# Patient Record
Sex: FEMALE | Race: WHITE | HISPANIC OR LATINO | Employment: UNEMPLOYED | URBAN - METROPOLITAN AREA
[De-identification: names, ages, dates, MRNs, and addresses within clinical notes are randomized per-mention and may not be internally consistent; named-entity substitution may affect disease eponyms.]

---

## 2017-01-31 ENCOUNTER — PATIENT MESSAGE (OUTPATIENT)
Dept: UROLOGY | Facility: CLINIC | Age: 42
End: 2017-01-31

## 2017-02-02 ENCOUNTER — PROCEDURE VISIT (OUTPATIENT)
Dept: UROLOGY | Facility: CLINIC | Age: 42
End: 2017-02-02
Payer: COMMERCIAL

## 2017-02-02 VITALS
HEART RATE: 84 BPM | TEMPERATURE: 99 F | WEIGHT: 143 LBS | DIASTOLIC BLOOD PRESSURE: 74 MMHG | BODY MASS INDEX: 26.31 KG/M2 | SYSTOLIC BLOOD PRESSURE: 120 MMHG | HEIGHT: 62 IN

## 2017-02-02 DIAGNOSIS — N39.8 VOIDING DYSFUNCTION: ICD-10-CM

## 2017-02-02 DIAGNOSIS — R35.0 FREQUENCY OF URINATION: ICD-10-CM

## 2017-02-02 DIAGNOSIS — N31.8 FREQUENCY-URGENCY SYNDROME: Primary | ICD-10-CM

## 2017-02-02 PROCEDURE — 51728 CYSTOMETROGRAM W/VP: CPT | Mod: 26,,, | Performed by: UROLOGY

## 2017-02-02 PROCEDURE — 51797 INTRAABDOMINAL PRESSURE TEST: CPT | Mod: 26,,, | Performed by: UROLOGY

## 2017-02-02 PROCEDURE — 52000 CYSTOURETHROSCOPY: CPT | Mod: 59,,, | Performed by: UROLOGY

## 2017-02-02 PROCEDURE — 51741 ELECTRO-UROFLOWMETRY FIRST: CPT | Mod: 26,51,, | Performed by: UROLOGY

## 2017-02-02 PROCEDURE — 51784 ANAL/URINARY MUSCLE STUDY: CPT | Mod: 26,51,, | Performed by: UROLOGY

## 2017-02-02 RX ORDER — LIDOCAINE HYDROCHLORIDE AND EPINEPHRINE 10; 10 MG/ML; UG/ML
20 INJECTION, SOLUTION INFILTRATION; PERINEURAL ONCE
Status: DISCONTINUED | OUTPATIENT
Start: 2017-02-02 | End: 2017-09-21

## 2017-02-02 RX ORDER — LORAZEPAM 1 MG/1
1 TABLET ORAL ONCE
Qty: 1 TABLET | Refills: 0 | Status: SHIPPED | OUTPATIENT
Start: 2017-02-02 | End: 2017-02-02

## 2017-02-02 RX ORDER — CIPROFLOXACIN 500 MG/1
500 TABLET ORAL 2 TIMES DAILY
Qty: 6 TABLET | Refills: 0 | Status: SHIPPED | OUTPATIENT
Start: 2017-02-02 | End: 2017-02-05

## 2017-02-02 RX ORDER — OXYCODONE AND ACETAMINOPHEN 5; 325 MG/1; MG/1
1 TABLET ORAL EVERY 6 HOURS PRN
Qty: 3 TABLET | Refills: 0 | Status: SHIPPED | OUTPATIENT
Start: 2017-02-02 | End: 2017-08-17

## 2017-02-02 NOTE — MR AVS SNAPSHOT
[facility] U[facility] 4th Floor  [address]  [place] [address]  Phone: [phone]                  Rema Horton   2017 9:30 AM   Procedure visit    Description:  Female : 1975   Provider:  SUDS, UROLOGY   Department:  [facility] Urolog 4th Floor           Diagnoses this Visit        Comments    Frequency-urgency syndrome    -  Primary     Frequency of urination         Voiding dysfunction                To Do List           Future Appointments        Provider Department Dept Phone    3/2/2017 12:45 PM ANUPAMA UROLOGISATU [facility] 4th Floor [phone]      Goals (5 Years of Data)     None      Follow-Up and Disposition     Return in about 4 weeks (around 3/2/2017) for PNE sacral nerve test stimulation.       These Medications        Disp Refills Start End    ciprofloxacin HCl (CIPRO) 500 MG tablet 6 tablet 0 2017    Take 1 tablet (500 mg total) by mouth 2 (two) times daily. - Oral    Pharmacy: [facility]/pharmacy #[address] [place] [address] [place] [address] [address]. Ph #: [phone]       lorazepam (ATIVAN) 1 MG tablet 1 tablet 0 2017    Take 1 tablet (1 mg total) by mouth once. - Oral    Pharmacy: [facility]/pharmacy #[address] [place] [address] [place] [address] [address]. Ph #: [phone]       oxycodone-acetaminophen (PERCOCET) 5-325 mg per tablet 3 tablet 0 2017     Take 1 tablet by mouth every 6 (six) hours as needed for Pain. - Oral    Pharmacy: [facility]/pharmacy #[address] [place] [address] [place] [address] [address]. Ph #: [phone]         [facility] On Call     [facility] On Call Nurse Care Line -  Assistance  Registered nurses in the [facility] Center provide clinical advisement, health education, appointment booking, and other advisory services.  Call for this free service at [phone].             Medications           Message regarding Medications     Verify the changes and/or additions to your medication regime listed below are the same as discussed with your  clinician today.  If any of these changes or additions are incorrect, please notify your healthcare provider.        START taking these NEW medications        Refills    ciprofloxacin HCl (CIPRO) 500 MG tablet 0    Sig: Take 1 tablet (500 mg total) by mouth 2 (two) times daily.    Class: Print    Route: Oral    lorazepam (ATIVAN) 1 MG tablet 0    Sig: Take 1 tablet (1 mg total) by mouth once.    Class: Print    Route: Oral    oxycodone-acetaminophen (PERCOCET) 5-325 mg per tablet 0    Sig: Take 1 tablet by mouth every 6 (six) hours as needed for Pain.    Class: Print    Route: Oral      These medications were administered today        Dose Freq    lidocaine-EPINEPHrine 1%-1:100,000 injection 20 mL 20 mL Once    Si mLs by Other route once.    Class: Normal    Route: Other    sodium bicarbonate 4% injection 2.5 mEq 5 mL Once    Sig: Inject 5 mLs (2.5 mEq total) into the skin once.    Class: Normal    Route: Subcutaneous      STOP taking these medications     tamsulosin (FLOMAX) 0.4 mg Cp24 Take 1 capsule (0.4 mg total) by mouth every evening.           Verify that the below list of medications is an accurate representation of the medications you are currently taking.  If none reported, the list may be blank. If incorrect, please contact your healthcare provider. Carry this list with you in case of emergency.           Current Medications     amitriptyline (ELAVIL) 10 MG tablet Take 1 tablet (10 mg total) by mouth nightly as needed for Insomnia. Take 1 to 2 tablets at night    ASCORBATE CALCIUM (VITAMIN C ORAL) Take by mouth.    baclofen (LIORESAL) 10 MG tablet Take 1 tablet (10 mg total) by mouth 2 (two) times daily.    betamethasone valerate (LUXIQ) 0.12 % foam APPLY TOPICALLY ONCE DAILY TO AFFECTED AREAS ON SCALP AS NEEDED FOR ITCHING    boric acid Gran QHS X 7 DAYS THEN 3 TIMES A WEEK VAGINALLY    buPROPion (WELLBUTRIN XL) 150 MG TB24 tablet Take 1 tablet (150 mg total) by mouth once daily.    ciclopirox 1 %  "shampoo AAA every other day. Let sit x 5 min prior to rinsing.    ciprofloxacin HCl (CIPRO) 500 MG tablet Take 1 tablet (500 mg total) by mouth 2 (two) times daily.    estradiol (ESTRACE) 0.01 % (0.1 mg/gram) vaginal cream Place 1 g vaginally twice a week.    fluocinonide (LIDEX) 0.05 % external solution AAA scalp qd - bid prn pruritus    gabapentin (NEURONTIN) 300 MG capsule TAKE ONE CAPSULE BY MOUTH 3 TIMES A DAY    hydrOXYzine HCl (ATARAX) 25 MG tablet Take 1 tablet (25 mg total) by mouth every evening.    IRON, FERROUS SULFATE, 325 mg (65 mg iron) Tab tablet TAKE 1 TABLET (325 MG TOTAL) BY MOUTH 3 (THREE) TIMES DAILY.    lorazepam (ATIVAN) 1 MG tablet Take 1 tablet (1 mg total) by mouth once.    oxycodone-acetaminophen (PERCOCET) 5-325 mg per tablet Take 1 tablet by mouth every 4 (four) hours as needed.    oxycodone-acetaminophen (PERCOCET) 5-325 mg per tablet Take 1 tablet by mouth every 6 (six) hours as needed for Pain.    oxyquinoline-Na lauryl sulfate (TRIMO-MIRANDA JELLY) 0.025-0.01 % Gel Place 3 g vaginally once daily.    triamcinolone acetonide 0.1% (KENALOG) 0.1 % cream AAA BID x 1-2 wks then prn flares only    valacyclovir (VALTREX) 500 MG tablet TAKE 1 TABLET (500 MG TOTAL) BY MOUTH ONCE DAILY.    VITAMIN E ACETATE (VITAMIN E ORAL) Take by mouth continuous prn.            Clinical Reference Information           Your Vitals Were     BP Pulse Temp Height Weight BMI    120/74 84 99.3 °F (37.4 °C) (Oral) 5' 2" (1.575 m) 64.9 kg (143 lb) 26.16 kg/m2      Blood Pressure          Most Recent Value    BP  120/74      Allergies as of 2/2/2017     Flexeril  [Cyclobenzaprine]    Lodine  [Etodolac]      Immunizations Administered on Date of Encounter - 2/2/2017     None      Orders Placed During Today's Visit      Normal Orders This Visit    Simple urodynamics w/ cysto     Future Labs/Procedures Expected by Expires    PERCUTANEOUS NERVE TRIAL  3/2/2017 5/2/2017      Instructions    SIMPLE URODYNAMIC STUDY (SUDS) & " CYSTOSCOPY  UROLOGY CLINIC DISCHARGE INSTRUCTIONS    You have had a procedure that will require time to properly heal. Follow the instructions you have been given on how to care for yourself once you are home. Below is additional information to help in your recovery.    ACTIVITY  · There are no restrictions in activity. Start doing again the things you did before the procedure.  · You may experience a slight burning sensation. You may notice a small amount of blood in your urine. This will clear up within a day. Call the clinic if this continues beyond 48 hours.    DIET  · Continue your normal diet. You may eat the same foods you ate before your procedure.  · Drink plenty of fluids during the first 24-48 hours following your procedure.    MEDICATIONS  · Resume all other previous medications from your prescribing physician.  · Continue any pre=procedure antibiotics until they are all gone.    SIGNS AND SYMPTOMS TO REPORT TO THE DOCTOR  · Chills or fever greater than 101° F within 24 hours of procedure.  · Changes in urination, such as increased bleeding, foul smell, cloudy urine, or painful urination.  · Call your doctor with any questions or concerns.    For any emergency situation, call 151 immediately or go to your nearest emergency room.    [facility]  [phone]      Instructed by_________________________________          Patient Signature___________________________________                                                                                                                                                                                             If any problems after hours or weekends, you may call [phone] and ask for the urology resident on call.       Language Assistance Services     ATTENTION: Language assistance services are available, free of charge. Please call [phone].      ATENCIÓN: Si habla español, tiene a valles disposición servicios gratuitos de asistencia  lingüística. Dario al [phone].     LAZARA Ý: N?u b?n nói Ti?ng Vi?t, có các d?ch v? h? tr? ngôn ng? mi?n phí dành cho b?n. G?i s? [phone].         [facility] 4th Floor complies with applicable Federal civil rights laws and does not discriminate on the basis of race, color, national origin, age, disability, or sex.

## 2017-02-02 NOTE — PATIENT INSTRUCTIONS
SIMPLE URODYNAMIC STUDY (SUDS) & CYSTOSCOPY  UROLOGY CLINIC DISCHARGE INSTRUCTIONS    You have had a procedure that will require time to properly heal. Follow the instructions you have been given on how to care for yourself once you are home. Below is additional information to help in your recovery.    ACTIVITY  · There are no restrictions in activity. Start doing again the things you did before the procedure.  · You may experience a slight burning sensation. You may notice a small amount of blood in your urine. This will clear up within a day. Call the clinic if this continues beyond 48 hours.    DIET  · Continue your normal diet. You may eat the same foods you ate before your procedure.  · Drink plenty of fluids during the first 24-48 hours following your procedure.    MEDICATIONS  · Resume all other previous medications from your prescribing physician.  · Continue any pre=procedure antibiotics until they are all gone.    SIGNS AND SYMPTOMS TO REPORT TO THE DOCTOR  · Chills or fever greater than 101° F within 24 hours of procedure.  · Changes in urination, such as increased bleeding, foul smell, cloudy urine, or painful urination.  · Call your doctor with any questions or concerns.    For any emergency situation, call 221 immediately or go to your nearest emergency room.    [facility]  [phone]      Instructed by_________________________________          Patient Signature___________________________________                                                                                                                                                                                             If any problems after hours or weekends, you may call [phone] and ask for the urology resident on call.

## 2017-02-03 NOTE — PROCEDURES
Simple urodynamics w/ cysto  Date/Time: 2/2/2017 6:06 PM  Performed by: DANIEL DAVIES.  Authorized by: DANIEL DAVIES.   Comments: Procedure Date:  02/02/2017    Procedure:   Diagnostic Cystourethroscopy   Complex Cystometrogram   Voiding / Pressure Study with Intrarectal Balloon   Complex Uroflow   Electromyogram of Anal Sphincter.     Pre-OP Diagnosis:   Frequency, urgency, incomplete emptying, bladder pain   Post-OP Diagnosis:   Same, abdominal voiding  Anesthesia:   Anesthesia Administered:   Intraurethral instillation of 10 mL 2% lidocaine (Xylocaine) jelly.   Findings:   --- Bladder ---   CYSTOMETROGRAM ( Filling Phase ):   Cystometric Numeric Data:   - First Desire (Sensation): 143 mL at 9cm of water.   - Normal Desire: 171mL at 7 cm of water.   - Strong Desire: 291 mL at 10 cm of water.   - Urgency (Imminent Void) : 405 mL at 15cm of water.   - Maximum Cystometric Capacity: 512 mL.   Compliance:   - normal.   Leak Point Pressure:   - Valsalva ( Abdominal ) Leak Point Pressure: none.   UROFLOW:   - Voided Volume: 494 mL.   - Residual Urine: 20 mL.   - Maximum Flow Rate: 20 mL/sec.   - Flow Pattern: intermittent, abdominal voiding  VOIDING PRESSURE STUDY ( Voiding Phase ):   Detrusor Pressure:   - Maximum Detrusor Pressure: 33cm of water.   - Detrusor Pressure at Maximum Flow: 19 cm of water.   - Detrusor Contraction Characteristics: low amplitude, Sustained contraction(s).   ELECTROMYOGRAM:   - increased tonicity and activity.     ---Diagnostic Cystourethroscopy ---   Normal urethra.   Width of Bladder Neck Opening: Approximately 18 Fr.   Normal bladder with some hyeremic area (?IC).   Normal ureteral orifices bilaterally.       Description of Procedure:   Informed Consent:   - Risks, benefits and alternatives of procedure discussed with   patient and informed consent obtained.   Patient Position:   - Supine.   --- Bladder ---   Prep and Drape:   - Patient prepped and draped in usual sterile fashion using  povidone   iodine (Betadine).   --- Diagnostic Cystourethroscopy ---   Instruments:   - 16 Fr flexible cystoscope with 0 degree lens.   Procedure Details:   - Cystoscope passed under vision into bladder.   - Bladder and urethra examined in their entirety with findings as   above.   --- Urodynamic Studies ---   Procedure Details:   Cystometrogram:   - Catheter(s) passed into the bladder.   - Rectal balloon inserted.   - Catheter(s) connected to infusion medium and to pressure recording   device.   - Infusion Rate: 30 mL / min.   Electromyogram:   - Perineal electromyogram pad placed and connected to electromyogram   recording device.   Equipment:   - Catheters: Double lumen catheter.   - Medium: Liquid.   - Pressure Recording Device: Calibrated electronic equipment.   Complications:   No immediate complications.    CONCLUSIONS:  1. Abdominal voiding  2. IC    Previous meds given did help her with symptoms.  However, Flomax made her so weak.  Recommend to stop flomax.  Continue all IC meds and IC diet.    Recommend InterStim test stimulation to improve her frequency, urgency, abdominal voiding with weak detrusor contraction    Post-OP Plan:   Patient was discharged home in a stable condition.  Medications: cipro  Follow up:  InterStim Therapy    A brochure given.  The consent was obtained.

## 2017-02-28 ENCOUNTER — PATIENT MESSAGE (OUTPATIENT)
Dept: UROLOGY | Facility: CLINIC | Age: 42
End: 2017-02-28

## 2017-03-06 ENCOUNTER — TELEPHONE (OUTPATIENT)
Dept: UROLOGY | Facility: CLINIC | Age: 42
End: 2017-03-06

## 2017-03-09 ENCOUNTER — OFFICE VISIT (OUTPATIENT)
Dept: SURGERY | Facility: CLINIC | Age: 42
End: 2017-03-09
Payer: COMMERCIAL

## 2017-03-09 VITALS
SYSTOLIC BLOOD PRESSURE: 137 MMHG | BODY MASS INDEX: 26.74 KG/M2 | HEART RATE: 101 BPM | DIASTOLIC BLOOD PRESSURE: 91 MMHG | HEIGHT: 62 IN | WEIGHT: 145.31 LBS

## 2017-03-09 DIAGNOSIS — K60.2 FISSURE IN ANO: Primary | ICD-10-CM

## 2017-03-09 PROCEDURE — 99999 PR PBB SHADOW E&M-EST. PATIENT-LVL III: CPT | Mod: PBBFAC,,, | Performed by: COLON & RECTAL SURGERY

## 2017-03-09 PROCEDURE — 1160F RVW MEDS BY RX/DR IN RCRD: CPT | Mod: S$GLB,,, | Performed by: COLON & RECTAL SURGERY

## 2017-03-09 PROCEDURE — 99203 OFFICE O/P NEW LOW 30 MIN: CPT | Mod: S$GLB,,, | Performed by: COLON & RECTAL SURGERY

## 2017-03-09 NOTE — PROGRESS NOTES
Subjective:       Patient ID: Rema Horton is a 42 y.o. female.    Chief Complaint: Hemorrhoids    HPI   42 F who presents to clinic with complaints of rectal bleeding and rectal pain for several years. She notices bright red bleeding with bowel movements 2-3x/week and has a burning pain with bowel movements that last a couple of hours then resolve. She has had issues with constipation for several years, she occasionally has to strain.  She is taking citrucel for her constipation, but only takes it every 4-5 days. She has never had a colonoscopy. No family history of colon or rectal cancer.       Review of Systems   Constitutional: Negative for fatigue, fever and unexpected weight change.   Respiratory: Negative for cough and shortness of breath.    Cardiovascular: Negative for chest pain and leg swelling.   Gastrointestinal: Positive for blood in stool, constipation and rectal pain. Negative for abdominal distention, abdominal pain, diarrhea, nausea and vomiting.       Objective:      Physical Exam   Constitutional: She is oriented to person, place, and time. She appears well-developed and well-nourished. No distress.   Eyes: Conjunctivae and EOM are normal.   Pulmonary/Chest: Effort normal. No respiratory distress.   Abdominal: Soft. She exhibits no distension. There is no tenderness.   Genitourinary:   Genitourinary Comments: Posterior fissure present   Musculoskeletal: Normal range of motion.   Neurological: She is alert and oriented to person, place, and time.   Skin: Skin is warm and dry.   Psychiatric: She has a normal mood and affect. Her behavior is normal.       Assessment:       No diagnosis found.    Plan:         Instructions anal fissure  We Discussed:  Take fiber supplements such as Metamucil, Citrucel, Konsyl,  The goal is 1-2 nonstraining nonloose bowel movements per day.  Sitz Baths or tub soaks three times a day in warm water  We recommend 25-30 grams of fiber daily or reaching the above bowel  movement goal.  Analpram ointment two times  This will cause resolution of fissure in the majority of cases.           RTC 3 weeks  Have seen and examined the patient with the fellow and agree with their plan.  ALENA GREENE

## 2017-03-10 ENCOUNTER — OFFICE VISIT (OUTPATIENT)
Dept: OBSTETRICS AND GYNECOLOGY | Facility: CLINIC | Age: 42
End: 2017-03-10
Payer: COMMERCIAL

## 2017-03-10 VITALS
DIASTOLIC BLOOD PRESSURE: 90 MMHG | HEIGHT: 59 IN | SYSTOLIC BLOOD PRESSURE: 130 MMHG | WEIGHT: 146.19 LBS | BODY MASS INDEX: 29.47 KG/M2

## 2017-03-10 DIAGNOSIS — Z01.419 ENCOUNTER FOR GYNECOLOGICAL EXAMINATION WITHOUT ABNORMAL FINDING: Primary | ICD-10-CM

## 2017-03-10 DIAGNOSIS — N76.3 CHRONIC VULVITIS: ICD-10-CM

## 2017-03-10 DIAGNOSIS — Z12.39 BREAST CANCER SCREENING: ICD-10-CM

## 2017-03-10 LAB
CANDIDA RRNA VAG QL PROBE: NEGATIVE
G VAGINALIS RRNA GENITAL QL PROBE: NEGATIVE
T VAGINALIS RRNA GENITAL QL PROBE: NEGATIVE

## 2017-03-10 PROCEDURE — 99396 PREV VISIT EST AGE 40-64: CPT | Mod: S$GLB,,, | Performed by: OBSTETRICS & GYNECOLOGY

## 2017-03-10 PROCEDURE — 87591 N.GONORRHOEAE DNA AMP PROB: CPT

## 2017-03-10 PROCEDURE — 99999 PR PBB SHADOW E&M-EST. PATIENT-LVL III: CPT | Mod: PBBFAC,,, | Performed by: OBSTETRICS & GYNECOLOGY

## 2017-03-10 PROCEDURE — 87480 CANDIDA DNA DIR PROBE: CPT

## 2017-03-10 RX ORDER — LORAZEPAM 1 MG/1
TABLET ORAL
Refills: 0 | COMMUNITY
Start: 2017-02-08 | End: 2017-06-14

## 2017-03-10 RX ORDER — FLUCONAZOLE 150 MG/1
TABLET ORAL
Qty: 2 TABLET | Refills: 1 | Status: SHIPPED | OUTPATIENT
Start: 2017-03-10 | End: 2017-06-13 | Stop reason: SDUPTHER

## 2017-03-10 NOTE — MR AVS SNAPSHOT
[facility] OB/GYN 5th Floor  [address] [facility] [address]  Phone: [phone]                  Rema Horton   3/10/2017 11:15 AM   Office Visit    Description:  Female : 1975   Provider:  Marichuy Ramirez MD   Department:  [facility] OB/[facility] 5th Floor           Reason for Visit     Vaginal Discharge     Vaginal Pain     Vaginal Itching                To Do List           Future Appointments        Provider Department Dept Phone    3/10/2017 11:15 AM Marichuy Ramirez MD [facility] 5th Floor [phone]    3/30/2017 10:15 AM Isra Bobo MD [facility] [phone]    2017 12:45 PM SUDS, UROLOGY [facility] Urology 4th Floor [phone]    2017 2:40 PM Britta Clark MD [facility] Internal Medicine [phone]      Goals (5 Years of Data)     None      [facility] On [facility]     [facility] On Call Nurse [facility] -  Assistance  Registered nurses in the [facility] On Call Center provide clinical advisement, health education, appointment booking, and other advisory services.  Call for this free service at [phone].             Medications           Message regarding Medications     Verify the changes and/or additions to your medication regime listed below are the same as discussed with your clinician today.  If any of these changes or additions are incorrect, please notify your healthcare provider.             Verify that the below list of medications is an accurate representation of the medications you are currently taking.  If none reported, the list may be blank. If incorrect, please contact your healthcare provider. Carry this list with you in case of emergency.           Current Medications     amitriptyline (ELAVIL) 10 MG tablet Take 1 tablet (10 mg total) by mouth nightly as needed for Insomnia. Take 1 to 2 tablets at night    ASCORBATE CALCIUM (VITAMIN C ORAL) Take by mouth.    betamethasone valerate (LUXIQ) 0.12 % foam APPLY  "TOPICALLY ONCE DAILY TO AFFECTED AREAS ON SCALP AS NEEDED FOR ITCHING    boric acid Gran QHS X 7 DAYS THEN 3 TIMES A WEEK VAGINALLY    ciclopirox 1 % shampoo AAA every other day. Let sit x 5 min prior to rinsing.    estradiol (ESTRACE) 0.01 % (0.1 mg/gram) vaginal cream Place 1 g vaginally twice a week.    gabapentin (NEURONTIN) 300 MG capsule TAKE ONE CAPSULE BY MOUTH 3 TIMES A DAY    hydrOXYzine HCl (ATARAX) 25 MG tablet Take 1 tablet (25 mg total) by mouth every evening.    IRON, FERROUS SULFATE, 325 mg (65 mg iron) Tab tablet TAKE 1 TABLET (325 MG TOTAL) BY MOUTH 3 (THREE) TIMES DAILY.    lorazepam (ATIVAN) 1 MG tablet TAKE 1 TABLET BY MOUTH ONCE    oxycodone-acetaminophen (PERCOCET) 5-325 mg per tablet Take 1 tablet by mouth every 4 (four) hours as needed.    oxycodone-acetaminophen (PERCOCET) 5-325 mg per tablet Take 1 tablet by mouth every 6 (six) hours as needed for Pain.    oxyquinoline-Na lauryl sulfate (TRIMO-MIRANDA JELLY) 0.025-0.01 % Gel Place 3 g vaginally once daily.    triamcinolone acetonide 0.1% (KENALOG) 0.1 % cream AAA BID x 1-2 wks then prn flares only    valacyclovir (VALTREX) 500 MG tablet TAKE 1 TABLET (500 MG TOTAL) BY MOUTH ONCE DAILY.    VITAMIN E ACETATE (VITAMIN E ORAL) Take by mouth continuous prn.     baclofen (LIORESAL) 10 MG tablet Take 1 tablet (10 mg total) by mouth 2 (two) times daily.    buPROPion (WELLBUTRIN XL) 150 MG TB24 tablet Take 1 tablet (150 mg total) by mouth once daily.    fluocinonide (LIDEX) 0.05 % external solution AAA scalp qd - bid prn pruritus           Clinical Reference Information           Your Vitals Were     BP Height Weight Last Period BMI    130/90 4' 11" (1.499 m) 66.3 kg (146 lb 2.6 oz) 02/17/2017 (Approximate) 29.52 kg/m2      Blood Pressure          Most Recent Value    BP  (!)  130/90      Allergies as of 3/10/2017     Flexeril  [Cyclobenzaprine]    Lodine  [Etodolac]      Immunizations Administered on Date of Encounter - 3/10/2017     None    "   Language Assistance Services     ATTENTION: Language assistance services are available, free of charge. Please call [phone].      ATENCIÓN: Si habla madeline, tiene a valles disposición servicios gratuitos de asistencia lingüística. Llame al [phone].     CHÚ Ý: N?u b?n nói Ti?ng Vi?t, có các d?ch v? h? tr? ngôn ng? mi?n phí dành cho b?n. G?i s? [phone].         [facility] 5th [facility] complies with applicable Federal civil rights laws and does not discriminate on the basis of race, color, national origin, age, disability, or sex.

## 2017-03-10 NOTE — PROGRESS NOTES
"CC: Well woman exam    Rema Horton is a 42 y.o. female  presents for a well woman exam.    She has seen Dr Mei who did vaginal testing and GC/ CT which have been negative. Feels something is wrong with her.   Dyspareunia. Discomfort. Having breast pain- followed by urology for urology sx, having fibromyalgia, sees Candace Freeman for breast pain     Continues to have vulvar and vaginal burning.  Very concerned about her vaginal introitus and the skin around the vulva.  Feels it causes significant dysfunction in her life and cannot have relations.                All vaginal and cervical cultures were negative    Past Medical History:   Diagnosis Date    Anemia     Fever blister     Fibromyalgia        Past Surgical History:   Procedure Laterality Date    BREAST BIOPSY Left 2014    fibroadenoma     SECTION  2012    X 2---JST FOR KJB ([facility])    DILATION AND CURETTAGE OF UTERUS      KJB       OB History    Para Term  AB SAB TAB Ectopic Multiple Living   2 2              # Outcome Date GA Lbr Jem/2nd Weight Sex Delivery Anes PTL Lv   2 Para            1 Para                   Family History   Problem Relation Age of Onset    Stroke Maternal Grandfather     Cancer Mother      bladder    Birth defects Neg Hx     Melanoma Neg Hx     Ovarian cancer Neg Hx     Colon cancer Neg Hx     Breast cancer Neg Hx        Social History   Substance Use Topics    Smoking status: Never Smoker    Smokeless tobacco: Never Used    Alcohol use No       BP (!) 130/90  Ht 4' 11" (1.499 m)  Wt 66.3 kg (146 lb 2.6 oz)  LMP 2017 (Approximate)  BMI 29.52 kg/m2    ROS:  GENERAL: Denies weight gain or weight loss. Feeling well overall.   SKIN: Denies rash or lesions.   HEAD: Denies head injury or headache.   NODES: Denies enlarged lymph nodes.   CHEST: Denies chest pain or shortness of breath.   CARDIOVASCULAR: Denies palpitations or left sided chest pain.   ABDOMEN: No abdominal pain, " constipation, diarrhea, nausea, vomiting or rectal bleeding.   URINARY: No frequency, dysuria, hematuria, or burning on urination.  REPRODUCTIVE: See HPI.   BREASTS: The patient performs breast self-examination and denies pain, lumps, or nipple discharge.   HEMATOLOGIC: No easy bruisability or excessive bleeding.  MUSCULOSKELETAL: Denies joint pain or swelling.   NEUROLOGIC: Denies syncope or weakness.   PSYCHIATRIC: Denies depression, anxiety or mood swings.    Physical Exam:    APPEARANCE: Well nourished, well developed, in no acute distress.  AFFECT: WNL, alert and oriented x 3  SKIN: No acne or hirsutism  NECK: Neck symmetric without masses or thyromegaly  NODES: No inguinal, cervical, axillary, or femoral lymph node enlargement  CHEST: Good respiratory effect  ABDOMEN: Soft.  No tenderness or masses.  No hepatosplenomegaly.  No hernias.  BREASTS: Symmetrical, no skin changes or visible lesions.  No palpable masses, nipple discharge bilaterally.  PELVIC: Normal external genitalia without lesions.  Normal hair distribution.  Adequate perineal body, normal urethral meatus.  Vagina moist and well rugated without lesions or discharge.  Cervix pink, without lesions, discharge or tenderness.  No significant cystocele or rectocele.  Bimanual exam shows uterus to be normal size, regular, mobile and nontender.  Adnexa without masses or tenderness.    EXTREMITIES: No edema.    ASSESSMENT AND PLAN  1. Encounter for gynecological examination without abnormal finding     2. Chronic vulvitis  fluconazole (DIFLUCAN) 150 MG Tab    boric acid (BORIC ACID) vaginal suppository    C. trachomatis/N. gonorrhoeae by AMP DNA Cervix    Vaginosis Screen by DNA Probe   3. Breast cancer screening  Mammo Digital Screening Bilat With CAD     Vaginitis prevention including :   a. avoiding feminine products such as deoderant soaps, body wash, bubble bath, douches, scented toilet paper, deoderant tampons or pads, baby or feminine wipes, chronic  pad use, etc. and   b. avoiding other vulvovaginal irritants such as long hot baths, humidity, tight, synthetic clothing, chlorine and sitting around in wet bathing suits and   c. wearing cotton underwear, avoiding thong underwear and no underwear to bed and   d. taking showers instead of baths and use a hair dryer on cool setting afterwards to dry and   e.wearing cotton to exercise and shower immediately after exercise and change clothes and   f. using polyurethane condoms without spermicide if sexually active and symptoms are triggered by intercourse;   Diflucan and Mycolog cream use and potential side effects;   -pelvic rest until symptoms resolve.           Patient was counseled today on A.C.S. Pap guidelines and recommendations for yearly pelvic exams, mammograms and monthly self breast exams; to see her PCP for other health maintenance.     No Follow-up on file.

## 2017-03-13 LAB
C TRACH DNA SPEC QL NAA+PROBE: NEGATIVE
N GONORRHOEA DNA SPEC QL NAA+PROBE: NEGATIVE

## 2017-05-15 ENCOUNTER — HOSPITAL ENCOUNTER (OUTPATIENT)
Dept: RADIOLOGY | Facility: HOSPITAL | Age: 42
Discharge: HOME | End: 2017-05-15
Attending: OBSTETRICS & GYNECOLOGY
Payer: COMMERCIAL

## 2017-05-15 DIAGNOSIS — Z12.39 BREAST CANCER SCREENING: ICD-10-CM

## 2017-05-15 DIAGNOSIS — N63.0 LUMP OR MASS IN BREAST: ICD-10-CM

## 2017-05-15 PROCEDURE — 77066 DX MAMMO INCL CAD BI: CPT | Mod: 26,,, | Performed by: RADIOLOGY

## 2017-05-15 PROCEDURE — 77066 DX MAMMO INCL CAD BI: CPT | Mod: TC

## 2017-05-15 PROCEDURE — 77062 BREAST TOMOSYNTHESIS BI: CPT | Mod: 26,,, | Performed by: RADIOLOGY

## 2017-06-13 DIAGNOSIS — N76.3 CHRONIC VULVITIS: ICD-10-CM

## 2017-06-14 ENCOUNTER — OFFICE VISIT (OUTPATIENT)
Dept: INTERNAL MEDICINE | Facility: CLINIC | Age: 42
End: 2017-06-14
Payer: COMMERCIAL

## 2017-06-14 VITALS
DIASTOLIC BLOOD PRESSURE: 80 MMHG | HEIGHT: 62 IN | WEIGHT: 149.06 LBS | OXYGEN SATURATION: 99 % | SYSTOLIC BLOOD PRESSURE: 110 MMHG | HEART RATE: 98 BPM | BODY MASS INDEX: 27.43 KG/M2

## 2017-06-14 DIAGNOSIS — M79.602 LEFT ARM PAIN: Primary | ICD-10-CM

## 2017-06-14 PROCEDURE — 93005 ELECTROCARDIOGRAM TRACING: CPT | Mod: S$GLB,,, | Performed by: INTERNAL MEDICINE

## 2017-06-14 PROCEDURE — 99999 PR PBB SHADOW E&M-EST. PATIENT-LVL III: CPT | Mod: PBBFAC,,, | Performed by: INTERNAL MEDICINE

## 2017-06-14 PROCEDURE — 93010 ELECTROCARDIOGRAM REPORT: CPT | Mod: S$GLB,,, | Performed by: INTERNAL MEDICINE

## 2017-06-14 PROCEDURE — 99213 OFFICE O/P EST LOW 20 MIN: CPT | Mod: S$GLB,,, | Performed by: INTERNAL MEDICINE

## 2017-06-14 RX ORDER — FLUCONAZOLE 150 MG/1
TABLET ORAL
Qty: 2 TABLET | Refills: 3 | Status: SHIPPED | OUTPATIENT
Start: 2017-06-14 | End: 2017-08-17

## 2017-06-14 NOTE — PROGRESS NOTES
Subjective:       Patient ID: Rema Horton is a 42 y.o. female.    Chief Complaint: Arm Pain (L arm pain x sev mos)    Patient gives detailed story of left arm and chest pain not associated with activity or position.  No assoc SOB, diaphoresis, nausea or vomiting.  Seems very anxious      Arm Pain    There was no injury mechanism. The pain is present in the left forearm, upper left arm and left shoulder (left chest). The quality of the pain is described as shooting, cramping and aching. The pain does not radiate. The pain is at a severity of 5/10. The pain is moderate. The pain has been intermittent since the incident. Associated symptoms include chest pain. Pertinent negatives include no muscle weakness, numbness or tingling. Associated symptoms comments: Cramping in left hand. Nothing aggravates the symptoms. She has tried nothing for the symptoms. The treatment provided no relief.     Review of Systems   Constitutional: Positive for activity change and unexpected weight change. Negative for chills, fatigue and fever.   HENT: Negative for congestion, ear pain, hearing loss, nosebleeds, postnasal drip, rhinorrhea, sinus pressure, sore throat and trouble swallowing.    Eyes: Positive for visual disturbance. Negative for discharge.   Respiratory: Positive for chest tightness. Negative for cough, shortness of breath and wheezing.    Cardiovascular: Positive for chest pain and palpitations.   Gastrointestinal: Positive for constipation. Negative for abdominal pain, blood in stool, diarrhea, nausea and vomiting.   Endocrine: Positive for polydipsia and polyuria.   Genitourinary: Positive for difficulty urinating and menstrual problem. Negative for dysuria, frequency, hematuria and urgency.   Musculoskeletal: Positive for arthralgias, joint swelling and neck pain. Negative for neck stiffness.   Skin: Negative for rash.   Neurological: Positive for weakness and headaches. Negative for dizziness, tingling and numbness.    Psychiatric/Behavioral: Positive for confusion and dysphoric mood. Negative for sleep disturbance. The patient is not nervous/anxious.        Objective:      Physical Exam   Constitutional: She is oriented to person, place, and time. She appears well-developed and well-nourished.  Non-toxic appearance. No distress.   HENT:   Head: Normocephalic and atraumatic.   Right Ear: Tympanic membrane, external ear and ear canal normal.   Left Ear: Tympanic membrane, external ear and ear canal normal.   Eyes: EOM are normal. Pupils are equal, round, and reactive to light. No scleral icterus.   Neck: Normal range of motion. Neck supple. No thyromegaly present.   Cardiovascular: Normal rate, regular rhythm and normal heart sounds.    Pulmonary/Chest: Effort normal and breath sounds normal.   Abdominal: Soft. Bowel sounds are normal. She exhibits no mass. There is no tenderness. There is no rebound.   Musculoskeletal: Normal range of motion.   Lymphadenopathy:     She has no cervical adenopathy.   Neurological: She is alert and oriented to person, place, and time. She has normal reflexes. She displays normal reflexes. No cranial nerve deficit. She exhibits normal muscle tone. Coordination normal.   Skin: Skin is warm and dry.   Psychiatric: She has a normal mood and affect. Her behavior is normal.       Assessment:       1. Left arm pain        Plan:   Rema was seen today for arm pain.    Diagnoses and all orders for this visit:    Left arm pain  -     IN OFFICE EKG 12-LEAD (to Muse)  -     EMG w/ Ultrasound; Future

## 2017-08-15 ENCOUNTER — PATIENT MESSAGE (OUTPATIENT)
Dept: OBSTETRICS AND GYNECOLOGY | Facility: CLINIC | Age: 42
End: 2017-08-15

## 2017-08-17 ENCOUNTER — OFFICE VISIT (OUTPATIENT)
Dept: OBSTETRICS AND GYNECOLOGY | Facility: CLINIC | Age: 42
End: 2017-08-17
Payer: COMMERCIAL

## 2017-08-17 ENCOUNTER — TELEPHONE (OUTPATIENT)
Dept: OBSTETRICS AND GYNECOLOGY | Facility: CLINIC | Age: 42
End: 2017-08-17

## 2017-08-17 VITALS
SYSTOLIC BLOOD PRESSURE: 118 MMHG | DIASTOLIC BLOOD PRESSURE: 22 MMHG | BODY MASS INDEX: 27.14 KG/M2 | WEIGHT: 148.38 LBS

## 2017-08-17 DIAGNOSIS — N76.3 CHRONIC VULVITIS: Primary | ICD-10-CM

## 2017-08-17 LAB
BILIRUB SERPL-MCNC: NEGATIVE MG/DL
BLOOD URINE, POC: NEGATIVE
CANDIDA RRNA VAG QL PROBE: NEGATIVE
COLOR, POC UA: YELLOW
G VAGINALIS RRNA GENITAL QL PROBE: NEGATIVE
GLUCOSE UR QL STRIP: NORMAL
KETONES UR QL STRIP: NEGATIVE
LEUKOCYTE ESTERASE URINE, POC: NEGATIVE
NITRITE, POC UA: NEGATIVE
PH, POC UA: 6
PROTEIN, POC: NORMAL
SPECIFIC GRAVITY, POC UA: 1
T VAGINALIS RRNA GENITAL QL PROBE: NEGATIVE
UROBILINOGEN, POC UA: NORMAL

## 2017-08-17 PROCEDURE — 3008F BODY MASS INDEX DOCD: CPT | Mod: S$GLB,,, | Performed by: NURSE PRACTITIONER

## 2017-08-17 PROCEDURE — 87480 CANDIDA DNA DIR PROBE: CPT

## 2017-08-17 PROCEDURE — 87660 TRICHOMONAS VAGIN DIR PROBE: CPT

## 2017-08-17 PROCEDURE — 99213 OFFICE O/P EST LOW 20 MIN: CPT | Mod: 25,S$GLB,, | Performed by: NURSE PRACTITIONER

## 2017-08-17 PROCEDURE — 99999 PR PBB SHADOW E&M-EST. PATIENT-LVL III: CPT | Mod: PBBFAC,,,

## 2017-08-17 PROCEDURE — 81002 URINALYSIS NONAUTO W/O SCOPE: CPT | Mod: S$GLB,,, | Performed by: NURSE PRACTITIONER

## 2017-08-17 RX ORDER — NYSTATIN AND TRIAMCINOLONE ACETONIDE 100000; 1 [USP'U]/G; MG/G
CREAM TOPICAL
Qty: 30 G | Refills: 1 | Status: SHIPPED | OUTPATIENT
Start: 2017-08-17 | End: 2018-09-12

## 2017-08-17 NOTE — PROGRESS NOTES
"Rema Horton is a 42 y.o. female  presents to urgent GYN care with complaint of vulvovaginal pain and irritation x 1 week. Denies odor and abnormal discharge. Pt states the pain has been occuring intermittently since she had her last child 2-3 years ago. Pt sees Dr. Ramirez for her GYN care. Reports seeing a "white stringy" substance in the toilet. Unsure if it is coming from her vagina. States that she continues to have pain and burning around her introitus and vulvar area. Last affirm in march was negative when she had the same complaints.     Past Medical History:   Diagnosis Date    Anemia     Fever blister     Fibromyalgia      Past Surgical History:   Procedure Laterality Date    BREAST BIOPSY Left 2014    fibroadenoma     SECTION  2012    X 2---JST FOR KJB ([facility])    DILATION AND CURETTAGE OF UTERUS      KJB     Social History   Substance Use Topics    Smoking status: Never Smoker    Smokeless tobacco: Never Used    Alcohol use No     Family History   Problem Relation Age of Onset    Stroke Maternal Grandfather     Cancer Mother      bladder    Birth defects Neg Hx     Melanoma Neg Hx     Ovarian cancer Neg Hx     Colon cancer Neg Hx     Breast cancer Neg Hx      OB History    Para Term  AB Living   2 2       2   SAB TAB Ectopic Multiple Live Births                  # Outcome Date GA Lbr Jem/2nd Weight Sex Delivery Anes PTL Lv   2 Para            1 Para                   BP (!) 118/22 (BP Location: Right arm, Patient Position: Sitting, BP Method: Medium (Manual))   Wt 67.3 kg (148 lb 5.9 oz)   LMP 2017 (Approximate)   BMI 27.14 kg/m²     ROS:  GENERAL: No fever, chills, fatigability or weight loss.  VULVAR: + pain, no lesions and no itching.  VAGINAL: No relaxation, no itching, no discharge, no abnormal bleeding and no lesions. + irritation at introitus  ABDOMEN: No abdominal pain. Denies nausea. Denies vomiting. No diarrhea. No constipation  BREAST: " Denies pain. No lumps. No discharge.  URINARY: No incontinence, no nocturia, no frequency and no dysuria.  CARDIOVASCULAR: No chest pain. No shortness of breath. No leg cramps.  NEUROLOGICAL: No headaches. No vision changes.    PHYSICAL EXAM:  VULVA: normal appearing vulva with no masses, tenderness or lesions   VAGINA: normal appearing vagina with normal color. NO discharge, no lesions   CERVIX: normal appearing cervix without discharge or lesions   UTERUS: uterus is normal size, shape, consistency and nontender   ADNEXA: normal adnexa in size, nontender and no masses    ASSESSMENT and PLAN:    ICD-10-CM ICD-9-CM    1. Vulvovaginitis N76.0 616.10 Vaginosis Screen by DNA Probe      nystatin-triamcinolone (MYCOLOG II) cream      POCT URINE DIPSTICK WITHOUT MICROSCOPE     -urine dip neg  -exam unremarkable  -affirm pending  -rx mycolog  -f/u with PCP for back pain and fatigue    Patient was counseled today on vaginitis prevention including :  a. avoiding feminine products such as deoderant soaps, body wash, bubble bath, douches, scented toilet paper, deoderant tampons or pads, feminine wipes, chronic pad use, etc.  b. avoiding other vulvovaginal irritants such as long hot baths, humidity, tight, synthetic clothing, chlorine and sitting around in wet bathing suits  c. wearing cotton underwear, avoiding thong underwear and no underwear to bed  d. taking showers instead of baths and use a hair dryer on cool setting afterwards to dry  e. wearing cotton to exercise and shower immediately after exercise and change clothes  f. using polyurethane condoms without spermicide if sexually active and symptoms are triggered by intercourse    FOLLOW UP: PRN lack of improvement.

## 2017-09-21 ENCOUNTER — IMMUNIZATION (OUTPATIENT)
Dept: INTERNAL MEDICINE | Facility: CLINIC | Age: 42
End: 2017-09-21
Payer: COMMERCIAL

## 2017-09-21 ENCOUNTER — OFFICE VISIT (OUTPATIENT)
Dept: INTERNAL MEDICINE | Facility: CLINIC | Age: 42
End: 2017-09-21
Payer: COMMERCIAL

## 2017-09-21 VITALS
OXYGEN SATURATION: 98 % | WEIGHT: 151.44 LBS | HEART RATE: 92 BPM | BODY MASS INDEX: 27.87 KG/M2 | HEIGHT: 62 IN | DIASTOLIC BLOOD PRESSURE: 92 MMHG | SYSTOLIC BLOOD PRESSURE: 120 MMHG

## 2017-09-21 DIAGNOSIS — F41.9 ANXIETY AND DEPRESSION: ICD-10-CM

## 2017-09-21 DIAGNOSIS — N64.4 BREAST PAIN: ICD-10-CM

## 2017-09-21 DIAGNOSIS — R45.89 ANXIETY ABOUT HEALTH: ICD-10-CM

## 2017-09-21 DIAGNOSIS — N63.20 LEFT BREAST MASS: ICD-10-CM

## 2017-09-21 DIAGNOSIS — Z00.00 HEALTH CARE MAINTENANCE: Primary | ICD-10-CM

## 2017-09-21 DIAGNOSIS — M79.7 FIBROMYALGIA: ICD-10-CM

## 2017-09-21 DIAGNOSIS — Z23 NEED FOR TDAP VACCINATION: ICD-10-CM

## 2017-09-21 DIAGNOSIS — F32.A ANXIETY AND DEPRESSION: ICD-10-CM

## 2017-09-21 DIAGNOSIS — R41.3 MEMORY DIFFICULTIES: ICD-10-CM

## 2017-09-21 PROCEDURE — 99396 PREV VISIT EST AGE 40-64: CPT | Mod: 25,S$GLB,, | Performed by: INTERNAL MEDICINE

## 2017-09-21 PROCEDURE — 99999 PR PBB SHADOW E&M-EST. PATIENT-LVL III: CPT | Mod: PBBFAC,,, | Performed by: INTERNAL MEDICINE

## 2017-09-21 PROCEDURE — 90471 IMMUNIZATION ADMIN: CPT | Mod: S$GLB,,, | Performed by: INTERNAL MEDICINE

## 2017-09-21 PROCEDURE — 90662 IIV NO PRSV INCREASED AG IM: CPT | Mod: S$GLB,,, | Performed by: INTERNAL MEDICINE

## 2017-09-21 PROCEDURE — 90715 TDAP VACCINE 7 YRS/> IM: CPT | Mod: S$GLB,,, | Performed by: INTERNAL MEDICINE

## 2017-09-21 RX ORDER — DULOXETIN HYDROCHLORIDE 30 MG/1
30 CAPSULE, DELAYED RELEASE ORAL DAILY
Qty: 30 CAPSULE | Refills: 11 | Status: SHIPPED | OUTPATIENT
Start: 2017-09-21 | End: 2018-09-12

## 2017-09-21 NOTE — PROGRESS NOTES
Subjective:       Patient ID: Rema Horton is a 42 y.o. female.    Chief Complaint: Establish Care and Recurrent Skin Infections (pt states she may have a possible lump near the L side of sternum, right under L axilla.)    HPI   Rema Horton is a 42 y.o. female here to establish care and have yearly preventative healthcare visit.   She was previously seen by Dr. Warren.   Previously diagnosed with fibromyalgia. Saw rheum last in 2015. At that time rx gabapentin, baclofen prn. Previously recommended PT for left hip pain but couldn't afford.   Sees Dr. Ramirez in gynecology.     Recently a lot of headaches.     Feels her left outer breast is larger and has a mass. MMG has shown fibroglandular densities. She has had breast biopsy in area near nipple that showed fibroadenoma.     Trouble with her memory. Forgets things frequently.   She saw Dr. Rodriguez and MRI was normal.   Referred to psychiatry for depression and anxiety.     has seen Dr. Robert for frequency-urgency syndrome.   She saw Dr. Bobo for anal fissure.     Recurrent left arm pain with needle like pain. EMG was normal in 2015.     Has tried meditation without help.   She has stopped all of her previous medication because didn't seem helpful. Gabapentin made her too drowsy.     She has a son and daughter. Daughter has special needs.     She previously took lorazepam from Dr. Warren briefly previously for panic attacks.     She is originally from [place].   Review of Systems   Constitutional: Positive for activity change and unexpected weight change.   HENT: Negative for hearing loss, rhinorrhea and trouble swallowing.    Eyes: Positive for visual disturbance. Negative for discharge.   Respiratory: Positive for chest tightness. Negative for wheezing.    Cardiovascular: Positive for palpitations. Negative for chest pain.   Gastrointestinal: Positive for blood in stool and constipation. Negative for diarrhea and vomiting.   Endocrine: Positive for polydipsia and  "polyuria.   Genitourinary: Positive for difficulty urinating and menstrual problem. Negative for dysuria and hematuria.   Musculoskeletal: Positive for arthralgias, joint swelling and neck pain.   Neurological: Positive for weakness and headaches.   Psychiatric/Behavioral: Positive for confusion and dysphoric mood.       Objective:   BP (!) 120/92 (BP Location: Right arm, Patient Position: Sitting, BP Method: Medium (Manual))   Pulse 92   Ht 5' 2" (1.575 m)   Wt 68.7 kg (151 lb 7.3 oz)   LMP  (LMP Unknown)   SpO2 98%   BMI 27.70 kg/m²      Physical Exam   Constitutional: She is oriented to person, place, and time. She appears well-developed and well-nourished. No distress.   HENT:   Head: Normocephalic and atraumatic.   Cardiovascular: Normal rate and regular rhythm.    Pulmonary/Chest: Effort normal. No respiratory distress. She has no wheezes. She has no rales.   Neurological: She is alert and oriented to person, place, and time.   Skin: Skin is warm and dry. She is not diaphoretic.   Left breast with density appreciated in upper lateral quadrant - cystic?   Psychiatric: She has a normal mood and affect. Her behavior is normal.   Anxious, pressured speech       Assessment:       1. Health care maintenance    2. Fibromyalgia    3. Breast pain    4. Memory difficulties    5. Anxiety and depression    6. Anxiety about health    7. Left breast mass    8. Need for Tdap vaccination        Plan:       Rema was seen today for establish care and recurrent skin infections.    Diagnoses and all orders for this visit:    Health care maintenance  -     CBC auto differential; Future  -     Comprehensive metabolic panel; Future  -     Lipid panel; Future  -     TSH; Future  -     Vitamin D; Future  -     Hemoglobin A1c; Future    Fibromyalgia  Anxiety and depression  Anxiety about health  -     duloxetine (CYMBALTA) 30 MG capsule; Take 1 capsule (30 mg total) by mouth once daily.  Start duloxetine    Memory " difficulties  Previously evaluated by neurology    Left breast mass with breast pain  -     US Breast Left Complete; Future    Need for Tdap vaccination  -     (In Office Administered) Tdap Vaccine        flu vaccine today

## 2017-09-22 ENCOUNTER — HOSPITAL ENCOUNTER (OUTPATIENT)
Dept: RADIOLOGY | Facility: HOSPITAL | Age: 42
Discharge: HOME | End: 2017-09-22
Attending: INTERNAL MEDICINE
Payer: COMMERCIAL

## 2017-09-22 DIAGNOSIS — N63.20 LEFT BREAST MASS: ICD-10-CM

## 2017-09-22 PROCEDURE — 76642 ULTRASOUND BREAST LIMITED: CPT | Mod: 26,LT,, | Performed by: RADIOLOGY

## 2017-09-22 PROCEDURE — 76642 ULTRASOUND BREAST LIMITED: CPT | Mod: TC,LT

## 2017-10-01 ENCOUNTER — PATIENT MESSAGE (OUTPATIENT)
Dept: INTERNAL MEDICINE | Facility: CLINIC | Age: 42
End: 2017-10-01

## 2017-10-01 DIAGNOSIS — E55.9 VITAMIN D INSUFFICIENCY: Primary | ICD-10-CM

## 2017-10-01 RX ORDER — VIT C/E/ZN/COPPR/LUTEIN/ZEAXAN 250MG-90MG
1000 CAPSULE ORAL DAILY
Qty: 30 CAPSULE | Refills: 11 | COMMUNITY
Start: 2017-10-01 | End: 2017-10-23 | Stop reason: SDUPTHER

## 2017-10-23 ENCOUNTER — PATIENT MESSAGE (OUTPATIENT)
Dept: INTERNAL MEDICINE | Facility: CLINIC | Age: 42
End: 2017-10-23

## 2017-10-23 DIAGNOSIS — E55.9 VITAMIN D INSUFFICIENCY: ICD-10-CM

## 2017-10-23 RX ORDER — VIT C/E/ZN/COPPR/LUTEIN/ZEAXAN 250MG-90MG
1000 CAPSULE ORAL DAILY
Qty: 30 CAPSULE | Refills: 11 | Status: SHIPPED | OUTPATIENT
Start: 2017-10-23 | End: 2019-03-29

## 2018-09-03 ENCOUNTER — PATIENT MESSAGE (OUTPATIENT)
Dept: INTERNAL MEDICINE | Facility: CLINIC | Age: 43
End: 2018-09-03

## 2018-09-12 ENCOUNTER — LAB VISIT (OUTPATIENT)
Dept: LAB | Facility: HOSPITAL | Age: 43
End: 2018-09-12
Attending: INTERNAL MEDICINE
Payer: COMMERCIAL

## 2018-09-12 ENCOUNTER — OFFICE VISIT (OUTPATIENT)
Dept: INTERNAL MEDICINE | Facility: CLINIC | Age: 43
End: 2018-09-12
Payer: COMMERCIAL

## 2018-09-12 VITALS
OXYGEN SATURATION: 99 % | HEART RATE: 83 BPM | DIASTOLIC BLOOD PRESSURE: 66 MMHG | SYSTOLIC BLOOD PRESSURE: 104 MMHG | WEIGHT: 155 LBS | HEIGHT: 62 IN | BODY MASS INDEX: 28.52 KG/M2

## 2018-09-12 DIAGNOSIS — Z00.00 HEALTH CARE MAINTENANCE: ICD-10-CM

## 2018-09-12 DIAGNOSIS — N30.10 INTERSTITIAL CYSTITIS: ICD-10-CM

## 2018-09-12 DIAGNOSIS — D64.9 NORMOCYTIC ANEMIA: ICD-10-CM

## 2018-09-12 DIAGNOSIS — R35.0 URINARY FREQUENCY: ICD-10-CM

## 2018-09-12 DIAGNOSIS — F41.9 ANXIETY: ICD-10-CM

## 2018-09-12 DIAGNOSIS — M79.662 PAIN IN BOTH LOWER LEGS: ICD-10-CM

## 2018-09-12 DIAGNOSIS — Z12.31 ENCOUNTER FOR SCREENING MAMMOGRAM FOR BREAST CANCER: ICD-10-CM

## 2018-09-12 DIAGNOSIS — N63.32 UNSPECIFIED LUMP IN AXILLARY TAIL OF THE LEFT BREAST: Primary | ICD-10-CM

## 2018-09-12 DIAGNOSIS — M79.7 FIBROMYALGIA: ICD-10-CM

## 2018-09-12 DIAGNOSIS — M79.661 PAIN IN BOTH LOWER LEGS: ICD-10-CM

## 2018-09-12 LAB
25(OH)D3+25(OH)D2 SERPL-MCNC: 26 NG/ML
ALBUMIN SERPL BCP-MCNC: 3.9 G/DL
ALP SERPL-CCNC: 55 U/L
ALT SERPL W/O P-5'-P-CCNC: 9 U/L
ANION GAP SERPL CALC-SCNC: 4 MMOL/L
ANISOCYTOSIS BLD QL SMEAR: SLIGHT
AST SERPL-CCNC: 15 U/L
BASOPHILS # BLD AUTO: 0.01 K/UL
BASOPHILS NFR BLD: 0.2 %
BILIRUB SERPL-MCNC: 0.3 MG/DL
BILIRUB SERPL-MCNC: ABNORMAL MG/DL
BILIRUB UR QL STRIP: NEGATIVE
BLOOD, POC UA: NEGATIVE
BUN SERPL-MCNC: 9 MG/DL
CALCIUM SERPL-MCNC: 8.9 MG/DL
CHLORIDE SERPL-SCNC: 106 MMOL/L
CLARITY UR REFRACT.AUTO: CLEAR
CO2 SERPL-SCNC: 28 MMOL/L
COLOR UR AUTO: NORMAL
CREAT SERPL-MCNC: 0.7 MG/DL
DIFFERENTIAL METHOD: ABNORMAL
EOSINOPHIL # BLD AUTO: 0 K/UL
EOSINOPHIL NFR BLD: 0.7 %
ERYTHROCYTE [DISTWIDTH] IN BLOOD BY AUTOMATED COUNT: 16.7 %
EST. GFR  (AFRICAN AMERICAN): >60 ML/MIN/1.73 M^2
EST. GFR  (NON AFRICAN AMERICAN): >60 ML/MIN/1.73 M^2
FERRITIN SERPL-MCNC: 2 NG/ML
FOLATE SERPL-MCNC: 16.4 NG/ML
GLUCOSE SERPL-MCNC: 85 MG/DL
GLUCOSE UR QL STRIP: NEGATIVE
GLUCOSE UR QL STRIP: NORMAL
HCT VFR BLD AUTO: 29.5 %
HGB BLD-MCNC: 8.5 G/DL
HGB UR QL STRIP: NEGATIVE
HYPOCHROMIA BLD QL SMEAR: ABNORMAL
IRON SERPL-MCNC: 12 UG/DL
KETONES UR QL STRIP: NEGATIVE
KETONES UR QL STRIP: NEGATIVE
LEUKOCYTE ESTERASE UR QL STRIP: NEGATIVE
LEUKOCYTE ESTERASE URINE, POC: ABNORMAL
LYMPHOCYTES # BLD AUTO: 1.6 K/UL
LYMPHOCYTES NFR BLD: 36.4 %
MCH RBC QN AUTO: 22 PG
MCHC RBC AUTO-ENTMCNC: 28.8 G/DL
MCV RBC AUTO: 76 FL
MONOCYTES # BLD AUTO: 0.5 K/UL
MONOCYTES NFR BLD: 10 %
NEUTROPHILS # BLD AUTO: 2.4 K/UL
NEUTROPHILS NFR BLD: 52.7 %
NITRITE UR QL STRIP: NEGATIVE
NITRITE, POC UA: NEGATIVE
PH UR STRIP: 6 [PH] (ref 5–8)
PH, POC UA: 5
PLATELET # BLD AUTO: 273 K/UL
PMV BLD AUTO: 10 FL
POIKILOCYTOSIS BLD QL SMEAR: SLIGHT
POTASSIUM SERPL-SCNC: 3.9 MMOL/L
PROT SERPL-MCNC: 7.1 G/DL
PROT UR QL STRIP: NEGATIVE
PROTEIN, POC: ABNORMAL
RBC # BLD AUTO: 3.87 M/UL
SATURATED IRON: 3 %
SODIUM SERPL-SCNC: 138 MMOL/L
SP GR UR STRIP: 1 (ref 1–1.03)
SPECIFIC GRAVITY, POC UA: 1.01
TOTAL IRON BINDING CAPACITY: 389 UG/DL
TRANSFERRIN SERPL-MCNC: 263 MG/DL
TSH SERPL DL<=0.005 MIU/L-ACNC: 2.11 UIU/ML
URN SPEC COLLECT METH UR: NORMAL
UROBILINOGEN UR STRIP-ACNC: NEGATIVE EU/DL
UROBILINOGEN, POC UA: NORMAL
VIT B12 SERPL-MCNC: 739 PG/ML
WBC # BLD AUTO: 4.48 K/UL

## 2018-09-12 PROCEDURE — 80053 COMPREHEN METABOLIC PANEL: CPT

## 2018-09-12 PROCEDURE — 82728 ASSAY OF FERRITIN: CPT

## 2018-09-12 PROCEDURE — 82607 VITAMIN B-12: CPT

## 2018-09-12 PROCEDURE — 82746 ASSAY OF FOLIC ACID SERUM: CPT

## 2018-09-12 PROCEDURE — 36415 COLL VENOUS BLD VENIPUNCTURE: CPT

## 2018-09-12 PROCEDURE — 84443 ASSAY THYROID STIM HORMONE: CPT

## 2018-09-12 PROCEDURE — 3008F BODY MASS INDEX DOCD: CPT | Mod: CPTII,S$GLB,, | Performed by: INTERNAL MEDICINE

## 2018-09-12 PROCEDURE — 81003 URINALYSIS AUTO W/O SCOPE: CPT

## 2018-09-12 PROCEDURE — 87077 CULTURE AEROBIC IDENTIFY: CPT

## 2018-09-12 PROCEDURE — 87086 URINE CULTURE/COLONY COUNT: CPT

## 2018-09-12 PROCEDURE — 85025 COMPLETE CBC W/AUTO DIFF WBC: CPT

## 2018-09-12 PROCEDURE — 87186 SC STD MICRODIL/AGAR DIL: CPT

## 2018-09-12 PROCEDURE — 81000 URINALYSIS NONAUTO W/SCOPE: CPT | Mod: S$GLB,,, | Performed by: INTERNAL MEDICINE

## 2018-09-12 PROCEDURE — 82306 VITAMIN D 25 HYDROXY: CPT

## 2018-09-12 PROCEDURE — 99214 OFFICE O/P EST MOD 30 MIN: CPT | Mod: 25,S$GLB,, | Performed by: INTERNAL MEDICINE

## 2018-09-12 PROCEDURE — 87088 URINE BACTERIA CULTURE: CPT

## 2018-09-12 PROCEDURE — 83540 ASSAY OF IRON: CPT

## 2018-09-12 PROCEDURE — 99999 PR PBB SHADOW E&M-EST. PATIENT-LVL IV: CPT | Mod: PBBFAC,,, | Performed by: INTERNAL MEDICINE

## 2018-09-12 RX ORDER — CITALOPRAM 10 MG/1
10 TABLET ORAL DAILY
Qty: 30 TABLET | Refills: 11 | Status: SHIPPED | OUTPATIENT
Start: 2018-09-12 | End: 2018-12-04 | Stop reason: ALTCHOICE

## 2018-09-12 NOTE — PROGRESS NOTES
Subjective:       Patient ID: Rema Horton is a 43 y.o. female.    Chief Complaint: Urinary Frequency (pt feels she has to urinate but cannot.); Mass (lump/mass in outer R foot. ); and Leg Pain (burning sensations in [place] legs. )    Urinary Frequency    Associated symptoms include frequency. Pertinent negatives include no hematuria or weight loss.   Mass   Associated symptoms include abdominal pain, chest pain, headaches, numbness and weakness. Pertinent negatives include no fever.   Leg Pain    Associated symptoms include numbness and tingling.   Back Pain   This is a chronic problem. The current episode started more than 1 year ago. The problem has been gradually worsening since onset. The pain is present in the thoracic spine and costovertebral angle. The quality of the pain is described as burning, cramping, shooting and stabbing. The pain radiates to the left foot and right foot. The pain is at a severity of 10/10. The pain is severe. The pain is the same all the time. The symptoms are aggravated by position, stress and twisting. Stiffness is present in the morning and at night. Associated symptoms include abdominal pain, bladder incontinence, chest pain, headaches, leg pain, numbness, paresis, paresthesias, pelvic pain, tingling and weakness. Pertinent negatives include no bowel incontinence, dysuria, fever, perianal numbness or weight loss. Risk factors include history of steroid use, lack of exercise and obesity. She has tried analgesics, NSAIDs, bed rest and home exercises for the symptoms. The treatment provided moderate relief.      42 yo F here for urgent eval of bilateral burning leg pains, mass of right foot, urinary frequency.    It has been a year since she last saw me.    Hx of frequency-urgency syndrome and incomplete bladder emptying.  Saw urology in 2016. dx Interstitial cystitis. Recommended InterStim test stimulation. This was in 3/2017.    Previously with nerve like pain of left arm. EMG 2015  "nl.     She was having pelvic pain, felt like she was pregnant. Is not pregnant. sympotms have resolved. She is schedule with gynecology.   Severe pain again last month. Describes as coming from low back wrapping around to bilateral sides. Felt like twisting in her insides.     Trouble with vision. Wearing reading glasses.     Feels like feet are retaining water. Legs feel tired and painful.  Two weeks of swelling on ankles.     Is eating lots of ice.  Last labs w normocytic anemia.     Not taking duloxetine. Trouble falling asleep.      Review of Systems   Constitutional: Negative for fever and weight loss.   Cardiovascular: Positive for chest pain.   Gastrointestinal: Positive for abdominal pain. Negative for bowel incontinence.   Genitourinary: Positive for bladder incontinence, frequency and pelvic pain. Negative for dysuria and hematuria.   Musculoskeletal: Positive for back pain.   Neurological: Positive for tingling, weakness, numbness, headaches and paresthesias.       Objective:   /66 (BP Location: Left arm, Patient Position: Sitting, BP Method: Large (Manual))   Pulse 83   Ht 5' 2" (1.575 m)   Wt 70.3 kg (155 lb)   SpO2 99%   BMI 28.35 kg/m²      Physical Exam   Constitutional: She is oriented to person, place, and time. She appears well-developed and well-nourished.   HENT:   Head: Normocephalic and atraumatic.   Eyes: Conjunctivae and EOM are normal. Pupils are equal, round, and reactive to light.   Neck: Neck supple. No thyromegaly present.   Cardiovascular: Normal rate, regular rhythm and normal heart sounds.   No murmur heard.  Pulmonary/Chest: Effort normal and breath sounds normal. No respiratory distress. She has no wheezes.       Abdominal: Soft. Bowel sounds are normal. She exhibits no distension. There is no tenderness.   Musculoskeletal: Normal range of motion.   Neurological: She is alert and oriented to person, place, and time.   Skin: Skin is warm and dry. No rash noted. "   Psychiatric: She has a normal mood and affect. Judgment and thought content normal.   Pressured speech, pleasant, no si/hi, no a/v hallucinations   Vitals reviewed.      Assessment:       1. Unspecified lump in axillary tail of the left breast    2. Urinary frequency    3. Interstitial cystitis    4. Pain in both lower legs    5. Encounter for screening mammogram for breast cancer    6. Normocytic anemia    7. Health care maintenance    8. Anxiety    9. Fibromyalgia        Plan:       Rema was seen today for urinary frequency, mass and leg pain.    Diagnoses and all orders for this visit:    Unspecified lump in axillary tail of the left breast  -     US Breast Left Limited; Future  Encounter for screening mammogram for breast cancer  -     Mammo Digital Screening Bilat with Tomosynthesis_CAD; Future    Urinary frequency  -     POCT Urinalysis  -     Urinalysis  -     Urine culture    Interstitial cystitis  -     Ambulatory consult to Urology    Normocytic anemia  -     CBC auto differential; Future  -     Vitamin D; Future  -     Ferritin; Future  -     Iron and TIBC; Future  -     Vitamin B12; Future  -     Folate; Future    Health care maintenance  -     CBC auto differential; Future  -     Comprehensive metabolic panel; Future  -     TSH; Future    Anxiety  Fibromyalgia  -     citalopram (CELEXA) 10 MG tablet; Take 1 tablet (10 mg total) by mouth once daily.          Answers for HPI/ROS submitted by the patient on 9/10/2018   Back pain  genital pain: Yes

## 2018-09-12 NOTE — PATIENT INSTRUCTIONS
Schedule urology - Dr. Robert  Urine sent to lab  Mammogram  Breast ultrasound  Schedule labs  Back to see me in 3 months.

## 2018-09-12 NOTE — PROGRESS NOTES
She was having pelvic pain, felt like she was pregnant. Is not pregnant. sympotms have resolved. She is schedule with gynecology.     Trouble with vision. Wearing reading glasses.       Answers for HPI/ROS submitted by the patient on 9/10/2018   Back pain  Chronicity: chronic  Onset: more than 1 year ago  Progression since onset: gradually worsening  Pain location: thoracic spine, costovertebral angle  Pain quality: burning, cramping, shooting, stabbing  Radiates to: left foot, right foot  Pain - numeric: 10/10  Pain is: the same all the time  Aggravated by: position, stress, twisting  Stiffness is present: in the morning, at night  abdominal pain: Yes  bladder incontinence: Yes  bowel incontinence: No  chest pain: Yes  dysuria: No  fever: No  headaches: Yes  leg pain: Yes  numbness: Yes  paresis: Yes  paresthesias: Yes  pelvic pain: Yes  perianal numbness: No  tingling: Yes  weakness: Yes  weight loss: No  genital pain: Yes  hematuria: No  Risk factors: history of steroid use, lack of exercise, obesity  Pain severity: severe  Treatments tried: analgesics, NSAIDs, bed rest, home exercises  Improvement on treatment: moderate

## 2018-09-13 ENCOUNTER — PATIENT MESSAGE (OUTPATIENT)
Dept: INTERNAL MEDICINE | Facility: CLINIC | Age: 43
End: 2018-09-13

## 2018-09-13 DIAGNOSIS — D50.9 IRON DEFICIENCY ANEMIA, UNSPECIFIED IRON DEFICIENCY ANEMIA TYPE: Primary | ICD-10-CM

## 2018-09-14 RX ORDER — FERROUS SULFATE 325(65) MG
325 TABLET ORAL
Refills: 0 | COMMUNITY
Start: 2018-09-14 | End: 2019-03-29

## 2018-09-15 ENCOUNTER — PATIENT MESSAGE (OUTPATIENT)
Dept: INTERNAL MEDICINE | Facility: CLINIC | Age: 43
End: 2018-09-15

## 2018-09-17 ENCOUNTER — PATIENT MESSAGE (OUTPATIENT)
Dept: INTERNAL MEDICINE | Facility: CLINIC | Age: 43
End: 2018-09-17

## 2018-09-17 LAB — BACTERIA UR CULT: NORMAL

## 2018-09-20 ENCOUNTER — HOSPITAL ENCOUNTER (OUTPATIENT)
Dept: RADIOLOGY | Facility: HOSPITAL | Age: 43
Discharge: HOME | End: 2018-09-20
Attending: INTERNAL MEDICINE
Payer: COMMERCIAL

## 2018-09-20 VITALS — WEIGHT: 155 LBS | BODY MASS INDEX: 28.52 KG/M2 | HEIGHT: 62 IN

## 2018-09-20 DIAGNOSIS — N63.0 BREAST MASS IN FEMALE: Primary | ICD-10-CM

## 2018-09-20 DIAGNOSIS — N63.32 UNSPECIFIED LUMP IN AXILLARY TAIL OF THE LEFT BREAST: ICD-10-CM

## 2018-09-20 DIAGNOSIS — Z12.31 ENCOUNTER FOR SCREENING MAMMOGRAM FOR BREAST CANCER: ICD-10-CM

## 2018-09-20 DIAGNOSIS — N63.0 BREAST LUMP: ICD-10-CM

## 2018-09-20 PROCEDURE — 76642 ULTRASOUND BREAST LIMITED: CPT | Mod: 26,LT,, | Performed by: RADIOLOGY

## 2018-09-20 PROCEDURE — 76642 ULTRASOUND BREAST LIMITED: CPT | Mod: TC,PO,LT

## 2018-09-20 PROCEDURE — 77066 DX MAMMO INCL CAD BI: CPT | Mod: 26,,, | Performed by: RADIOLOGY

## 2018-09-20 PROCEDURE — 77062 BREAST TOMOSYNTHESIS BI: CPT | Mod: 26,,, | Performed by: RADIOLOGY

## 2018-09-20 PROCEDURE — 77062 BREAST TOMOSYNTHESIS BI: CPT | Mod: TC,PO

## 2018-10-16 ENCOUNTER — PATIENT MESSAGE (OUTPATIENT)
Dept: INTERNAL MEDICINE | Facility: CLINIC | Age: 43
End: 2018-10-16

## 2018-10-16 DIAGNOSIS — R70.0 ESR RAISED: Primary | ICD-10-CM

## 2018-10-18 ENCOUNTER — LAB VISIT (OUTPATIENT)
Dept: LAB | Facility: HOSPITAL | Age: 43
End: 2018-10-18
Attending: INTERNAL MEDICINE
Payer: COMMERCIAL

## 2018-10-18 DIAGNOSIS — R70.0 ESR RAISED: ICD-10-CM

## 2018-10-18 LAB — ERYTHROCYTE [SEDIMENTATION RATE] IN BLOOD BY WESTERGREN METHOD: 15 MM/HR

## 2018-10-18 PROCEDURE — 85652 RBC SED RATE AUTOMATED: CPT

## 2018-10-18 PROCEDURE — 36415 COLL VENOUS BLD VENIPUNCTURE: CPT

## 2018-10-30 ENCOUNTER — TELEPHONE (OUTPATIENT)
Dept: OBSTETRICS AND GYNECOLOGY | Facility: CLINIC | Age: 43
End: 2018-10-30

## 2018-10-30 NOTE — TELEPHONE ENCOUNTER
----- Message from Kj Douglass MD sent at 10/29/2018 12:20 PM CDT -----  Hey, you're seeing her and she's the mom of one of my delayed patients - she mentioned that she's anemic and bleeds during periods but OCP option wasn't given to her - do you plan on it?

## 2018-10-30 NOTE — TELEPHONE ENCOUNTER
Please schedule appt for patient to review her bleeding and anemia concerns. We can discuss treatment options for the patient   [place]

## 2018-11-02 ENCOUNTER — TELEPHONE (OUTPATIENT)
Dept: OBSTETRICS AND GYNECOLOGY | Facility: CLINIC | Age: 43
End: 2018-11-02

## 2018-11-06 ENCOUNTER — PATIENT MESSAGE (OUTPATIENT)
Dept: OBSTETRICS AND GYNECOLOGY | Facility: CLINIC | Age: 43
End: 2018-11-06

## 2018-11-09 ENCOUNTER — OFFICE VISIT (OUTPATIENT)
Dept: OBSTETRICS AND GYNECOLOGY | Facility: CLINIC | Age: 43
End: 2018-11-09
Payer: COMMERCIAL

## 2018-11-09 VITALS
DIASTOLIC BLOOD PRESSURE: 70 MMHG | WEIGHT: 152.25 LBS | SYSTOLIC BLOOD PRESSURE: 110 MMHG | HEIGHT: 62 IN | BODY MASS INDEX: 28.02 KG/M2

## 2018-11-09 DIAGNOSIS — N89.8 VAGINAL DISCHARGE: ICD-10-CM

## 2018-11-09 DIAGNOSIS — N92.4 EXCESSIVE BLEEDING IN PREMENOPAUSAL PERIOD: Primary | ICD-10-CM

## 2018-11-09 DIAGNOSIS — N90.89 VULVAR IRRITATION: ICD-10-CM

## 2018-11-09 PROCEDURE — 99214 OFFICE O/P EST MOD 30 MIN: CPT | Mod: S$GLB,,, | Performed by: OBSTETRICS & GYNECOLOGY

## 2018-11-09 PROCEDURE — 87660 TRICHOMONAS VAGIN DIR PROBE: CPT

## 2018-11-09 PROCEDURE — 99999 PR PBB SHADOW E&M-EST. PATIENT-LVL IV: CPT | Mod: PBBFAC,,, | Performed by: OBSTETRICS & GYNECOLOGY

## 2018-11-09 PROCEDURE — 3008F BODY MASS INDEX DOCD: CPT | Mod: CPTII,S$GLB,, | Performed by: OBSTETRICS & GYNECOLOGY

## 2018-11-09 PROCEDURE — 87491 CHLMYD TRACH DNA AMP PROBE: CPT

## 2018-11-09 NOTE — PROGRESS NOTES
"CC: menorrhagia  anemia    Rema Horton is a 43 y.o. female  presents for menorrhagia and symptomatic anemia.  She is still deciding if she wants to consider future childbearing H/H-  .   Having back pain and vaginal pain    Past Medical History:   Diagnosis Date    Anemia     Fever blister     Fibromyalgia        Past Surgical History:   Procedure Laterality Date    BREAST BIOPSY Left 2014    fibroadenoma     SECTION  2012    X 2---JST FOR KJB ([facility])    DILATION AND CURETTAGE OF UTERUS      KJB    LAPAROSCOPY-DIAGNOSTIC N/A 2016    Performed by Richy Mei Jr., MD at [facility] OR       OB History    Para Term  AB Living   3 3 3     2   SAB TAB Ectopic Multiple Live Births                  # Outcome Date GA Lbr Jem/2nd Weight Sex Delivery Anes PTL Lv   3 Term            2 Term            1 Term                   Family History   Problem Relation Age of Onset    Stroke Maternal Grandfather     Cancer Mother         bladder (not sure if was actually gynecologic)    No Known Problems Father     No Known Problems Son     Chromosomal disorder Daughter     Melanoma Neg Hx     Ovarian cancer Neg Hx     Colon cancer Neg Hx     Breast cancer Neg Hx        Social History     Tobacco Use    Smoking status: Never Smoker    Smokeless tobacco: Never Used   Substance Use Topics    Alcohol use: No     Alcohol/week: 0.0 oz    Drug use: No       /70   Ht 5' 2" (1.575 m)   Wt 69 kg (152 lb 3.6 oz)   LMP 10/25/2018   BMI 27.84 kg/m²     ROS:  GENERAL: Denies weight gain or weight loss. Feeling well overall.   SKIN: Denies rash or lesions.   HEAD: Denies head injury or headache.   NODES: Denies enlarged lymph nodes.   CHEST: Denies chest pain or shortness of breath.   CARDIOVASCULAR: Denies palpitations or left sided chest pain.   ABDOMEN: No abdominal pain, constipation, diarrhea, nausea, vomiting or rectal bleeding.   URINARY: No frequency, dysuria, hematuria, " or burning on urination.  REPRODUCTIVE: See HPI.   BREASTS: The patient performs breast self-examination and denies pain, lumps, or nipple discharge.   HEMATOLOGIC: No easy bruisability or excessive bleeding.  MUSCULOSKELETAL: Denies joint pain or swelling.   NEUROLOGIC: Denies syncope or weakness.   PSYCHIATRIC: Denies depression, anxiety or mood swings.    Physical Exam:    APPEARANCE: Well nourished, well developed, in no acute distress.  AFFECT: WNL, alert and oriented x 3  SKIN: No acne or hirsutism  NECK: Neck symmetric without masses or thyromegaly  NODES: No inguinal, cervical, axillary, or femoral lymph node enlargement  CHEST: Good respiratory effect  ABDOMEN: Soft.  No tenderness or masses.  No hepatosplenomegaly.  No hernias.  PELVIC: Normal external genitalia without lesions.  Normal hair distribution.  Adequate perineal body, normal urethral meatus.  Vagina moist and well rugated without lesions or discharge.  Cervix pink, without lesions, discharge or tenderness.  No significant cystocele or rectocele.  Bimanual exam shows uterus to be normal size, regular, mobile and nontender.  Adnexa without masses or tenderness.    EXTREMITIES: No edema.    ASSESSMENT AND PLAN  1. Excessive bleeding in premenopausal period  US Pelvis Comp with Transvag NON-OB (xpd    C. trachomatis/N. gonorrhoeae by AMP DNA   2. Vaginal discharge  US Pelvis Comp with Transvag NON-OB (xpd    Vaginosis Screen by DNA Probe    C. trachomatis/N. gonorrhoeae by AMP DNA   3. Vulvar irritation  US Pelvis Comp with Transvag NON-OB (xpd    C. trachomatis/N. gonorrhoeae by AMP DNA     Discussed possible treatment options  May consider endometrial abalation but needs to decide if she wants to have any more children    Patient was counseled today on A.C.S. Pap guidelines and recommendations for yearly pelvic exams, mammograms and monthly self breast exams; to see her PCP for other health maintenance.     Follow-up in about 4 weeks (around  12/7/2018), or if symptoms worsen or fail to improve.  F/U after US to consider possible medical vs surgical management and EMBx

## 2018-11-10 LAB
C TRACH DNA SPEC QL NAA+PROBE: NOT DETECTED
N GONORRHOEA DNA SPEC QL NAA+PROBE: NOT DETECTED

## 2018-11-21 ENCOUNTER — HOSPITAL ENCOUNTER (OUTPATIENT)
Dept: RADIOLOGY | Facility: HOSPITAL | Age: 43
Discharge: HOME | End: 2018-11-21
Attending: OBSTETRICS & GYNECOLOGY
Payer: COMMERCIAL

## 2018-11-21 DIAGNOSIS — N92.4 EXCESSIVE BLEEDING IN PREMENOPAUSAL PERIOD: ICD-10-CM

## 2018-11-21 DIAGNOSIS — N90.89 VULVAR IRRITATION: ICD-10-CM

## 2018-11-21 DIAGNOSIS — N89.8 VAGINAL DISCHARGE: ICD-10-CM

## 2018-11-21 PROCEDURE — 76830 TRANSVAGINAL US NON-OB: CPT | Mod: 26,,, | Performed by: RADIOLOGY

## 2018-11-21 PROCEDURE — 76830 TRANSVAGINAL US NON-OB: CPT | Mod: TC

## 2018-11-21 PROCEDURE — 76856 US EXAM PELVIC COMPLETE: CPT | Mod: 26,,, | Performed by: RADIOLOGY

## 2018-11-21 PROCEDURE — 76856 US EXAM PELVIC COMPLETE: CPT | Mod: TC

## 2018-11-30 ENCOUNTER — PATIENT MESSAGE (OUTPATIENT)
Dept: INTERNAL MEDICINE | Facility: CLINIC | Age: 43
End: 2018-11-30

## 2018-11-30 DIAGNOSIS — M79.7 FIBROMYALGIA: Primary | ICD-10-CM

## 2018-12-04 ENCOUNTER — PATIENT MESSAGE (OUTPATIENT)
Dept: OBSTETRICS AND GYNECOLOGY | Facility: CLINIC | Age: 43
End: 2018-12-04

## 2018-12-04 RX ORDER — DULOXETIN HYDROCHLORIDE 30 MG/1
30 CAPSULE, DELAYED RELEASE ORAL DAILY
Qty: 60 CAPSULE | Refills: 5 | Status: SHIPPED | OUTPATIENT
Start: 2018-12-04 | End: 2019-03-21

## 2018-12-06 ENCOUNTER — PROCEDURE VISIT (OUTPATIENT)
Dept: OBSTETRICS AND GYNECOLOGY | Facility: CLINIC | Age: 43
End: 2018-12-06
Payer: COMMERCIAL

## 2018-12-06 VITALS
WEIGHT: 154 LBS | SYSTOLIC BLOOD PRESSURE: 114 MMHG | HEIGHT: 62 IN | BODY MASS INDEX: 28.34 KG/M2 | DIASTOLIC BLOOD PRESSURE: 72 MMHG

## 2018-12-06 DIAGNOSIS — N93.8 DUB (DYSFUNCTIONAL UTERINE BLEEDING): Primary | ICD-10-CM

## 2018-12-06 DIAGNOSIS — N92.4 EXCESSIVE BLEEDING IN PREMENOPAUSAL PERIOD: ICD-10-CM

## 2018-12-06 DIAGNOSIS — D21.9 FIBROIDS: ICD-10-CM

## 2018-12-06 DIAGNOSIS — N94.6 DYSMENORRHEA: ICD-10-CM

## 2018-12-06 PROCEDURE — 58100 BIOPSY OF UTERUS LINING: CPT | Mod: S$GLB,,, | Performed by: OBSTETRICS & GYNECOLOGY

## 2018-12-06 PROCEDURE — 88305 TISSUE EXAM BY PATHOLOGIST: CPT | Performed by: PATHOLOGY

## 2018-12-06 PROCEDURE — 88305 TISSUE EXAM BY PATHOLOGIST: CPT | Mod: 26,,, | Performed by: PATHOLOGY

## 2018-12-06 NOTE — PROCEDURES
Biopsy (Gynecological)  Date/Time: 12/6/2018 10:24 AM  Performed by: Marichuy Ramirez MD  Authorized by: Marichuy Ramirez MD       Here for endometrial biopsy  Risks, benefits and alternatives to procedure discussed.        Patient presents for endometrial biopsy.      The risks, benefits and alternatives to procedure was discussed, and will also determine the plan of care, treatments available, the minimal risk of bleeding and infection with endometrial biopsy,and she agrees to proceed.      UPT is negative    ENDOMETRIAL BIOPSY     The cervix was swabbed with betadine times 3.  The anterior lip of the cervix was grasped with a single toothed tenaculum.  A uterine pipelle was inserted into the uterus to a level of 8 cm.  The patient tolerated with above procedure WELL.     All collected specimens sent to pathology for histologic analysis.    Post-biopsy counseling:  The patient was instructed to manage post endometrial biopsy cramping with NSAIDs or Tylenol, or with a prescription per the medication card.  Avoid intercourse, douching, or tampons in the vagina for at least 2-3 days.  .  Report heavy bleeding, worsening pain or pain that does not respond to above medications, or foul-smelling vaginal discharge.   Importance of follow-up stressed.      Follow up based on endometrial biopsy results.    Reviewed her US    FINDINGS:  Uterus:    Size: 9.8 x 5.4 x 5.7 cm    Masses: Hypoechoic intramural fibroid re-identified measuring 1.0 x 0.7 x 1.3 cm.    Endometrium: Normal in this pre menopausal patient, measuring 12 mm.    Right ovary:    Size: 3.4 x 2.9 x 3.2 cm    Appearance: Normal    Vascular flow: Normal.    Left ovary:    Not definitively seen.    Free Fluid:    None.      Impression       Stable intramural uterine fibroid.    Left ovary non-visualized.       Will consider Novasure endometrial ablation-  To notify the office

## 2019-03-21 ENCOUNTER — OFFICE VISIT (OUTPATIENT)
Dept: INTERNAL MEDICINE | Facility: CLINIC | Age: 44
End: 2019-03-21
Payer: COMMERCIAL

## 2019-03-21 ENCOUNTER — IMMUNIZATION (OUTPATIENT)
Dept: INTERNAL MEDICINE | Facility: CLINIC | Age: 44
End: 2019-03-21
Payer: COMMERCIAL

## 2019-03-21 VITALS
HEIGHT: 62 IN | SYSTOLIC BLOOD PRESSURE: 122 MMHG | DIASTOLIC BLOOD PRESSURE: 80 MMHG | HEART RATE: 72 BPM | OXYGEN SATURATION: 96 % | BODY MASS INDEX: 28.71 KG/M2 | WEIGHT: 156 LBS

## 2019-03-21 DIAGNOSIS — D22.9 NEVUS: ICD-10-CM

## 2019-03-21 DIAGNOSIS — N92.0 MENORRHAGIA WITH REGULAR CYCLE: ICD-10-CM

## 2019-03-21 DIAGNOSIS — Z00.00 HEALTH CARE MAINTENANCE: Primary | ICD-10-CM

## 2019-03-21 DIAGNOSIS — D50.0 IRON DEFICIENCY ANEMIA DUE TO CHRONIC BLOOD LOSS: ICD-10-CM

## 2019-03-21 LAB
B-HCG UR QL: NEGATIVE
CTP QC/QA: YES

## 2019-03-21 PROCEDURE — 99999 PR PBB SHADOW E&M-EST. PATIENT-LVL III: CPT | Mod: PBBFAC,,, | Performed by: INTERNAL MEDICINE

## 2019-03-21 PROCEDURE — 99999 PR PBB SHADOW E&M-EST. PATIENT-LVL III: ICD-10-PCS | Mod: PBBFAC,,, | Performed by: INTERNAL MEDICINE

## 2019-03-21 PROCEDURE — 81025 POCT URINE PREGNANCY: ICD-10-PCS | Mod: S$GLB,,, | Performed by: INTERNAL MEDICINE

## 2019-03-21 PROCEDURE — 81025 URINE PREGNANCY TEST: CPT | Mod: S$GLB,,, | Performed by: INTERNAL MEDICINE

## 2019-03-21 PROCEDURE — 90686 IIV4 VACC NO PRSV 0.5 ML IM: CPT | Mod: S$GLB,,, | Performed by: INTERNAL MEDICINE

## 2019-03-21 PROCEDURE — 90471 IMMUNIZATION ADMIN: CPT | Mod: S$GLB,,, | Performed by: INTERNAL MEDICINE

## 2019-03-21 PROCEDURE — 99396 PR PREVENTIVE VISIT,EST,40-64: ICD-10-PCS | Mod: 25,S$GLB,, | Performed by: INTERNAL MEDICINE

## 2019-03-21 PROCEDURE — 99396 PREV VISIT EST AGE 40-64: CPT | Mod: 25,S$GLB,, | Performed by: INTERNAL MEDICINE

## 2019-03-21 PROCEDURE — 90686 FLU VACCINE (QUAD) GREATER THAN OR EQUAL TO 3YO PRESERVATIVE FREE IM: ICD-10-PCS | Mod: S$GLB,,, | Performed by: INTERNAL MEDICINE

## 2019-03-21 PROCEDURE — 90471 FLU VACCINE (QUAD) GREATER THAN OR EQUAL TO 3YO PRESERVATIVE FREE IM: ICD-10-PCS | Mod: S$GLB,,, | Performed by: INTERNAL MEDICINE

## 2019-03-21 NOTE — PROGRESS NOTES
Subjective:       Patient ID: Rema Horton is a 44 y.o. female.    Chief Complaint: Annual Exam (yearly checkup.); Medication Problem (patient stated that her last check for iron levels were low. ); and Possible Pregnancy (test for pregnancy, last menstrual was 2/08 along with some sharp pains in [place] breasts/abdominal cramping. )    HPI   Rema Hortno is a 44 y.o. female here for a yearly preventative healthcare visit.     She has menorrhagia and symptomatic anemia. Sees Dr. Ramirez. Pelvic US with stable intramural uterine fibroid. Has been generally regular. LMP 2/8. Feeling cramp, pulling in pelvic area, breasts feel tender.   Last hgb 8.5.   Taking iron one per day (325mg?)  UPT at home negative. Not on an OCP etc. Last relations with  were on 3/6.   She underwent endometrial biopsy on 12/6/18 which was benign.     Fibromyalgia has been ongoing issue. Stable.   Off of cymblata and citalopram.    Has flesh colored papule near right ear that bothers her - would like to have it removed.   Review of Systems   Constitutional: Positive for unexpected weight change. Negative for activity change.   HENT: Negative for hearing loss, rhinorrhea and trouble swallowing.    Eyes: Positive for visual disturbance. Negative for discharge.   Respiratory: Negative for chest tightness and wheezing.    Cardiovascular: Positive for palpitations. Negative for chest pain.   Gastrointestinal: Positive for constipation. Negative for blood in stool, diarrhea and vomiting.   Endocrine: Negative for polydipsia and polyuria.   Genitourinary: Positive for menstrual problem. Negative for difficulty urinating, dysuria and hematuria.   Musculoskeletal: Positive for arthralgias, joint swelling and neck pain.   Neurological: Positive for weakness and headaches.   Psychiatric/Behavioral: Positive for confusion. Negative for dysphoric mood.       Objective:   /80 (BP Location: Right arm, Patient Position: Sitting, BP Method: Medium  "(Manual))   Pulse 72   Ht 5' 2" (1.575 m)   Wt 70.8 kg (156 lb)   LMP 02/08/2019 (Exact Date)   SpO2 96%   BMI 28.53 kg/m²      Physical Exam   Constitutional: She is oriented to person, place, and time. She appears well-developed and well-nourished. No distress.   HENT:   Head: Normocephalic and atraumatic.   Cardiovascular: Normal rate and regular rhythm.   Pulmonary/Chest: Effort normal. No respiratory distress. She has no wheezes. She has no rales.   Neurological: She is alert and oriented to person, place, and time.   Skin: Skin is warm and dry. She is not diaphoretic.   Psychiatric: She has a normal mood and affect. Her behavior is normal.     right cheek near ear 1mm raised flesh colored nevus    Assessment:       1. Health care maintenance    2. Iron deficiency anemia due to chronic blood loss    3. Menorrhagia with regular cycle    4. Nevus        Plan:       Rema was seen today for annual exam, medication problem and possible pregnancy.    Diagnoses and all orders for this visit:    Health care maintenance  -     POCT Urine Pregnancy  -     Comprehensive metabolic panel; Future  -     TSH; Future  -     Vitamin D; Future    Iron deficiency anemia due to chronic blood loss  -     CBC auto differential; Future  -     Ferritin; Future  -     Iron and TIBC; Future   Taking iron daily    Menorrhagia with regular cycle   Now with late menses (LMP 2/8) - will check UPT    Nevus  -     Ambulatory Referral to Dermatology          "

## 2019-03-25 ENCOUNTER — TELEPHONE (OUTPATIENT)
Dept: INTERNAL MEDICINE | Facility: CLINIC | Age: 44
End: 2019-03-25

## 2019-03-25 NOTE — TELEPHONE ENCOUNTER
----- Message from Deborah Russell sent at 3/25/2019  2:48 PM CDT -----  Contact: [facility] [phone]  Requesting 90 day supply.    DULoxetine (CYMBALTA) 30 MG capsule     [facility]/pharmacy #[id number] - [facility] [address]    Thank You

## 2019-03-25 NOTE — TELEPHONE ENCOUNTER
----- Message from Deborah Russell sent at 3/25/2019  2:48 PM CDT -----  Contact: [facility] [phone]  Requesting 90 day supply.    DULoxetine (CYMBALTA) 30 MG capsule     [facility]/pharmacy #[id number] - [facility] [address]    Thank You

## 2019-03-26 NOTE — TELEPHONE ENCOUNTER
Please verify if patient requesting. At her recent visit she was not taking and did not want to restart.

## 2019-03-28 ENCOUNTER — LAB VISIT (OUTPATIENT)
Dept: LAB | Facility: HOSPITAL | Age: 44
End: 2019-03-28
Attending: INTERNAL MEDICINE
Payer: COMMERCIAL

## 2019-03-28 DIAGNOSIS — Z00.00 HEALTH CARE MAINTENANCE: ICD-10-CM

## 2019-03-28 DIAGNOSIS — D50.0 IRON DEFICIENCY ANEMIA DUE TO CHRONIC BLOOD LOSS: ICD-10-CM

## 2019-03-28 LAB
25(OH)D3+25(OH)D2 SERPL-MCNC: 28 NG/ML (ref 30–96)
ALBUMIN SERPL BCP-MCNC: 3.9 G/DL (ref 3.5–5.2)
ALP SERPL-CCNC: 56 U/L (ref 55–135)
ALT SERPL W/O P-5'-P-CCNC: 13 U/L (ref 10–44)
ANION GAP SERPL CALC-SCNC: 8 MMOL/L (ref 8–16)
AST SERPL-CCNC: 19 U/L (ref 10–40)
BASOPHILS # BLD AUTO: 0.01 K/UL (ref 0–0.2)
BASOPHILS NFR BLD: 0.3 % (ref 0–1.9)
BILIRUB SERPL-MCNC: 0.4 MG/DL (ref 0.1–1)
BUN SERPL-MCNC: 11 MG/DL (ref 6–20)
CALCIUM SERPL-MCNC: 9.3 MG/DL (ref 8.7–10.5)
CHLORIDE SERPL-SCNC: 105 MMOL/L (ref 95–110)
CO2 SERPL-SCNC: 27 MMOL/L (ref 23–29)
CREAT SERPL-MCNC: 0.8 MG/DL (ref 0.5–1.4)
DIFFERENTIAL METHOD: ABNORMAL
EOSINOPHIL # BLD AUTO: 0 K/UL (ref 0–0.5)
EOSINOPHIL NFR BLD: 0.5 % (ref 0–8)
ERYTHROCYTE [DISTWIDTH] IN BLOOD BY AUTOMATED COUNT: 13.2 % (ref 11.5–14.5)
EST. GFR  (AFRICAN AMERICAN): >60 ML/MIN/1.73 M^2
EST. GFR  (NON AFRICAN AMERICAN): >60 ML/MIN/1.73 M^2
FERRITIN SERPL-MCNC: 7 NG/ML (ref 20–300)
GLUCOSE SERPL-MCNC: 88 MG/DL (ref 70–110)
HCT VFR BLD AUTO: 36.8 % (ref 37–48.5)
HGB BLD-MCNC: 11.9 G/DL (ref 12–16)
IRON SERPL-MCNC: 42 UG/DL (ref 30–160)
LYMPHOCYTES # BLD AUTO: 1.5 K/UL (ref 1–4.8)
LYMPHOCYTES NFR BLD: 41.7 % (ref 18–48)
MCH RBC QN AUTO: 30.4 PG (ref 27–31)
MCHC RBC AUTO-ENTMCNC: 32.3 G/DL (ref 32–36)
MCV RBC AUTO: 94 FL (ref 82–98)
MONOCYTES # BLD AUTO: 0.3 K/UL (ref 0.3–1)
MONOCYTES NFR BLD: 7.9 % (ref 4–15)
NEUTROPHILS # BLD AUTO: 1.8 K/UL (ref 1.8–7.7)
NEUTROPHILS NFR BLD: 49.3 % (ref 38–73)
PLATELET # BLD AUTO: 224 K/UL (ref 150–350)
PMV BLD AUTO: 10.1 FL (ref 9.2–12.9)
POTASSIUM SERPL-SCNC: 4.1 MMOL/L (ref 3.5–5.1)
PROT SERPL-MCNC: 7.2 G/DL (ref 6–8.4)
RBC # BLD AUTO: 3.91 M/UL (ref 4–5.4)
SATURATED IRON: 11 % (ref 20–50)
SODIUM SERPL-SCNC: 140 MMOL/L (ref 136–145)
TOTAL IRON BINDING CAPACITY: 369 UG/DL (ref 250–450)
TRANSFERRIN SERPL-MCNC: 249 MG/DL (ref 200–375)
TSH SERPL DL<=0.005 MIU/L-ACNC: 2.59 UIU/ML (ref 0.4–4)
WBC # BLD AUTO: 3.69 K/UL (ref 3.9–12.7)

## 2019-03-28 PROCEDURE — 80053 COMPREHEN METABOLIC PANEL: CPT

## 2019-03-28 PROCEDURE — 36415 COLL VENOUS BLD VENIPUNCTURE: CPT

## 2019-03-28 PROCEDURE — 83540 ASSAY OF IRON: CPT

## 2019-03-28 PROCEDURE — 82306 VITAMIN D 25 HYDROXY: CPT

## 2019-03-28 PROCEDURE — 85025 COMPLETE CBC W/AUTO DIFF WBC: CPT

## 2019-03-28 PROCEDURE — 84443 ASSAY THYROID STIM HORMONE: CPT

## 2019-03-28 PROCEDURE — 82728 ASSAY OF FERRITIN: CPT

## 2019-03-29 DIAGNOSIS — D50.9 IRON DEFICIENCY ANEMIA, UNSPECIFIED IRON DEFICIENCY ANEMIA TYPE: ICD-10-CM

## 2019-03-29 DIAGNOSIS — E55.9 VITAMIN D INSUFFICIENCY: ICD-10-CM

## 2019-03-29 RX ORDER — FERROUS SULFATE 325(65) MG
325 TABLET ORAL 2 TIMES DAILY
Refills: 0 | COMMUNITY
Start: 2019-03-29 | End: 2020-01-08 | Stop reason: SDUPTHER

## 2019-03-29 RX ORDER — VIT C/E/ZN/COPPR/LUTEIN/ZEAXAN 250MG-90MG
2000 CAPSULE ORAL DAILY
Qty: 30 CAPSULE | Refills: 11 | Status: SHIPPED | OUTPATIENT
Start: 2019-03-29 | End: 2020-01-08 | Stop reason: SDUPTHER

## 2019-04-03 ENCOUNTER — OFFICE VISIT (OUTPATIENT)
Dept: OPTOMETRY | Facility: CLINIC | Age: 44
End: 2019-04-03
Payer: COMMERCIAL

## 2019-04-03 DIAGNOSIS — Z01.00 EYE EXAM, ROUTINE: Primary | ICD-10-CM

## 2019-04-03 DIAGNOSIS — H52.4 PRESBYOPIA OF BOTH EYES: ICD-10-CM

## 2019-04-03 PROCEDURE — 92015 PR REFRACTION: ICD-10-PCS | Mod: S$GLB,,, | Performed by: OPTOMETRIST

## 2019-04-03 PROCEDURE — 92004 COMPRE OPH EXAM NEW PT 1/>: CPT | Mod: S$GLB,,, | Performed by: OPTOMETRIST

## 2019-04-03 PROCEDURE — 99999 PR PBB SHADOW E&M-EST. PATIENT-LVL III: CPT | Mod: PBBFAC,,, | Performed by: OPTOMETRIST

## 2019-04-03 PROCEDURE — 92015 DETERMINE REFRACTIVE STATE: CPT | Mod: S$GLB,,, | Performed by: OPTOMETRIST

## 2019-04-03 PROCEDURE — 92004 PR EYE EXAM, NEW PATIENT,COMPREHESV: ICD-10-PCS | Mod: S$GLB,,, | Performed by: OPTOMETRIST

## 2019-04-03 PROCEDURE — 99999 PR PBB SHADOW E&M-EST. PATIENT-LVL III: ICD-10-PCS | Mod: PBBFAC,,, | Performed by: OPTOMETRIST

## 2019-04-03 NOTE — PROGRESS NOTES
HPI     Patient Is here for annual eye exam.  Pt complaints of reading up close  Using OTC readers  (-)Flashes (-)Floaters  (-)Itch, (-)tear, (--)burn, (-)Dryness. (-) OTC Drops   (-)Photophobia  (--)Glare (-)diplopia (--) headaches          Last edited by Greg Christina, OD on 4/3/2019 10:39 AM. (History)            Assessment /Plan     For exam results, see Encounter Report.    Eye exam, routine  -Good ocular health    Presbyopia of both eyes  Eyeglass Final Rx     Eyeglass Final Rx       Sphere Cylinder Axis    Right +1.00 +0.50 090    Left +1.50 Sphere     Type:  SVL-Near    Expiration Date:  4/3/2020                  RTC 1 yr

## 2019-07-16 ENCOUNTER — PATIENT MESSAGE (OUTPATIENT)
Dept: INTERNAL MEDICINE | Facility: CLINIC | Age: 44
End: 2019-07-16

## 2019-07-17 ENCOUNTER — PATIENT MESSAGE (OUTPATIENT)
Dept: INTERNAL MEDICINE | Facility: CLINIC | Age: 44
End: 2019-07-17

## 2019-08-01 ENCOUNTER — PATIENT MESSAGE (OUTPATIENT)
Dept: RHEUMATOLOGY | Facility: CLINIC | Age: 44
End: 2019-08-01

## 2019-10-08 ENCOUNTER — PATIENT OUTREACH (OUTPATIENT)
Dept: ADMINISTRATIVE | Facility: OTHER | Age: 44
End: 2019-10-08

## 2019-10-10 ENCOUNTER — OFFICE VISIT (OUTPATIENT)
Dept: INTERNAL MEDICINE | Facility: CLINIC | Age: 44
End: 2019-10-10
Payer: COMMERCIAL

## 2019-10-10 ENCOUNTER — IMMUNIZATION (OUTPATIENT)
Dept: PHARMACY | Facility: CLINIC | Age: 44
End: 2019-10-10
Payer: COMMERCIAL

## 2019-10-10 VITALS
WEIGHT: 158 LBS | DIASTOLIC BLOOD PRESSURE: 84 MMHG | HEIGHT: 62 IN | SYSTOLIC BLOOD PRESSURE: 120 MMHG | BODY MASS INDEX: 29.08 KG/M2 | OXYGEN SATURATION: 96 % | HEART RATE: 74 BPM

## 2019-10-10 DIAGNOSIS — M79.7 FIBROMYALGIA: ICD-10-CM

## 2019-10-10 DIAGNOSIS — F32.A ANXIETY AND DEPRESSION: ICD-10-CM

## 2019-10-10 DIAGNOSIS — F41.9 ANXIETY AND DEPRESSION: ICD-10-CM

## 2019-10-10 DIAGNOSIS — G89.29 CHRONIC NONINTRACTABLE HEADACHE, UNSPECIFIED HEADACHE TYPE: ICD-10-CM

## 2019-10-10 DIAGNOSIS — R41.3 MEMORY DIFFICULTIES: ICD-10-CM

## 2019-10-10 DIAGNOSIS — R51.9 CHRONIC NONINTRACTABLE HEADACHE, UNSPECIFIED HEADACHE TYPE: ICD-10-CM

## 2019-10-10 DIAGNOSIS — R06.83 SNORING: ICD-10-CM

## 2019-10-10 DIAGNOSIS — D50.0 IRON DEFICIENCY ANEMIA DUE TO CHRONIC BLOOD LOSS: Primary | ICD-10-CM

## 2019-10-10 DIAGNOSIS — R68.89 SOMATIC COMPLAINTS, MULTIPLE: ICD-10-CM

## 2019-10-10 PROCEDURE — 99999 PR PBB SHADOW E&M-EST. PATIENT-LVL IV: ICD-10-PCS | Mod: PBBFAC,,, | Performed by: INTERNAL MEDICINE

## 2019-10-10 PROCEDURE — 99999 PR PBB SHADOW E&M-EST. PATIENT-LVL IV: CPT | Mod: PBBFAC,,, | Performed by: INTERNAL MEDICINE

## 2019-10-10 PROCEDURE — 99215 OFFICE O/P EST HI 40 MIN: CPT | Mod: S$GLB,,, | Performed by: INTERNAL MEDICINE

## 2019-10-10 PROCEDURE — 3008F PR BODY MASS INDEX (BMI) DOCUMENTED: ICD-10-PCS | Mod: CPTII,S$GLB,, | Performed by: INTERNAL MEDICINE

## 2019-10-10 PROCEDURE — 3008F BODY MASS INDEX DOCD: CPT | Mod: CPTII,S$GLB,, | Performed by: INTERNAL MEDICINE

## 2019-10-10 PROCEDURE — 99215 PR OFFICE/OUTPT VISIT, EST, LEVL V, 40-54 MIN: ICD-10-PCS | Mod: S$GLB,,, | Performed by: INTERNAL MEDICINE

## 2019-10-10 NOTE — PROGRESS NOTES
Subjective:       Patient ID: Rema Horton is a 44 y.o. female.    Chief Complaint: Memory Loss (patient says she tends to be more and more forgetful. patient feels that she has to write everything down. ) and Headache (reports of more frequent, severe headaches, lasting up to 2-3 days. sensitivity to loud noises, teary eyed and slight lightheaded. )    Headache    Associated symptoms include a fever and neck pain. Pertinent negatives include no abdominal pain, ear pain, rhinorrhea, sore throat, swollen glands or vomiting.   Shortness of Breath   This is a chronic problem. The current episode started more than 1 year ago. The problem occurs daily. The problem has been waxing and waning. The average episode lasts 2 hours. Associated symptoms include chest pain, claudication, a fever, headaches, leg pain, leg swelling, neck pain, orthopnea, PND, a rash and syncope. Pertinent negatives include no abdominal pain, coryza, ear pain, hemoptysis, rhinorrhea, sore throat, sputum production, swollen glands, vomiting or wheezing. The symptoms are aggravated by emotional upset, any activity and lying flat. The patient has no known risk factors for DVT/PE. The treatment provided moderate relief. There is no history of allergies, aspirin allergies, asthma, bronchiolitis, CAD, chronic lung disease, COPD, DVT, a heart failure, PE, pneumonia or a recent surgery.      43 yo F here for evaluation of memory loss and headaches.     Fibromyalgia has been ongoing issue.   Off of cymblata and citalopram.    She has menorrhagia and symptomatic anemia. Sees Dr. Ramirez.   hgb in March 11.9 improved from 8.5 previously.     She will be going back to rheumatology - to see Dr. Alex in December.     Chest heaviness all the time  Worse when wearing bra. Wears pajamas all day.  This is chronic and unchanged for years.     Headache - chronic  Seems to be getting worse. Throbbing.  Taking ibuprofen twice a day.   Worse with orgasm - feels like an  "ice pick in the brain.  MRI brain 2015 was normal.  Worse with noise.   Saw neurology 2015 with pain of LUE and MCI secondary to depression, anxiety.    fatigue  Chronic    Hair loss  Chronic    Memory loss, confusion  Forgets what she is supposed to get at store, having wrong date.  Trouble counting change so just uses debit/credit card.    Snoring a lot. Was supposed to do sleep study but never done.     Wrist pain  Neck pain    She shows me pictures of erythema of her face - states happens if she is outside gardening.     Labs 2014:  FELIPA HEP-2 Titer Positive 1:160 Speckled     Remainder FELIPA profile negative  Recent ESR in 2018 was normal.    Recently tried cymbalta again but felt worse on it.     Review of Systems   Constitutional: Positive for fever.   HENT: Negative for ear pain, rhinorrhea and sore throat.    Respiratory: Positive for shortness of breath. Negative for hemoptysis, sputum production and wheezing.    Cardiovascular: Positive for chest pain, orthopnea, claudication, leg swelling, syncope and PND.   Gastrointestinal: Negative for abdominal pain and vomiting.   Musculoskeletal: Positive for neck pain.   Skin: Positive for rash.   Neurological: Positive for headaches.       Objective:   /84 (BP Location: Right arm, Patient Position: Sitting, BP Method: Medium (Manual))   Pulse 74   Ht 5' 2" (1.575 m)   Wt 71.7 kg (158 lb)   SpO2 96%   BMI 28.90 kg/m²      Physical Exam   Constitutional: She is oriented to person, place, and time. She appears well-developed and well-nourished. No distress.   HENT:   Head: Normocephalic and atraumatic.   Cardiovascular: Normal rate.   Pulmonary/Chest: Effort normal. No respiratory distress.   Neurological: She is alert and oriented to person, place, and time.   Skin: Skin is warm and dry. She is not diaphoretic.   Psychiatric: She has a normal mood and affect. Her behavior is normal.   Anxious appearing, pressured speech       Assessment:       1. Iron " deficiency anemia due to chronic blood loss    2. Fibromyalgia    3. Anxiety and depression    4. Chronic nonintractable headache, unspecified headache type    5. Memory difficulties    6. Snoring    7. Somatic complaints, multiple        Plan:       Rema was seen today for memory loss and headache.    Diagnoses and all orders for this visit:    Iron deficiency anemia due to chronic blood loss  -     CBC auto differential; Future  -     Comprehensive metabolic panel; Future  -     Ferritin; Future    Fibromyalgia  Anxiety and depression  -     TSH; Future  -     Ambulatory referral to [facility]    Chronic nonintractable headache, unspecified headache type  -     Ambulatory referral to Neurology    Memory difficulties  -     Ambulatory consult to Neuropsychology  -     Ambulatory referral to Neurology    Snoring  -     Ambulatory referral to Sleep Disorders    Somatic complaints, multiple  -     Ambulatory referral to [facility]       discussed restarting cymbalta but she declines.     We discussed that I believe most of her somatic complaints are related to anxiety, depression and fibromyalgia.  I think the functional restoration program would be very beneficial for her and she agrees to take part in this.  Will also get her set up with Sleep Medicine to evaluate for sleep apnea and Neurology and neuropsychological testing to evaluate memory difficulties and headaches.  I suspect the memory difficulties are related to the anxiety. I suspect headaches are functional as well and she has had previous normal brain imaging.     45  minutes were spent with patient with over half of this time spent in coordination of care and/or counseling.

## 2019-10-11 ENCOUNTER — TELEPHONE (OUTPATIENT)
Dept: PAIN MEDICINE | Facility: OTHER | Age: 44
End: 2019-10-11

## 2019-10-14 ENCOUNTER — LAB VISIT (OUTPATIENT)
Dept: LAB | Facility: HOSPITAL | Age: 44
End: 2019-10-14
Attending: INTERNAL MEDICINE
Payer: COMMERCIAL

## 2019-10-14 DIAGNOSIS — F41.9 ANXIETY AND DEPRESSION: ICD-10-CM

## 2019-10-14 DIAGNOSIS — F32.A ANXIETY AND DEPRESSION: ICD-10-CM

## 2019-10-14 DIAGNOSIS — D50.0 IRON DEFICIENCY ANEMIA DUE TO CHRONIC BLOOD LOSS: ICD-10-CM

## 2019-10-14 LAB
ALBUMIN SERPL BCP-MCNC: 3.9 G/DL (ref 3.5–5.2)
ALP SERPL-CCNC: 52 U/L (ref 55–135)
ALT SERPL W/O P-5'-P-CCNC: 14 U/L (ref 10–44)
ANION GAP SERPL CALC-SCNC: 6 MMOL/L (ref 8–16)
AST SERPL-CCNC: 13 U/L (ref 10–40)
BASOPHILS # BLD AUTO: 0.01 K/UL (ref 0–0.2)
BASOPHILS NFR BLD: 0.2 % (ref 0–1.9)
BILIRUB SERPL-MCNC: 0.3 MG/DL (ref 0.1–1)
BUN SERPL-MCNC: 12 MG/DL (ref 6–20)
CALCIUM SERPL-MCNC: 9.3 MG/DL (ref 8.7–10.5)
CHLORIDE SERPL-SCNC: 107 MMOL/L (ref 95–110)
CO2 SERPL-SCNC: 25 MMOL/L (ref 23–29)
CREAT SERPL-MCNC: 0.7 MG/DL (ref 0.5–1.4)
DIFFERENTIAL METHOD: ABNORMAL
EOSINOPHIL # BLD AUTO: 0 K/UL (ref 0–0.5)
EOSINOPHIL NFR BLD: 0.5 % (ref 0–8)
ERYTHROCYTE [DISTWIDTH] IN BLOOD BY AUTOMATED COUNT: 12.6 % (ref 11.5–14.5)
EST. GFR  (AFRICAN AMERICAN): >60 ML/MIN/1.73 M^2
EST. GFR  (NON AFRICAN AMERICAN): >60 ML/MIN/1.73 M^2
FERRITIN SERPL-MCNC: 5 NG/ML (ref 20–300)
GLUCOSE SERPL-MCNC: 93 MG/DL (ref 70–110)
HCT VFR BLD AUTO: 34.4 % (ref 37–48.5)
HGB BLD-MCNC: 11.1 G/DL (ref 12–16)
LYMPHOCYTES # BLD AUTO: 1.6 K/UL (ref 1–4.8)
LYMPHOCYTES NFR BLD: 39.1 % (ref 18–48)
MCH RBC QN AUTO: 28.5 PG (ref 27–31)
MCHC RBC AUTO-ENTMCNC: 32.3 G/DL (ref 32–36)
MCV RBC AUTO: 88 FL (ref 82–98)
MONOCYTES # BLD AUTO: 0.4 K/UL (ref 0.3–1)
MONOCYTES NFR BLD: 9 % (ref 4–15)
NEUTROPHILS # BLD AUTO: 2.1 K/UL (ref 1.8–7.7)
NEUTROPHILS NFR BLD: 51.2 % (ref 38–73)
PLATELET # BLD AUTO: 238 K/UL (ref 150–350)
PMV BLD AUTO: 9.5 FL (ref 9.2–12.9)
POTASSIUM SERPL-SCNC: 4.4 MMOL/L (ref 3.5–5.1)
PROT SERPL-MCNC: 7.1 G/DL (ref 6–8.4)
RBC # BLD AUTO: 3.89 M/UL (ref 4–5.4)
SODIUM SERPL-SCNC: 138 MMOL/L (ref 136–145)
TSH SERPL DL<=0.005 MIU/L-ACNC: 2.3 UIU/ML (ref 0.4–4)
WBC # BLD AUTO: 4.09 K/UL (ref 3.9–12.7)

## 2019-10-14 PROCEDURE — 80053 COMPREHEN METABOLIC PANEL: CPT

## 2019-10-14 PROCEDURE — 82728 ASSAY OF FERRITIN: CPT

## 2019-10-14 PROCEDURE — 85025 COMPLETE CBC W/AUTO DIFF WBC: CPT

## 2019-10-14 PROCEDURE — 36415 COLL VENOUS BLD VENIPUNCTURE: CPT

## 2019-10-14 PROCEDURE — 84443 ASSAY THYROID STIM HORMONE: CPT

## 2019-11-12 ENCOUNTER — TELEPHONE (OUTPATIENT)
Dept: PAIN MEDICINE | Facility: OTHER | Age: 44
End: 2019-11-12

## 2019-11-14 ENCOUNTER — OFFICE VISIT (OUTPATIENT)
Dept: OBSTETRICS AND GYNECOLOGY | Facility: CLINIC | Age: 44
End: 2019-11-14
Payer: COMMERCIAL

## 2019-11-14 VITALS
DIASTOLIC BLOOD PRESSURE: 86 MMHG | WEIGHT: 156.06 LBS | SYSTOLIC BLOOD PRESSURE: 116 MMHG | HEIGHT: 62 IN | BODY MASS INDEX: 28.72 KG/M2

## 2019-11-14 DIAGNOSIS — N30.10 IC (INTERSTITIAL CYSTITIS): ICD-10-CM

## 2019-11-14 DIAGNOSIS — Z12.39 BREAST CANCER SCREENING: ICD-10-CM

## 2019-11-14 DIAGNOSIS — R92.30 BREAST DENSITY: ICD-10-CM

## 2019-11-14 DIAGNOSIS — Z01.419 ENCOUNTER FOR GYNECOLOGICAL EXAMINATION WITHOUT ABNORMAL FINDING: Primary | ICD-10-CM

## 2019-11-14 DIAGNOSIS — N63.0 BREAST LUMP: ICD-10-CM

## 2019-11-14 PROCEDURE — 99396 PR PREVENTIVE VISIT,EST,40-64: ICD-10-PCS | Mod: S$GLB,,, | Performed by: OBSTETRICS & GYNECOLOGY

## 2019-11-14 PROCEDURE — 99396 PREV VISIT EST AGE 40-64: CPT | Mod: S$GLB,,, | Performed by: OBSTETRICS & GYNECOLOGY

## 2019-11-14 PROCEDURE — 99999 PR PBB SHADOW E&M-EST. PATIENT-LVL III: CPT | Mod: PBBFAC,,, | Performed by: OBSTETRICS & GYNECOLOGY

## 2019-11-14 PROCEDURE — 88175 CYTOPATH C/V AUTO FLUID REDO: CPT

## 2019-11-14 PROCEDURE — 99999 PR PBB SHADOW E&M-EST. PATIENT-LVL III: ICD-10-PCS | Mod: PBBFAC,,, | Performed by: OBSTETRICS & GYNECOLOGY

## 2019-11-14 RX ORDER — DULOXETIN HYDROCHLORIDE 30 MG/1
CAPSULE, DELAYED RELEASE ORAL
Refills: 5 | COMMUNITY
Start: 2019-09-18 | End: 2019-12-03 | Stop reason: ALTCHOICE

## 2019-11-14 NOTE — PROGRESS NOTES
"CC: Well woman exam    Rema Horton is a 44 y.o. female  presents for a well woman exam.  She has been following with a breast density by [facility] and   Will follow up with the breast center. She has chronic pain, fibromyalgia    Past Medical History:   Diagnosis Date    Abnormal Pap smear of cervix     Anemia     Breast disorder     Fever blister     Fibromyalgia     IC (interstitial cystitis)        Past Surgical History:   Procedure Laterality Date    BREAST BIOPSY Left 2014    fibroadenoma     SECTION  2012    X 2---JST FOR KJB (BREECH)    DILATION AND CURETTAGE OF UTERUS      KJB       OB History    Para Term  AB Living   4 3 3   1 3   SAB TAB Ectopic Multiple Live Births   1       3      # Outcome Date GA Lbr Jem/2nd Weight Sex Delivery Anes PTL Lv   4 Term 12    F CS-Unspec   BARRY   3 Term 03/24/10    M Vag-Spont   BARRY   2 SAB      SAB   FD   1 Term 10/23/92    M Vag-Spont   BARRY       Family History   Problem Relation Age of Onset    Stroke Maternal Grandfather     Cancer Mother         bladder (not sure if was actually gynecologic)    No Known Problems Father     No Known Problems Son     Chromosomal disorder Daughter     Melanoma Neg Hx     Ovarian cancer Neg Hx     Colon cancer Neg Hx     Breast cancer Neg Hx        Social History     Tobacco Use    Smoking status: Never Smoker    Smokeless tobacco: Never Used   Substance Use Topics    Alcohol use: Yes     Alcohol/week: 0.0 standard drinks     Frequency: Never     Drinks per session: Patient refused     Binge frequency: Never     Comment: Occasionally.    Drug use: No       /86   Ht 5' 2" (1.575 m)   Wt 70.8 kg (156 lb 1.4 oz)   LMP 11/10/2019 (Approximate)   BMI 28.55 kg/m²     ROS:  GENERAL: Denies weight gain or weight loss. Feeling well overall.   SKIN: Denies rash or lesions.   HEAD: Denies head injury or headache.   NODES: Denies enlarged lymph nodes.   CHEST: Denies chest pain or " shortness of breath.   CARDIOVASCULAR: Denies palpitations or left sided chest pain.   ABDOMEN: No abdominal pain, constipation, diarrhea, nausea, vomiting or rectal bleeding.   URINARY: No frequency, dysuria, hematuria, or burning on urination.  REPRODUCTIVE: See HPI.   BREASTS: The patient performs breast self-examination and denies pain, lumps, or nipple discharge.   HEMATOLOGIC: No easy bruisability or excessive bleeding.  MUSCULOSKELETAL: Denies joint pain or swelling.   NEUROLOGIC: Denies syncope or weakness.   PSYCHIATRIC: Denies depression, anxiety or mood swings.    Physical Exam:    APPEARANCE: Well nourished, well developed, in no acute distress.  AFFECT: WNL, alert and oriented x 3  SKIN: No acne or hirsutism  NECK: Neck symmetric without masses or thyromegaly  NODES: No inguinal, cervical, axillary, or femoral lymph node enlargement  CHEST: Good respiratory effect  ABDOMEN: Soft.  No tenderness or masses.  No hepatosplenomegaly.  No hernias.  BREASTS: Symmetrical, no skin changes or visible lesions.  No palpable masses, nipple discharge bilaterally.  PELVIC: Normal external genitalia without lesions.  Normal hair distribution.  Adequate perineal body, normal urethral meatus.  Vagina moist and well rugated without lesions or discharge.  Cervix pink, without lesions, discharge or tenderness.  No significant cystocele or rectocele.  Bimanual exam shows uterus to be normal size, regular, mobile and nontender.  Adnexa without masses or tenderness.    EXTREMITIES: No edema.    ASSESSMENT AND PLAN  1. Encounter for gynecological examination without abnormal finding  Liquid-Based Pap Smear, Screening   2. Breast cancer screening  Mammo Digital Screening Bilat w/ Hsosein   3. Breast lump     4. Breast density  Ambulatory Referral to Breast Surgery   5. IC (interstitial cystitis)         Patient was counseled today on A.C.S. Pap guidelines and recommendations for yearly pelvic exams, mammograms and monthly self  breast exams; to see her PCP for other health maintenance.     Follow up in about 1 year (around 11/14/2020).

## 2019-11-16 ENCOUNTER — TELEPHONE (OUTPATIENT)
Dept: OBSTETRICS AND GYNECOLOGY | Facility: CLINIC | Age: 44
End: 2019-11-16

## 2019-11-16 DIAGNOSIS — R92.30 BREAST DENSITY: Primary | ICD-10-CM

## 2019-11-16 NOTE — TELEPHONE ENCOUNTER
Please schedule diagnostic mammogram with left breast US for increasing size in her left breast density   Please schedule her at the [facility]  AP

## 2019-11-17 ENCOUNTER — PATIENT MESSAGE (OUTPATIENT)
Dept: OBSTETRICS AND GYNECOLOGY | Facility: CLINIC | Age: 44
End: 2019-11-17

## 2019-11-23 LAB
FINAL PATHOLOGIC DIAGNOSIS: NORMAL
Lab: NORMAL

## 2019-11-29 ENCOUNTER — OFFICE VISIT (OUTPATIENT)
Dept: DERMATOLOGY | Facility: CLINIC | Age: 44
End: 2019-11-29
Payer: COMMERCIAL

## 2019-11-29 DIAGNOSIS — D22.9 NEVUS: ICD-10-CM

## 2019-11-29 DIAGNOSIS — L21.9 ACUTE SEBORRHEIC DERMATITIS: Primary | ICD-10-CM

## 2019-11-29 PROCEDURE — 99201 PR OFFICE/OUTPT VISIT,NEW,LEVL I: CPT | Mod: S$GLB,,, | Performed by: DERMATOLOGY

## 2019-11-29 PROCEDURE — 99201 PR OFFICE/OUTPT VISIT,NEW,LEVL I: ICD-10-PCS | Mod: S$GLB,,, | Performed by: DERMATOLOGY

## 2019-11-29 PROCEDURE — 99999 PR PBB SHADOW E&M-EST. PATIENT-LVL II: ICD-10-PCS | Mod: PBBFAC,,, | Performed by: DERMATOLOGY

## 2019-11-29 PROCEDURE — 99999 PR PBB SHADOW E&M-EST. PATIENT-LVL II: CPT | Mod: PBBFAC,,, | Performed by: DERMATOLOGY

## 2019-11-29 RX ORDER — FLUOCINOLONE ACETONIDE 0.1 MG/ML
SOLUTION TOPICAL
Qty: 60 ML | Refills: 3 | Status: SHIPPED | OUTPATIENT
Start: 2019-11-29 | End: 2020-01-08 | Stop reason: SDUPTHER

## 2019-11-29 RX ORDER — KETOCONAZOLE 20 MG/ML
SHAMPOO, SUSPENSION TOPICAL
Qty: 120 ML | Refills: 5 | Status: SHIPPED | OUTPATIENT
Start: 2019-11-29 | End: 2020-01-08 | Stop reason: SDUPTHER

## 2019-11-29 NOTE — PROGRESS NOTES
Subjective:       Patient ID:  Rema Horton is a 44 y.o. female who presents for   Chief Complaint   Patient presents with    Hair Loss     scalp     HPI   Pt presents today for spot on right side of face, raised, asymptomatic Tx none  Pt presents today for scalp, hair loss, itching, oily  x  20 years, Tx otc shampoo    Review of Systems   Constitutional: Negative for fever, chills, weight loss, weight gain, fatigue and malaise.   Skin: Positive for activity-related sunscreen use. Negative for daily sunscreen use and recent sunburn.   Hematologic/Lymphatic: Does not bruise/bleed easily.        Objective:    Physical Exam   Constitutional: She appears well-developed and well-nourished.   Neurological: She is alert and oriented to person, place, and time.   Psychiatric: She has a normal mood and affect.   Skin:   Areas Examined (abnormalities noted in diagram):   Scalp / Hair Palpated and Inspected  Head / Face Inspection Performed              Diagram Legend     Erythematous scaling macule/papule c/w actinic keratosis       Vascular papule c/w angioma      Pigmented verrucoid papule/plaque c/w seborrheic keratosis      Yellow umbilicated papule c/w sebaceous hyperplasia      Irregularly shaped tan macule c/w lentigo     1-2 mm smooth white papules consistent with Milia      Movable subcutaneous cyst with punctum c/w epidermal inclusion cyst      Subcutaneous movable cyst c/w pilar cyst      Firm pink to brown papule c/w dermatofibroma      Pedunculated fleshy papule(s) c/w skin tag(s)      Evenly pigmented macule c/w junctional nevus     Mildly variegated pigmented, slightly irregular-bordered macule c/w mildly atypical nevus      Flesh colored to evenly pigmented papule c/w intradermal nevus       Pink pearly papule/plaque c/w basal cell carcinoma      Erythematous hyperkeratotic cursted plaque c/w SCC      Surgical scar with no sign of skin cancer recurrence      Open and closed comedones      Inflammatory  papules and pustules      Verrucoid papule consistent consistent with wart     Erythematous eczematous patches and plaques     Dystrophic onycholytic nail with subungual debris c/w onychomycosis     Umbilicated papule    Erythematous-base heme-crusted tan verrucoid plaque consistent with inflamed seborrheic keratosis     Erythematous Silvery Scaling Plaque c/w Psoriasis     See annotation      Assessment / Plan:        Acute seborrheic dermatitis  -     ketoconazole (NIZORAL) 2 % shampoo; Wash hair with medicated shampoo at least 2x/week - let sit on scalp at least 5 minutes prior to rinsing  Dispense: 120 mL; Refill: 5  -     fluocinolone (SYNALAR) 0.01 % external solution; AAA scalp qday - bid prn itching or scaling  Dispense: 60 mL; Refill: 3  Biotin 5000mcg daily    Nevus  Benign-appearing on exam today. Counseled pt to monitor mole(s) and return to clinic if any changes noted or symptoms (bleeding, itching, pain, etc) noted. Brochure provided.  Explained methods of removal to patient, including excision by a plastic surgeon or removal with punch biopsy (will likely have a linear scar with less chance of recurrence)  vs. shave removal (will leave a flat, round, slightly depressed scar with a higher chance of recurrence)  Pt will think about removal and call back if interested.    rtc prn

## 2019-12-03 ENCOUNTER — OFFICE VISIT (OUTPATIENT)
Dept: RHEUMATOLOGY | Facility: CLINIC | Age: 44
End: 2019-12-03
Payer: COMMERCIAL

## 2019-12-03 VITALS
HEIGHT: 62 IN | BODY MASS INDEX: 30.07 KG/M2 | HEART RATE: 72 BPM | DIASTOLIC BLOOD PRESSURE: 87 MMHG | WEIGHT: 163.38 LBS | SYSTOLIC BLOOD PRESSURE: 134 MMHG

## 2019-12-03 DIAGNOSIS — M79.7 FIBROMYALGIA: Primary | ICD-10-CM

## 2019-12-03 PROCEDURE — 99204 OFFICE O/P NEW MOD 45 MIN: CPT | Mod: S$GLB,,, | Performed by: INTERNAL MEDICINE

## 2019-12-03 PROCEDURE — 99204 PR OFFICE/OUTPT VISIT, NEW, LEVL IV, 45-59 MIN: ICD-10-PCS | Mod: S$GLB,,, | Performed by: INTERNAL MEDICINE

## 2019-12-03 PROCEDURE — 3008F PR BODY MASS INDEX (BMI) DOCUMENTED: ICD-10-PCS | Mod: CPTII,S$GLB,, | Performed by: INTERNAL MEDICINE

## 2019-12-03 PROCEDURE — 99999 PR PBB SHADOW E&M-EST. PATIENT-LVL III: CPT | Mod: PBBFAC,,, | Performed by: INTERNAL MEDICINE

## 2019-12-03 PROCEDURE — 3008F BODY MASS INDEX DOCD: CPT | Mod: CPTII,S$GLB,, | Performed by: INTERNAL MEDICINE

## 2019-12-03 PROCEDURE — 99999 PR PBB SHADOW E&M-EST. PATIENT-LVL III: ICD-10-PCS | Mod: PBBFAC,,, | Performed by: INTERNAL MEDICINE

## 2019-12-03 ASSESSMENT — ROUTINE ASSESSMENT OF PATIENT INDEX DATA (RAPID3)
WHEN YOU AWAKENED IN THE MORNING OVER THE LAST WEEK, PLEASE INDICATE THE AMOUNT OF TIME IT TAKES UNTIL YOU ARE AS LIMBER AS YOU WILL BE FOR THE DAY: 2HRS
PAIN SCORE: 8
FATIGUE SCORE: 10
MDHAQ FUNCTION SCORE: 1.2
TOTAL RAPID3 SCORE: 7
PATIENT GLOBAL ASSESSMENT SCORE: 9
AM STIFFNESS SCORE: 1, YES
PSYCHOLOGICAL DISTRESS SCORE: 7.7

## 2019-12-03 NOTE — PROGRESS NOTES
Rapid3 Question Responses and Scores 11/28/2019   MDHAQ Score 1.2   Psychologic Score 7.7   Pain Score 8   When you awakened in the morning OVER THE LAST WEEK, did you feel stiff? Yes   If Yes, please indicate the number of hours until you are as limber as you will be for the day 2   Fatigue Score 10   Global Health Score 9   RAPID3 Score 7

## 2019-12-03 NOTE — ASSESSMENT & PLAN NOTE
She has FMS with chronic fatigue  She has a lot of anxiety and stress   She has not had professional psych support    Advise [employer], Marcel Chi, trial of Savella since cannot tolerate Cymbalta; also advise consult  re: stress management    Can f/u with me in a few months

## 2019-12-03 NOTE — PROGRESS NOTES
"[facility]  CLINIC NOTE    Patient Name: Rema Horton  YOB: 1975    PRESENTING HISTORY       History of Present Illness:  Ms. Rema Horton is a 44 y.o. female w/ history of anxiety, depression, iron deficiency anemia, fibroadenoma of the left breast,  chronic headaches, coming to follow-up for Fibromyalgia. She was previously seen by Dr. Nunez in 2014, last seen in 2015. At the time she had FELIPA+ 1:160 but the rest of immunological work-up was negative and mildly elevated ESR. She was diagnosed with Fibromyalgia, treated initially with Gabapentin but she states she did not feel well with it so she stopped it. She was then started on Cymbalta by her PCP which has been taking ever since, however she is not constant with it. She takes it 1-2 times per week since it makes her feel more fatigued and feels "out of this world".  She has not followed-up with Rheumatology since 2015 due to problems with her insurance. She comes for the first time with Dr. Alex.    Today she has multiple complaints including menorrhagia,left arm pain, chest pain that improves when she removes her bra, chronic fatigue, photosensitivity, chronic mouth sores, memory loss, dizziness, hair loss, joint pain mostly her PIP joints bilaterally and knee pain, vaginal soreness and diffuse muscular pain. She states she is very limited in her daily activities due to her fatigue and cannot be outside for too long. She has a 10 year old daughter with a chromosome abnormality requiring special needs, and that creates a lot of stress and anxiety in her life. Her mother and grandmother had psychiatric disorders (she states "lost their mind"), she remember her mother also had Raynaud's phenomenon, but is unaware if they had any rheumatological diseases.       Review of Systems   Constitutional: Positive for fever and malaise/fatigue.   Eyes: Positive for blurred vision and redness.   Respiratory: Positive for cough " and shortness of breath.    Cardiovascular: Positive for chest pain.   Gastrointestinal: Positive for constipation. Negative for diarrhea.   Genitourinary: Positive for dysuria.   Musculoskeletal: Positive for myalgias and neck pain.   Skin: Positive for rash.   Neurological: Positive for headaches.   Endo/Heme/Allergies: Bruises/bleeds easily.   Psychiatric/Behavioral: Positive for depression and memory loss. The patient is nervous/anxious and has insomnia.          PAST HISTORY:     Past Medical History:   Diagnosis Date    Abnormal Pap smear of cervix     Anemia     Breast disorder     Fever blister     Fibromyalgia     IC (interstitial cystitis)        Past Surgical History:   Procedure Laterality Date    BREAST BIOPSY Left 2014    fibroadenoma     SECTION  2012    X 2---JST FOR KJB ([facility])    DILATION AND CURETTAGE OF UTERUS      KJB       Family History   Problem Relation Age of Onset    Stroke Maternal Grandfather     Cancer Mother         bladder (not sure if was actually gynecologic)    No Known Problems Father     No Known Problems Son     Chromosomal disorder Daughter     Melanoma Neg Hx     Ovarian cancer Neg Hx     Colon cancer Neg Hx     Breast cancer Neg Hx        Social History     Socioeconomic History    Marital status:      Spouse name: Not on file    Number of children: Not on file    Years of education: Not on file    Highest education level: Not on file   Occupational History    Not on file   Social Needs    Financial resource strain: Somewhat hard    Food insecurity:     Worry: Sometimes true     Inability: Sometimes true    Transportation needs:     Medical: No     Non-medical: No   Tobacco Use    Smoking status: Never Smoker    Smokeless tobacco: Never Used   Substance and Sexual Activity    Alcohol use: Yes     Alcohol/week: 0.0 standard drinks     Frequency: Never     Drinks per session: Patient refused     Binge frequency: Never      "Comment: Occasionally.    Drug use: No    Sexual activity: Yes     Partners: Male     Birth control/protection: None   Lifestyle    Physical activity:     Days per week: Patient refused     Minutes per session: 10 min    Stress: Not on file   Relationships    Social connections:     Talks on phone: Once a week     Gets together: Once a week     Attends Yarsani service: Not on file     Active member of club or organization: No     Attends meetings of clubs or organizations: Never     Relationship status:    Other Topics Concern    Are you pregnant or think you may be? Not Asked    Breast-feeding Not Asked   Social History Narrative    Not on file       MEDICATIONS & ALLERGIES:     Current Outpatient Medications on File Prior to Visit   Medication Sig    cholecalciferol, vitamin D3, (VITAMIN D3) 1,000 unit capsule Take 2 capsules (2,000 Units total) by mouth once daily.    DULoxetine (CYMBALTA) 30 MG capsule TAKE 1 CAPSULE (30 MG TOTAL) BY MOUTH ONCE DAILY. CAN INCREASE TO 2 TABS (60MG) IF NEEDED    ferrous sulfate (FEOSOL) 325 mg (65 mg iron) Tab tablet Take 1 tablet (325 mg total) by mouth 2 (two) times daily.    fluocinolone (SYNALAR) 0.01 % external solution AAA scalp qday - bid prn itching or scaling    ketoconazole (NIZORAL) 2 % shampoo Wash hair with medicated shampoo at least 2x/week - let sit on scalp at least 5 minutes prior to rinsing     No current facility-administered medications on file prior to visit.        Review of patient's allergies indicates:   Allergen Reactions    Flexeril  [cyclobenzaprine]      Other reaction(s): Rash    Lodine  [etodolac] Rash       OBJECTIVE:   Vital Signs:  Vitals:    12/03/19 0850   BP: 134/87   Pulse: 72   Weight: 74.1 kg (163 lb 5.8 oz)   Height: 5' 2" (1.575 m)       No results found for this or any previous visit (from the past 24 hour(s)).      Physical Exam   Constitutional: She is oriented to person, place, and time. She appears " well-developed and well-nourished. No distress.   HENT:   Head: Normocephalic and atraumatic.   Mouth/Throat: Oropharynx is clear and moist.   Eyes: Pupils are equal, round, and reactive to light.   Neck: Normal range of motion. Neck supple. No JVD present.   Cardiovascular: Normal rate, regular rhythm and normal heart sounds.   No murmur heard.  Pulmonary/Chest: Effort normal and breath sounds normal. No respiratory distress. She has no wheezes. She has no rales.   Abdominal: Soft. Bowel sounds are normal. She exhibits no distension. There is no tenderness.   Musculoskeletal: Normal range of motion. She exhibits no edema, tenderness or deformity.   Tenderness on trigger points in back, neck, parasternal, back of the head, outer elbows and knees   Neurological: She is alert and oriented to person, place, and time.   Skin: Skin is warm. Rash (mild malar rash that crosses the nasolabial folds) noted.   Psychiatric:   Patient appears extremely anxious and stressed about her health     Vitals reviewed.        ASSESSMENT & PLAN:      44 y.o. female w/ history of anxiety, depression, iron deficiency anemia, fibroadenoma of the left breast,  chronic headaches, coming to follow-up for Fibromyalgia, diagnosed in 2014 by Dr. Nunez, Lost to f/u since 2015 due to insurance issues, and now coming again to continue her care. She comes with multiple complaints including photosensitivity with a rash that crosses nasolabial folds. No objective arthritis, n o oral ulcers. FELIPA+ 1:160 but the rest of immunological work-up was negative. No signs of inflammation in laboratory studies or on physical exam. She does not meet SLICC criteria for SLE. She does have an important anxiety/depression background due to her personal and family issues. Most likely she has Fibromyalgia.     Fibromyalgia        -     Patient has tried gabapentin in the past but did not tolerate. Currently taking Cymbalta but states it causes her more fatigue and  dizziness.        -     Advise [employer], Marcel Chi, trial of Savella since cannot tolerate Cymbalta; also advise consult  re: stress management  -     Will start milnacipran (SAVELLA) 12.5 mg Tab tablet; Take 1 tablet (12.5 mg total) by mouth 2 (two) times daily.      Discussed with Dr. Alex    RTC  In 3-4 months    Yadiel Rincon MD  Internal Medicine PGY-1  [phone]    Answers for HPI/ROS submitted by the patient on 11/28/2019   trouble swallowing: No  unexpected weight change: Yes  genital sore: Yes

## 2019-12-05 ENCOUNTER — HOSPITAL ENCOUNTER (OUTPATIENT)
Dept: RADIOLOGY | Facility: HOSPITAL | Age: 44
Discharge: HOME | End: 2019-12-05
Attending: OBSTETRICS & GYNECOLOGY
Payer: COMMERCIAL

## 2019-12-05 ENCOUNTER — OFFICE VISIT (OUTPATIENT)
Dept: SURGERY | Facility: CLINIC | Age: 44
End: 2019-12-05
Payer: COMMERCIAL

## 2019-12-05 VITALS
HEIGHT: 62 IN | SYSTOLIC BLOOD PRESSURE: 143 MMHG | TEMPERATURE: 98 F | DIASTOLIC BLOOD PRESSURE: 87 MMHG | HEART RATE: 65 BPM | BODY MASS INDEX: 29.13 KG/M2 | WEIGHT: 158.31 LBS

## 2019-12-05 DIAGNOSIS — N63.20 BREAST MASS, LEFT: Primary | ICD-10-CM

## 2019-12-05 DIAGNOSIS — R92.30 BREAST DENSITY: ICD-10-CM

## 2019-12-05 PROCEDURE — 99211 PR OFFICE/OUTPT VISIT, EST, LEVL I: ICD-10-PCS | Mod: S$GLB,,, | Performed by: SURGERY

## 2019-12-05 PROCEDURE — 77062 BREAST TOMOSYNTHESIS BI: CPT | Mod: 26,,, | Performed by: RADIOLOGY

## 2019-12-05 PROCEDURE — 99211 OFF/OP EST MAY X REQ PHY/QHP: CPT | Mod: S$GLB,,, | Performed by: SURGERY

## 2019-12-05 PROCEDURE — 77066 MAMMO DIGITAL DIAGNOSTIC BILAT WITH TOMOSYNTHESIS_CAD: ICD-10-PCS | Mod: 26,,, | Performed by: RADIOLOGY

## 2019-12-05 PROCEDURE — 77062 BREAST TOMOSYNTHESIS BI: CPT | Mod: TC,PO

## 2019-12-05 PROCEDURE — 99999 PR PBB SHADOW E&M-EST. PATIENT-LVL III: CPT | Mod: PBBFAC,,, | Performed by: SURGERY

## 2019-12-05 PROCEDURE — 77066 DX MAMMO INCL CAD BI: CPT | Mod: 26,,, | Performed by: RADIOLOGY

## 2019-12-05 PROCEDURE — 76642 ULTRASOUND BREAST LIMITED: CPT | Mod: 26,LT,, | Performed by: RADIOLOGY

## 2019-12-05 PROCEDURE — 76642 US BREAST LEFT LIMITED: ICD-10-PCS | Mod: 26,LT,, | Performed by: RADIOLOGY

## 2019-12-05 PROCEDURE — 77062 MAMMO DIGITAL DIAGNOSTIC BILAT WITH TOMOSYNTHESIS_CAD: ICD-10-PCS | Mod: 26,,, | Performed by: RADIOLOGY

## 2019-12-05 PROCEDURE — 76642 ULTRASOUND BREAST LIMITED: CPT | Mod: TC,PO,LT

## 2019-12-05 PROCEDURE — 99999 PR PBB SHADOW E&M-EST. PATIENT-LVL III: ICD-10-PCS | Mod: PBBFAC,,, | Performed by: SURGERY

## 2019-12-05 NOTE — PROGRESS NOTES
Patient was scheduled to see me last year but cancelled to have a left breast mass evaluated  The patient repots a mass in her left breast about midway between the nipple edge and her axilla  She reports that the mass is tender to touch  She has breast imaging last year and again today that does not reveal any abnormality  Her T-C score is about 8%  On exam I do not palpate anything abnormal in either breast or axilla  Her nipples an areolae are normal and symmetrical  The area that is most concerned about is her rib  I have tried to reassure her

## 2019-12-20 ENCOUNTER — PATIENT OUTREACH (OUTPATIENT)
Dept: ADMINISTRATIVE | Facility: HOSPITAL | Age: 44
End: 2019-12-20

## 2019-12-27 ENCOUNTER — PATIENT MESSAGE (OUTPATIENT)
Dept: SLEEP MEDICINE | Facility: CLINIC | Age: 44
End: 2019-12-27

## 2020-01-08 DIAGNOSIS — D50.9 IRON DEFICIENCY ANEMIA, UNSPECIFIED IRON DEFICIENCY ANEMIA TYPE: ICD-10-CM

## 2020-01-08 DIAGNOSIS — M79.7 FIBROMYALGIA: ICD-10-CM

## 2020-01-08 DIAGNOSIS — E55.9 VITAMIN D INSUFFICIENCY: ICD-10-CM

## 2020-01-08 DIAGNOSIS — L21.9 ACUTE SEBORRHEIC DERMATITIS: ICD-10-CM

## 2020-01-08 RX ORDER — KETOCONAZOLE 20 MG/ML
SHAMPOO, SUSPENSION TOPICAL
Qty: 120 ML | Refills: 5 | Status: SHIPPED | OUTPATIENT
Start: 2020-01-08 | End: 2024-02-09 | Stop reason: SDUPTHER

## 2020-01-08 RX ORDER — FLUOCINOLONE ACETONIDE 0.1 MG/ML
SOLUTION TOPICAL
Qty: 60 ML | Refills: 3 | Status: SHIPPED | OUTPATIENT
Start: 2020-01-08

## 2020-01-09 ENCOUNTER — OFFICE VISIT (OUTPATIENT)
Dept: INTERNAL MEDICINE | Facility: CLINIC | Age: 45
End: 2020-01-09
Payer: COMMERCIAL

## 2020-01-09 VITALS
HEIGHT: 62 IN | BODY MASS INDEX: 29.08 KG/M2 | OXYGEN SATURATION: 96 % | DIASTOLIC BLOOD PRESSURE: 74 MMHG | WEIGHT: 158 LBS | SYSTOLIC BLOOD PRESSURE: 108 MMHG | HEART RATE: 76 BPM

## 2020-01-09 DIAGNOSIS — M79.7 FIBROMYALGIA: Primary | ICD-10-CM

## 2020-01-09 DIAGNOSIS — E55.9 VITAMIN D INSUFFICIENCY: ICD-10-CM

## 2020-01-09 DIAGNOSIS — D50.0 IRON DEFICIENCY ANEMIA DUE TO CHRONIC BLOOD LOSS: ICD-10-CM

## 2020-01-09 PROCEDURE — 99214 OFFICE O/P EST MOD 30 MIN: CPT | Mod: S$GLB,,, | Performed by: INTERNAL MEDICINE

## 2020-01-09 PROCEDURE — 99214 PR OFFICE/OUTPT VISIT, EST, LEVL IV, 30-39 MIN: ICD-10-PCS | Mod: S$GLB,,, | Performed by: INTERNAL MEDICINE

## 2020-01-09 PROCEDURE — 99999 PR PBB SHADOW E&M-EST. PATIENT-LVL III: CPT | Mod: PBBFAC,,, | Performed by: INTERNAL MEDICINE

## 2020-01-09 PROCEDURE — 99999 PR PBB SHADOW E&M-EST. PATIENT-LVL III: ICD-10-PCS | Mod: PBBFAC,,, | Performed by: INTERNAL MEDICINE

## 2020-01-09 PROCEDURE — 3008F BODY MASS INDEX DOCD: CPT | Mod: CPTII,S$GLB,, | Performed by: INTERNAL MEDICINE

## 2020-01-09 PROCEDURE — 3008F PR BODY MASS INDEX (BMI) DOCUMENTED: ICD-10-PCS | Mod: CPTII,S$GLB,, | Performed by: INTERNAL MEDICINE

## 2020-01-09 RX ORDER — VIT C/E/ZN/COPPR/LUTEIN/ZEAXAN 250MG-90MG
2000 CAPSULE ORAL DAILY
Qty: 180 CAPSULE | Refills: 3 | Status: SHIPPED | OUTPATIENT
Start: 2020-01-09 | End: 2021-01-11

## 2020-01-09 RX ORDER — FERROUS SULFATE 325(65) MG
325 TABLET ORAL 2 TIMES DAILY
Qty: 180 TABLET | Refills: 3 | Status: SHIPPED | OUTPATIENT
Start: 2020-01-09 | End: 2021-01-11

## 2020-01-09 NOTE — PROGRESS NOTES
Refill Authorization Note     is requesting a refill authorization.    Brief assessment and rationale for refill: ROUTE: op (ferrous sulfate) // APPROVE: prr                                         Comments:     Requested Prescriptions   Pending Prescriptions Disp Refills    ferrous sulfate (FEOSOL) 325 mg (65 mg iron) Tab tablet 180 tablet 3     Sig: Take 1 tablet (325 mg total) by mouth 2 (two) times daily.       There is no refill protocol information for this order      Signed Prescriptions Disp Refills    cholecalciferol, vitamin D3, (VITAMIN D3) 25 mcg (1,000 unit) capsule 180 capsule 3     Sig: Take 2 capsules (2,000 Units total) by mouth once daily.       Endocrinology:  Vitamins - Vitamin D Supplementation Passed - 1/9/2020  2:21 PM        Passed - Patient is at least 18 years old        Passed - Negative Pregnancy Status Check        Passed - Hypercalcemia is not present on problem list        Passed - Office visit in past 12 months or future 90 days     Recent Outpatient Visits            Today Fibromyalgia    [facility] Britta Clark MD    1 month ago Breast mass, left    [facility] General [facility] Greg Nagy MD    1 month ago Fibromyalgia    [facility] - Rheumatology Julio Alex MD    1 month ago Acute seborrheic dermatitis    [facility] - Dermatology Odessa Li MD    1 month ago Encounter for gynecological examination without abnormal finding    [facility] - Obstetrics and Gynecology Marichuy Ramirez MD          Future Appointments              In 1 week Vicky Pan MD [facility] [place] [address], [facility]    In 2 weeks MD [facility] - Neurology, [facility]    In 1 month LAB, APPOINTMENT [facility]    In 5 months [facility]                Passed - Ca in normal range and within 360 days     Calcium   Date Value Ref Range  Status   10/14/2019 9.3 8.7 - 10.5 mg/dL Final   03/28/2019 9.3 8.7 - 10.5 mg/dL Final   09/12/2018 8.9 8.7 - 10.5 mg/dL Final              Passed - Vit D is 20 or above and within 360 days     Vit D, 25-Hydroxy   Date Value Ref Range Status   03/28/2019 28 (L) 30 - 96 ng/mL Final     Comment:     Vitamin D deficiency.........<10 ng/mL                              Vitamin D insufficiency......10-29 ng/mL       Vitamin D sufficiency........> or equal to 30 ng/mL  Vitamin D toxicity............>100 ng/mL     09/12/2018 26 (L) 30 - 96 ng/mL Final     Comment:     Vitamin D deficiency.........<10 ng/mL                              Vitamin D insufficiency......10-29 ng/mL       Vitamin D sufficiency........> or equal to 30 ng/mL  Vitamin D toxicity............>100 ng/mL     09/22/2017 24 (L) 30 - 96 ng/mL Final     Comment:     Vitamin D deficiency.........<10 ng/mL                              Vitamin D insufficiency......10-29 ng/mL       Vitamin D sufficiency........> or equal to 30 ng/mL  Vitamin D toxicity............>100 ng/mL

## 2020-01-09 NOTE — PROGRESS NOTES
Subjective:       Patient ID: Rema Horton is a 44 y.o. female.    Chief Complaint: Follow-up (3 month follow up. patient says she feels weak, fatigued. )    HPI   43 yo F with fibromyalgia and vitamin d deficiency presents for follow up. She was referred to Functional restoration program in November but did not go. States she spoke to someone on phone about it. Has had trouble scheduling due to her daughter's needs. Also rescheduled with sleep clinic.     Labs October 2019 - ferritin 5 and hgb 11.1.    Vitamin d 2000 IU daily  Iron 325mg bid - not able to take everyday due to constipation.   savella 12.5mg bid - recently started by Dr. Alex.     She is having heavy periods.     Reports she is fatigued, stiff, headaches. Multiple complaints.   She reports that since starting Savella she is having trouble sleeping. She was having that trouble before starting it as well. Unclear if medicine related.     She saw Dr. Nagy for left breast mass subjective,. Her imaging did not reveal abnormality and he did not palpate any abnormality.     Review of Systems   Constitutional: Positive for activity change and unexpected weight change.   HENT: Positive for rhinorrhea. Negative for hearing loss and trouble swallowing.    Eyes: Positive for visual disturbance. Negative for discharge.   Respiratory: Positive for chest tightness. Negative for wheezing.    Cardiovascular: Negative for chest pain and palpitations.   Gastrointestinal: Positive for blood in stool and constipation. Negative for diarrhea and vomiting.   Endocrine: Negative for polydipsia and polyuria.   Genitourinary: Positive for difficulty urinating. Negative for dysuria, hematuria and menstrual problem.   Musculoskeletal: Positive for arthralgias, joint swelling and neck pain.   Neurological: Positive for weakness and headaches.   Psychiatric/Behavioral: Positive for confusion and dysphoric mood.       Objective:   /74 (BP Location: Right arm, Patient  "Position: Sitting, BP Method: Medium (Manual))   Pulse 76   Ht 5' 2" (1.575 m)   Wt 71.7 kg (158 lb)   SpO2 96%   BMI 28.90 kg/m²      Physical Exam   Constitutional: She is oriented to person, place, and time. She appears well-developed and well-nourished. No distress.   HENT:   Head: Normocephalic and atraumatic.   Cardiovascular: Normal rate and regular rhythm.   Pulmonary/Chest: Effort normal. No respiratory distress. She has no wheezes. She has no rales.   Neurological: She is alert and oriented to person, place, and time.   Skin: Skin is warm and dry. She is not diaphoretic.   Psychiatric:   Pressured speech, appears anxious       Assessment:       1. Fibromyalgia    2. Iron deficiency anemia due to chronic blood loss    3. Vitamin D insufficiency        Plan:       Rema was seen today for follow-up.    Diagnoses and all orders for this visit:    Fibromyalgia  Recommend functional restoration program  Continue savella    Iron deficiency anemia due to chronic blood loss  -     Ferritin; Future  -     CBC auto differential; Future    Vitamin D insufficiency  -     Vitamin D; Future    rtc 5 months       "

## 2020-01-09 NOTE — TELEPHONE ENCOUNTER
Please see the following assessment. This refill request still requires some action on your part. Iron supplementation is outside of [facility] protocol. Routing to you. Thank you.

## 2020-01-17 ENCOUNTER — OFFICE VISIT (OUTPATIENT)
Dept: SLEEP MEDICINE | Facility: CLINIC | Age: 45
End: 2020-01-17
Payer: COMMERCIAL

## 2020-01-17 VITALS
SYSTOLIC BLOOD PRESSURE: 111 MMHG | HEIGHT: 62 IN | HEART RATE: 74 BPM | WEIGHT: 158.5 LBS | DIASTOLIC BLOOD PRESSURE: 74 MMHG | BODY MASS INDEX: 29.17 KG/M2

## 2020-01-17 DIAGNOSIS — G47.30 SLEEP APNEA, UNSPECIFIED TYPE: ICD-10-CM

## 2020-01-17 DIAGNOSIS — R53.82 CHRONIC FATIGUE: ICD-10-CM

## 2020-01-17 DIAGNOSIS — R06.83 SNORING: Primary | ICD-10-CM

## 2020-01-17 DIAGNOSIS — G47.10 HYPERSOMNIA: ICD-10-CM

## 2020-01-17 DIAGNOSIS — G47.9 RESTLESS SLEEPER: ICD-10-CM

## 2020-01-17 DIAGNOSIS — G47.62 NOCTURNAL LEG CRAMPS: ICD-10-CM

## 2020-01-17 PROCEDURE — 99204 PR OFFICE/OUTPT VISIT, NEW, LEVL IV, 45-59 MIN: ICD-10-PCS | Mod: S$GLB,,, | Performed by: INTERNAL MEDICINE

## 2020-01-17 PROCEDURE — 99999 PR PBB SHADOW E&M-EST. PATIENT-LVL III: CPT | Mod: PBBFAC,,, | Performed by: INTERNAL MEDICINE

## 2020-01-17 PROCEDURE — 3008F BODY MASS INDEX DOCD: CPT | Mod: CPTII,S$GLB,, | Performed by: INTERNAL MEDICINE

## 2020-01-17 PROCEDURE — 99204 OFFICE O/P NEW MOD 45 MIN: CPT | Mod: S$GLB,,, | Performed by: INTERNAL MEDICINE

## 2020-01-17 PROCEDURE — 3008F PR BODY MASS INDEX (BMI) DOCUMENTED: ICD-10-PCS | Mod: CPTII,S$GLB,, | Performed by: INTERNAL MEDICINE

## 2020-01-17 PROCEDURE — 99999 PR PBB SHADOW E&M-EST. PATIENT-LVL III: ICD-10-PCS | Mod: PBBFAC,,, | Performed by: INTERNAL MEDICINE

## 2020-01-17 NOTE — PROGRESS NOTES
Subjective:       Patient ID: Rema Horton is a 44 y.o. female.    Chief Complaint: No chief complaint on file.    HPI   I had the pleasure of seeing Rema Horton today, who is a 44 y.o. female that presents with snoring.    Rema Horton does not havea CDL.    Rema Horton is nota shift worker.    Rema Horton presents with apnea that has been going on for 5 years    Bedtime when working ranges from NA to NA.   When not working, bedtime ranges from 2000 to 2030.   Sleep latency ranges from 30 to 180 minutes.   Average number of awakenings is 2-3 and return to sleep is variable.   Wake up time when working is NA to NA.   When not working, wake up time is 0600 to 0900.   Patient does not rested upon awakening.    Rema Horton consumes approximately 1-2 beverages with caffeine are consumed daily.   An average of 0 beverages with alcohol are consumed   Medications taken for sleep currently: none  Previous medications taken: none    Rema Horton does experience daytime sleepiness.   Naps are taken about 0 times weekly, usually lasting NA to NA minutes.  Rema currently does operate an automobile.  Rema Horton does not experience drowsiness when driving.   Patient does doze off when sedentary.   Rema Horton does not have auxiliary symptoms of narcolepsy including sleep onset paralysis, hypnagogic hallucinations, sleep attacks and cataplexy    EPWORTH SLEEPINESS SCALE 1/10/2020   Sitting and reading 3   Watching TV 2   Sitting, inactive in a public place (e.g. a theatre or a meeting) 3   As a passenger in a car for an hour without a break 3   Lying down to rest in the afternoon when circumstances permit 3   Sitting and talking to someone 3   Sitting quietly after a lunch without alcohol 3   In a car, while stopped for a few minutes in traffic 1   Total score 21       Rema Horton has a history of snoring.   Snoring is described as moderate and constant.   Apneic episodes have been noticed during  sleep.   A witness to sleep is present.   The patient awakens with mouth dryness and headaches.      Rema Horton may have symptoms of Restless Legs Syndrome.   Dysthesia description: numbness  Are symptoms limited to lower extremities? no  Do symptoms ever involve the arms?   Yes  Is there an associated urge to move?  no  Does movement relieve symptoms?  no  Symptoms are worse in the in the morning, in the evening and at nighttime  Ferritin level: No  CBC: No  Symptoms started around 5 years.    Nocturnal leg movements have not been noticed.   The patient does not experience sleep related leg cramps.   There is not a history of parasomnia .      Current Outpatient Medications:     cholecalciferol, vitamin D3, (VITAMIN D3) 25 mcg (1,000 unit) capsule, Take 2 capsules (2,000 Units total) by mouth once daily., Disp: 180 capsule, Rfl: 3    ferrous sulfate (FEOSOL) 325 mg (65 mg iron) Tab tablet, Take 1 tablet (325 mg total) by mouth 2 (two) times daily., Disp: 180 tablet, Rfl: 3    fluocinolone (SYNALAR) 0.01 % external solution, AAA scalp qday - bid prn itching or scaling, Disp: 60 mL, Rfl: 3    ketoconazole (NIZORAL) 2 % shampoo, Wash hair with medicated shampoo at least 2x/week - let sit on scalp at least 5 minutes prior to rinsing, Disp: 120 mL, Rfl: 5    milnacipran (SAVELLA) 12.5 mg Tab tablet, Take 1 tablet (12.5 mg total) by mouth 2 (two) times daily., Disp: 60 tablet, Rfl: 2     Review of patient's allergies indicates:   Allergen Reactions    Flexeril  [cyclobenzaprine]      Other reaction(s): Rash    Lodine  [etodolac] Rash         Past Medical History:   Diagnosis Date    Abnormal Pap smear of cervix     Anemia     Breast disorder     Fever blister     Fibromyalgia     IC (interstitial cystitis)        Past Surgical History:   Procedure Laterality Date    BREAST BIOPSY Left     fibroadenoma     SECTION  2012    X 2---JST FOR KJB ([facility])    DILATION AND CURETTAGE OF UTERUS       KJB       Family History   Problem Relation Age of Onset    Stroke Maternal Grandfather     Cancer Mother         bladder (not sure if was actually gynecologic)    No Known Problems Father     No Known Problems Son     Chromosomal disorder Daughter     Melanoma Neg Hx     Ovarian cancer Neg Hx     Colon cancer Neg Hx     Breast cancer Neg Hx        Social History     Socioeconomic History    Marital status:      Spouse name: Not on file    Number of children: Not on file    Years of education: Not on file    Highest education level: Not on file   Occupational History    Not on file   Social Needs    Financial resource strain: Somewhat hard    Food insecurity:     Worry: Sometimes true     Inability: Sometimes true    Transportation needs:     Medical: No     Non-medical: No   Tobacco Use    Smoking status: Never Smoker    Smokeless tobacco: Never Used   Substance and Sexual Activity    Alcohol use: Yes     Alcohol/week: 0.0 standard drinks     Frequency: Never     Drinks per session: Patient refused     Binge frequency: Never     Comment: Occasionally.    Drug use: No    Sexual activity: Yes     Partners: Male     Birth control/protection: None   Lifestyle    Physical activity:     Days per week: Patient refused     Minutes per session: 10 min    Stress: Not on file   Relationships    Social connections:     Talks on phone: Once a week     Gets together: Once a week     Attends [employer] service: Not on file     Active member of club or organization: No     Attends meetings of clubs or organizations: Never     Relationship status:    Other Topics Concern    Are you pregnant or think you may be? Not Asked    Breast-feeding Not Asked   Social History Narrative    Not on file           Old medical records.    Vitals:    01/17/20 1040   BP: 111/74   Pulse: 74         Diagnoses and all orders for this visit:    Snoring    Chronic fatigue    Sleep apnea, unspecified  type           The patient was given open opportunity to ask questions and/or express concerns about treatment plan.   All questions/concerns were discussed.   Driving precautions were provided.     Two patient identifiers used prior to evaluation.    Thank you for referring Rema Horton for evaluation.             Review of Systems   Constitutional: Positive for fatigue. Negative for activity change, appetite change, chills, diaphoresis, fever and unexpected weight change.   HENT: Negative for congestion, dental problem, drooling, facial swelling, hearing loss, mouth sores, nosebleeds, postnasal drip, rhinorrhea, sneezing, sore throat, tinnitus, trouble swallowing and voice change.    Eyes: Negative for photophobia and visual disturbance.   Respiratory: Positive for apnea, chest tightness and shortness of breath. Negative for cough, choking, wheezing and stridor.    Cardiovascular: Positive for chest pain. Negative for palpitations and leg swelling.   Gastrointestinal: Positive for abdominal pain. Negative for abdominal distention, blood in stool, constipation, diarrhea, nausea and vomiting.   Endocrine: Negative for cold intolerance, heat intolerance, polydipsia, polyphagia and polyuria.   Genitourinary: Positive for dysuria and urgency. Negative for enuresis, flank pain and frequency.   Musculoskeletal: Positive for myalgias. Negative for arthralgias, back pain, gait problem, joint swelling, neck pain and neck stiffness.   Skin: Negative for rash and wound.   Allergic/Immunologic: Negative for environmental allergies, food allergies and immunocompromised state.   Neurological: Positive for numbness and headaches. Negative for dizziness, tremors, seizures, syncope, facial asymmetry, speech difficulty, weakness and light-headedness.   Hematological: Negative for adenopathy. Does not bruise/bleed easily.   Psychiatric/Behavioral: Positive for sleep disturbance. Negative for agitation, behavioral problems,  confusion, decreased concentration, dysphoric mood, hallucinations, self-injury and suicidal ideas. The patient is not nervous/anxious and is not hyperactive.    All other systems reviewed and are negative.      Objective:      Physical Exam   Constitutional: She is oriented to person, place, and time. She appears well-developed and well-nourished. No distress.   HENT:   Head: Normocephalic and atraumatic.   Nose: Nose normal.   Mouth/Throat: Uvula is midline, oropharynx is clear and moist and mucous membranes are normal. She does not have dentures. No uvula swelling. No oropharyngeal exudate or posterior oropharyngeal edema. Tonsils are 0 on the right. Tonsils are 0 on the left. No tonsillar exudate.   Eyes: EOM are normal.   Neck: Normal range of motion. Neck supple. No JVD present. No tracheal deviation present. No thyromegaly present.   Cardiovascular: Normal rate, regular rhythm, normal heart sounds and intact distal pulses. Exam reveals no gallop and no friction rub.   No murmur heard.  Pulmonary/Chest: Effort normal and breath sounds normal. No stridor. No respiratory distress. She has no wheezes. She has no rales. She exhibits no tenderness.   Musculoskeletal: Normal range of motion.   Lymphadenopathy:     She has no cervical adenopathy.   Neurological: She is alert and oriented to person, place, and time. She displays normal reflexes. No cranial nerve deficit. She exhibits normal muscle tone. Coordination normal.   Skin: Skin is warm and dry. She is not diaphoretic.   Psychiatric: She has a normal mood and affect. Her behavior is normal. Judgment and thought content normal.   Nursing note and vitals reviewed.      Assessment:       1. Snoring    2. Chronic fatigue    3. Sleep apnea, unspecified type        Plan:       Due to listed symptoms, a home sleep apnea test is recommended (due to insurance requirements)  and ordered.   Description of procedure given to patient.   If significant Obstructive Sleep  Apnea (PERFECTO) is found during the initial portion of the study, therapy will be initiated with nasal Continuous Positive Airway Pressure (CPAP).   Goals of therapy were discussed, alternative treatments listed and patient agrees to this form of therapy if indicated.   The pathophysiology of PERFECTO was discussed.   The effects of PERFETCO on patient's co-morbid conditions and the increased morbidity and/or mortality associated with this condition were reviewed.   The patient was given open opportunity to ask questions and/or express concerns about treatment plan.   All questions/concerns were discussed.   Driving precautions were provided.       Thank you for referring Rema Horton for evaluation.

## 2020-01-17 NOTE — PATIENT INSTRUCTIONS
Obstructive Sleep Apnea  Obstructive sleep apnea is a condition that causes your air passages to become narrowed or blocked during sleep. As a result, breathing stops for short periods. Your body wakes up enough for breathing to begin again, though you don't remember it. The cycle of stopped breathing and brief awakenings can repeat dozens of times a night. This prevents the body from getting to the deeper stages of sleep that are needed for good rest and may cause your body's oxygen level to fall.  Signs of sleep apnea include loud snoring, noisy breathing, and gasping sounds during sleep. Daytime symptoms include waking up tired after a full night's sleep, waking up with headaches, feeling very sleepy or falling asleep during the day, and having problems with memory or concentration.  Risk factors for sleep apnea include:  · Being overweight  · Being a man, or a woman in menopause  · Smoking  · Using alcohol or sedating medicines  · Having enlarged structures in the nose or throat  Home care  Lifestyle changes that can help treat snoring and sleep apnea include the following:  · If you are overweight, lose weight. Talk to your healthcare provider about a weight-loss plan for you.  · Avoid alcohol for 3 to 4 hours before bedtime. Avoid sedating medications. Ask your healthcare provider about the medicines you take.  · If you smoke, talk to your healthcare provider about ways to quit.  · Sleep on your side. This can help prevent gravity from pulling relaxed throat tissues into your breathing passages.  · If you have allergies or sinus problems that block your nose, ask your healthcare provider for help.  Follow-up care  Follow up with your healthcare provider, or as advised. A diagnosis of sleep apnea is made with a sleep study. Your healthcare provider can tell you more about this test.  When to seek medical advice  Sleep apnea can make you more likely to have certain health problems. These include high blood  pressure, heart attack, stroke, and sexual dysfunction. If you have sleep apnea, talk to your healthcare provider about the best treatments for you.  Date Last Reviewed: 4/1/2017  © 3324-6822 [employer]. [address] [place] [address], [place] [address]. All rights reserved. This information is not intended as a substitute for professional medical care. Always follow your healthcare professional's instructions.

## 2020-01-17 NOTE — LETTER
January 17, 2020      Britta Clark MD  [address]  [facility] [address]           [facility] [place] [address] [facility] [address]  [address]  [facility] [address]  Phone: [phone]          Patient: Rema Horton   MR Number: [id number]   YOB: 1975   Date of Visit: 1/17/2020       Dear Dr. Britta Clark:    Thank you for referring Rema Horton to me for evaluation. Attached you will find relevant portions of my assessment and plan of care.    If you have questions, please do not hesitate to call me. I look forward to following Rema Horton along with you.    Sincerely,    Vicky Pan MD    Enclosure  CC:  No Recipients    If you would like to receive this communication electronically, please contact [email or web address] or [phone] to request more information on [employer] access.    For providers and/or their staff who would like to refer a patient to [facility], please contact us through our one-stop-shop provider referral line, [facility], at [phone].    If you feel you have received this communication in error or would no longer like to receive these types of communications, please e-mail [email or web address]

## 2020-01-24 ENCOUNTER — OFFICE VISIT (OUTPATIENT)
Dept: NEUROLOGY | Facility: CLINIC | Age: 45
End: 2020-01-24
Payer: COMMERCIAL

## 2020-01-24 VITALS
DIASTOLIC BLOOD PRESSURE: 89 MMHG | HEIGHT: 62 IN | HEART RATE: 74 BPM | SYSTOLIC BLOOD PRESSURE: 130 MMHG | WEIGHT: 158 LBS | BODY MASS INDEX: 29.08 KG/M2

## 2020-01-24 DIAGNOSIS — G47.09 OTHER INSOMNIA: ICD-10-CM

## 2020-01-24 DIAGNOSIS — G43.C0 PERIODIC HEADACHE SYNDROME, NOT INTRACTABLE: Primary | ICD-10-CM

## 2020-01-24 DIAGNOSIS — R41.3 MEMORY DIFFICULTY: ICD-10-CM

## 2020-01-24 PROCEDURE — 99205 PR OFFICE/OUTPT VISIT, NEW, LEVL V, 60-74 MIN: ICD-10-PCS | Mod: S$GLB,,, | Performed by: PSYCHIATRY & NEUROLOGY

## 2020-01-24 PROCEDURE — 99999 PR PBB SHADOW E&M-EST. PATIENT-LVL III: CPT | Mod: PBBFAC,,, | Performed by: PSYCHIATRY & NEUROLOGY

## 2020-01-24 PROCEDURE — 99999 PR PBB SHADOW E&M-EST. PATIENT-LVL III: ICD-10-PCS | Mod: PBBFAC,,, | Performed by: PSYCHIATRY & NEUROLOGY

## 2020-01-24 PROCEDURE — 3008F PR BODY MASS INDEX (BMI) DOCUMENTED: ICD-10-PCS | Mod: CPTII,S$GLB,, | Performed by: PSYCHIATRY & NEUROLOGY

## 2020-01-24 PROCEDURE — 99205 OFFICE O/P NEW HI 60 MIN: CPT | Mod: S$GLB,,, | Performed by: PSYCHIATRY & NEUROLOGY

## 2020-01-24 PROCEDURE — 3008F BODY MASS INDEX DOCD: CPT | Mod: CPTII,S$GLB,, | Performed by: PSYCHIATRY & NEUROLOGY

## 2020-01-24 RX ORDER — AMITRIPTYLINE HYDROCHLORIDE 25 MG/1
25 TABLET, FILM COATED ORAL NIGHTLY
Qty: 90 TABLET | Refills: 3 | Status: SHIPPED | OUTPATIENT
Start: 2020-01-24 | End: 2021-03-18

## 2020-01-24 RX ORDER — LANOLIN ALCOHOL/MO/W.PET/CERES
400 CREAM (GRAM) TOPICAL DAILY
Qty: 90 TABLET | Refills: 3 | Status: SHIPPED | OUTPATIENT
Start: 2020-01-24 | End: 2022-08-25

## 2020-01-24 NOTE — PATIENT INSTRUCTIONS
TAKE ELAVIL 1 TAB AT BED TIME EVERY NIGHT TO HELP WITH HEADACHE AND INSOMNIA    START SUPPLEMENTATION WITH MAGNESIUM 400MG DAILY

## 2020-01-24 NOTE — PROGRESS NOTES
[facility] - NEUROLOGY  [facility], [place]    Date: 2020   Patient Name: Rema Horton   MRN: [id number]   PCP: Britta Clark  Referring Provider: Britta Clark MD    Assessment:      This is Rema Horton, 44 y.o. female with fibromyalgia, Fe deficiency, migraines, insomnia, possible PEFRECTO, and short term memory difficulties.     Plan:      -  Elavil, Mg supplement  -  Vitamin levels  -  Encouraged follow up for upcoming home sleep study    Follow up 3 months        I discussed side effects of the medications. I asked the patient to stop the medication if she notices serious adverse effects as we discussed and to seek immediate medical attention at an ER.     Carmelo Alex MD  [facility]   Department of Neurology    Subjective:      HPI:   Ms. Rema Horton is a 44 y.o. female who presents with multiple complaints, she is a disorganized historian    She has had difficulty with pain and fatigue followed by rheumatology with diagnosis of fibromyalgia and has been unable to tolerate several medications, most recently savella worsened insomnia.  She has prolonged pounding headaches 2-3 days per week as well as abdominal pain that does not have clear association with headaches, difficulty with constipation as well.  She reports some short term memory difficulty such as keeping track of tasks during the day but remains independent and able to fulfill responsibilities as caregiver to her children.  She acknowledges taking Fe supplement inconsistently.    PAST MEDICAL HISTORY:  Past Medical History:   Diagnosis Date    Abnormal Pap smear of cervix     Anemia     Breast disorder     Fever blister     Fibromyalgia     IC (interstitial cystitis)        PAST SURGICAL HISTORY:  Past Surgical History:   Procedure Laterality Date    BREAST BIOPSY Left     fibroadenoma     SECTION  2012    X 2---JST FOR KJB ([facility])    DILATION AND  CURETTAGE OF UTERUS  2000    KJB       CURRENT MEDS:  Current Outpatient Medications   Medication Sig Dispense Refill    cholecalciferol, vitamin D3, (VITAMIN D3) 25 mcg (1,000 unit) capsule Take 2 capsules (2,000 Units total) by mouth once daily. 180 capsule 3    ferrous sulfate (FEOSOL) 325 mg (65 mg iron) Tab tablet Take 1 tablet (325 mg total) by mouth 2 (two) times daily. 180 tablet 3    fluocinolone (SYNALAR) 0.01 % external solution AAA scalp qday - bid prn itching or scaling 60 mL 3    ketoconazole (NIZORAL) 2 % shampoo Wash hair with medicated shampoo at least 2x/week - let sit on scalp at least 5 minutes prior to rinsing 120 mL 5    milnacipran (SAVELLA) 12.5 mg Tab tablet Take 1 tablet (12.5 mg total) by mouth 2 (two) times daily. 60 tablet 2    amitriptyline (ELAVIL) 25 MG tablet Take 1 tablet (25 mg total) by mouth every evening. 90 tablet 3    magnesium oxide (MAG-OX) 400 mg (241.3 mg magnesium) tablet Take 1 tablet (400 mg total) by mouth once daily. 90 tablet 3     No current facility-administered medications for this visit.        ALLERGIES:  Review of patient's allergies indicates:   Allergen Reactions    Flexeril  [cyclobenzaprine]      Other reaction(s): Rash    Lodine  [etodolac] Rash       FAMILY HISTORY:  Family History   Problem Relation Age of Onset    Stroke Maternal Grandfather     Cancer Mother         bladder (not sure if was actually gynecologic)    No Known Problems Father     No Known Problems Son     Chromosomal disorder Daughter     Melanoma Neg Hx     Ovarian cancer Neg Hx     Colon cancer Neg Hx     Breast cancer Neg Hx        SOCIAL HISTORY:  Social History     Tobacco Use    Smoking status: Never Smoker    Smokeless tobacco: Never Used   Substance Use Topics    Alcohol use: Yes     Alcohol/week: 0.0 standard drinks     Frequency: Never     Drinks per session: Patient refused     Binge frequency: Never     Comment: Occasionally.    Drug use: No       Review  "of Systems:  12 review of systems is negative except for the symptoms mentioned in HPI.        Objective:     Vitals:    01/24/20 1053   BP: 130/89   Pulse: 74   Weight: 71.7 kg (158 lb)   Height: 5' 2" (1.575 m)       General: NAD, well nourished   Eyes: no tearing, discharge, no erythema   ENT: moist mucous membranes of the oral cavity, nares patent    Neck: Supple, full range of motion  Cardiovascular: Warm and well perfused, pulses equal and symmetrical  Lungs: Normal work of breathing, normal chest wall excursions  Skin: No rash, lesions, or breakdown on exposed skin  Psychiatry: Mood and affect are appropriate   Abdomen: soft, non tender, non distended  Extremeties: No cyanosis, clubbing or edema.    Neurological   MENTAL STATUS: Alert and oriented to person, place, and time. Speech without dysarthria, able to name and repeat without difficulty.   CRANIAL NERVES: Visual fields intact. PERRL. EOMI. Facial sensation intact. Face symmetrical. Hearing grossly intact. Full shoulder shrug bilaterally. Tongue protrudes midline   SENSORY: Sensation is intact to light touch throughout. Negative Romberg.   MOTOR: Normal bulk and tone. No pronator drift.  5/5 deltoid, biceps, triceps, interosseous, hand  bilaterally. 5/5 iliopsoas, knee extension/flexion, foot dorsi/plantarflexion bilaterally.    REFLEXES: ankles mute, remainder symmetric and 2+ throughout.  CEREBELLAR/COORDINATION/GAIT: Gait steady with normal arm swing and stride length.    "

## 2020-01-24 NOTE — LETTER
January 24, 2020      Britta Clark MD  [address]  [facility] [address]           [facility]  [address]  [facility] [address]  Phone: [phone]  Fax: [phone]          Patient: Rema Horton   MR Number: [id number]   YOB: 1975   Date of Visit: 1/24/2020       Dear Dr. Britta Clark:    Thank you for referring Rema Horton to me for evaluation. Attached you will find relevant portions of my assessment and plan of care.    If you have questions, please do not hesitate to call me. I look forward to following Rema Horton along with you.    Sincerely,    Dontrell Olivas MD    Enclosure  CC:  No Recipients    If you would like to receive this communication electronically, please contact [email or web address] or [phone] to request more information on [employer] access.    For providers and/or their staff who would like to refer a patient to [facility], please contact us through our one-stop-shop provider referral line, [facility], at [phone].    If you feel you have received this communication in error or would no longer like to receive these types of communications, please e-mail [email or web address]

## 2020-02-05 ENCOUNTER — TELEPHONE (OUTPATIENT)
Dept: SLEEP MEDICINE | Facility: OTHER | Age: 45
End: 2020-02-05

## 2020-02-14 ENCOUNTER — TELEPHONE (OUTPATIENT)
Dept: SLEEP MEDICINE | Facility: OTHER | Age: 45
End: 2020-02-14

## 2020-02-17 ENCOUNTER — TELEPHONE (OUTPATIENT)
Dept: SLEEP MEDICINE | Facility: OTHER | Age: 45
End: 2020-02-17

## 2020-02-27 ENCOUNTER — TELEPHONE (OUTPATIENT)
Dept: SLEEP MEDICINE | Facility: OTHER | Age: 45
End: 2020-02-27

## 2020-02-28 ENCOUNTER — HOSPITAL ENCOUNTER (OUTPATIENT)
Dept: SLEEP MEDICINE | Facility: OTHER | Age: 45
Discharge: HOME | End: 2020-02-28
Attending: INTERNAL MEDICINE
Payer: COMMERCIAL

## 2020-02-28 DIAGNOSIS — R53.82 CHRONIC FATIGUE: ICD-10-CM

## 2020-02-28 DIAGNOSIS — R06.83 SNORING: ICD-10-CM

## 2020-02-28 DIAGNOSIS — G47.10 HYPERSOMNIA: ICD-10-CM

## 2020-02-28 DIAGNOSIS — G47.30 SLEEP APNEA, UNSPECIFIED TYPE: ICD-10-CM

## 2020-02-28 DIAGNOSIS — G47.33 OSA (OBSTRUCTIVE SLEEP APNEA): Primary | ICD-10-CM

## 2020-02-28 PROCEDURE — 95800 SLP STDY UNATTENDED: CPT

## 2020-03-01 DIAGNOSIS — M79.7 FIBROMYALGIA: ICD-10-CM

## 2020-03-02 RX ORDER — MILNACIPRAN HYDROCHLORIDE 12.5 MG/1
TABLET, FILM COATED ORAL
Qty: 60 TABLET | Refills: 2 | Status: SHIPPED | OUTPATIENT
Start: 2020-03-02 | End: 2020-03-02 | Stop reason: SDUPTHER

## 2020-03-03 PROCEDURE — 95800 PR SLEEP STUDY, UNATTENDED, RECORD HEART RATE/O2 SAT/RESP ANAL/SLEEP TIME: ICD-10-PCS | Mod: 26,,, | Performed by: INTERNAL MEDICINE

## 2020-03-03 PROCEDURE — 95800 SLP STDY UNATTENDED: CPT | Mod: 26,,, | Performed by: INTERNAL MEDICINE

## 2020-03-04 ENCOUNTER — LAB VISIT (OUTPATIENT)
Dept: LAB | Facility: HOSPITAL | Age: 45
End: 2020-03-04
Attending: INTERNAL MEDICINE
Payer: COMMERCIAL

## 2020-03-04 DIAGNOSIS — D50.0 IRON DEFICIENCY ANEMIA DUE TO CHRONIC BLOOD LOSS: ICD-10-CM

## 2020-03-04 DIAGNOSIS — E55.9 VITAMIN D INSUFFICIENCY: ICD-10-CM

## 2020-03-04 LAB
25(OH)D3+25(OH)D2 SERPL-MCNC: 30 NG/ML (ref 30–96)
BASOPHILS # BLD AUTO: 0.02 K/UL (ref 0–0.2)
BASOPHILS NFR BLD: 0.5 % (ref 0–1.9)
DIFFERENTIAL METHOD: ABNORMAL
EOSINOPHIL # BLD AUTO: 0 K/UL (ref 0–0.5)
EOSINOPHIL NFR BLD: 0.5 % (ref 0–8)
ERYTHROCYTE [DISTWIDTH] IN BLOOD BY AUTOMATED COUNT: 16.2 % (ref 11.5–14.5)
FERRITIN SERPL-MCNC: 12 NG/ML (ref 20–300)
HCT VFR BLD AUTO: 38.1 % (ref 37–48.5)
HGB BLD-MCNC: 11.7 G/DL (ref 12–16)
IMM GRANULOCYTES # BLD AUTO: 0.01 K/UL (ref 0–0.04)
IMM GRANULOCYTES NFR BLD AUTO: 0.2 % (ref 0–0.5)
LYMPHOCYTES # BLD AUTO: 1.1 K/UL (ref 1–4.8)
LYMPHOCYTES NFR BLD: 26.8 % (ref 18–48)
MCH RBC QN AUTO: 29 PG (ref 27–31)
MCHC RBC AUTO-ENTMCNC: 30.7 G/DL (ref 32–36)
MCV RBC AUTO: 94 FL (ref 82–98)
MONOCYTES # BLD AUTO: 0.5 K/UL (ref 0.3–1)
MONOCYTES NFR BLD: 11.4 % (ref 4–15)
NEUTROPHILS # BLD AUTO: 2.5 K/UL (ref 1.8–7.7)
NEUTROPHILS NFR BLD: 60.6 % (ref 38–73)
NRBC BLD-RTO: 0 /100 WBC
PLATELET # BLD AUTO: 237 K/UL (ref 150–350)
PMV BLD AUTO: 10.1 FL (ref 9.2–12.9)
RBC # BLD AUTO: 4.04 M/UL (ref 4–5.4)
WBC # BLD AUTO: 4.11 K/UL (ref 3.9–12.7)

## 2020-03-04 PROCEDURE — 85025 COMPLETE CBC W/AUTO DIFF WBC: CPT

## 2020-03-04 PROCEDURE — 82728 ASSAY OF FERRITIN: CPT

## 2020-03-04 PROCEDURE — 82306 VITAMIN D 25 HYDROXY: CPT

## 2020-03-04 PROCEDURE — 36415 COLL VENOUS BLD VENIPUNCTURE: CPT

## 2020-03-05 ENCOUNTER — PATIENT MESSAGE (OUTPATIENT)
Dept: INTERNAL MEDICINE | Facility: CLINIC | Age: 45
End: 2020-03-05

## 2020-03-05 DIAGNOSIS — G47.33 OBSTRUCTIVE SLEEP APNEA SYNDROME: Primary | ICD-10-CM

## 2020-03-05 NOTE — PROCEDURES
"The sleep study that you ordered is complete.  You have ordered sleep LAB services to perform the sleep study for Rema Horton     Please find Sleep Study result in  the "Media tab" of Chart Review menu.     Patient is already established with a Sleep Medicine provider        For any questions, please contact our clinic staff at [phone] to talk to clinical staff.     "

## 2020-03-10 ENCOUNTER — TELEPHONE (OUTPATIENT)
Dept: SLEEP MEDICINE | Facility: CLINIC | Age: 45
End: 2020-03-10

## 2020-03-10 NOTE — TELEPHONE ENCOUNTER
Called patient to review study results and treatment options. Mild jonny (ahi 5)    Candidate for treatment including CPAP or mandibular repositioning device.

## 2020-04-07 ENCOUNTER — PATIENT MESSAGE (OUTPATIENT)
Dept: OBSTETRICS AND GYNECOLOGY | Facility: CLINIC | Age: 45
End: 2020-04-07

## 2020-04-07 RX ORDER — VALACYCLOVIR HYDROCHLORIDE 500 MG/1
500 TABLET, FILM COATED ORAL DAILY
Qty: 30 TABLET | Refills: 0 | Status: SHIPPED | OUTPATIENT
Start: 2020-04-07 | End: 2020-05-04 | Stop reason: SDUPTHER

## 2020-04-07 RX ORDER — FLUCONAZOLE 150 MG/1
TABLET ORAL
Qty: 2 TABLET | Refills: 0 | Status: SHIPPED | OUTPATIENT
Start: 2020-04-07 | End: 2020-08-27

## 2020-05-05 RX ORDER — VALACYCLOVIR HYDROCHLORIDE 500 MG/1
500 TABLET, FILM COATED ORAL DAILY
Qty: 30 TABLET | Refills: 0 | Status: SHIPPED | OUTPATIENT
Start: 2020-05-05 | End: 2020-06-01

## 2020-05-22 ENCOUNTER — PATIENT MESSAGE (OUTPATIENT)
Dept: RHEUMATOLOGY | Facility: CLINIC | Age: 45
End: 2020-05-22

## 2020-08-26 ENCOUNTER — TELEPHONE (OUTPATIENT)
Dept: INTERNAL MEDICINE | Facility: CLINIC | Age: 45
End: 2020-08-26

## 2020-08-26 ENCOUNTER — OFFICE VISIT (OUTPATIENT)
Dept: INTERNAL MEDICINE | Facility: CLINIC | Age: 45
End: 2020-08-26
Payer: COMMERCIAL

## 2020-08-26 VITALS
WEIGHT: 153.44 LBS | DIASTOLIC BLOOD PRESSURE: 80 MMHG | SYSTOLIC BLOOD PRESSURE: 118 MMHG | HEART RATE: 77 BPM | BODY MASS INDEX: 28.24 KG/M2 | HEIGHT: 62 IN | OXYGEN SATURATION: 98 % | TEMPERATURE: 98 F

## 2020-08-26 DIAGNOSIS — R07.89 LEFT-SIDED CHEST WALL PAIN: ICD-10-CM

## 2020-08-26 DIAGNOSIS — R07.81 RIB PAIN ON LEFT SIDE: ICD-10-CM

## 2020-08-26 DIAGNOSIS — R10.12 LEFT UPPER QUADRANT PAIN: Primary | ICD-10-CM

## 2020-08-26 LAB
BILIRUB UR QL STRIP: NEGATIVE
CLARITY UR REFRACT.AUTO: CLEAR
COLOR UR AUTO: NORMAL
GLUCOSE UR QL STRIP: NEGATIVE
HGB UR QL STRIP: NEGATIVE
KETONES UR QL STRIP: NEGATIVE
LEUKOCYTE ESTERASE UR QL STRIP: NEGATIVE
NITRITE UR QL STRIP: NEGATIVE
PH UR STRIP: 6 [PH] (ref 5–8)
PROT UR QL STRIP: NEGATIVE
SP GR UR STRIP: 1 (ref 1–1.03)
URN SPEC COLLECT METH UR: NORMAL

## 2020-08-26 PROCEDURE — 99999 PR PBB SHADOW E&M-EST. PATIENT-LVL V: ICD-10-PCS | Mod: PBBFAC,,, | Performed by: NURSE PRACTITIONER

## 2020-08-26 PROCEDURE — 3008F BODY MASS INDEX DOCD: CPT | Mod: CPTII,S$GLB,, | Performed by: NURSE PRACTITIONER

## 2020-08-26 PROCEDURE — 99214 PR OFFICE/OUTPT VISIT, EST, LEVL IV, 30-39 MIN: ICD-10-PCS | Mod: S$GLB,,, | Performed by: NURSE PRACTITIONER

## 2020-08-26 PROCEDURE — 81025 URINE PREGNANCY TEST: CPT

## 2020-08-26 PROCEDURE — 81003 URINALYSIS AUTO W/O SCOPE: CPT

## 2020-08-26 PROCEDURE — 99214 OFFICE O/P EST MOD 30 MIN: CPT | Mod: S$GLB,,, | Performed by: NURSE PRACTITIONER

## 2020-08-26 PROCEDURE — 3008F PR BODY MASS INDEX (BMI) DOCUMENTED: ICD-10-PCS | Mod: CPTII,S$GLB,, | Performed by: NURSE PRACTITIONER

## 2020-08-26 PROCEDURE — 99999 PR PBB SHADOW E&M-EST. PATIENT-LVL V: CPT | Mod: PBBFAC,,, | Performed by: NURSE PRACTITIONER

## 2020-08-26 RX ORDER — METHOCARBAMOL 500 MG/1
500 TABLET, FILM COATED ORAL 3 TIMES DAILY
Qty: 30 TABLET | Refills: 0 | Status: SHIPPED | OUTPATIENT
Start: 2020-08-26 | End: 2020-09-05

## 2020-08-26 RX ORDER — IBUPROFEN 600 MG/1
600 TABLET ORAL 3 TIMES DAILY
Qty: 30 TABLET | Refills: 0 | Status: SHIPPED | OUTPATIENT
Start: 2020-08-26 | End: 2022-08-25

## 2020-08-26 NOTE — PATIENT INSTRUCTIONS
Labs today.    Urine sent to lab    CT scan and xrays tomorrow    Muscle relaxer (robaxin) and ibuprofen sent to pharmacy.  Take as needed.  Muscle relaxer may make you drowsy.  Do not drive while taking    Can take tylenol as well for pain.    Apply heating pack to the area as well    Go to the ER for new shortness of breath, chest pain, lightheadedness, fever, severe/new headaches or any other emergent complaints or changes in condition.

## 2020-08-26 NOTE — PROGRESS NOTES
Subjective:      Patient ID: Rema Horton is a 45 y.o. female.    Chief Complaint: Follow-up and Flank Pain (right side pain, pain rate 8 )    Ms. Horton is a 45 y.o. female patient of Britta Clark MD who comes in today for left sided pain.  Pain started Monday morning.  It comes and goes.  When it comes it lasts 2-4 hours.  Sometimes pain so severe that she cannot move.  Has to lay in bed on right side.   It is relieved by tylenol and ibuprofen, but only temporarily.  Today pain is a 8/10.  can be a 10/10.  Monday morning could not move at all d/t this pain.  Pain is located on her left side under her rib cage.  Certain movements such as reaching back make it worse.  It is tender to touch in that area.  She denies any fall or injury to the area.  Says this pain happened one year ago as well.  It is a sharp pain with sudden movements and also described as a tearing pain.  No fever or chills.  Urinating fine today.  No blood noted in urine.  Bowel movements at baseline for her.  No blood noted in stool.  She uses metamucil twice weekly for constipation.  Last bm yesterday which was normal.  No sob or chest pain.  Says she always has some fatigue and chest pressure, this is not worse or new with the pain.  Pain is not worse with deep breaths.  NO cough.  Does not smoke, not on birth control.  She believes her last menstrual cycle was around 08/14.  Eating and drinking fine.  Occasional nausea, but no vomiting.      Has a disable daughter who she takes care of.  Daughter is immunocompromised so she has to worry about bringing home infection to her.  Says prior the pain she was doing well.  Has been doing yoga and meditating which has helped with her fatigue        Review of Systems   Constitutional: Negative for chills and fever.   HENT: Negative for congestion, ear pain, sinus pain and sore throat.    Respiratory: Negative for cough, chest tightness and shortness of breath.    Cardiovascular: Negative for  chest pain and leg swelling.   Gastrointestinal: Positive for abdominal pain and nausea. Negative for abdominal distention, constipation, diarrhea and vomiting.        More left sided pain than abdominal pain, radiates to left back   Genitourinary: Negative for difficulty urinating, flank pain and hematuria.   Musculoskeletal: Positive for back pain. Negative for gait problem.   Skin: Negative for rash.   Neurological: Negative for dizziness, light-headedness and headaches.   Hematological: Bruises/bleeds easily.       Review of patient's allergies indicates:   Allergen Reactions    Flexeril  [cyclobenzaprine]      Other reaction(s): Rash    Lodine  [etodolac] Rash         Current Outpatient Medications:     amitriptyline (ELAVIL) 25 MG tablet, Take 1 tablet (25 mg total) by mouth every evening., Disp: 90 tablet, Rfl: 3    cholecalciferol, vitamin D3, (VITAMIN D3) 25 mcg (1,000 unit) capsule, Take 2 capsules (2,000 Units total) by mouth once daily., Disp: 180 capsule, Rfl: 3    ferrous sulfate (FEOSOL) 325 mg (65 mg iron) Tab tablet, Take 1 tablet (325 mg total) by mouth 2 (two) times daily., Disp: 180 tablet, Rfl: 3    fluconazole (DIFLUCAN) 150 MG Tab, Take 1 tablet (150mg) now, then in 3 days., Disp: 2 tablet, Rfl: 0    fluocinolone (SYNALAR) 0.01 % external solution, AAA scalp qday - bid prn itching or scaling, Disp: 60 mL, Rfl: 3    ketoconazole (NIZORAL) 2 % shampoo, Wash hair with medicated shampoo at least 2x/week - let sit on scalp at least 5 minutes prior to rinsing, Disp: 120 mL, Rfl: 5    magnesium oxide (MAG-OX) 400 mg (241.3 mg magnesium) tablet, Take 1 tablet (400 mg total) by mouth once daily., Disp: 90 tablet, Rfl: 3    milnacipran (SAVELLA) 12.5 mg Tab tablet, Take 1 tablet (12.5 mg total) by mouth 2 (two) times daily., Disp: 180 tablet, Rfl: 1    valACYclovir (VALTREX) 500 MG tablet, TAKE 1 TABLET BY MOUTH EVERY DAY, Disp: 30 tablet, Rfl: 0    Patient Active Problem List    Diagnosis  "Date Noted    PERFECTO (obstructive sleep apnea)     Snoring     Periodic headache syndrome, not intractable 2020    Other insomnia 2020    Interstitial cystitis 2018    Fibromyalgia 2017    Voiding dysfunction 2017    Dysuria 10/14/2016    Incomplete bladder emptying 10/14/2016    Normocytic anemia 10/14/2016    Chronic pelvic pain in female 2015    Frequency-urgency syndrome 2015    Slow transit constipation 2015       Past Medical History:   Diagnosis Date    Abnormal Pap smear of cervix     Anemia     Breast disorder     Fever blister     Fibromyalgia     IC (interstitial cystitis)        Past Surgical History:   Procedure Laterality Date    BREAST BIOPSY Left 2014    fibroadenoma     SECTION  2012    X 2---JST FOR KJB (BREECH)    DILATION AND CURETTAGE OF UTERUS      [facility]       Objective:     Vitals:    20 1626   BP: 118/80   Pulse: 77   Temp: 98.3 °F (36.8 °C)   SpO2: 98%   Weight: 69.6 kg (153 lb 7 oz)   Height: 5' 2" (1.575 m)   PainSc:   8       Body mass index is 28.06 kg/m².    Physical Exam  Vitals signs reviewed.   Constitutional:       General: She is not in acute distress.     Appearance: She is well-developed. She is not ill-appearing or diaphoretic.   HENT:      Head: Normocephalic and atraumatic.   Eyes:      General: No scleral icterus.     Conjunctiva/sclera: Conjunctivae normal.   Neck:      Musculoskeletal: Normal range of motion and neck supple.   Cardiovascular:      Rate and Rhythm: Normal rate and regular rhythm.      Pulses: Normal pulses.      Heart sounds: Normal heart sounds.   Pulmonary:      Effort: Pulmonary effort is normal. No respiratory distress.      Breath sounds: Normal breath sounds. No wheezing.   Abdominal:      General: Abdomen is flat. Bowel sounds are normal. There is no distension.      Palpations: Abdomen is soft. There is no mass.      Tenderness: There is no abdominal tenderness. There " is no right CVA tenderness, left CVA tenderness, guarding or rebound.      Hernia: No hernia is present.      Comments: No abdominal distention or tenderness noted   Musculoskeletal: Normal range of motion.         General: Tenderness present. No swelling or deformity.      Comments: Patient with tenderness to left side right below rib cage, normal range of motione, but pain worse with certain positions and movement   Skin:     General: Skin is warm and dry.      Findings: No rash.   Neurological:      Mental Status: She is alert and oriented to person, place, and time.   Psychiatric:         Behavior: Behavior normal.       Assessment:     1. Left upper quadrant pain    2. Rib pain on left side    3. Left-sided chest wall pain      Plan:     Rema was seen today for follow-up and flank pain.    Diagnoses and all orders for this visit:    Left upper quadrant pain  Patient states pain is severe.  Discussed with patient that quickest way to get fully evaluated and pain control would be to go to the ER.  Patient declines.  Does not want to be exposed to anything in the ER.  Immunocompromised child at home.  VSS.  Pt does not appear in acute distress.  Will get CT, lab work and xrays.  Pain is possibly muskuloskeletal, will give ibuprofen and robaxin.  This plan was discussed with Dr. Albright.  Strict ER precautions discussed    -     CT Abdomen Pelvis With Contrast; Future  -     CBC auto differential; Future  -     Comprehensive metabolic panel; Future  -     Amylase; Future  -     Lipase; Future  -     Urinalysis, Reflex to Urine Culture Urine, Clean Catch   MICROSCOPE  -     PREGNANCY TEST, URINE RAPID    Rib pain on left side  -     X-Ray Chest PA And Lateral; Future  -     X-Ray Ribs 2 View Left; Future  -     Urinalysis, Reflex to Urine Culture Urine, Clean Catch  -     methocarbamoL (ROBAXIN) 500 MG Tab; Take 1 tablet (500 mg total) by mouth 3 (three) times daily. for 10 days    Left-sided chest wall  pain  Patient with allergy to etodolac, but has been taking ibuprofen without difficulty or adverse effects  -     ibuprofen (ADVIL,MOTRIN) 600 MG tablet; Take 1 tablet (600 mg total) by mouth 3 (three) times daily.      Patient Instructions   Labs today.    Urine sent to lab    CT scan and xrays tomorrow    Muscle relaxer (robaxin) and ibuprofen sent to pharmacy.  Take as needed.  Muscle relaxer may make you drowsy.  Do not drive while taking    Can take tylenol as well for pain.    Apply heating pack to the area as well    Go to the ER for new shortness of breath, chest pain, lightheadedness, fever, severe/new headaches or any other emergent complaints or changes in condition.

## 2020-08-26 NOTE — TELEPHONE ENCOUNTER
Pt says a year ago she had a sharp pain in her back causing her to not be able to move because the pain was so bad. She saw PCP and was advised to wait and see if this pain ever comes back. Pt says Sunday was the first time this pain has come back. She says the pain is on her left side by her ribs and radiates to the back. She says Sunday she stayed in the bed for a few hours before she had some relief. she has been taking tylenol which helps a little. Pt says when she moves certain ways she feels the pain.

## 2020-08-27 ENCOUNTER — HOSPITAL ENCOUNTER (OUTPATIENT)
Dept: RADIOLOGY | Facility: HOSPITAL | Age: 45
Discharge: HOME | End: 2020-08-27
Attending: NURSE PRACTITIONER
Payer: COMMERCIAL

## 2020-08-27 DIAGNOSIS — R10.12 LEFT UPPER QUADRANT PAIN: ICD-10-CM

## 2020-08-27 DIAGNOSIS — R07.81 RIB PAIN ON LEFT SIDE: ICD-10-CM

## 2020-08-27 LAB — B-HCG UR QL: NEGATIVE

## 2020-08-27 PROCEDURE — 74177 CT ABD & PELVIS W/CONTRAST: CPT | Mod: 26,,, | Performed by: RADIOLOGY

## 2020-08-27 PROCEDURE — 71100 X-RAY EXAM RIBS UNI 2 VIEWS: CPT | Mod: 26,LT,, | Performed by: RADIOLOGY

## 2020-08-27 PROCEDURE — 25500020 PHARM REV CODE 255: Performed by: NURSE PRACTITIONER

## 2020-08-27 PROCEDURE — 71046 X-RAY EXAM CHEST 2 VIEWS: CPT | Mod: 26,,, | Performed by: RADIOLOGY

## 2020-08-27 PROCEDURE — 71046 X-RAY EXAM CHEST 2 VIEWS: CPT | Mod: TC

## 2020-08-27 PROCEDURE — 71046 XR CHEST PA AND LATERAL: ICD-10-PCS | Mod: 26,,, | Performed by: RADIOLOGY

## 2020-08-27 PROCEDURE — 74177 CT ABD & PELVIS W/CONTRAST: CPT | Mod: TC

## 2020-08-27 PROCEDURE — 71100 XR RIBS 2 VIEW LEFT: ICD-10-PCS | Mod: 26,LT,, | Performed by: RADIOLOGY

## 2020-08-27 PROCEDURE — 74177 CT ABDOMEN PELVIS WITH CONTRAST: ICD-10-PCS | Mod: 26,,, | Performed by: RADIOLOGY

## 2020-08-27 PROCEDURE — 71100 X-RAY EXAM RIBS UNI 2 VIEWS: CPT | Mod: TC,LT

## 2020-08-27 RX ADMIN — IOHEXOL 15 ML: 350 INJECTION, SOLUTION INTRAVENOUS at 02:08

## 2020-08-27 RX ADMIN — IOHEXOL 75 ML: 350 INJECTION, SOLUTION INTRAVENOUS at 02:08

## 2020-08-27 RX ADMIN — IOHEXOL 15 ML: 350 INJECTION, SOLUTION INTRAVENOUS at 01:08

## 2020-08-27 NOTE — PROGRESS NOTES
CT scan reviewed.  Attempted to call patient with no answer.  Sent results through patient portal.  Small punctate nonobstructing left renal stone seen.  And 2 small pulmonary micro nodules.  I do not believe that this kidney stone is the cause of patient's left side pain.  Likely pain is muscular.  Recommend to continue treatment plan as discussed in clinic with close follow-up next week and strict ER precautions.    Candace Zayas NP

## 2020-08-31 PROBLEM — R91.8 PULMONARY NODULES: Status: ACTIVE | Noted: 2020-08-31

## 2020-10-15 ENCOUNTER — OFFICE VISIT (OUTPATIENT)
Dept: RHEUMATOLOGY | Facility: CLINIC | Age: 45
End: 2020-10-15
Payer: COMMERCIAL

## 2020-10-15 ENCOUNTER — LAB VISIT (OUTPATIENT)
Dept: LAB | Facility: HOSPITAL | Age: 45
End: 2020-10-15
Attending: INTERNAL MEDICINE
Payer: COMMERCIAL

## 2020-10-15 VITALS
SYSTOLIC BLOOD PRESSURE: 135 MMHG | BODY MASS INDEX: 29.89 KG/M2 | DIASTOLIC BLOOD PRESSURE: 85 MMHG | HEIGHT: 61 IN | HEART RATE: 96 BPM | WEIGHT: 158.31 LBS

## 2020-10-15 DIAGNOSIS — M79.7 FIBROMYALGIA: ICD-10-CM

## 2020-10-15 DIAGNOSIS — M79.7 FIBROMYALGIA: Primary | ICD-10-CM

## 2020-10-15 LAB
CCP AB SER IA-ACNC: <0.5 U/ML
CRP SERPL-MCNC: 4.8 MG/L (ref 0–8.2)
ERYTHROCYTE [SEDIMENTATION RATE] IN BLOOD BY WESTERGREN METHOD: 19 MM/HR (ref 0–36)
RHEUMATOID FACT SERPL-ACNC: <10 IU/ML (ref 0–15)

## 2020-10-15 PROCEDURE — 85652 RBC SED RATE AUTOMATED: CPT

## 2020-10-15 PROCEDURE — 99999 PR PBB SHADOW E&M-EST. PATIENT-LVL III: ICD-10-PCS | Mod: PBBFAC,,, | Performed by: INTERNAL MEDICINE

## 2020-10-15 PROCEDURE — 3008F BODY MASS INDEX DOCD: CPT | Mod: CPTII,S$GLB,, | Performed by: INTERNAL MEDICINE

## 2020-10-15 PROCEDURE — 86038 ANTINUCLEAR ANTIBODIES: CPT

## 2020-10-15 PROCEDURE — 99215 PR OFFICE/OUTPT VISIT, EST, LEVL V, 40-54 MIN: ICD-10-PCS | Mod: S$GLB,,, | Performed by: INTERNAL MEDICINE

## 2020-10-15 PROCEDURE — 99999 PR PBB SHADOW E&M-EST. PATIENT-LVL III: CPT | Mod: PBBFAC,,, | Performed by: INTERNAL MEDICINE

## 2020-10-15 PROCEDURE — 36415 COLL VENOUS BLD VENIPUNCTURE: CPT

## 2020-10-15 PROCEDURE — 99215 OFFICE O/P EST HI 40 MIN: CPT | Mod: S$GLB,,, | Performed by: INTERNAL MEDICINE

## 2020-10-15 PROCEDURE — 86431 RHEUMATOID FACTOR QUANT: CPT

## 2020-10-15 PROCEDURE — 86200 CCP ANTIBODY: CPT

## 2020-10-15 PROCEDURE — 3008F PR BODY MASS INDEX (BMI) DOCUMENTED: ICD-10-PCS | Mod: CPTII,S$GLB,, | Performed by: INTERNAL MEDICINE

## 2020-10-15 PROCEDURE — 86140 C-REACTIVE PROTEIN: CPT

## 2020-10-15 NOTE — PROGRESS NOTES
Subjective:       Patient ID: Rema Horton is a 39 y.o. Female her for joint pain and +FELIPA     Chief Complaint: Joint Pain     HPI   40 yo F with new left breast nodule awaiting biopsy here for joint pain  -reports fatigue for 2010  - lower back and buttock pain when lying down since 2010  -+fatigue  -no recent change  -burning sensation IN UPPER BACK  -HAD mri pelvis in summer 2013  overall unrevealing  -had hair loss  For last 2 years  -no joint swelling or stiffness  -oral ulcers this week (painful with eating)  +photosensitivity since childhood                     Past Medical History   Diagnosis Date    Menarche         Age of onset 13           Interval history: Patient is here for f/u of pain. Last seen in 2015. She is taking savella 12.5 mg a day since December 2019.  She has poor memory and is seeing neurology and is having migraines.  Reports she will be seeing neurologist for urine retention.  Pain level can be as high as 10/10 aching and non radiating.  She feels stressed. Denies SI or HI. Denies any rashes, oral ulcers, fevers, photosensitivity, or cough.             Review of Systems   Constitutional: Positive for fatigue. Negative for chills, diaphoresis, activity change and appetite change.   HENT: Negative for congestion, ear discharge, ear pain, facial swelling, sinus pressure, sneezing, sore throat, tinnitus and trouble swallowing.    Eyes: Negative for photophobia, pain, discharge, redness, itching and visual disturbance.   Respiratory: Negative for apnea, chest tightness, shortness of breath, wheezing and stridor.    Cardiovascular: Negative for chest pain, palpitations and leg swelling.   Gastrointestinal: Negative for nausea, abdominal pain, diarrhea, constipation, blood in stool, abdominal distention and anal bleeding.   Endocrine: Negative for cold intolerance and heat intolerance.   Genitourinary: Negative for dysuria and difficulty urinating.   Musculoskeletal: Positive for back pain  and arthralgias. Negative for myalgias, joint swelling, gait problem, neck pain and neck stiffness.   Skin: Negative for color change, pallor and wound.   Neurological: Negative for dizziness, seizures, light-headedness and numbness.   Hematological: Negative for adenopathy. Does not bruise/bleed easily.   Psychiatric/Behavioral: Negative for sleep disturbance. The patient is not nervous/anxious.           Objective:         Physical Exam   Constitutional: She is oriented to person, place, and time and well-developed, well-nourished, and in no distress.   HENT:   Head: Normocephalic and atraumatic.   Right Ear: External ear normal.   Left Ear: External ear normal.   Nose: Nose normal.   Mouth/Throat: Oropharynx is clear and moist. No oropharyngeal exudate.   Eyes: Conjunctivae and EOM are normal. Pupils are equal, round, and reactive to light. Right eye exhibits no discharge. Left eye exhibits no discharge. No scleral icterus.   Neck: Neck supple. No JVD present. No thyromegaly present.   Cardiovascular: Normal rate, regular rhythm, normal heart sounds and intact distal pulses.  Exam reveals no gallop and no friction rub.    No murmur heard.  Pulmonary/Chest: Effort normal and breath sounds normal. No respiratory distress. She has no wheezes. She has no rales. She exhibits no tenderness.   Abdominal: Soft. Bowel sounds are normal. She exhibits no distension and no mass. There is no tenderness. There is no rebound and no guarding.   Lymphadenopathy:     She has no cervical adenopathy.   Neurological: She is alert and oriented to person, place, and time. No cranial nerve deficit. Gait normal. Coordination normal.   Skin: Skin is dry. No rash noted.. No pallor.     Bilateral cheek and chin erythema   Psychiatric: Affect and judgment normal.   Musculoskeletal: Normal range of motion. She exhibits tenderness. She exhibits no edema.   Tender points throughout entire back          labs and imaging reviewed  Abbey-1:160  Neg  marshall, ro, la, dsdna, rnp,APS panel  Rf,ccp-negative  -normal TSH, T4  Esr-33<34        Lumbar spine xray (2014)- I personally reviewed films and it was negative.  10/2014-Arthritis survey- no erosions ( I personally reviewed films)     Assessment:     44 yo F with no significant PMH here for follow up of fibromyalgia. Reports diffuse body aches and is very anxious about her health.     1. Fibromyalgia-  -increase savella from 12.5mg po qday to BID  -PMR clinic consult           2 Elevated ESR- patient with persistent elevated ESR. She does not have features of an inflammatory arthritis. SPEP was wnl.  -encouraged weight loss   labs    3.Obesity- encouraged diet and exercise.     45 minutes of face to face time spent with patient

## 2020-10-16 LAB — ANA SER QL IF: NORMAL

## 2020-12-22 ENCOUNTER — PATIENT MESSAGE (OUTPATIENT)
Dept: OBSTETRICS AND GYNECOLOGY | Facility: CLINIC | Age: 45
End: 2020-12-22

## 2021-01-04 ENCOUNTER — PATIENT MESSAGE (OUTPATIENT)
Dept: ADMINISTRATIVE | Facility: HOSPITAL | Age: 46
End: 2021-01-04

## 2021-01-09 DIAGNOSIS — D50.9 IRON DEFICIENCY ANEMIA, UNSPECIFIED IRON DEFICIENCY ANEMIA TYPE: ICD-10-CM

## 2021-01-09 DIAGNOSIS — E55.9 VITAMIN D INSUFFICIENCY: ICD-10-CM

## 2021-01-11 RX ORDER — FERROUS SULFATE 325(65) MG
TABLET ORAL
Qty: 180 TABLET | Refills: 3 | Status: SHIPPED | OUTPATIENT
Start: 2021-01-11 | End: 2022-08-25

## 2021-01-11 RX ORDER — VIT C/E/ZN/COPPR/LUTEIN/ZEAXAN 250MG-90MG
2000 CAPSULE ORAL DAILY
Qty: 180 CAPSULE | Refills: 3 | Status: SHIPPED | OUTPATIENT
Start: 2021-01-11

## 2021-01-20 ENCOUNTER — PATIENT OUTREACH (OUTPATIENT)
Dept: ADMINISTRATIVE | Facility: OTHER | Age: 46
End: 2021-01-20

## 2021-01-20 DIAGNOSIS — Z12.31 ENCOUNTER FOR SCREENING MAMMOGRAM FOR MALIGNANT NEOPLASM OF BREAST: Primary | ICD-10-CM

## 2021-01-21 ENCOUNTER — OFFICE VISIT (OUTPATIENT)
Dept: OBSTETRICS AND GYNECOLOGY | Facility: CLINIC | Age: 46
End: 2021-01-21
Payer: COMMERCIAL

## 2021-01-21 VITALS
WEIGHT: 166.75 LBS | BODY MASS INDEX: 31.48 KG/M2 | HEIGHT: 61 IN | SYSTOLIC BLOOD PRESSURE: 122 MMHG | DIASTOLIC BLOOD PRESSURE: 76 MMHG

## 2021-01-21 DIAGNOSIS — R11.0 NAUSEA: ICD-10-CM

## 2021-01-21 DIAGNOSIS — N95.1 PERIMENOPAUSAL: ICD-10-CM

## 2021-01-21 DIAGNOSIS — N91.2 AMENORRHEA: Primary | ICD-10-CM

## 2021-01-21 DIAGNOSIS — M79.7 FIBROMYALGIA: ICD-10-CM

## 2021-01-21 LAB
B-HCG UR QL: NEGATIVE
CTP QC/QA: YES

## 2021-01-21 PROCEDURE — 99214 OFFICE O/P EST MOD 30 MIN: CPT | Mod: S$GLB,,, | Performed by: OBSTETRICS & GYNECOLOGY

## 2021-01-21 PROCEDURE — 1126F AMNT PAIN NOTED NONE PRSNT: CPT | Mod: S$GLB,,, | Performed by: OBSTETRICS & GYNECOLOGY

## 2021-01-21 PROCEDURE — 99999 PR PBB SHADOW E&M-EST. PATIENT-LVL III: ICD-10-PCS | Mod: PBBFAC,,, | Performed by: OBSTETRICS & GYNECOLOGY

## 2021-01-21 PROCEDURE — 99999 PR PBB SHADOW E&M-EST. PATIENT-LVL III: CPT | Mod: PBBFAC,,, | Performed by: OBSTETRICS & GYNECOLOGY

## 2021-01-21 PROCEDURE — 3008F BODY MASS INDEX DOCD: CPT | Mod: CPTII,S$GLB,, | Performed by: OBSTETRICS & GYNECOLOGY

## 2021-01-21 PROCEDURE — 3008F PR BODY MASS INDEX (BMI) DOCUMENTED: ICD-10-PCS | Mod: CPTII,S$GLB,, | Performed by: OBSTETRICS & GYNECOLOGY

## 2021-01-21 PROCEDURE — 1126F PR PAIN SEVERITY QUANTIFIED, NO PAIN PRESENT: ICD-10-PCS | Mod: S$GLB,,, | Performed by: OBSTETRICS & GYNECOLOGY

## 2021-01-21 PROCEDURE — 99214 PR OFFICE/OUTPT VISIT, EST, LEVL IV, 30-39 MIN: ICD-10-PCS | Mod: S$GLB,,, | Performed by: OBSTETRICS & GYNECOLOGY

## 2021-01-28 ENCOUNTER — HOSPITAL ENCOUNTER (OUTPATIENT)
Dept: RADIOLOGY | Facility: HOSPITAL | Age: 46
Discharge: HOME | End: 2021-01-28
Attending: INTERNAL MEDICINE
Payer: COMMERCIAL

## 2021-01-28 DIAGNOSIS — Z12.31 ENCOUNTER FOR SCREENING MAMMOGRAM FOR MALIGNANT NEOPLASM OF BREAST: ICD-10-CM

## 2021-01-28 PROCEDURE — 77067 SCR MAMMO BI INCL CAD: CPT | Mod: TC

## 2021-01-28 PROCEDURE — 77067 SCR MAMMO BI INCL CAD: CPT | Mod: 26,,, | Performed by: RADIOLOGY

## 2021-01-28 PROCEDURE — 77063 MAMMO DIGITAL SCREENING BILAT WITH TOMO: ICD-10-PCS | Mod: 26,,, | Performed by: RADIOLOGY

## 2021-01-28 PROCEDURE — 77063 BREAST TOMOSYNTHESIS BI: CPT | Mod: 26,,, | Performed by: RADIOLOGY

## 2021-01-28 PROCEDURE — 77067 MAMMO DIGITAL SCREENING BILAT WITH TOMO: ICD-10-PCS | Mod: 26,,, | Performed by: RADIOLOGY

## 2021-01-29 ENCOUNTER — TELEPHONE (OUTPATIENT)
Dept: RADIOLOGY | Facility: HOSPITAL | Age: 46
End: 2021-01-29

## 2021-02-03 ENCOUNTER — TELEPHONE (OUTPATIENT)
Dept: GASTROENTEROLOGY | Facility: CLINIC | Age: 46
End: 2021-02-03

## 2021-02-05 ENCOUNTER — HOSPITAL ENCOUNTER (OUTPATIENT)
Dept: RADIOLOGY | Facility: HOSPITAL | Age: 46
Discharge: HOME | End: 2021-02-05
Attending: INTERNAL MEDICINE
Payer: COMMERCIAL

## 2021-02-05 DIAGNOSIS — R92.8 ABNORMAL MAMMOGRAM: ICD-10-CM

## 2021-02-05 PROCEDURE — 77061 BREAST TOMOSYNTHESIS UNI: CPT | Mod: 26,LT,, | Performed by: RADIOLOGY

## 2021-02-05 PROCEDURE — 77065 DX MAMMO INCL CAD UNI: CPT | Mod: 26,LT,, | Performed by: RADIOLOGY

## 2021-02-05 PROCEDURE — 77065 MAMMO DIGITAL DIAGNOSTIC LEFT WITH TOMO: ICD-10-PCS | Mod: 26,LT,, | Performed by: RADIOLOGY

## 2021-02-05 PROCEDURE — 76642 ULTRASOUND BREAST LIMITED: CPT | Mod: TC,LT

## 2021-02-05 PROCEDURE — 76642 ULTRASOUND BREAST LIMITED: CPT | Mod: 26,LT,, | Performed by: RADIOLOGY

## 2021-02-05 PROCEDURE — 77061 BREAST TOMOSYNTHESIS UNI: CPT | Mod: TC,LT

## 2021-02-05 PROCEDURE — 77061 MAMMO DIGITAL DIAGNOSTIC LEFT WITH TOMO: ICD-10-PCS | Mod: 26,LT,, | Performed by: RADIOLOGY

## 2021-02-05 PROCEDURE — 76642 US BREAST LEFT LIMITED: ICD-10-PCS | Mod: 26,LT,, | Performed by: RADIOLOGY

## 2021-02-08 ENCOUNTER — TELEPHONE (OUTPATIENT)
Dept: SURGERY | Facility: CLINIC | Age: 46
End: 2021-02-08

## 2021-02-10 ENCOUNTER — TELEPHONE (OUTPATIENT)
Dept: SURGERY | Facility: CLINIC | Age: 46
End: 2021-02-10

## 2021-02-18 ENCOUNTER — OFFICE VISIT (OUTPATIENT)
Dept: GASTROENTEROLOGY | Facility: CLINIC | Age: 46
End: 2021-02-18
Payer: COMMERCIAL

## 2021-02-18 ENCOUNTER — LAB VISIT (OUTPATIENT)
Dept: LAB | Facility: HOSPITAL | Age: 46
End: 2021-02-18
Payer: COMMERCIAL

## 2021-02-18 VITALS
SYSTOLIC BLOOD PRESSURE: 141 MMHG | WEIGHT: 166.69 LBS | BODY MASS INDEX: 30.67 KG/M2 | HEART RATE: 82 BPM | DIASTOLIC BLOOD PRESSURE: 99 MMHG | HEIGHT: 62 IN

## 2021-02-18 DIAGNOSIS — Z12.11 COLON CANCER SCREENING: ICD-10-CM

## 2021-02-18 DIAGNOSIS — R10.9 ABDOMINAL PAIN, UNSPECIFIED ABDOMINAL LOCATION: ICD-10-CM

## 2021-02-18 DIAGNOSIS — D50.9 IRON DEFICIENCY ANEMIA, UNSPECIFIED IRON DEFICIENCY ANEMIA TYPE: ICD-10-CM

## 2021-02-18 DIAGNOSIS — R14.0 BLOATING: ICD-10-CM

## 2021-02-18 DIAGNOSIS — R11.0 NAUSEA: Primary | ICD-10-CM

## 2021-02-18 DIAGNOSIS — R07.89 CHEST DISCOMFORT: ICD-10-CM

## 2021-02-18 DIAGNOSIS — R11.0 NAUSEA: ICD-10-CM

## 2021-02-18 LAB — IGA SERPL-MCNC: 298 MG/DL (ref 40–350)

## 2021-02-18 PROCEDURE — 3008F PR BODY MASS INDEX (BMI) DOCUMENTED: ICD-10-PCS | Mod: CPTII,S$GLB,, | Performed by: NURSE PRACTITIONER

## 2021-02-18 PROCEDURE — 36415 COLL VENOUS BLD VENIPUNCTURE: CPT

## 2021-02-18 PROCEDURE — 99999 PR PBB SHADOW E&M-EST. PATIENT-LVL V: CPT | Mod: PBBFAC,,, | Performed by: NURSE PRACTITIONER

## 2021-02-18 PROCEDURE — 83516 IMMUNOASSAY NONANTIBODY: CPT

## 2021-02-18 PROCEDURE — 82784 ASSAY IGA/IGD/IGG/IGM EACH: CPT

## 2021-02-18 PROCEDURE — 3008F BODY MASS INDEX DOCD: CPT | Mod: CPTII,S$GLB,, | Performed by: NURSE PRACTITIONER

## 2021-02-18 PROCEDURE — 86677 HELICOBACTER PYLORI ANTIBODY: CPT

## 2021-02-18 PROCEDURE — 99204 PR OFFICE/OUTPT VISIT, NEW, LEVL IV, 45-59 MIN: ICD-10-PCS | Mod: S$GLB,,, | Performed by: NURSE PRACTITIONER

## 2021-02-18 PROCEDURE — 1126F AMNT PAIN NOTED NONE PRSNT: CPT | Mod: S$GLB,,, | Performed by: NURSE PRACTITIONER

## 2021-02-18 PROCEDURE — 99999 PR PBB SHADOW E&M-EST. PATIENT-LVL V: ICD-10-PCS | Mod: PBBFAC,,, | Performed by: NURSE PRACTITIONER

## 2021-02-18 PROCEDURE — 1126F PR PAIN SEVERITY QUANTIFIED, NO PAIN PRESENT: ICD-10-PCS | Mod: S$GLB,,, | Performed by: NURSE PRACTITIONER

## 2021-02-18 PROCEDURE — 99204 OFFICE O/P NEW MOD 45 MIN: CPT | Mod: S$GLB,,, | Performed by: NURSE PRACTITIONER

## 2021-02-18 RX ORDER — BLACK COHOSH ROOT 540 MG
CAPSULE ORAL
COMMUNITY
Start: 2021-02-03 | End: 2021-09-23

## 2021-02-18 RX ORDER — OMEPRAZOLE 20 MG/1
20 CAPSULE, DELAYED RELEASE ORAL DAILY
Qty: 90 CAPSULE | Refills: 1 | Status: SHIPPED | OUTPATIENT
Start: 2021-02-18 | End: 2021-09-23

## 2021-02-18 RX ORDER — EVE PRIMROSE/LINOLEIC/G-LENIC 1000 MG
CAPSULE ORAL
COMMUNITY
Start: 2021-02-03

## 2021-02-22 LAB
H PYLORI IGG SERPL QL IA: NEGATIVE
TTG IGA SER-ACNC: 5 UNITS

## 2021-02-23 ENCOUNTER — PATIENT MESSAGE (OUTPATIENT)
Dept: RHEUMATOLOGY | Facility: CLINIC | Age: 46
End: 2021-02-23

## 2021-02-26 ENCOUNTER — OFFICE VISIT (OUTPATIENT)
Dept: SURGERY | Facility: CLINIC | Age: 46
End: 2021-02-26
Payer: COMMERCIAL

## 2021-02-26 VITALS
DIASTOLIC BLOOD PRESSURE: 83 MMHG | SYSTOLIC BLOOD PRESSURE: 132 MMHG | BODY MASS INDEX: 30.36 KG/M2 | WEIGHT: 166 LBS | HEART RATE: 81 BPM

## 2021-02-26 DIAGNOSIS — N64.4 CYCLICAL BREAST PAIN: Primary | ICD-10-CM

## 2021-02-26 PROCEDURE — 3008F PR BODY MASS INDEX (BMI) DOCUMENTED: ICD-10-PCS | Mod: CPTII,S$GLB,, | Performed by: PHYSICIAN ASSISTANT

## 2021-02-26 PROCEDURE — 99213 PR OFFICE/OUTPT VISIT, EST, LEVL III, 20-29 MIN: ICD-10-PCS | Mod: S$GLB,,, | Performed by: PHYSICIAN ASSISTANT

## 2021-02-26 PROCEDURE — 1126F AMNT PAIN NOTED NONE PRSNT: CPT | Mod: S$GLB,,, | Performed by: PHYSICIAN ASSISTANT

## 2021-02-26 PROCEDURE — 99213 OFFICE O/P EST LOW 20 MIN: CPT | Mod: S$GLB,,, | Performed by: PHYSICIAN ASSISTANT

## 2021-02-26 PROCEDURE — 1126F PR PAIN SEVERITY QUANTIFIED, NO PAIN PRESENT: ICD-10-PCS | Mod: S$GLB,,, | Performed by: PHYSICIAN ASSISTANT

## 2021-02-26 PROCEDURE — 99999 PR PBB SHADOW E&M-EST. PATIENT-LVL III: ICD-10-PCS | Mod: PBBFAC,,, | Performed by: PHYSICIAN ASSISTANT

## 2021-02-26 PROCEDURE — 3008F BODY MASS INDEX DOCD: CPT | Mod: CPTII,S$GLB,, | Performed by: PHYSICIAN ASSISTANT

## 2021-02-26 PROCEDURE — 99999 PR PBB SHADOW E&M-EST. PATIENT-LVL III: CPT | Mod: PBBFAC,,, | Performed by: PHYSICIAN ASSISTANT

## 2021-03-10 ENCOUNTER — PATIENT MESSAGE (OUTPATIENT)
Dept: INTERNAL MEDICINE | Facility: CLINIC | Age: 46
End: 2021-03-10

## 2021-03-18 ENCOUNTER — OFFICE VISIT (OUTPATIENT)
Dept: INTERNAL MEDICINE | Facility: CLINIC | Age: 46
End: 2021-03-18
Payer: COMMERCIAL

## 2021-03-18 VITALS
SYSTOLIC BLOOD PRESSURE: 120 MMHG | BODY MASS INDEX: 30.73 KG/M2 | HEART RATE: 72 BPM | DIASTOLIC BLOOD PRESSURE: 82 MMHG | OXYGEN SATURATION: 96 % | HEIGHT: 62 IN | WEIGHT: 167 LBS

## 2021-03-18 DIAGNOSIS — G47.33 OSA (OBSTRUCTIVE SLEEP APNEA): ICD-10-CM

## 2021-03-18 DIAGNOSIS — M79.7 FIBROMYALGIA: Primary | ICD-10-CM

## 2021-03-18 DIAGNOSIS — D64.9 NORMOCYTIC ANEMIA: ICD-10-CM

## 2021-03-18 DIAGNOSIS — R91.1 SOLITARY PULMONARY NODULE: ICD-10-CM

## 2021-03-18 PROCEDURE — 99999 PR PBB SHADOW E&M-EST. PATIENT-LVL IV: CPT | Mod: PBBFAC,,, | Performed by: INTERNAL MEDICINE

## 2021-03-18 PROCEDURE — 1126F PR PAIN SEVERITY QUANTIFIED, NO PAIN PRESENT: ICD-10-PCS | Mod: S$GLB,,, | Performed by: INTERNAL MEDICINE

## 2021-03-18 PROCEDURE — 1126F AMNT PAIN NOTED NONE PRSNT: CPT | Mod: S$GLB,,, | Performed by: INTERNAL MEDICINE

## 2021-03-18 PROCEDURE — 99213 OFFICE O/P EST LOW 20 MIN: CPT | Mod: S$GLB,,, | Performed by: INTERNAL MEDICINE

## 2021-03-18 PROCEDURE — 3008F PR BODY MASS INDEX (BMI) DOCUMENTED: ICD-10-PCS | Mod: CPTII,S$GLB,, | Performed by: INTERNAL MEDICINE

## 2021-03-18 PROCEDURE — 99213 PR OFFICE/OUTPT VISIT, EST, LEVL III, 20-29 MIN: ICD-10-PCS | Mod: S$GLB,,, | Performed by: INTERNAL MEDICINE

## 2021-03-18 PROCEDURE — 99999 PR PBB SHADOW E&M-EST. PATIENT-LVL IV: ICD-10-PCS | Mod: PBBFAC,,, | Performed by: INTERNAL MEDICINE

## 2021-03-18 PROCEDURE — 3008F BODY MASS INDEX DOCD: CPT | Mod: CPTII,S$GLB,, | Performed by: INTERNAL MEDICINE

## 2021-04-02 ENCOUNTER — IMMUNIZATION (OUTPATIENT)
Dept: INTERNAL MEDICINE | Facility: CLINIC | Age: 46
End: 2021-04-02
Payer: COMMERCIAL

## 2021-04-02 DIAGNOSIS — Z23 NEED FOR VACCINATION: Primary | ICD-10-CM

## 2021-04-02 PROCEDURE — 0011A COVID-19, MRNA, LNP-S, PF, 100 MCG/0.5 ML DOSE VACCINE: CPT | Mod: PBBFAC | Performed by: INTERNAL MEDICINE

## 2021-04-28 ENCOUNTER — PATIENT MESSAGE (OUTPATIENT)
Dept: OBSTETRICS AND GYNECOLOGY | Facility: CLINIC | Age: 46
End: 2021-04-28

## 2021-04-30 ENCOUNTER — IMMUNIZATION (OUTPATIENT)
Dept: INTERNAL MEDICINE | Facility: CLINIC | Age: 46
End: 2021-04-30
Payer: COMMERCIAL

## 2021-04-30 DIAGNOSIS — Z23 NEED FOR VACCINATION: Primary | ICD-10-CM

## 2021-04-30 PROCEDURE — 0012A COVID-19, MRNA, LNP-S, PF, 100 MCG/0.5 ML DOSE VACCINE: CPT | Mod: PBBFAC | Performed by: INTERNAL MEDICINE

## 2021-06-04 ENCOUNTER — OFFICE VISIT (OUTPATIENT)
Dept: OBSTETRICS AND GYNECOLOGY | Facility: CLINIC | Age: 46
End: 2021-06-04
Payer: COMMERCIAL

## 2021-06-04 VITALS
DIASTOLIC BLOOD PRESSURE: 76 MMHG | HEIGHT: 62 IN | WEIGHT: 165.56 LBS | SYSTOLIC BLOOD PRESSURE: 114 MMHG | BODY MASS INDEX: 30.47 KG/M2

## 2021-06-04 DIAGNOSIS — N64.4 MASTALGIA: ICD-10-CM

## 2021-06-04 DIAGNOSIS — N89.8 VAGINAL DISCHARGE: ICD-10-CM

## 2021-06-04 DIAGNOSIS — R10.2 PELVIC PAIN IN FEMALE: ICD-10-CM

## 2021-06-04 DIAGNOSIS — N95.1 MENOPAUSAL SYMPTOMS: Primary | ICD-10-CM

## 2021-06-04 PROCEDURE — 3008F BODY MASS INDEX DOCD: CPT | Mod: CPTII,S$GLB,, | Performed by: OBSTETRICS & GYNECOLOGY

## 2021-06-04 PROCEDURE — 99214 PR OFFICE/OUTPT VISIT, EST, LEVL IV, 30-39 MIN: ICD-10-PCS | Mod: S$GLB,,, | Performed by: OBSTETRICS & GYNECOLOGY

## 2021-06-04 PROCEDURE — 87661 TRICHOMONAS VAGINALIS AMPLIF: CPT | Mod: 59 | Performed by: OBSTETRICS & GYNECOLOGY

## 2021-06-04 PROCEDURE — 1125F PR PAIN SEVERITY QUANTIFIED, PAIN PRESENT: ICD-10-PCS | Mod: S$GLB,,, | Performed by: OBSTETRICS & GYNECOLOGY

## 2021-06-04 PROCEDURE — 87481 CANDIDA DNA AMP PROBE: CPT | Mod: 59 | Performed by: OBSTETRICS & GYNECOLOGY

## 2021-06-04 PROCEDURE — 99999 PR PBB SHADOW E&M-EST. PATIENT-LVL III: CPT | Mod: PBBFAC,,, | Performed by: OBSTETRICS & GYNECOLOGY

## 2021-06-04 PROCEDURE — 99999 PR PBB SHADOW E&M-EST. PATIENT-LVL III: ICD-10-PCS | Mod: PBBFAC,,, | Performed by: OBSTETRICS & GYNECOLOGY

## 2021-06-04 PROCEDURE — 99214 OFFICE O/P EST MOD 30 MIN: CPT | Mod: S$GLB,,, | Performed by: OBSTETRICS & GYNECOLOGY

## 2021-06-04 PROCEDURE — 3008F PR BODY MASS INDEX (BMI) DOCUMENTED: ICD-10-PCS | Mod: CPTII,S$GLB,, | Performed by: OBSTETRICS & GYNECOLOGY

## 2021-06-04 PROCEDURE — 1125F AMNT PAIN NOTED PAIN PRSNT: CPT | Mod: S$GLB,,, | Performed by: OBSTETRICS & GYNECOLOGY

## 2021-06-08 LAB
BACTERIAL VAGINOSIS DNA: NEGATIVE
CANDIDA GLABRATA DNA: NEGATIVE
CANDIDA KRUSEI DNA: NEGATIVE
CANDIDA RRNA VAG QL PROBE: NEGATIVE
T VAGINALIS RRNA GENITAL QL PROBE: NEGATIVE

## 2021-06-16 ENCOUNTER — TELEPHONE (OUTPATIENT)
Dept: ALLERGY | Facility: CLINIC | Age: 46
End: 2021-06-16

## 2021-07-05 ENCOUNTER — PATIENT MESSAGE (OUTPATIENT)
Dept: INTERNAL MEDICINE | Facility: CLINIC | Age: 46
End: 2021-07-05

## 2021-07-06 ENCOUNTER — TELEPHONE (OUTPATIENT)
Dept: INTERNAL MEDICINE | Facility: CLINIC | Age: 46
End: 2021-07-06

## 2021-07-06 ENCOUNTER — PATIENT MESSAGE (OUTPATIENT)
Dept: RHEUMATOLOGY | Facility: CLINIC | Age: 46
End: 2021-07-06

## 2021-07-07 ENCOUNTER — OFFICE VISIT (OUTPATIENT)
Dept: INTERNAL MEDICINE | Facility: CLINIC | Age: 46
End: 2021-07-07
Payer: COMMERCIAL

## 2021-07-07 VITALS
SYSTOLIC BLOOD PRESSURE: 118 MMHG | OXYGEN SATURATION: 97 % | HEART RATE: 76 BPM | DIASTOLIC BLOOD PRESSURE: 84 MMHG | WEIGHT: 166.25 LBS | BODY MASS INDEX: 30.59 KG/M2 | HEIGHT: 62 IN

## 2021-07-07 DIAGNOSIS — M54.2 NECK PAIN ON RIGHT SIDE: Primary | ICD-10-CM

## 2021-07-07 PROCEDURE — 99999 PR PBB SHADOW E&M-EST. PATIENT-LVL V: CPT | Mod: PBBFAC,,, | Performed by: PHYSICIAN ASSISTANT

## 2021-07-07 PROCEDURE — 3008F BODY MASS INDEX DOCD: CPT | Mod: CPTII,S$GLB,, | Performed by: PHYSICIAN ASSISTANT

## 2021-07-07 PROCEDURE — 3008F PR BODY MASS INDEX (BMI) DOCUMENTED: ICD-10-PCS | Mod: CPTII,S$GLB,, | Performed by: PHYSICIAN ASSISTANT

## 2021-07-07 PROCEDURE — 99214 PR OFFICE/OUTPT VISIT, EST, LEVL IV, 30-39 MIN: ICD-10-PCS | Mod: S$GLB,,, | Performed by: PHYSICIAN ASSISTANT

## 2021-07-07 PROCEDURE — 1125F PR PAIN SEVERITY QUANTIFIED, PAIN PRESENT: ICD-10-PCS | Mod: S$GLB,,, | Performed by: PHYSICIAN ASSISTANT

## 2021-07-07 PROCEDURE — 99999 PR PBB SHADOW E&M-EST. PATIENT-LVL V: ICD-10-PCS | Mod: PBBFAC,,, | Performed by: PHYSICIAN ASSISTANT

## 2021-07-07 PROCEDURE — 1125F AMNT PAIN NOTED PAIN PRSNT: CPT | Mod: S$GLB,,, | Performed by: PHYSICIAN ASSISTANT

## 2021-07-07 PROCEDURE — 99214 OFFICE O/P EST MOD 30 MIN: CPT | Mod: S$GLB,,, | Performed by: PHYSICIAN ASSISTANT

## 2021-07-07 RX ORDER — BACLOFEN 10 MG/1
10 TABLET ORAL 3 TIMES DAILY
Qty: 90 TABLET | Refills: 0 | Status: SHIPPED | OUTPATIENT
Start: 2021-07-07 | End: 2021-09-23 | Stop reason: SDUPTHER

## 2021-07-08 ENCOUNTER — HOSPITAL ENCOUNTER (OUTPATIENT)
Dept: RADIOLOGY | Facility: HOSPITAL | Age: 46
Discharge: HOME | End: 2021-07-08
Attending: PHYSICIAN ASSISTANT
Payer: COMMERCIAL

## 2021-07-08 DIAGNOSIS — M54.2 NECK PAIN ON RIGHT SIDE: ICD-10-CM

## 2021-07-08 PROCEDURE — 72040 XR CERVICAL SPINE AP LATERAL: ICD-10-PCS | Mod: 26,,, | Performed by: RADIOLOGY

## 2021-07-08 PROCEDURE — 72040 X-RAY EXAM NECK SPINE 2-3 VW: CPT | Mod: 26,,, | Performed by: RADIOLOGY

## 2021-07-08 PROCEDURE — 72040 X-RAY EXAM NECK SPINE 2-3 VW: CPT | Mod: TC

## 2021-07-15 ENCOUNTER — HOSPITAL ENCOUNTER (OUTPATIENT)
Dept: RADIOLOGY | Facility: HOSPITAL | Age: 46
Discharge: HOME | End: 2021-07-15
Attending: OBSTETRICS & GYNECOLOGY
Payer: COMMERCIAL

## 2021-07-15 DIAGNOSIS — R10.2 PELVIC PAIN IN FEMALE: ICD-10-CM

## 2021-07-15 PROCEDURE — 76830 US PELVIS COMP WITH TRANSVAG NON-OB (XPD): ICD-10-PCS | Mod: 26,,, | Performed by: RADIOLOGY

## 2021-07-15 PROCEDURE — 76830 TRANSVAGINAL US NON-OB: CPT | Mod: 26,,, | Performed by: RADIOLOGY

## 2021-07-15 PROCEDURE — 76856 US EXAM PELVIC COMPLETE: CPT | Mod: 26,,, | Performed by: RADIOLOGY

## 2021-07-15 PROCEDURE — 76856 US PELVIS COMP WITH TRANSVAG NON-OB (XPD): ICD-10-PCS | Mod: 26,,, | Performed by: RADIOLOGY

## 2021-07-15 PROCEDURE — 76856 US EXAM PELVIC COMPLETE: CPT | Mod: TC

## 2021-07-29 DIAGNOSIS — M54.2 NECK PAIN ON RIGHT SIDE: ICD-10-CM

## 2021-07-29 RX ORDER — BACLOFEN 10 MG/1
TABLET ORAL
Qty: 90 TABLET | Refills: 0 | OUTPATIENT
Start: 2021-07-29

## 2021-08-20 ENCOUNTER — HOSPITAL ENCOUNTER (OUTPATIENT)
Dept: RADIOLOGY | Facility: HOSPITAL | Age: 46
Discharge: HOME | End: 2021-08-20
Attending: INTERNAL MEDICINE
Payer: COMMERCIAL

## 2021-08-20 DIAGNOSIS — R91.1 SOLITARY PULMONARY NODULE: ICD-10-CM

## 2021-08-20 PROCEDURE — 71250 CT THORAX DX C-: CPT | Mod: 26,,, | Performed by: RADIOLOGY

## 2021-08-20 PROCEDURE — 71250 CT CHEST WITHOUT CONTRAST: ICD-10-PCS | Mod: 26,,, | Performed by: RADIOLOGY

## 2021-08-20 PROCEDURE — 71250 CT THORAX DX C-: CPT | Mod: TC

## 2021-08-21 ENCOUNTER — PATIENT MESSAGE (OUTPATIENT)
Dept: INTERNAL MEDICINE | Facility: CLINIC | Age: 46
End: 2021-08-21

## 2021-09-23 ENCOUNTER — IMMUNIZATION (OUTPATIENT)
Dept: INTERNAL MEDICINE | Facility: CLINIC | Age: 46
End: 2021-09-23
Payer: COMMERCIAL

## 2021-09-23 ENCOUNTER — OFFICE VISIT (OUTPATIENT)
Dept: INTERNAL MEDICINE | Facility: CLINIC | Age: 46
End: 2021-09-23
Payer: COMMERCIAL

## 2021-09-23 VITALS
WEIGHT: 163 LBS | HEIGHT: 62 IN | OXYGEN SATURATION: 98 % | BODY MASS INDEX: 30 KG/M2 | DIASTOLIC BLOOD PRESSURE: 72 MMHG | HEART RATE: 70 BPM | SYSTOLIC BLOOD PRESSURE: 116 MMHG

## 2021-09-23 DIAGNOSIS — F32.A ANXIETY AND DEPRESSION: ICD-10-CM

## 2021-09-23 DIAGNOSIS — Z00.00 HEALTH CARE MAINTENANCE: Primary | ICD-10-CM

## 2021-09-23 DIAGNOSIS — D50.0 IRON DEFICIENCY ANEMIA DUE TO CHRONIC BLOOD LOSS: ICD-10-CM

## 2021-09-23 DIAGNOSIS — R91.8 PULMONARY NODULES: ICD-10-CM

## 2021-09-23 DIAGNOSIS — M54.2 NECK PAIN ON RIGHT SIDE: ICD-10-CM

## 2021-09-23 DIAGNOSIS — M79.7 FIBROMYALGIA: ICD-10-CM

## 2021-09-23 DIAGNOSIS — F41.9 ANXIETY AND DEPRESSION: ICD-10-CM

## 2021-09-23 PROCEDURE — 90471 FLU VACCINE (QUAD) GREATER THAN OR EQUAL TO 3YO PRESERVATIVE FREE IM: ICD-10-PCS | Mod: S$GLB,,, | Performed by: INTERNAL MEDICINE

## 2021-09-23 PROCEDURE — 1160F RVW MEDS BY RX/DR IN RCRD: CPT | Mod: CPTII,S$GLB,, | Performed by: INTERNAL MEDICINE

## 2021-09-23 PROCEDURE — 99999 PR PBB SHADOW E&M-EST. PATIENT-LVL III: CPT | Mod: PBBFAC,,, | Performed by: INTERNAL MEDICINE

## 2021-09-23 PROCEDURE — 1160F PR REVIEW ALL MEDS BY PRESCRIBER/CLIN PHARMACIST DOCUMENTED: ICD-10-PCS | Mod: CPTII,S$GLB,, | Performed by: INTERNAL MEDICINE

## 2021-09-23 PROCEDURE — 1159F PR MEDICATION LIST DOCUMENTED IN MEDICAL RECORD: ICD-10-PCS | Mod: CPTII,S$GLB,, | Performed by: INTERNAL MEDICINE

## 2021-09-23 PROCEDURE — 99215 PR OFFICE/OUTPT VISIT, EST, LEVL V, 40-54 MIN: ICD-10-PCS | Mod: 25,S$GLB,, | Performed by: INTERNAL MEDICINE

## 2021-09-23 PROCEDURE — 99215 OFFICE O/P EST HI 40 MIN: CPT | Mod: 25,S$GLB,, | Performed by: INTERNAL MEDICINE

## 2021-09-23 PROCEDURE — 90686 IIV4 VACC NO PRSV 0.5 ML IM: CPT | Mod: S$GLB,,, | Performed by: INTERNAL MEDICINE

## 2021-09-23 PROCEDURE — 3078F DIAST BP <80 MM HG: CPT | Mod: CPTII,S$GLB,, | Performed by: INTERNAL MEDICINE

## 2021-09-23 PROCEDURE — 3074F SYST BP LT 130 MM HG: CPT | Mod: CPTII,S$GLB,, | Performed by: INTERNAL MEDICINE

## 2021-09-23 PROCEDURE — 90686 FLU VACCINE (QUAD) GREATER THAN OR EQUAL TO 3YO PRESERVATIVE FREE IM: ICD-10-PCS | Mod: S$GLB,,, | Performed by: INTERNAL MEDICINE

## 2021-09-23 PROCEDURE — 99999 PR PBB SHADOW E&M-EST. PATIENT-LVL III: ICD-10-PCS | Mod: PBBFAC,,, | Performed by: INTERNAL MEDICINE

## 2021-09-23 PROCEDURE — 3078F PR MOST RECENT DIASTOLIC BLOOD PRESSURE < 80 MM HG: ICD-10-PCS | Mod: CPTII,S$GLB,, | Performed by: INTERNAL MEDICINE

## 2021-09-23 PROCEDURE — 3008F PR BODY MASS INDEX (BMI) DOCUMENTED: ICD-10-PCS | Mod: CPTII,S$GLB,, | Performed by: INTERNAL MEDICINE

## 2021-09-23 PROCEDURE — 1159F MED LIST DOCD IN RCRD: CPT | Mod: CPTII,S$GLB,, | Performed by: INTERNAL MEDICINE

## 2021-09-23 PROCEDURE — 3008F BODY MASS INDEX DOCD: CPT | Mod: CPTII,S$GLB,, | Performed by: INTERNAL MEDICINE

## 2021-09-23 PROCEDURE — 90471 IMMUNIZATION ADMIN: CPT | Mod: S$GLB,,, | Performed by: INTERNAL MEDICINE

## 2021-09-23 PROCEDURE — 3074F PR MOST RECENT SYSTOLIC BLOOD PRESSURE < 130 MM HG: ICD-10-PCS | Mod: CPTII,S$GLB,, | Performed by: INTERNAL MEDICINE

## 2021-09-23 RX ORDER — BUPROPION HYDROCHLORIDE 75 MG/1
TABLET ORAL
Qty: 120 TABLET | Refills: 11 | Status: SHIPPED | OUTPATIENT
Start: 2021-09-23 | End: 2022-02-01

## 2021-09-23 RX ORDER — BACLOFEN 10 MG/1
10 TABLET ORAL DAILY PRN
Qty: 90 TABLET | Refills: 0 | Status: SHIPPED | OUTPATIENT
Start: 2021-09-23 | End: 2021-12-14

## 2021-09-24 ENCOUNTER — LAB VISIT (OUTPATIENT)
Dept: LAB | Facility: HOSPITAL | Age: 46
End: 2021-09-24
Attending: INTERNAL MEDICINE
Payer: COMMERCIAL

## 2021-09-24 DIAGNOSIS — D50.0 IRON DEFICIENCY ANEMIA DUE TO CHRONIC BLOOD LOSS: ICD-10-CM

## 2021-09-24 DIAGNOSIS — Z00.00 HEALTH CARE MAINTENANCE: ICD-10-CM

## 2021-09-24 LAB
25(OH)D3+25(OH)D2 SERPL-MCNC: 38 NG/ML (ref 30–96)
ALBUMIN SERPL BCP-MCNC: 3.9 G/DL (ref 3.5–5.2)
ALP SERPL-CCNC: 58 U/L (ref 55–135)
ALT SERPL W/O P-5'-P-CCNC: 10 U/L (ref 10–44)
ANION GAP SERPL CALC-SCNC: 11 MMOL/L (ref 8–16)
AST SERPL-CCNC: 16 U/L (ref 10–40)
BASOPHILS # BLD AUTO: 0.01 K/UL (ref 0–0.2)
BASOPHILS NFR BLD: 0.2 % (ref 0–1.9)
BILIRUB SERPL-MCNC: 0.4 MG/DL (ref 0.1–1)
BUN SERPL-MCNC: 13 MG/DL (ref 6–20)
CALCIUM SERPL-MCNC: 9.6 MG/DL (ref 8.7–10.5)
CHLORIDE SERPL-SCNC: 106 MMOL/L (ref 95–110)
CHOLEST SERPL-MCNC: 201 MG/DL (ref 120–199)
CHOLEST/HDLC SERPL: 4 {RATIO} (ref 2–5)
CO2 SERPL-SCNC: 24 MMOL/L (ref 23–29)
CREAT SERPL-MCNC: 0.7 MG/DL (ref 0.5–1.4)
DIFFERENTIAL METHOD: NORMAL
EOSINOPHIL # BLD AUTO: 0 K/UL (ref 0–0.5)
EOSINOPHIL NFR BLD: 0.7 % (ref 0–8)
ERYTHROCYTE [DISTWIDTH] IN BLOOD BY AUTOMATED COUNT: 12.4 % (ref 11.5–14.5)
EST. GFR  (AFRICAN AMERICAN): >60 ML/MIN/1.73 M^2
EST. GFR  (NON AFRICAN AMERICAN): >60 ML/MIN/1.73 M^2
ESTIMATED AVG GLUCOSE: 103 MG/DL (ref 68–131)
FERRITIN SERPL-MCNC: 29 NG/ML (ref 20–300)
GLUCOSE SERPL-MCNC: 89 MG/DL (ref 70–110)
HBA1C MFR BLD: 5.2 % (ref 4–5.6)
HCT VFR BLD AUTO: 39 % (ref 37–48.5)
HDLC SERPL-MCNC: 50 MG/DL (ref 40–75)
HDLC SERPL: 24.9 % (ref 20–50)
HGB BLD-MCNC: 12.9 G/DL (ref 12–16)
IMM GRANULOCYTES # BLD AUTO: 0 K/UL (ref 0–0.04)
IMM GRANULOCYTES NFR BLD AUTO: 0 % (ref 0–0.5)
IRON SERPL-MCNC: 91 UG/DL (ref 30–160)
LDLC SERPL CALC-MCNC: 126.6 MG/DL (ref 63–159)
LYMPHOCYTES # BLD AUTO: 1.2 K/UL (ref 1–4.8)
LYMPHOCYTES NFR BLD: 29.7 % (ref 18–48)
MCH RBC QN AUTO: 30.3 PG (ref 27–31)
MCHC RBC AUTO-ENTMCNC: 33.1 G/DL (ref 32–36)
MCV RBC AUTO: 92 FL (ref 82–98)
MONOCYTES # BLD AUTO: 0.5 K/UL (ref 0.3–1)
MONOCYTES NFR BLD: 11.8 % (ref 4–15)
NEUTROPHILS # BLD AUTO: 2.3 K/UL (ref 1.8–7.7)
NEUTROPHILS NFR BLD: 57.6 % (ref 38–73)
NONHDLC SERPL-MCNC: 151 MG/DL
NRBC BLD-RTO: 0 /100 WBC
PLATELET # BLD AUTO: 199 K/UL (ref 150–450)
PMV BLD AUTO: 10.2 FL (ref 9.2–12.9)
POTASSIUM SERPL-SCNC: 4.4 MMOL/L (ref 3.5–5.1)
PROT SERPL-MCNC: 7.1 G/DL (ref 6–8.4)
RBC # BLD AUTO: 4.26 M/UL (ref 4–5.4)
SATURATED IRON: 30 % (ref 20–50)
SODIUM SERPL-SCNC: 141 MMOL/L (ref 136–145)
TOTAL IRON BINDING CAPACITY: 299 UG/DL (ref 250–450)
TRANSFERRIN SERPL-MCNC: 202 MG/DL (ref 200–375)
TRIGL SERPL-MCNC: 122 MG/DL (ref 30–150)
TSH SERPL DL<=0.005 MIU/L-ACNC: 2.13 UIU/ML (ref 0.4–4)
WBC # BLD AUTO: 4.07 K/UL (ref 3.9–12.7)

## 2021-09-24 PROCEDURE — 84443 ASSAY THYROID STIM HORMONE: CPT | Performed by: INTERNAL MEDICINE

## 2021-09-24 PROCEDURE — 83036 HEMOGLOBIN GLYCOSYLATED A1C: CPT | Performed by: INTERNAL MEDICINE

## 2021-09-24 PROCEDURE — 82728 ASSAY OF FERRITIN: CPT | Performed by: INTERNAL MEDICINE

## 2021-09-24 PROCEDURE — 36415 COLL VENOUS BLD VENIPUNCTURE: CPT | Performed by: INTERNAL MEDICINE

## 2021-09-24 PROCEDURE — 85025 COMPLETE CBC W/AUTO DIFF WBC: CPT | Performed by: INTERNAL MEDICINE

## 2021-09-24 PROCEDURE — 80053 COMPREHEN METABOLIC PANEL: CPT | Performed by: INTERNAL MEDICINE

## 2021-09-24 PROCEDURE — 84466 ASSAY OF TRANSFERRIN: CPT | Performed by: INTERNAL MEDICINE

## 2021-09-24 PROCEDURE — 82306 VITAMIN D 25 HYDROXY: CPT | Performed by: INTERNAL MEDICINE

## 2021-09-24 PROCEDURE — 80061 LIPID PANEL: CPT | Performed by: INTERNAL MEDICINE

## 2021-09-29 ENCOUNTER — OFFICE VISIT (OUTPATIENT)
Dept: NEUROLOGY | Facility: CLINIC | Age: 46
End: 2021-09-29
Payer: COMMERCIAL

## 2021-09-29 VITALS — BODY MASS INDEX: 30 KG/M2 | HEIGHT: 62 IN | WEIGHT: 163 LBS

## 2021-09-29 DIAGNOSIS — G43.C0 PERIODIC HEADACHE SYNDROME, NOT INTRACTABLE: Primary | ICD-10-CM

## 2021-09-29 PROCEDURE — 1160F RVW MEDS BY RX/DR IN RCRD: CPT | Mod: CPTII,S$GLB,, | Performed by: PSYCHIATRY & NEUROLOGY

## 2021-09-29 PROCEDURE — 3044F HG A1C LEVEL LT 7.0%: CPT | Mod: CPTII,S$GLB,, | Performed by: PSYCHIATRY & NEUROLOGY

## 2021-09-29 PROCEDURE — 99999 PR PBB SHADOW E&M-EST. PATIENT-LVL IV: ICD-10-PCS | Mod: PBBFAC,,, | Performed by: PSYCHIATRY & NEUROLOGY

## 2021-09-29 PROCEDURE — 99214 OFFICE O/P EST MOD 30 MIN: CPT | Mod: S$GLB,,, | Performed by: PSYCHIATRY & NEUROLOGY

## 2021-09-29 PROCEDURE — 99999 PR PBB SHADOW E&M-EST. PATIENT-LVL IV: CPT | Mod: PBBFAC,,, | Performed by: PSYCHIATRY & NEUROLOGY

## 2021-09-29 PROCEDURE — 1159F MED LIST DOCD IN RCRD: CPT | Mod: CPTII,S$GLB,, | Performed by: PSYCHIATRY & NEUROLOGY

## 2021-09-29 PROCEDURE — 3044F PR MOST RECENT HEMOGLOBIN A1C LEVEL <7.0%: ICD-10-PCS | Mod: CPTII,S$GLB,, | Performed by: PSYCHIATRY & NEUROLOGY

## 2021-09-29 PROCEDURE — 3008F BODY MASS INDEX DOCD: CPT | Mod: CPTII,S$GLB,, | Performed by: PSYCHIATRY & NEUROLOGY

## 2021-09-29 PROCEDURE — 3008F PR BODY MASS INDEX (BMI) DOCUMENTED: ICD-10-PCS | Mod: CPTII,S$GLB,, | Performed by: PSYCHIATRY & NEUROLOGY

## 2021-09-29 PROCEDURE — 1160F PR REVIEW ALL MEDS BY PRESCRIBER/CLIN PHARMACIST DOCUMENTED: ICD-10-PCS | Mod: CPTII,S$GLB,, | Performed by: PSYCHIATRY & NEUROLOGY

## 2021-09-29 PROCEDURE — 1159F PR MEDICATION LIST DOCUMENTED IN MEDICAL RECORD: ICD-10-PCS | Mod: CPTII,S$GLB,, | Performed by: PSYCHIATRY & NEUROLOGY

## 2021-09-29 PROCEDURE — 99214 PR OFFICE/OUTPT VISIT, EST, LEVL IV, 30-39 MIN: ICD-10-PCS | Mod: S$GLB,,, | Performed by: PSYCHIATRY & NEUROLOGY

## 2021-09-29 RX ORDER — RIZATRIPTAN BENZOATE 10 MG/1
TABLET ORAL
Qty: 9 TABLET | Refills: 11 | Status: SHIPPED | OUTPATIENT
Start: 2021-09-29 | End: 2023-08-18

## 2021-11-02 ENCOUNTER — PATIENT OUTREACH (OUTPATIENT)
Dept: ADMINISTRATIVE | Facility: HOSPITAL | Age: 46
End: 2021-11-02
Payer: COMMERCIAL

## 2021-11-16 ENCOUNTER — OFFICE VISIT (OUTPATIENT)
Dept: INTERNAL MEDICINE | Facility: CLINIC | Age: 46
End: 2021-11-16
Payer: COMMERCIAL

## 2021-11-16 VITALS
BODY MASS INDEX: 30.76 KG/M2 | WEIGHT: 167.13 LBS | HEART RATE: 89 BPM | OXYGEN SATURATION: 98 % | HEIGHT: 62 IN | SYSTOLIC BLOOD PRESSURE: 114 MMHG | DIASTOLIC BLOOD PRESSURE: 86 MMHG

## 2021-11-16 DIAGNOSIS — M79.7 FIBROMYALGIA: ICD-10-CM

## 2021-11-16 DIAGNOSIS — G43.C0 PERIODIC HEADACHE SYNDROME, NOT INTRACTABLE: ICD-10-CM

## 2021-11-16 DIAGNOSIS — M25.532 LEFT WRIST PAIN: Primary | ICD-10-CM

## 2021-11-16 DIAGNOSIS — G47.33 OSA (OBSTRUCTIVE SLEEP APNEA): ICD-10-CM

## 2021-11-16 PROCEDURE — 3079F DIAST BP 80-89 MM HG: CPT | Mod: CPTII,S$GLB,, | Performed by: INTERNAL MEDICINE

## 2021-11-16 PROCEDURE — 1160F RVW MEDS BY RX/DR IN RCRD: CPT | Mod: CPTII,S$GLB,, | Performed by: INTERNAL MEDICINE

## 2021-11-16 PROCEDURE — 3044F PR MOST RECENT HEMOGLOBIN A1C LEVEL <7.0%: ICD-10-PCS | Mod: CPTII,S$GLB,, | Performed by: INTERNAL MEDICINE

## 2021-11-16 PROCEDURE — 1159F PR MEDICATION LIST DOCUMENTED IN MEDICAL RECORD: ICD-10-PCS | Mod: CPTII,S$GLB,, | Performed by: INTERNAL MEDICINE

## 2021-11-16 PROCEDURE — 99999 PR PBB SHADOW E&M-EST. PATIENT-LVL V: CPT | Mod: PBBFAC,,, | Performed by: INTERNAL MEDICINE

## 2021-11-16 PROCEDURE — 1159F MED LIST DOCD IN RCRD: CPT | Mod: CPTII,S$GLB,, | Performed by: INTERNAL MEDICINE

## 2021-11-16 PROCEDURE — 3008F PR BODY MASS INDEX (BMI) DOCUMENTED: ICD-10-PCS | Mod: CPTII,S$GLB,, | Performed by: INTERNAL MEDICINE

## 2021-11-16 PROCEDURE — 3008F BODY MASS INDEX DOCD: CPT | Mod: CPTII,S$GLB,, | Performed by: INTERNAL MEDICINE

## 2021-11-16 PROCEDURE — 99214 OFFICE O/P EST MOD 30 MIN: CPT | Mod: S$GLB,,, | Performed by: INTERNAL MEDICINE

## 2021-11-16 PROCEDURE — 1160F PR REVIEW ALL MEDS BY PRESCRIBER/CLIN PHARMACIST DOCUMENTED: ICD-10-PCS | Mod: CPTII,S$GLB,, | Performed by: INTERNAL MEDICINE

## 2021-11-16 PROCEDURE — 3074F SYST BP LT 130 MM HG: CPT | Mod: CPTII,S$GLB,, | Performed by: INTERNAL MEDICINE

## 2021-11-16 PROCEDURE — 99999 PR PBB SHADOW E&M-EST. PATIENT-LVL V: ICD-10-PCS | Mod: PBBFAC,,, | Performed by: INTERNAL MEDICINE

## 2021-11-16 PROCEDURE — 3079F PR MOST RECENT DIASTOLIC BLOOD PRESSURE 80-89 MM HG: ICD-10-PCS | Mod: CPTII,S$GLB,, | Performed by: INTERNAL MEDICINE

## 2021-11-16 PROCEDURE — 3044F HG A1C LEVEL LT 7.0%: CPT | Mod: CPTII,S$GLB,, | Performed by: INTERNAL MEDICINE

## 2021-11-16 PROCEDURE — 3074F PR MOST RECENT SYSTOLIC BLOOD PRESSURE < 130 MM HG: ICD-10-PCS | Mod: CPTII,S$GLB,, | Performed by: INTERNAL MEDICINE

## 2021-11-16 PROCEDURE — 99214 PR OFFICE/OUTPT VISIT, EST, LEVL IV, 30-39 MIN: ICD-10-PCS | Mod: S$GLB,,, | Performed by: INTERNAL MEDICINE

## 2021-11-18 ENCOUNTER — HOSPITAL ENCOUNTER (OUTPATIENT)
Dept: RADIOLOGY | Facility: HOSPITAL | Age: 46
Discharge: HOME | End: 2021-11-18
Attending: INTERNAL MEDICINE
Payer: COMMERCIAL

## 2021-11-18 DIAGNOSIS — M25.532 LEFT WRIST PAIN: ICD-10-CM

## 2021-11-18 PROCEDURE — 73100 XR WRIST 2 VIEW LEFT: ICD-10-PCS | Mod: 26,LT,, | Performed by: RADIOLOGY

## 2021-11-18 PROCEDURE — 73100 X-RAY EXAM OF WRIST: CPT | Mod: TC,LT

## 2021-11-18 PROCEDURE — 73100 X-RAY EXAM OF WRIST: CPT | Mod: 26,LT,, | Performed by: RADIOLOGY

## 2021-12-10 ENCOUNTER — OFFICE VISIT (OUTPATIENT)
Dept: ORTHOPEDICS | Facility: CLINIC | Age: 46
End: 2021-12-10
Payer: COMMERCIAL

## 2021-12-10 VITALS
SYSTOLIC BLOOD PRESSURE: 137 MMHG | BODY MASS INDEX: 30.73 KG/M2 | DIASTOLIC BLOOD PRESSURE: 94 MMHG | HEIGHT: 62 IN | WEIGHT: 167 LBS | HEART RATE: 76 BPM

## 2021-12-10 DIAGNOSIS — M77.8 RIGHT WRIST TENDINITIS: Primary | ICD-10-CM

## 2021-12-10 DIAGNOSIS — M25.539 PAIN OF ULNAR SIDE OF WRIST: ICD-10-CM

## 2021-12-10 PROCEDURE — 97760 PR ORTHOTIC MGMT&TRAINJ INITIAL ENC EA 15 MINS: ICD-10-PCS | Mod: S$GLB,,, | Performed by: PHYSICIAN ASSISTANT

## 2021-12-10 PROCEDURE — 99999 PR PBB SHADOW E&M-EST. PATIENT-LVL V: ICD-10-PCS | Mod: PBBFAC,,, | Performed by: PHYSICIAN ASSISTANT

## 2021-12-10 PROCEDURE — 99204 PR OFFICE/OUTPT VISIT, NEW, LEVL IV, 45-59 MIN: ICD-10-PCS | Mod: S$GLB,,, | Performed by: PHYSICIAN ASSISTANT

## 2021-12-10 PROCEDURE — 99204 OFFICE O/P NEW MOD 45 MIN: CPT | Mod: S$GLB,,, | Performed by: PHYSICIAN ASSISTANT

## 2021-12-10 PROCEDURE — 97760 ORTHOTIC MGMT&TRAING 1ST ENC: CPT | Mod: S$GLB,,, | Performed by: PHYSICIAN ASSISTANT

## 2021-12-10 PROCEDURE — 99999 PR PBB SHADOW E&M-EST. PATIENT-LVL V: CPT | Mod: PBBFAC,,, | Performed by: PHYSICIAN ASSISTANT

## 2021-12-14 ENCOUNTER — IMMUNIZATION (OUTPATIENT)
Dept: INTERNAL MEDICINE | Facility: CLINIC | Age: 46
End: 2021-12-14
Payer: COMMERCIAL

## 2021-12-14 DIAGNOSIS — Z23 NEED FOR VACCINATION: Primary | ICD-10-CM

## 2021-12-14 DIAGNOSIS — M54.2 NECK PAIN ON RIGHT SIDE: ICD-10-CM

## 2021-12-14 PROCEDURE — 0004A COVID-19, MRNA, LNP-S, PF, 30 MCG/0.3 ML DOSE VACCINE: CPT | Mod: CV19,PBBFAC | Performed by: INTERNAL MEDICINE

## 2021-12-14 RX ORDER — BACLOFEN 10 MG/1
10 TABLET ORAL DAILY PRN
Qty: 30 TABLET | Refills: 2 | Status: SHIPPED | OUTPATIENT
Start: 2021-12-14 | End: 2022-04-25

## 2022-01-20 ENCOUNTER — TELEPHONE (OUTPATIENT)
Dept: OBSTETRICS AND GYNECOLOGY | Facility: CLINIC | Age: 47
End: 2022-01-20
Payer: COMMERCIAL

## 2022-01-20 ENCOUNTER — PATIENT MESSAGE (OUTPATIENT)
Dept: OBSTETRICS AND GYNECOLOGY | Facility: CLINIC | Age: 47
End: 2022-01-20
Payer: COMMERCIAL

## 2022-01-20 DIAGNOSIS — B37.9 CANDIDA INFECTION: Primary | ICD-10-CM

## 2022-01-20 RX ORDER — FLUCONAZOLE 150 MG/1
TABLET ORAL
Qty: 2 TABLET | Refills: 1 | Status: SHIPPED | OUTPATIENT
Start: 2022-01-20 | End: 2022-07-18

## 2022-02-10 ENCOUNTER — PATIENT MESSAGE (OUTPATIENT)
Dept: INTERNAL MEDICINE | Facility: CLINIC | Age: 47
End: 2022-02-10
Payer: COMMERCIAL

## 2022-02-23 ENCOUNTER — TELEPHONE (OUTPATIENT)
Dept: INTERNAL MEDICINE | Facility: CLINIC | Age: 47
End: 2022-02-23
Payer: COMMERCIAL

## 2022-02-23 DIAGNOSIS — Z12.31 SCREENING MAMMOGRAM, ENCOUNTER FOR: Primary | ICD-10-CM

## 2022-02-23 NOTE — TELEPHONE ENCOUNTER
----- Message from Theresa Cadet sent at 2/23/2022 11:44 AM CST -----  Contact: [phone] @ Patient  Caller is requesting to schedule their annual screening mammogram appointment. Order is not listed in Epic.  Please enter order and contact patient to schedule.  Would the patient like a call back, or a response through their [facility] portal?: call

## 2022-03-03 ENCOUNTER — OFFICE VISIT (OUTPATIENT)
Dept: INTERNAL MEDICINE | Facility: CLINIC | Age: 47
End: 2022-03-03
Payer: COMMERCIAL

## 2022-03-03 ENCOUNTER — LAB VISIT (OUTPATIENT)
Dept: LAB | Facility: HOSPITAL | Age: 47
End: 2022-03-03
Attending: INTERNAL MEDICINE
Payer: COMMERCIAL

## 2022-03-03 ENCOUNTER — TELEPHONE (OUTPATIENT)
Dept: INTERNAL MEDICINE | Facility: CLINIC | Age: 47
End: 2022-03-03

## 2022-03-03 VITALS
DIASTOLIC BLOOD PRESSURE: 84 MMHG | SYSTOLIC BLOOD PRESSURE: 130 MMHG | OXYGEN SATURATION: 98 % | HEIGHT: 62 IN | BODY MASS INDEX: 30.2 KG/M2 | HEART RATE: 77 BPM | WEIGHT: 164.13 LBS

## 2022-03-03 DIAGNOSIS — N92.1 MENORRHAGIA WITH IRREGULAR CYCLE: Primary | ICD-10-CM

## 2022-03-03 DIAGNOSIS — N92.1 MENORRHAGIA WITH IRREGULAR CYCLE: ICD-10-CM

## 2022-03-03 DIAGNOSIS — M25.532 LEFT WRIST PAIN: ICD-10-CM

## 2022-03-03 DIAGNOSIS — Z12.11 COLON CANCER SCREENING: ICD-10-CM

## 2022-03-03 LAB
ALBUMIN SERPL BCP-MCNC: 3.9 G/DL (ref 3.5–5.2)
ALP SERPL-CCNC: 57 U/L (ref 55–135)
ALT SERPL W/O P-5'-P-CCNC: 16 U/L (ref 10–44)
ANION GAP SERPL CALC-SCNC: 13 MMOL/L (ref 8–16)
AST SERPL-CCNC: 21 U/L (ref 10–40)
BASOPHILS # BLD AUTO: 0.03 K/UL (ref 0–0.2)
BASOPHILS NFR BLD: 0.7 % (ref 0–1.9)
BILIRUB SERPL-MCNC: 0.4 MG/DL (ref 0.1–1)
BUN SERPL-MCNC: 11 MG/DL (ref 6–20)
CALCIUM SERPL-MCNC: 9.3 MG/DL (ref 8.7–10.5)
CHLORIDE SERPL-SCNC: 106 MMOL/L (ref 95–110)
CO2 SERPL-SCNC: 22 MMOL/L (ref 23–29)
CREAT SERPL-MCNC: 0.7 MG/DL (ref 0.5–1.4)
DIFFERENTIAL METHOD: ABNORMAL
EOSINOPHIL # BLD AUTO: 0 K/UL (ref 0–0.5)
EOSINOPHIL NFR BLD: 0.7 % (ref 0–8)
ERYTHROCYTE [DISTWIDTH] IN BLOOD BY AUTOMATED COUNT: 12.8 % (ref 11.5–14.5)
EST. GFR  (AFRICAN AMERICAN): >60 ML/MIN/1.73 M^2
EST. GFR  (NON AFRICAN AMERICAN): >60 ML/MIN/1.73 M^2
FERRITIN SERPL-MCNC: 14 NG/ML (ref 20–300)
GLUCOSE SERPL-MCNC: 89 MG/DL (ref 70–110)
HCT VFR BLD AUTO: 38.7 % (ref 37–48.5)
HGB BLD-MCNC: 12.1 G/DL (ref 12–16)
IMM GRANULOCYTES # BLD AUTO: 0.01 K/UL (ref 0–0.04)
IMM GRANULOCYTES NFR BLD AUTO: 0.2 % (ref 0–0.5)
LYMPHOCYTES # BLD AUTO: 1.7 K/UL (ref 1–4.8)
LYMPHOCYTES NFR BLD: 40.4 % (ref 18–48)
MCH RBC QN AUTO: 30.5 PG (ref 27–31)
MCHC RBC AUTO-ENTMCNC: 31.3 G/DL (ref 32–36)
MCV RBC AUTO: 98 FL (ref 82–98)
MONOCYTES # BLD AUTO: 0.4 K/UL (ref 0.3–1)
MONOCYTES NFR BLD: 8.1 % (ref 4–15)
NEUTROPHILS # BLD AUTO: 2.2 K/UL (ref 1.8–7.7)
NEUTROPHILS NFR BLD: 49.9 % (ref 38–73)
NRBC BLD-RTO: 0 /100 WBC
PLATELET # BLD AUTO: 250 K/UL (ref 150–450)
PMV BLD AUTO: 10.6 FL (ref 9.2–12.9)
POTASSIUM SERPL-SCNC: 4.8 MMOL/L (ref 3.5–5.1)
PROT SERPL-MCNC: 7.2 G/DL (ref 6–8.4)
RBC # BLD AUTO: 3.97 M/UL (ref 4–5.4)
SODIUM SERPL-SCNC: 141 MMOL/L (ref 136–145)
WBC # BLD AUTO: 4.31 K/UL (ref 3.9–12.7)

## 2022-03-03 PROCEDURE — 1160F RVW MEDS BY RX/DR IN RCRD: CPT | Mod: CPTII,S$GLB,, | Performed by: INTERNAL MEDICINE

## 2022-03-03 PROCEDURE — 85025 COMPLETE CBC W/AUTO DIFF WBC: CPT | Performed by: INTERNAL MEDICINE

## 2022-03-03 PROCEDURE — 1159F PR MEDICATION LIST DOCUMENTED IN MEDICAL RECORD: ICD-10-PCS | Mod: CPTII,S$GLB,, | Performed by: INTERNAL MEDICINE

## 2022-03-03 PROCEDURE — 1160F PR REVIEW ALL MEDS BY PRESCRIBER/CLIN PHARMACIST DOCUMENTED: ICD-10-PCS | Mod: CPTII,S$GLB,, | Performed by: INTERNAL MEDICINE

## 2022-03-03 PROCEDURE — 99214 OFFICE O/P EST MOD 30 MIN: CPT | Mod: S$GLB,,, | Performed by: INTERNAL MEDICINE

## 2022-03-03 PROCEDURE — 1159F MED LIST DOCD IN RCRD: CPT | Mod: CPTII,S$GLB,, | Performed by: INTERNAL MEDICINE

## 2022-03-03 PROCEDURE — 99999 PR PBB SHADOW E&M-EST. PATIENT-LVL III: ICD-10-PCS | Mod: PBBFAC,,, | Performed by: INTERNAL MEDICINE

## 2022-03-03 PROCEDURE — 3075F PR MOST RECENT SYSTOLIC BLOOD PRESS GE 130-139MM HG: ICD-10-PCS | Mod: CPTII,S$GLB,, | Performed by: INTERNAL MEDICINE

## 2022-03-03 PROCEDURE — 3008F BODY MASS INDEX DOCD: CPT | Mod: CPTII,S$GLB,, | Performed by: INTERNAL MEDICINE

## 2022-03-03 PROCEDURE — 80053 COMPREHEN METABOLIC PANEL: CPT | Performed by: INTERNAL MEDICINE

## 2022-03-03 PROCEDURE — 3079F PR MOST RECENT DIASTOLIC BLOOD PRESSURE 80-89 MM HG: ICD-10-PCS | Mod: CPTII,S$GLB,, | Performed by: INTERNAL MEDICINE

## 2022-03-03 PROCEDURE — 82728 ASSAY OF FERRITIN: CPT | Performed by: INTERNAL MEDICINE

## 2022-03-03 PROCEDURE — 99999 PR PBB SHADOW E&M-EST. PATIENT-LVL III: CPT | Mod: PBBFAC,,, | Performed by: INTERNAL MEDICINE

## 2022-03-03 PROCEDURE — 3075F SYST BP GE 130 - 139MM HG: CPT | Mod: CPTII,S$GLB,, | Performed by: INTERNAL MEDICINE

## 2022-03-03 PROCEDURE — 99214 PR OFFICE/OUTPT VISIT, EST, LEVL IV, 30-39 MIN: ICD-10-PCS | Mod: S$GLB,,, | Performed by: INTERNAL MEDICINE

## 2022-03-03 PROCEDURE — 36415 COLL VENOUS BLD VENIPUNCTURE: CPT | Performed by: INTERNAL MEDICINE

## 2022-03-03 PROCEDURE — 3008F PR BODY MASS INDEX (BMI) DOCUMENTED: ICD-10-PCS | Mod: CPTII,S$GLB,, | Performed by: INTERNAL MEDICINE

## 2022-03-03 PROCEDURE — 3079F DIAST BP 80-89 MM HG: CPT | Mod: CPTII,S$GLB,, | Performed by: INTERNAL MEDICINE

## 2022-03-03 NOTE — PROGRESS NOTES
Subjective:       Patient ID: Rema Horton is a 47 y.o. female.    Chief Complaint: Arm Pain (LEFT)    HPI     Fibromyalgia, anxiety, depression  wellbutrin - She felt it was too strong and now is taking 1/2 tablet BID.     Left forearm from below elbow to wrist. Wrist is painful. Hurts when reaching back to put on seatbelt.  She saw ortho PA on 12/10/21 and referred to PT. Had to postpone therapy due to her daughter being sick. Reports arm is very painful intermittently. Tried to open door with left arm and got severe sharp pain. She gets intermittent spasm in arm and cannot hold things for prolonged period.     Portal msg 2/10/22:  Hi doctor this is Mrs. Horton I am sending this message bc lately I have been to extreme sensitive to cold I always to cold my entire body and specially my hands and feet even with the heater on at home sometimes I have to put a few blankets and if not enough I started shivering my family started worrying why I am so cold ?? I wonder if I have any blood vessel disorders  or any other thing that is causing me that extreme cold and also the fatigue is back and my brain hardly to get focus my left arm is still in pain and the pain is getting to my elbow.   Thank you      Ferritin, hgb, TSH were normal 9/2021.     symptoms come and go. Period is back again after several months without it. She got period on 1/26 and again on 2/25. It was quite heavy like previously. No energy, fatigued.     She did stop her iron but is eating plenty of meat. She restarted due to low energy with one per day.      Memory is not great, writes everything down.     She does feel an unevenness on left lateral breast and feels a mass. She has had mammogram and US of this area which was unrevealing.   Review of Systems   Constitutional: Positive for fatigue. Negative for fever.   Respiratory: Negative for shortness of breath.    Cardiovascular: Negative for chest pain.   Endocrine: Positive for cold  "intolerance.   Musculoskeletal: Positive for arthralgias and myalgias.   Skin: Negative.        Objective:   /84 (BP Location: Right arm, Patient Position: Sitting, BP Method: Medium (Manual))   Pulse 77   Ht 5' 2" (1.575 m)   Wt 74.5 kg (164 lb 2.1 oz)   SpO2 98%   BMI 30.02 kg/m²      Physical Exam  Constitutional:       General: She is not in acute distress.     Appearance: She is well-developed. She is not diaphoretic.   HENT:      Head: Normocephalic and atraumatic.   Cardiovascular:      Rate and Rhythm: Normal rate and regular rhythm.   Pulmonary:      Effort: Pulmonary effort is normal. No respiratory distress.      Breath sounds: No wheezing or rales.   Skin:     General: Skin is warm and dry.   Neurological:      Mental Status: She is alert and oriented to person, place, and time.   Psychiatric:         Behavior: Behavior normal.         Left arm is cooler to touch than the right arm. 2+ radial pulse bilaterally. Faint lacy erythema bilateral dorsal arms and chest.     More prominent breast tissue left lateral breast, no discrete mass  Assessment:       1. Menorrhagia with irregular cycle    2. Colon cancer screening    3. Left wrist pain        Plan:       Rema was seen today for arm pain.    Diagnoses and all orders for this visit:    Menorrhagia with irregular cycle  -     CBC Auto Differential; Future  -     Ferritin; Future  -     Comprehensive Metabolic Panel; Future  Restart iron    Colon cancer screening  -     Fecal Immunochemical Test (iFOBT); Future    Left wrist pain  Therapy as ordered by ortho        Needs to restart iron as likely anemic with return of menses.     Consider EMG if left arm sx not improving with therapy.    Colon cancer screening recommended.   Discussed colon cancer screening and that colonoscopy is the preferred test for screening. After discussion of risks and benefits of colonoscopy jairnet declines. However, patient does agree to FIT testing with " understanding that colonoscopy is the preferred test and stipulation that any positive result requires a colonoscopy as follow up.

## 2022-03-16 ENCOUNTER — PATIENT MESSAGE (OUTPATIENT)
Dept: ADMINISTRATIVE | Facility: HOSPITAL | Age: 47
End: 2022-03-16
Payer: COMMERCIAL

## 2022-03-18 ENCOUNTER — CLINICAL SUPPORT (OUTPATIENT)
Dept: REHABILITATION | Facility: HOSPITAL | Age: 47
End: 2022-03-18
Payer: COMMERCIAL

## 2022-03-18 DIAGNOSIS — R52 PAIN: ICD-10-CM

## 2022-03-18 DIAGNOSIS — M25.60 DECREASED RANGE OF MOTION: ICD-10-CM

## 2022-03-18 PROCEDURE — 97110 THERAPEUTIC EXERCISES: CPT

## 2022-03-18 PROCEDURE — 97165 OT EVAL LOW COMPLEX 30 MIN: CPT

## 2022-03-18 NOTE — PATIENT INSTRUCTIONS
[facility], OCCUPATIONAL THERAPY  HOME EXERCISE PROGRAM      Complete 10 repetitions of each exercise 2-3 times a day:          - wear brace during daily activities and during nighttime   - ICE 2x/day   - avoid lifting over 5 lbs with left wrist      Susana Hart OTR/L      _

## 2022-03-23 ENCOUNTER — CLINICAL SUPPORT (OUTPATIENT)
Dept: REHABILITATION | Facility: HOSPITAL | Age: 47
End: 2022-03-23
Payer: COMMERCIAL

## 2022-03-23 DIAGNOSIS — M25.60 DECREASED RANGE OF MOTION: ICD-10-CM

## 2022-03-23 DIAGNOSIS — R52 PAIN: Primary | ICD-10-CM

## 2022-03-23 PROCEDURE — 97010 HOT OR COLD PACKS THERAPY: CPT

## 2022-03-23 PROCEDURE — 97110 THERAPEUTIC EXERCISES: CPT

## 2022-03-23 PROCEDURE — 97140 MANUAL THERAPY 1/> REGIONS: CPT
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
## 2022-03-23 NOTE — PROGRESS NOTES
"    [facility]  Occupational Therapy Treatment Note    Date: 3/23/2022  Name: Rema Horton  Clinic Number: [id number]    Therapy Diagnosis:   Encounter Diagnoses   Name Primary?    Pain Yes    Decreased range of motion      Physician: Candace Freeman PA  Physician Orders: eval and treat   Medical Diagnosis: M25.539 (ICD-10-CM) - Pain of ulnar side of wrist   M77.8 (ICD-10-CM) - Right wrist tendinitis  Surgical Procedure and Date: n/a,  / Date of Injury/Onset: August 2021  Evaluation Date: 3/18/2022  Insurance Authorization Period Expiration: 3/18/2023  Plan of Care Expiration: 8 weeks; 5/18/2022  Progress Note Due: 4 weeks;   Date of Return to MD: 1  Visit # / Visits authorized: 1 / 20  FOTO: initial eval      Precautions:  Standard and fibromyaglia     Time In: 01:08 PM   Time Out: 02:05 PM  Total Appointment Time (timed & untimed codes): 57 minutes        Time In: 10:30 A  Time Out: 11:25 A  Total Billable Time: 55 minutes      SUBJECTIVE     Pt reports: "somedays I forget to do my exercises or to not use it and I end up doing things that increase pain   She was compliant with home exercise program given last session.   Response to previous treatment: fair; cont to have pain with rotation  Functional change: none noted to this date     Pain: 7/10 (fixing her hair this morning)   Location: right wrists      OBJECTIVE   Objective Measures updated at progress report unless specified.    Edema. Measured in centimeters.    3/23/2022 3/23/2022     Left Right   2in. Above elbow       2in. Below elbow       Wrist Crease 15.6 15.7   Figure of 8       MCPs             Elbow and Wrist ROM. Measured in degrees.    3/23/2022 3/23/2022     Left Right   Elbow Ext/Flex       Supination/Pronation WNL/WNL     Wrist Ext/Flex   WNL   Wrist RD/UD 20/20 15/35      Hand ROM. Measured in degrees.    3/23/2022 3/23/2022     Left Right           Index: MP  Full composite flexion              Thumb: MP     "                IP           Rad ADD/ABD           Pal ADD/ABD              Opposition           Strength (Dynamometer) and Pinch Strength (Pinch Gauge)  Measured in pounds.    3/23/2022 3/23/2022     Left Right   Rung II 27/30/25 45/40/40   Key Pinch 9 (pain) 11   3pt Pinch 5 7   2pt Pinch          Sensation    3/18/2022 3/18/2022     Left Right   [place] Jacqui       Normal 1.65-2.83       Diminished Light Touch 3.22-3.61       Diminished Protective 3.84-4.31       Loss of Protective 4.56-6.65       Untestable >6.65       2 Point Discrimination       Static       Dynamic          Manual Muscle Test    3/23/2022 3/23/2022     Left Right   Wrist Extension        Wrist Flexion       Radial Deviation       Ulnar Deviation       Supination       Pronation       Elbow Extension       Elbow Flexion               Treatment     Rema received the treatments listed below:     Supervised modalities after being cleared for contradictions: Hot Pack - 10 minutes prior to tx       Manual therapy techniques: Soft tissue Mobilization were applied to the:  for 8 minutes, including:  - soft tissue mobilization     Therapeutic exercises to develop strength and endurance for 32 minutes, including:  - isometric wrist all ways x 10 (2 sets)   - isospheres     Ice applied to wrist for 5 min     Patient Education and Home Exercises      Education provided:   - isometric wrist all ways with forearm pronated   - Progress towards goals     Written Home Exercises Provided: Patient instructed to cont prior HEP.  Exercises were reviewed and Rema was able to demonstrate them prior to the end of the session.  Rema demonstrated fair  understanding of the HEP provided. See EMR under Patient Instructions for exercises provided during therapy sessions.      ASSESSMENT     Pt would continue to benefit from skilled OT. No increase in pain during tx today. Advised to not perform actions that increase pain. Pt able to jolanta isometric  exercises during tx today.      Rema is not progressing well towards her goals and there are no updates to goals at this time. Pt prognosis is Good.     Pt will continue to benefit from skilled outpatient occupational therapy to address the deficits listed in the problem list on initial evaluation, provide pt/family education and to maximize pt's level of independence in the home and community environment.     Pt's spiritual, cultural and educational needs considered and pt agreeable to plan of care and goals.    Anticipated barriers to occupational therapy: cares for 10 year old  daughter who needs physical assistance with ADLs     Goals:  Long Term Goals (LTGs); to be met by discharge.  LTG #1: Pt will report a pain level of 0 out of 10 with functional use of R hand          LTG #2: Pt will demo improved FOTO score by 20 points.   LTG #3: Pt will return to prior level of function for ADLs and household management.      Short Term Goals (STGs); to be met within 4 weeks ().  STG #1: Pt will report 2-3 out of 10 pain level with functional use of R hand.  STG #2: Pt will report/demo increase in  strength by 5#  STG #3: Assess MMT   STG #4: Pt will demonstrate independence with issued HEP.          PLAN     Continue skilled occupational therapy with individualized plan of care focusing on improving functional independence    Updates/Grading for next session: cont as jolanta; isometrics, heat, and manual     Susana Hart OT

## 2022-03-31 ENCOUNTER — HOSPITAL ENCOUNTER (OUTPATIENT)
Dept: RADIOLOGY | Facility: HOSPITAL | Age: 47
Discharge: HOME | End: 2022-03-31
Attending: PHYSICIAN ASSISTANT
Payer: COMMERCIAL

## 2022-03-31 DIAGNOSIS — N63.20 LEFT BREAST LUMP: ICD-10-CM

## 2022-03-31 DIAGNOSIS — Z12.31 SCREENING MAMMOGRAM, ENCOUNTER FOR: ICD-10-CM

## 2022-03-31 PROCEDURE — 76642 ULTRASOUND BREAST LIMITED: CPT | Mod: 26,LT,, | Performed by: RADIOLOGY

## 2022-03-31 PROCEDURE — 77066 DX MAMMO INCL CAD BI: CPT | Mod: TC

## 2022-03-31 PROCEDURE — 77062 BREAST TOMOSYNTHESIS BI: CPT | Mod: 26,,, | Performed by: RADIOLOGY

## 2022-03-31 PROCEDURE — 76642 US BREAST LEFT LIMITED: ICD-10-PCS | Mod: 26,LT,, | Performed by: RADIOLOGY

## 2022-03-31 PROCEDURE — 77066 MAMMO DIGITAL DIAGNOSTIC BILAT WITH TOMO: ICD-10-PCS | Mod: 26,,, | Performed by: RADIOLOGY

## 2022-03-31 PROCEDURE — 77066 DX MAMMO INCL CAD BI: CPT | Mod: 26,,, | Performed by: RADIOLOGY

## 2022-03-31 PROCEDURE — 76642 ULTRASOUND BREAST LIMITED: CPT | Mod: TC,LT

## 2022-03-31 PROCEDURE — 77062 MAMMO DIGITAL DIAGNOSTIC BILAT WITH TOMO: ICD-10-PCS | Mod: 26,,, | Performed by: RADIOLOGY

## 2022-04-25 DIAGNOSIS — M54.2 NECK PAIN ON RIGHT SIDE: ICD-10-CM

## 2022-04-25 RX ORDER — BACLOFEN 10 MG/1
10 TABLET ORAL DAILY PRN
Qty: 30 TABLET | Refills: 2 | Status: SHIPPED | OUTPATIENT
Start: 2022-04-25 | End: 2022-09-27

## 2022-05-20 ENCOUNTER — OFFICE VISIT (OUTPATIENT)
Dept: OBSTETRICS AND GYNECOLOGY | Facility: CLINIC | Age: 47
End: 2022-05-20
Payer: COMMERCIAL

## 2022-05-20 VITALS
BODY MASS INDEX: 30.34 KG/M2 | HEIGHT: 62 IN | DIASTOLIC BLOOD PRESSURE: 72 MMHG | SYSTOLIC BLOOD PRESSURE: 114 MMHG | WEIGHT: 164.88 LBS

## 2022-05-20 DIAGNOSIS — Z01.419 ENCOUNTER FOR GYNECOLOGICAL EXAMINATION WITHOUT ABNORMAL FINDING: Primary | ICD-10-CM

## 2022-05-20 DIAGNOSIS — N95.1 PERIMENOPAUSAL: ICD-10-CM

## 2022-05-20 PROCEDURE — 1160F PR REVIEW ALL MEDS BY PRESCRIBER/CLIN PHARMACIST DOCUMENTED: ICD-10-PCS | Mod: CPTII,S$GLB,, | Performed by: OBSTETRICS & GYNECOLOGY

## 2022-05-20 PROCEDURE — 1159F PR MEDICATION LIST DOCUMENTED IN MEDICAL RECORD: ICD-10-PCS | Mod: CPTII,S$GLB,, | Performed by: OBSTETRICS & GYNECOLOGY

## 2022-05-20 PROCEDURE — 3074F SYST BP LT 130 MM HG: CPT | Mod: CPTII,S$GLB,, | Performed by: OBSTETRICS & GYNECOLOGY

## 2022-05-20 PROCEDURE — 3078F DIAST BP <80 MM HG: CPT | Mod: CPTII,S$GLB,, | Performed by: OBSTETRICS & GYNECOLOGY

## 2022-05-20 PROCEDURE — 99396 PR PREVENTIVE VISIT,EST,40-64: ICD-10-PCS | Mod: S$GLB,,, | Performed by: OBSTETRICS & GYNECOLOGY

## 2022-05-20 PROCEDURE — 99999 PR PBB SHADOW E&M-EST. PATIENT-LVL IV: CPT | Mod: PBBFAC,,, | Performed by: OBSTETRICS & GYNECOLOGY

## 2022-05-20 PROCEDURE — 3008F PR BODY MASS INDEX (BMI) DOCUMENTED: ICD-10-PCS | Mod: CPTII,S$GLB,, | Performed by: OBSTETRICS & GYNECOLOGY

## 2022-05-20 PROCEDURE — 3078F PR MOST RECENT DIASTOLIC BLOOD PRESSURE < 80 MM HG: ICD-10-PCS | Mod: CPTII,S$GLB,, | Performed by: OBSTETRICS & GYNECOLOGY

## 2022-05-20 PROCEDURE — 3074F PR MOST RECENT SYSTOLIC BLOOD PRESSURE < 130 MM HG: ICD-10-PCS | Mod: CPTII,S$GLB,, | Performed by: OBSTETRICS & GYNECOLOGY

## 2022-05-20 PROCEDURE — 1159F MED LIST DOCD IN RCRD: CPT | Mod: CPTII,S$GLB,, | Performed by: OBSTETRICS & GYNECOLOGY

## 2022-05-20 PROCEDURE — 99396 PREV VISIT EST AGE 40-64: CPT | Mod: S$GLB,,, | Performed by: OBSTETRICS & GYNECOLOGY

## 2022-05-20 PROCEDURE — 99999 PR PBB SHADOW E&M-EST. PATIENT-LVL IV: ICD-10-PCS | Mod: PBBFAC,,, | Performed by: OBSTETRICS & GYNECOLOGY

## 2022-05-20 PROCEDURE — 3008F BODY MASS INDEX DOCD: CPT | Mod: CPTII,S$GLB,, | Performed by: OBSTETRICS & GYNECOLOGY

## 2022-05-20 PROCEDURE — 88175 CYTOPATH C/V AUTO FLUID REDO: CPT | Performed by: OBSTETRICS & GYNECOLOGY

## 2022-05-20 PROCEDURE — 1160F RVW MEDS BY RX/DR IN RCRD: CPT | Mod: CPTII,S$GLB,, | Performed by: OBSTETRICS & GYNECOLOGY

## 2022-05-20 NOTE — PROGRESS NOTES
"CC: Well woman exam    Rema Horton is a 47 y.o. female  presents for a well woman exam.    She is having fatigue.  Having pain In her legs  Perimenopausal-  Last cycle was in     Past Medical History:   Diagnosis Date    Abnormal Pap smear of cervix     Anemia     Breast disorder     Fever blister     Fibromyalgia     IC (interstitial cystitis)        Past Surgical History:   Procedure Laterality Date    BREAST BIOPSY Left 2014    fibroadenoma     SECTION  2012    X 2---JST FOR KJB (BREECH)    DILATION AND CURETTAGE OF UTERUS      KJB       OB History    Para Term  AB Living   4 3 3   1 3   SAB IAB Ectopic Multiple Live Births   1       3      # Outcome Date GA Lbr Jem/2nd Weight Sex Delivery Anes PTL Lv   4 Term 12    F CS-Unspec   BARRY   3 Term 03/24/10    M Vag-Spont   BARRY   2 1999     SAB   FD   1 Term 10/23/92    M Vag-Spont   BARRY       Family History   Problem Relation Age of Onset    Cancer Mother         bladder (not sure if was actually gynecologic)    No Known Problems Father     Stroke Maternal Grandfather     Breast cancer Paternal Grandmother     Chromosomal disorder Daughter     No Known Problems Son     Melanoma Neg Hx     Ovarian cancer Neg Hx     Colon cancer Neg Hx     Colon polyps Neg Hx     Esophageal cancer Neg Hx     Stomach cancer Neg Hx     Rectal cancer Neg Hx        Social History     Tobacco Use    Smoking status: Never Smoker    Smokeless tobacco: Never Used   Substance Use Topics    Alcohol use: Not Currently     Alcohol/week: 0.0 standard drinks     Comment: Occasionally.    Drug use: No       /72   Ht 5' 2" (1.575 m)   Wt 74.8 kg (164 lb 14.5 oz)   LMP 02/15/2022 (Exact Date)   BMI 30.16 kg/m²     ROS:  GENERAL: Denies weight gain or weight loss. Feeling well overall.   SKIN: Denies rash or lesions.   HEAD: Denies head injury or headache.   NODES: Denies enlarged lymph nodes.   CHEST: Denies chest " pain or shortness of breath.   CARDIOVASCULAR: Denies palpitations or left sided chest pain.   ABDOMEN: No abdominal pain, constipation, diarrhea, nausea, vomiting or rectal bleeding.   URINARY: No frequency, dysuria, hematuria, or burning on urination.  REPRODUCTIVE: See HPI.   BREASTS: The patient performs breast self-examination and denies pain, lumps, or nipple discharge.   HEMATOLOGIC: No easy bruisability or excessive bleeding.  MUSCULOSKELETAL: Denies joint pain or swelling.   NEUROLOGIC: Denies syncope or weakness.   PSYCHIATRIC: Denies depression, anxiety or mood swings.    Physical Exam:    APPEARANCE: Well nourished, well developed, in no acute distress.  AFFECT: WNL, alert and oriented x 3  SKIN: No acne or hirsutism  NECK: Neck symmetric without masses or thyromegaly  NODES: No inguinal, cervical, axillary, or femoral lymph node enlargement  CHEST: Good respiratory effect  ABDOMEN: Soft.  No tenderness or masses.  No hepatosplenomegaly.  No hernias.  BREASTS: Symmetrical, no skin changes or visible lesions.  No palpable masses, nipple discharge bilaterally.  PELVIC: Normal external genitalia without lesions.  Normal hair distribution.  Adequate perineal body, normal urethral meatus.  Vagina moist and well rugated without lesions or discharge.  Cervix pink, without lesions, discharge or tenderness.  No significant cystocele or rectocele.  Bimanual exam shows uterus to be normal size, regular, mobile and nontender.  Adnexa without masses or tenderness.    EX    ASSESSMENT AND PLAN  1. Encounter for gynecological examination without abnormal finding  Liquid-Based Pap Smear, Screening   2. Perimenopausal         A full discussion of the benefit-risk ratio of hormonal replacement therapy was carried out. Improvement in vasomotor and other climacteric symptoms is discussed, including possible improvements in sleep and mood. Reduction of risk for osteoporosis was explained. We discussed the study data showing  increased risk of thrombo-embolic events such as myocardial infarction, stroke and also possibly breast cancer with estrogen replacement, and how this might affect her. The range of side effects such as breast tenderness, weight gain and including possible increases in lifetime risk of breast cancer and possible thrombotic complications was discussed. We also discussed ACOG's recommendation to use hormone replacement therapy for the relief of hot flashes alone and to be on the lowest dose possible for the shortest amount of time.  Alternative such as herbal and soy-based products were reviewed. All of her questions about this therapy were answered.    Patient was counseled today on A.C.S. Pap guidelines and recommendations for yearly pelvic exams, mammograms and monthly self breast exams; to see her PCP for other health maintenance.       Follow up in about 1 year (around 5/20/2023), or if symptoms worsen or fail to improve.

## 2022-05-26 LAB
FINAL PATHOLOGIC DIAGNOSIS: NORMAL
Lab: NORMAL

## 2022-08-25 ENCOUNTER — OFFICE VISIT (OUTPATIENT)
Dept: INTERNAL MEDICINE | Facility: CLINIC | Age: 47
End: 2022-08-25
Payer: COMMERCIAL

## 2022-08-25 VITALS
BODY MASS INDEX: 29.57 KG/M2 | SYSTOLIC BLOOD PRESSURE: 122 MMHG | DIASTOLIC BLOOD PRESSURE: 80 MMHG | WEIGHT: 160.69 LBS | OXYGEN SATURATION: 99 % | HEIGHT: 62 IN | HEART RATE: 71 BPM

## 2022-08-25 DIAGNOSIS — N95.1 HOT FLASH, MENOPAUSAL: ICD-10-CM

## 2022-08-25 DIAGNOSIS — Z00.00 HEALTH CARE MAINTENANCE: Primary | ICD-10-CM

## 2022-08-25 DIAGNOSIS — D50.0 IRON DEFICIENCY ANEMIA DUE TO CHRONIC BLOOD LOSS: ICD-10-CM

## 2022-08-25 DIAGNOSIS — D50.9 IRON DEFICIENCY ANEMIA, UNSPECIFIED IRON DEFICIENCY ANEMIA TYPE: ICD-10-CM

## 2022-08-25 DIAGNOSIS — R06.83 SNORING: ICD-10-CM

## 2022-08-25 DIAGNOSIS — M79.7 FIBROMYALGIA: ICD-10-CM

## 2022-08-25 DIAGNOSIS — R91.1 SOLITARY PULMONARY NODULE: ICD-10-CM

## 2022-08-25 DIAGNOSIS — G47.30 SLEEP APNEA, UNSPECIFIED TYPE: ICD-10-CM

## 2022-08-25 PROCEDURE — 99999 PR PBB SHADOW E&M-EST. PATIENT-LVL V: CPT | Mod: PBBFAC,,, | Performed by: INTERNAL MEDICINE

## 2022-08-25 PROCEDURE — 99396 PR PREVENTIVE VISIT,EST,40-64: ICD-10-PCS | Mod: S$GLB,,, | Performed by: INTERNAL MEDICINE

## 2022-08-25 PROCEDURE — 3079F DIAST BP 80-89 MM HG: CPT | Mod: CPTII,S$GLB,, | Performed by: INTERNAL MEDICINE

## 2022-08-25 PROCEDURE — 1160F RVW MEDS BY RX/DR IN RCRD: CPT | Mod: CPTII,S$GLB,, | Performed by: INTERNAL MEDICINE

## 2022-08-25 PROCEDURE — 1159F MED LIST DOCD IN RCRD: CPT | Mod: CPTII,S$GLB,, | Performed by: INTERNAL MEDICINE

## 2022-08-25 PROCEDURE — 1160F PR REVIEW ALL MEDS BY PRESCRIBER/CLIN PHARMACIST DOCUMENTED: ICD-10-PCS | Mod: CPTII,S$GLB,, | Performed by: INTERNAL MEDICINE

## 2022-08-25 PROCEDURE — 99999 PR PBB SHADOW E&M-EST. PATIENT-LVL V: ICD-10-PCS | Mod: PBBFAC,,, | Performed by: INTERNAL MEDICINE

## 2022-08-25 PROCEDURE — 3008F PR BODY MASS INDEX (BMI) DOCUMENTED: ICD-10-PCS | Mod: CPTII,S$GLB,, | Performed by: INTERNAL MEDICINE

## 2022-08-25 PROCEDURE — 3074F PR MOST RECENT SYSTOLIC BLOOD PRESSURE < 130 MM HG: ICD-10-PCS | Mod: CPTII,S$GLB,, | Performed by: INTERNAL MEDICINE

## 2022-08-25 PROCEDURE — 3079F PR MOST RECENT DIASTOLIC BLOOD PRESSURE 80-89 MM HG: ICD-10-PCS | Mod: CPTII,S$GLB,, | Performed by: INTERNAL MEDICINE

## 2022-08-25 PROCEDURE — 3074F SYST BP LT 130 MM HG: CPT | Mod: CPTII,S$GLB,, | Performed by: INTERNAL MEDICINE

## 2022-08-25 PROCEDURE — 99396 PREV VISIT EST AGE 40-64: CPT | Mod: S$GLB,,, | Performed by: INTERNAL MEDICINE

## 2022-08-25 PROCEDURE — 3008F BODY MASS INDEX DOCD: CPT | Mod: CPTII,S$GLB,, | Performed by: INTERNAL MEDICINE

## 2022-08-25 PROCEDURE — 1159F PR MEDICATION LIST DOCUMENTED IN MEDICAL RECORD: ICD-10-PCS | Mod: CPTII,S$GLB,, | Performed by: INTERNAL MEDICINE

## 2022-08-25 RX ORDER — VENLAFAXINE HYDROCHLORIDE 37.5 MG/1
37.5 CAPSULE, EXTENDED RELEASE ORAL DAILY
Qty: 30 CAPSULE | Refills: 11 | Status: SHIPPED | OUTPATIENT
Start: 2022-08-25 | End: 2023-04-28

## 2022-08-25 RX ORDER — FERROUS SULFATE 325(65) MG
325 TABLET ORAL 2 TIMES DAILY
Qty: 180 TABLET | Refills: 3
Start: 2022-08-25 | End: 2023-07-03 | Stop reason: SDUPTHER

## 2022-08-25 RX ORDER — NAPROXEN 250 MG/1
250 TABLET ORAL 2 TIMES DAILY
COMMUNITY
Start: 2022-08-25 | End: 2023-04-28

## 2022-08-25 NOTE — PROGRESS NOTES
"Subjective:       Patient ID: Rema Horton is a 47 y.o. female.    Chief Complaint: Annual Exam    HPI   Rema Horton is a 47 y.o. female here for a yearly preventative healthcare visit.       Iron deficiency; last hgb 12.1; previously w anemia  Heavy periods. Advised to restart iron in March because she had period again.     Fibromyalgia, anxiety, depression  Previously on wellbutrin - She felt it was too strong and now is taking 1/2 tablet BID.     Overall feeling pretty good. Still tiredness and aches.     Perimenopausal w hot flashes.  Previously took black cohosh; plans to go back. Lots of sweating at nights. Has to have a fan on her.  She thinks last period Feb '22, doesn't remember for sure.    Working on eating better.     Mild jonny  Saw Dr. Pan 1/2020.   Review of Systems   Constitutional: Negative for activity change and unexpected weight change.   HENT: Negative for hearing loss, rhinorrhea and trouble swallowing.    Eyes: Negative for discharge and visual disturbance.   Respiratory: Negative for chest tightness and wheezing.    Cardiovascular: Positive for palpitations. Negative for chest pain.   Gastrointestinal: Positive for constipation, diarrhea and vomiting. Negative for blood in stool.   Endocrine: Negative for polydipsia and polyuria.   Genitourinary: Positive for difficulty urinating. Negative for dysuria, hematuria and menstrual problem.   Musculoskeletal: Positive for arthralgias and joint swelling. Negative for neck pain.   Neurological: Positive for weakness and headaches.   Psychiatric/Behavioral: Positive for confusion and dysphoric mood.       Objective:   /80 (BP Location: Left arm, Patient Position: Sitting, BP Method: Medium (Manual))   Pulse 71   Ht 5' 2" (1.575 m)   Wt 72.9 kg (160 lb 11.5 oz)   SpO2 99%   BMI 29.40 kg/m²      Physical Exam  Vitals reviewed.   Constitutional:       Appearance: She is well-developed.   HENT:      Head: Normocephalic and atraumatic. "   Eyes:      Conjunctiva/sclera: Conjunctivae normal.      Pupils: Pupils are equal, round, and reactive to light.   Neck:      Thyroid: No thyromegaly.   Cardiovascular:      Rate and Rhythm: Normal rate and regular rhythm.      Heart sounds: Normal heart sounds. No murmur heard.  Pulmonary:      Effort: Pulmonary effort is normal. No respiratory distress.      Breath sounds: Normal breath sounds. No wheezing.   Abdominal:      General: There is no distension.      Palpations: Abdomen is soft.      Tenderness: There is no abdominal tenderness. There is no rebound.   Musculoskeletal:         General: No swelling or deformity. Normal range of motion.      Cervical back: Neck supple.   Lymphadenopathy:      Cervical: No cervical adenopathy.   Skin:     General: Skin is warm and dry.      Findings: No rash.   Neurological:      Mental Status: She is alert and oriented to person, place, and time.   Psychiatric:         Thought Content: Thought content normal.         Judgment: Judgment normal.         Assessment:       1. Health care maintenance    2. Fibromyalgia    3. Iron deficiency anemia due to chronic blood loss    4. Hot flash, menopausal    5. Iron deficiency anemia, unspecified iron deficiency anemia type    6. Solitary pulmonary nodule    7. Sleep apnea, unspecified type    8. Snoring        Plan:       Rema was seen today for annual exam.    Diagnoses and all orders for this visit:    Health care maintenance  -     CBC Auto Differential; Future  -     Ferritin; Future  -     Comprehensive Metabolic Panel; Future  -     Lipid Panel; Future  -     Hemoglobin A1C; Future    Fibromyalgia  -     naproxen (NAPROSYN) 250 MG tablet; Take 1 tablet (250 mg total) by mouth 2 (two) times daily. As needed  -    start venlafaxine (EFFEXOR-XR) 37.5 MG 24 hr capsule; Take 1 capsule (37.5 mg total) by mouth once daily.    Iron deficiency anemia due to chronic blood loss  -     CBC Auto Differential; Future  -      Ferritin; Future  -     ferrous sulfate (FEOSOL) 325 mg (65 mg iron) Tab tablet; Take 1 tablet (325 mg total) by mouth 2 (two) times daily. During period only    Hot flash, menopausal  -    start venlafaxine (EFFEXOR-XR) 37.5 MG 24 hr capsule; Take 1 capsule (37.5 mg total) by mouth once daily.    Iron deficiency anemia, unspecified iron deficiency anemia type  -     ferrous sulfate (FEOSOL) 325 mg (65 mg iron) Tab tablet; Take 1 tablet (325 mg total) by mouth 2 (two) times daily. During period only    Solitary pulmonary nodule  -     CT Chest Without Contrast; Future    Sleep apnea, unspecified type  Snoring  -     Ambulatory referral/consult to Sleep Disorders; Future          Labs fasting

## 2022-08-29 ENCOUNTER — LAB VISIT (OUTPATIENT)
Dept: LAB | Facility: HOSPITAL | Age: 47
End: 2022-08-29
Attending: INTERNAL MEDICINE
Payer: COMMERCIAL

## 2022-08-29 DIAGNOSIS — D50.0 IRON DEFICIENCY ANEMIA DUE TO CHRONIC BLOOD LOSS: ICD-10-CM

## 2022-08-29 DIAGNOSIS — Z00.00 HEALTH CARE MAINTENANCE: ICD-10-CM

## 2022-08-29 LAB
ALBUMIN SERPL BCP-MCNC: 4 G/DL (ref 3.5–5.2)
ALP SERPL-CCNC: 61 U/L (ref 55–135)
ALT SERPL W/O P-5'-P-CCNC: 14 U/L (ref 10–44)
ANION GAP SERPL CALC-SCNC: 10 MMOL/L (ref 8–16)
AST SERPL-CCNC: 17 U/L (ref 10–40)
BASOPHILS # BLD AUTO: 0.02 K/UL (ref 0–0.2)
BASOPHILS NFR BLD: 0.5 % (ref 0–1.9)
BILIRUB SERPL-MCNC: 0.3 MG/DL (ref 0.1–1)
BUN SERPL-MCNC: 16 MG/DL (ref 6–20)
CALCIUM SERPL-MCNC: 9.5 MG/DL (ref 8.7–10.5)
CHLORIDE SERPL-SCNC: 108 MMOL/L (ref 95–110)
CHOLEST SERPL-MCNC: 187 MG/DL (ref 120–199)
CHOLEST/HDLC SERPL: 3.8 {RATIO} (ref 2–5)
CO2 SERPL-SCNC: 26 MMOL/L (ref 23–29)
CREAT SERPL-MCNC: 0.8 MG/DL (ref 0.5–1.4)
DIFFERENTIAL METHOD: ABNORMAL
EOSINOPHIL # BLD AUTO: 0 K/UL (ref 0–0.5)
EOSINOPHIL NFR BLD: 0.7 % (ref 0–8)
ERYTHROCYTE [DISTWIDTH] IN BLOOD BY AUTOMATED COUNT: 12.4 % (ref 11.5–14.5)
EST. GFR  (NO RACE VARIABLE): >60 ML/MIN/1.73 M^2
ESTIMATED AVG GLUCOSE: 103 MG/DL (ref 68–131)
FERRITIN SERPL-MCNC: 36 NG/ML (ref 20–300)
GLUCOSE SERPL-MCNC: 90 MG/DL (ref 70–110)
HBA1C MFR BLD: 5.2 % (ref 4–5.6)
HCT VFR BLD AUTO: 40 % (ref 37–48.5)
HDLC SERPL-MCNC: 49 MG/DL (ref 40–75)
HDLC SERPL: 26.2 % (ref 20–50)
HGB BLD-MCNC: 12.7 G/DL (ref 12–16)
IMM GRANULOCYTES # BLD AUTO: 0 K/UL (ref 0–0.04)
IMM GRANULOCYTES NFR BLD AUTO: 0 % (ref 0–0.5)
LDLC SERPL CALC-MCNC: 121.2 MG/DL (ref 63–159)
LYMPHOCYTES # BLD AUTO: 1.7 K/UL (ref 1–4.8)
LYMPHOCYTES NFR BLD: 41.8 % (ref 18–48)
MCH RBC QN AUTO: 30.5 PG (ref 27–31)
MCHC RBC AUTO-ENTMCNC: 31.8 G/DL (ref 32–36)
MCV RBC AUTO: 96 FL (ref 82–98)
MONOCYTES # BLD AUTO: 0.4 K/UL (ref 0.3–1)
MONOCYTES NFR BLD: 8.8 % (ref 4–15)
NEUTROPHILS # BLD AUTO: 2 K/UL (ref 1.8–7.7)
NEUTROPHILS NFR BLD: 48.2 % (ref 38–73)
NONHDLC SERPL-MCNC: 138 MG/DL
NRBC BLD-RTO: 0 /100 WBC
PLATELET # BLD AUTO: 224 K/UL (ref 150–450)
PMV BLD AUTO: 10.4 FL (ref 9.2–12.9)
POTASSIUM SERPL-SCNC: 5.4 MMOL/L (ref 3.5–5.1)
PROT SERPL-MCNC: 7 G/DL (ref 6–8.4)
RBC # BLD AUTO: 4.16 M/UL (ref 4–5.4)
SODIUM SERPL-SCNC: 144 MMOL/L (ref 136–145)
TRIGL SERPL-MCNC: 84 MG/DL (ref 30–150)
WBC # BLD AUTO: 4.11 K/UL (ref 3.9–12.7)

## 2022-08-29 PROCEDURE — 36415 COLL VENOUS BLD VENIPUNCTURE: CPT | Performed by: INTERNAL MEDICINE

## 2022-08-29 PROCEDURE — 80061 LIPID PANEL: CPT | Performed by: INTERNAL MEDICINE

## 2022-08-29 PROCEDURE — 85025 COMPLETE CBC W/AUTO DIFF WBC: CPT | Performed by: INTERNAL MEDICINE

## 2022-08-29 PROCEDURE — 82728 ASSAY OF FERRITIN: CPT | Performed by: INTERNAL MEDICINE

## 2022-08-29 PROCEDURE — 83036 HEMOGLOBIN GLYCOSYLATED A1C: CPT | Performed by: INTERNAL MEDICINE

## 2022-08-29 PROCEDURE — 80053 COMPREHEN METABOLIC PANEL: CPT | Performed by: INTERNAL MEDICINE

## 2022-09-02 ENCOUNTER — OFFICE VISIT (OUTPATIENT)
Dept: DERMATOLOGY | Facility: CLINIC | Age: 47
End: 2022-09-02
Payer: COMMERCIAL

## 2022-09-02 ENCOUNTER — HOSPITAL ENCOUNTER (OUTPATIENT)
Dept: RADIOLOGY | Facility: HOSPITAL | Age: 47
Discharge: HOME | End: 2022-09-02
Attending: INTERNAL MEDICINE
Payer: COMMERCIAL

## 2022-09-02 ENCOUNTER — TELEPHONE (OUTPATIENT)
Dept: INTERNAL MEDICINE | Facility: CLINIC | Age: 47
End: 2022-09-02
Payer: COMMERCIAL

## 2022-09-02 DIAGNOSIS — R91.1 SOLITARY PULMONARY NODULE: ICD-10-CM

## 2022-09-02 DIAGNOSIS — L29.9 PRURITUS: ICD-10-CM

## 2022-09-02 DIAGNOSIS — L98.9 DISEASE OF SKIN AND SUBCUTANEOUS TISSUE: Primary | ICD-10-CM

## 2022-09-02 PROCEDURE — 71250 CT THORAX DX C-: CPT | Mod: 26,,, | Performed by: RADIOLOGY

## 2022-09-02 PROCEDURE — 99213 OFFICE O/P EST LOW 20 MIN: CPT | Mod: 25,S$GLB,, | Performed by: DERMATOLOGY

## 2022-09-02 PROCEDURE — 1159F MED LIST DOCD IN RCRD: CPT | Mod: CPTII,S$GLB,, | Performed by: DERMATOLOGY

## 2022-09-02 PROCEDURE — 99999 PR PBB SHADOW E&M-EST. PATIENT-LVL III: CPT | Mod: PBBFAC,,, | Performed by: DERMATOLOGY

## 2022-09-02 PROCEDURE — 88305 TISSUE EXAM BY PATHOLOGIST: CPT | Performed by: PATHOLOGY

## 2022-09-02 PROCEDURE — 11104 PR PUNCH BIOPSY, SKIN, SINGLE LESION: ICD-10-PCS | Mod: S$GLB,,, | Performed by: DERMATOLOGY

## 2022-09-02 PROCEDURE — 88313 SPECIAL STAINS GROUP 2: CPT | Mod: 26,,, | Performed by: PATHOLOGY

## 2022-09-02 PROCEDURE — 88305 TISSUE EXAM BY PATHOLOGIST: CPT | Mod: 26,,, | Performed by: PATHOLOGY

## 2022-09-02 PROCEDURE — 99213 PR OFFICE/OUTPT VISIT, EST, LEVL III, 20-29 MIN: ICD-10-PCS | Mod: 25,S$GLB,, | Performed by: DERMATOLOGY

## 2022-09-02 PROCEDURE — 3044F HG A1C LEVEL LT 7.0%: CPT | Mod: CPTII,S$GLB,, | Performed by: DERMATOLOGY

## 2022-09-02 PROCEDURE — 71250 CT CHEST WITHOUT CONTRAST: ICD-10-PCS | Mod: 26,,, | Performed by: RADIOLOGY

## 2022-09-02 PROCEDURE — 99999 PR PBB SHADOW E&M-EST. PATIENT-LVL III: ICD-10-PCS | Mod: PBBFAC,,, | Performed by: DERMATOLOGY

## 2022-09-02 PROCEDURE — 88312 SPECIAL STAINS GROUP 1: CPT | Mod: 26,,, | Performed by: PATHOLOGY

## 2022-09-02 PROCEDURE — 88313 SPECIAL STAINS GROUP 2: CPT | Performed by: PATHOLOGY

## 2022-09-02 PROCEDURE — 11104 PUNCH BX SKIN SINGLE LESION: CPT | Mod: S$GLB,,, | Performed by: DERMATOLOGY

## 2022-09-02 PROCEDURE — 1160F PR REVIEW ALL MEDS BY PRESCRIBER/CLIN PHARMACIST DOCUMENTED: ICD-10-PCS | Mod: CPTII,S$GLB,, | Performed by: DERMATOLOGY

## 2022-09-02 PROCEDURE — 1160F RVW MEDS BY RX/DR IN RCRD: CPT | Mod: CPTII,S$GLB,, | Performed by: DERMATOLOGY

## 2022-09-02 PROCEDURE — 88305 TISSUE EXAM BY PATHOLOGIST: ICD-10-PCS | Mod: 26,,, | Performed by: PATHOLOGY

## 2022-09-02 PROCEDURE — 88312 SPECIAL STAINS GROUP 1: CPT | Performed by: PATHOLOGY

## 2022-09-02 PROCEDURE — 71250 CT THORAX DX C-: CPT | Mod: TC

## 2022-09-02 PROCEDURE — 1159F PR MEDICATION LIST DOCUMENTED IN MEDICAL RECORD: ICD-10-PCS | Mod: CPTII,S$GLB,, | Performed by: DERMATOLOGY

## 2022-09-02 PROCEDURE — 88312 PR  SPECIAL STAINS,GROUP I: ICD-10-PCS | Mod: 26,,, | Performed by: PATHOLOGY

## 2022-09-02 PROCEDURE — 88313 PR  SPECIAL STAINS,GROUP II: ICD-10-PCS | Mod: 26,,, | Performed by: PATHOLOGY

## 2022-09-02 PROCEDURE — 3044F PR MOST RECENT HEMOGLOBIN A1C LEVEL <7.0%: ICD-10-PCS | Mod: CPTII,S$GLB,, | Performed by: DERMATOLOGY

## 2022-09-02 RX ORDER — TRIAMCINOLONE ACETONIDE 1 MG/G
CREAM TOPICAL 2 TIMES DAILY
Qty: 45 G | Refills: 2 | Status: SHIPPED | OUTPATIENT
Start: 2022-09-02 | End: 2023-05-08

## 2022-09-02 NOTE — TELEPHONE ENCOUNTER
Oreyell Powders P Braaten Jennifer Staff  Hello,     This test was denied authorization reason listed below:      Facility denied authorization, You may contact [facility] at [phone] to schedule and complete a peer to peer review reference case number [id number].       Thank you   [facility]   [id number]

## 2022-09-02 NOTE — PROGRESS NOTES
Subjective:       Patient ID:  Rema Horton is a 47 y.o. female who presents for   Chief Complaint   Patient presents with    redness     face     HPI  Pt here today for a red rash that occurs when exposed to sunlight/heat . Areas affected are the face, chest, and arms. They itch and are being treated with Aveeno baby. States arms and chest have become pink and darker over the past 6 months.  States she starts getting fatigued and weak when she's out in the heat.    Prev HPI:  Patient biopsied on neck, 12/30/15, which showed subacute spong derm consistent with atopic dermatitis, allergic contact dermatitis, nummular dermatitis or id reaction. Based on patient's clinical presentation, most likely nummular eczema. She has been using TAC 0.1% cream BID to affected areas including her left back, right and left lower leg, and john-umbilical area with improvement.     Being followed by rheum for + FELIPA (1:160 speckled atypical pattern in 9/2014). FELIPA in 10/2020 was negative.  Neg marshall, ro, la, dsdna, rnp, APS panel  Rf, ccp-negative  normal TSH, T4  Esr:33, 34     Review of Systems   Constitutional:  Negative for fever, chills, weight loss, weight gain, fatigue and malaise.   Skin:  Positive for itching, rash and activity-related sunscreen use. Negative for daily sunscreen use and recent sunburn.   Hematologic/Lymphatic: Does not bruise/bleed easily.      Objective:    Physical Exam   Constitutional: She appears well-developed and well-nourished.   Neurological: She is alert and oriented to person, place, and time.   Psychiatric: She has a normal mood and affect.   Skin:   Areas Examined (abnormalities noted in diagram):   Head / Face Inspection Performed  Neck Inspection Performed  Chest / Axilla Inspection Performed  RUE Inspected  LUE Inspection Performed                         Diagram Legend     Erythematous scaling macule/papule c/w actinic keratosis       Vascular papule c/w angioma      Pigmented verrucoid  papule/plaque c/w seborrheic keratosis      Yellow umbilicated papule c/w sebaceous hyperplasia      Irregularly shaped tan macule c/w lentigo     1-2 mm smooth white papules consistent with Milia      Movable subcutaneous cyst with punctum c/w epidermal inclusion cyst      Subcutaneous movable cyst c/w pilar cyst      Firm pink to brown papule c/w dermatofibroma      Pedunculated fleshy papule(s) c/w skin tag(s)      Evenly pigmented macule c/w junctional nevus     Mildly variegated pigmented, slightly irregular-bordered macule c/w mildly atypical nevus      Flesh colored to evenly pigmented papule c/w intradermal nevus       Pink pearly papule/plaque c/w basal cell carcinoma      Erythematous hyperkeratotic cursted plaque c/w SCC      Surgical scar with no sign of skin cancer recurrence      Open and closed comedones      Inflammatory papules and pustules      Verrucoid papule consistent consistent with wart     Erythematous eczematous patches and plaques     Dystrophic onycholytic nail with subungual debris c/w onychomycosis     Umbilicated papule    Erythematous-base heme-crusted tan verrucoid plaque consistent with inflamed seborrheic keratosis     Erythematous Silvery Scaling Plaque c/w Psoriasis     See annotation      Assessment / Plan:    Disease of skin and subcutaneous tissue  Punch biopsy procedure note:  Punch biopsy performed after verbal consent obtained. Area marked and prepped with alcohol. Approximately 1cc of 1% lidocaine with epinephrine injected. 4 mm disposable punch used to remove lesion. Hemostasis obtained and biopsy site closed with 1 - 2 Prolene sutures. Wound care instructions reviewed with patient and handout given.    -     Specimen to Pathology, Dermatology  Pathology Orders:       Normal Orders This Visit    Specimen to Pathology, Dermatology     Comments:    Number of Specimens:->1  ------------------------->-------------------------  Spec 1 Procedure:->Biopsy  Spec 1 Clinical  Impression:->r/o lupus vs dermatomyositis vs  photoallergic dermatitis vs ACD vs other  Spec 1 Source:->upper chest    Questions:    Procedure Type: Dermatology and skin neoplasms    Number of Specimens: 1    ------------------------: -------------------------    Spec 1 Procedure: Biopsy    Spec 1 Clinical Impression: r/o lupus vs dermatomyositis vs photoallergic dermatitis vs ACD vs other    Spec 1 Source: upper chest    Release to patient:               Pruritus  -     triamcinolone acetonide 0.1% (KENALOG) 0.1 % cream; Apply topically 2 (two) times daily. To affected areas on arms and chest  x 1-2 wks then prn flares only. Avoid face, armpits, and groin.  Dispense: 45 g; Refill: 2    Patient instructed in importance of daily broad spectrum sunscreen use with spf at least 30. Sun avoidance and topical protection/protective clothing discussed.      Follow up in about 2 weeks (around 9/16/2022) for suture removal and biopsy results.

## 2022-09-02 NOTE — PATIENT INSTRUCTIONS
Punch Biopsy Wound Care    Your doctor has performed a punch biopsy today.  A band aid and antibiotic ointment has been placed over the site.  This should remain in place for 24 hours.  It is recommended that you keep the area dry for the first 24 hours.  After 24 hours, you may remove the band aid and wash the area with warm soap and water and apply Vaseline jelly.  Many patients prefer to use Neosporin or Bacitracin ointment.  This is acceptable; however know that you can develop an allergy to this medication even if you have used it safely for years.  It is important to keep the area moist.  Letting it dry out and get air slows healing time, will worsen the scar, and make it more difficult to remove the stitches if they were placed.  Band aid is optional after first 24 hours.      If you notice increasing redness, tenderness, pain, or yellow drainage at the biopsy or surgical site, please notify your doctor.  These are signs of an infection.    If your biopsy/surgical site is bleeding, apply firm pressure for 15 minutes straight.  Repeat for another 15 minutes, if it is still bleeding.   If the surgical site continues to bleed, then please contact your doctor.      For [facility] users:   You will receive your biopsy results in [facility] as soon as they are available. Please be assured that your physician/provider will review your results and will then determine what further treatment, evaluation, or planning is required. You should be contacted by your physician's/provider's office within 5 business days of receiving your results; If not, please reach out to directly. This is one more way [facility] is putting you first.       [facility] [place] [address]/ [phone] FAX/ [email or web address]      Sun Protection      The [facility] Department of Dermat[facility] would like to remind you of the importance of sun protection all year round and particularly during the summer when the suns rays  are the strongest. It has been proven that both acute and chronic sun exposure damages our cells and leads to skin cancer. Beyond skin cancer, the sun causes 90% of the symptoms of premature skin aging, including wrinkles, lentigines (brown spots), and thin, easily bruised skin. Proper sun protection can help prevent these unwanted conditions.    Many patients report that they dont go in the sun. It has been shown that the average person receives 18 hours of incidental sun exposure per week during activities such as walking through parking lots, driving, or sitting next to windows. This accumulates to several bad sunburns per year!    In choosing sunscreen, you want one that protects against both UVA and UVB rays (broad spectrum). It is recommended that you use one of SPF 30 or higher. It is important to apply the sunscreen about 20 minutes prior to sun exposure. Most sunscreens are chemical sunscreens and a reaction must take place in the skin so that they are effective. If they are applied and then you are immediately exposed to the sun or start sweating, this reaction has not had time to take place and you are therefore unprotected. Sunscreen needs to be reapplied every 2 hours if you are participating in water sports or sweating. We recommend Elta MD or CeraVe sunscreens for daily use; however there are many options and it is most important for you to find one that you will use on a consistent basis.    If you have sensitive skin, you may do best with a sunscreen that contains only physical blockers in the active ingredient section. The only physical blockers available in the [place] currently are titanium dioxide or zinc oxide. These are typically thicker and harder to apply, however they afford very good protection. Neutrogena Sensitive Skin, Blue Lizard Sensitive Skin (pink top) or Neutrogena Pure and Free are popular ones.     Aside from sunscreen, clothes with UV protection (UPF), wide brimmed hats, and  sunglasses are other means of sun protection that we recommend.      Based on a recent study (6/2021) and out of an abundance of caution, we are recommending that you AVOID the following sunscreens as they may contain the carcinogen, benzene:    Spray and gel sunscreens  Any [facility] or [facility] brands as well as Max Block and TopCare brands   Neutrogena Ultra Sheer Dry-touch Water Resistant Sunscreen LOTION SPF 70   Neutrogena Sheer Zinc Dry-touch Face Sunscreen LOTION SPF 50   5.   Aveeno Baby Continuous Protection Sensitive Skin Sunscreen LOTION - Broad Spectrum SPF 50    Please note that Benzene is not an ingredient or the degradation product of any ingredient in any sunscreen. This study suggested that the findings are a result of contamination in the manufacturing process. At this point, we don't know how effectively Benzene gets through the skin, if it gets absorbed systemically, and what effects it may have.     We do know that ultraviolet radiation is a well-established carcinogen. Please use daily sun protection/avoidance and use of at least SPF 30, broad-spectrum sunscreen not listed above.                       [facility] - DERMATOLOGY [address]  [address]  [place] [address]  Dept: [phone]  Dept Fax: [phone]

## 2022-09-12 LAB
FINAL PATHOLOGIC DIAGNOSIS: NORMAL
GROSS: NORMAL
Lab: NORMAL
MICROSCOPIC EXAM: NORMAL

## 2022-09-16 ENCOUNTER — CLINICAL SUPPORT (OUTPATIENT)
Dept: DERMATOLOGY | Facility: CLINIC | Age: 47
End: 2022-09-16
Payer: COMMERCIAL

## 2022-09-16 DIAGNOSIS — Z48.02 ENCOUNTER FOR REMOVAL OF SUTURES: Primary | ICD-10-CM

## 2022-09-16 PROCEDURE — 99024 POSTOP FOLLOW-UP VISIT: CPT | Mod: S$GLB,,, | Performed by: DERMATOLOGY

## 2022-09-16 PROCEDURE — 99024 PR POST-OP FOLLOW-UP VISIT: ICD-10-PCS | Mod: S$GLB,,, | Performed by: DERMATOLOGY

## 2022-09-16 NOTE — PROGRESS NOTES
Suture Removal note:  CC: 47 y.o. female patient is here for suture removal.         HPI: Patient is s/p excision of - MILD EPIDERMAL ATROPHY WITH A SPARSE SUPERFICIAL PERIVASCULAR AND PERIFOLLICULAR LYMPHOHISTIOCYTIC INFILTRATE from the . upper chest on 9/2/2022.  Patient reports no problems.    WOUND PE:  Sutures intact.  Wound healing well.  Good approximation of skin edges.  No signs or symptoms of infection.    IMPRESSION:  Sections show the essentially unremarkable epidermis and overlying stratum corneum. There is no significant epidermal spongiosis, and no active vacuolar interface alteration is appreciated. Within the superficial dermis, there is a sparse perivascular and perifollicular lymphohistiocytic infiltrate. Colloidal iron stain shows mildly increased dermal mucin, limited to the superficial areas of the epidermis. Elastic stain shows a normal density and distribution of elastic fibers throughout the dermis. PAS stain is negative for fungi. Appropriately reactive controls were reviewed. Multiple levels were examined. - margins clear.    PLAN:  Sutures removed today.  Continue wound care.    RTC: In prn

## 2022-09-28 ENCOUNTER — IMMUNIZATION (OUTPATIENT)
Dept: INTERNAL MEDICINE | Facility: CLINIC | Age: 47
End: 2022-09-28
Payer: COMMERCIAL

## 2022-09-28 DIAGNOSIS — Z23 NEED FOR VACCINATION: Primary | ICD-10-CM

## 2022-09-28 PROCEDURE — 0124A PR IMMUNIZ ADMIN, SARS-COV- 2 COVID-19 VACCINE, 30MCG/0.3ML, BIVALENT, BOOSTER DOSE: CPT | Mod: S$GLB,,, | Performed by: INTERNAL MEDICINE

## 2022-09-28 PROCEDURE — 0124A COVID-19, MRNA, LNP-S, BIVALENT BOOSTER, PF, 30 MCG/0.3 ML DOSE: CPT | Mod: CV19,PBBFAC | Performed by: INTERNAL MEDICINE

## 2022-09-28 PROCEDURE — 91312 COVID-19, MRNA, LNP-S, BIVALENT BOOSTER, PF, 30 MCG/0.3 ML DOSE: ICD-10-PCS | Mod: S$GLB,,, | Performed by: INTERNAL MEDICINE

## 2022-09-28 PROCEDURE — 91312 COVID-19, MRNA, LNP-S, BIVALENT BOOSTER, PF, 30 MCG/0.3 ML DOSE: CPT | Mod: S$GLB,,, | Performed by: INTERNAL MEDICINE

## 2022-09-28 PROCEDURE — 0124A PR IMMUNIZ ADMIN, SARS-COV- 2 COVID-19 VACCINE, 30MCG/0.3ML, BIVALENT, BOOSTER DOSE: ICD-10-PCS | Mod: S$GLB,,, | Performed by: INTERNAL MEDICINE

## 2022-10-07 ENCOUNTER — OFFICE VISIT (OUTPATIENT)
Dept: INTERNAL MEDICINE | Facility: CLINIC | Age: 47
End: 2022-10-07
Payer: COMMERCIAL

## 2022-10-07 VITALS
TEMPERATURE: 98 F | BODY MASS INDEX: 30.34 KG/M2 | DIASTOLIC BLOOD PRESSURE: 70 MMHG | SYSTOLIC BLOOD PRESSURE: 120 MMHG | HEIGHT: 62 IN | HEART RATE: 88 BPM | OXYGEN SATURATION: 98 % | WEIGHT: 164.88 LBS

## 2022-10-07 DIAGNOSIS — R05.8 OTHER COUGH: ICD-10-CM

## 2022-10-07 DIAGNOSIS — R05.9 COUGH IN ADULT: ICD-10-CM

## 2022-10-07 DIAGNOSIS — E66.9 OBESITY (BMI 30-39.9): ICD-10-CM

## 2022-10-07 DIAGNOSIS — R11.0 NAUSEA: ICD-10-CM

## 2022-10-07 DIAGNOSIS — G44.89 OTHER HEADACHE SYNDROME: ICD-10-CM

## 2022-10-07 DIAGNOSIS — J06.9 ACUTE URI: Primary | ICD-10-CM

## 2022-10-07 LAB
INFLUENZA A, MOLECULAR: NEGATIVE
INFLUENZA B, MOLECULAR: NEGATIVE
SPECIMEN SOURCE: NORMAL

## 2022-10-07 PROCEDURE — 1160F RVW MEDS BY RX/DR IN RCRD: CPT | Mod: CPTII,S$GLB,, | Performed by: NURSE PRACTITIONER

## 2022-10-07 PROCEDURE — 1160F PR REVIEW ALL MEDS BY PRESCRIBER/CLIN PHARMACIST DOCUMENTED: ICD-10-PCS | Mod: CPTII,S$GLB,, | Performed by: NURSE PRACTITIONER

## 2022-10-07 PROCEDURE — 3044F HG A1C LEVEL LT 7.0%: CPT | Mod: CPTII,S$GLB,, | Performed by: NURSE PRACTITIONER

## 2022-10-07 PROCEDURE — 99214 OFFICE O/P EST MOD 30 MIN: CPT | Mod: S$GLB,,, | Performed by: NURSE PRACTITIONER

## 2022-10-07 PROCEDURE — 3044F PR MOST RECENT HEMOGLOBIN A1C LEVEL <7.0%: ICD-10-PCS | Mod: CPTII,S$GLB,, | Performed by: NURSE PRACTITIONER

## 2022-10-07 PROCEDURE — 99999 PR PBB SHADOW E&M-EST. PATIENT-LVL V: CPT | Mod: PBBFAC,,, | Performed by: NURSE PRACTITIONER

## 2022-10-07 PROCEDURE — 3008F PR BODY MASS INDEX (BMI) DOCUMENTED: ICD-10-PCS | Mod: CPTII,S$GLB,, | Performed by: NURSE PRACTITIONER

## 2022-10-07 PROCEDURE — U0003 INFECTIOUS AGENT DETECTION BY NUCLEIC ACID (DNA OR RNA); SEVERE ACUTE RESPIRATORY SYNDROME CORONAVIRUS 2 (SARS-COV-2) (CORONAVIRUS DISEASE [COVID-19]), AMPLIFIED PROBE TECHNIQUE, MAKING USE OF HIGH THROUGHPUT TECHNOLOGIES AS DESCRIBED BY CMS-2020-01-R: HCPCS | Performed by: NURSE PRACTITIONER

## 2022-10-07 PROCEDURE — 3008F BODY MASS INDEX DOCD: CPT | Mod: CPTII,S$GLB,, | Performed by: NURSE PRACTITIONER

## 2022-10-07 PROCEDURE — 3078F PR MOST RECENT DIASTOLIC BLOOD PRESSURE < 80 MM HG: ICD-10-PCS | Mod: CPTII,S$GLB,, | Performed by: NURSE PRACTITIONER

## 2022-10-07 PROCEDURE — 1159F MED LIST DOCD IN RCRD: CPT | Mod: CPTII,S$GLB,, | Performed by: NURSE PRACTITIONER

## 2022-10-07 PROCEDURE — 1159F PR MEDICATION LIST DOCUMENTED IN MEDICAL RECORD: ICD-10-PCS | Mod: CPTII,S$GLB,, | Performed by: NURSE PRACTITIONER

## 2022-10-07 PROCEDURE — 3078F DIAST BP <80 MM HG: CPT | Mod: CPTII,S$GLB,, | Performed by: NURSE PRACTITIONER

## 2022-10-07 PROCEDURE — 87502 INFLUENZA DNA AMP PROBE: CPT | Performed by: NURSE PRACTITIONER

## 2022-10-07 PROCEDURE — 99214 PR OFFICE/OUTPT VISIT, EST, LEVL IV, 30-39 MIN: ICD-10-PCS | Mod: S$GLB,,, | Performed by: NURSE PRACTITIONER

## 2022-10-07 PROCEDURE — 3074F SYST BP LT 130 MM HG: CPT | Mod: CPTII,S$GLB,, | Performed by: NURSE PRACTITIONER

## 2022-10-07 PROCEDURE — 3074F PR MOST RECENT SYSTOLIC BLOOD PRESSURE < 130 MM HG: ICD-10-PCS | Mod: CPTII,S$GLB,, | Performed by: NURSE PRACTITIONER

## 2022-10-07 PROCEDURE — 99999 PR PBB SHADOW E&M-EST. PATIENT-LVL V: ICD-10-PCS | Mod: PBBFAC,,, | Performed by: NURSE PRACTITIONER

## 2022-10-07 RX ORDER — PROMETHAZINE HYDROCHLORIDE AND DEXTROMETHORPHAN HYDROBROMIDE 6.25; 15 MG/5ML; MG/5ML
5 SYRUP ORAL EVERY 6 HOURS PRN
Qty: 240 ML | Refills: 0 | Status: SHIPPED | OUTPATIENT
Start: 2022-10-07 | End: 2023-03-02

## 2022-10-07 RX ORDER — FLUTICASONE PROPIONATE 50 MCG
1 SPRAY, SUSPENSION (ML) NASAL 2 TIMES DAILY
Qty: 18.2 ML | Refills: 0 | Status: SHIPPED | OUTPATIENT
Start: 2022-10-07 | End: 2023-04-04

## 2022-10-07 NOTE — PATIENT INSTRUCTIONS
Flu and COVID tests pending, will message with results    Meds as prescribed    Rest and Fluids, Tylenol or Motrin otc as needed for pain and or fever    Warm liquids to thin mucus    Allergen avoidance discussed, humidified air recommended    Warm salt water gargles as needed for throat pain    Nasal spray, taught how to correctly use

## 2022-10-07 NOTE — PROGRESS NOTES
"Subjective:       Patient ID: Rema Horton is a 47 y.o. female.    Chief Complaint: Chest Congestion, Hoarse, Epistaxis, Cough, Sore Throat, Fatigue, and Dizziness    Pt of Dr. Clark, here for, "Nausea Very congested green mucus with blood and pain in my nostril I lost my voice not able to open my mouth two days ago dizzy."    Cough  This is a new problem. The current episode started in the past 7 days (5 days). The problem has been unchanged. The problem occurs constantly. The cough is Productive of sputum (green mucus). Associated symptoms include ear pain, eye redness, headaches, nasal congestion, rhinorrhea and a sore throat. Pertinent negatives include no chest pain, chills, ear congestion, fever, heartburn, hemoptysis, myalgias, postnasal drip, rash, shortness of breath, sweats, weight loss or wheezing. The symptoms are aggravated by lying down. Risk factors: sleeps with ceiling fan, daughter recently ill. Treatments tried: took tylenol otc. The treatment provided no relief. There is no history of asthma, bronchiectasis, bronchitis, COPD, emphysema, environmental allergies or pneumonia.   Review of Systems   Constitutional:  Positive for fatigue. Negative for chills, fever and weight loss.   HENT:  Positive for nasal congestion, ear pain, nosebleeds, rhinorrhea, sore throat and voice change. Negative for postnasal drip.    Eyes:  Positive for redness.        Right eye red today   Respiratory:  Positive for cough and chest tightness. Negative for hemoptysis, shortness of breath and wheezing.    Cardiovascular:  Negative for chest pain.   Gastrointestinal:  Positive for diarrhea, nausea and vomiting. Negative for abdominal pain and heartburn.   Genitourinary:  Negative for dysuria.   Musculoskeletal:  Positive for arthralgias. Negative for myalgias.   Integumentary:  Negative for rash.   Allergic/Immunologic: Negative for environmental allergies.   Neurological:  Positive for dizziness and headaches. " Negative for weakness.   Hematological:  Negative for adenopathy. Does not bruise/bleed easily.   Psychiatric/Behavioral:  Negative for suicidal ideas.      Review of patient's allergies indicates:   Allergen Reactions    Flexeril  [cyclobenzaprine]      Other reaction(s): Rash    Lodine  [etodolac] Rash       Current Outpatient Medications:     baclofen (LIORESAL) 10 MG tablet, TAKE 1 TABLET (10 MG TOTAL) BY MOUTH DAILY AS NEEDED (SEVERE PAIN/MUSCLE ACHE)., Disp: 30 tablet, Rfl: 2    cholecalciferol, vitamin D3, (VITAMIN D3) 25 mcg (1,000 unit) capsule, TAKE 2 CAPSULES (2,000 UNITS TOTAL) BY MOUTH ONCE DAILY., Disp: 180 capsule, Rfl: 3    xavier prim-linoleic-gamolenic ac (PRIMROSE OIL) 1,000 mg Cap, , Disp: , Rfl:     ferrous sulfate (FEOSOL) 325 mg (65 mg iron) Tab tablet, Take 1 tablet (325 mg total) by mouth 2 (two) times daily. During period only, Disp: 180 tablet, Rfl: 3    fluconazole (DIFLUCAN) 150 MG Tab, TAKE 1 TABLET (150MG) NOW, THEN IN 3 DAYS., Disp: 2 tablet, Rfl: 1    fluocinolone (SYNALAR) 0.01 % external solution, AAA scalp qday - bid prn itching or scaling, Disp: 60 mL, Rfl: 3    ketoconazole (NIZORAL) 2 % shampoo, Wash hair with medicated shampoo at least 2x/week - let sit on scalp at least 5 minutes prior to rinsing, Disp: 120 mL, Rfl: 5    Lactobacillus acidophilus (PROBIOTIC ORAL), Take by mouth once daily., Disp: , Rfl:     multivit with iron,hematinic (SUPER B-COMPLEX ORAL), , Disp: , Rfl:     MULTIVITAMIN ORAL, NADIA MULTIVITAMIN, Disp: , Rfl:     naproxen (NAPROSYN) 250 MG tablet, Take 1 tablet (250 mg total) by mouth 2 (two) times daily. As needed, Disp: , Rfl:     rizatriptan (MAXALT) 10 MG tablet, Take 1/2 to 1 tab at onset of headache.  If no improvement in 2 hours, take another.  Do not take more than 2 tabs in 24 hours., Disp: 9 tablet, Rfl: 11    triamcinolone acetonide 0.1% (KENALOG) 0.1 % cream, Apply topically 2 (two) times daily. To affected areas on arms and chest  x 1-2 wks then  prn flares only. Avoid face, armpits, and groin., Disp: 45 g, Rfl: 2    TURMERIC ORAL, Take by mouth., Disp: , Rfl:     valACYclovir (VALTREX) 500 MG tablet, Take 1 tablet (500 mg total) by mouth once daily., Disp: 30 tablet, Rfl: 6    venlafaxine (EFFEXOR-XR) 37.5 MG 24 hr capsule, Take 1 capsule (37.5 mg total) by mouth once daily., Disp: 30 capsule, Rfl: 11    vitamin E acetate (VITAMIN E ORAL), Take by mouth once daily., Disp: , Rfl:     Patient Active Problem List   Diagnosis    Chronic pelvic pain in female    Frequency-urgency syndrome    Slow transit constipation    Dysuria    Incomplete bladder emptying    Normocytic anemia    Voiding dysfunction    Fibromyalgia    Interstitial cystitis    Periodic headache syndrome, not intractable    Other insomnia    Snoring    PERFECTO (obstructive sleep apnea)    Pulmonary nodules    Pain    Decreased range of motion     Past Medical History:   Diagnosis Date    Abnormal Pap smear of cervix     Anemia     Breast disorder     Fever blister     Fibromyalgia     IC (interstitial cystitis)      Past Surgical History:   Procedure Laterality Date    BREAST BIOPSY Left 2014    fibroadenoma     SECTION  2012    X 2---JST FOR KJB ([facility])    DILATION AND CURETTAGE OF UTERUS      [facility]     Social History     Socioeconomic History    Marital status:    Tobacco Use    Smoking status: Never    Smokeless tobacco: Never   Substance and Sexual Activity    Alcohol use: Not Currently     Alcohol/week: 0.0 standard drinks     Comment: Occasionally.    Drug use: No    Sexual activity: Yes     Partners: Male     Birth control/protection: None     Social Determinants of Health     Financial Resource Strain: Medium Risk    Difficulty of Paying Living Expenses: Somewhat hard   Food Insecurity: Food Insecurity Present    Worried About Running Out of Food in the Last Year: Sometimes true    Ran Out of Food in the Last Year: Sometimes true   Transportation Needs: No Transportation  "Needs    Lack of Transportation (Medical): No    Lack of Transportation (Non-Medical): No   Physical Activity: Inactive    Days of Exercise per Week: 0 days    Minutes of Exercise per Session: 0 min   Stress: No Stress Concern Present    Feeling of Stress : Only a little   Social Connections: Unknown    Frequency of Communication with Friends and Family: Once a week    Frequency of Social Gatherings with Friends and Family: Never    Active Member of Clubs or Organizations: No    Attends Club or Organization Meetings: Never    Marital Status:    Housing Stability: Low Risk     Unable to Pay for Housing in the Last Year: No    Number of Places Lived in the Last Year: 1    Unstable Housing in the Last Year: No     Family History   Problem Relation Age of Onset    Cancer Mother         bladder (not sure if was actually gynecologic)    No Known Problems Father     Stroke Maternal Grandfather     Breast cancer Paternal Grandmother     Chromosomal disorder Daughter     No Known Problems Son     Melanoma Neg Hx     Ovarian cancer Neg Hx     Colon cancer Neg Hx     Colon polyps Neg Hx     Esophageal cancer Neg Hx     Stomach cancer Neg Hx     Rectal cancer Neg Hx            Objective:      Vitals:    10/07/22 1400   BP: 120/70   Pulse: 88   Temp: 98.2 °F (36.8 °C)   SpO2: 98%   Weight: 74.8 kg (164 lb 14.5 oz)   Height: 5' 2" (1.575 m)   PainSc: 0-No pain     Body mass index is 30.16 kg/m².    Physical Exam  Vitals and nursing note reviewed.   Constitutional:       Appearance: She is obese.   HENT:      Head: Normocephalic.      Right Ear: Tympanic membrane, ear canal and external ear normal. There is no impacted cerumen.      Left Ear: Tympanic membrane, ear canal and external ear normal. There is no impacted cerumen.      Nose: Nasal tenderness, mucosal edema, congestion and rhinorrhea present.      Right Turbinates: Swollen.      Left Turbinates: Swollen.      Right Sinus: Maxillary sinus tenderness present.      " Left Sinus: Maxillary sinus tenderness present.      Mouth/Throat:      Mouth: Mucous membranes are moist.      Pharynx: Oropharynx is clear. No oropharyngeal exudate or posterior oropharyngeal erythema.      Comments: Clear PND noted on exam  Eyes:      Extraocular Movements: Extraocular movements intact.      Conjunctiva/sclera: Conjunctivae normal.      Pupils: Pupils are equal, round, and reactive to light.   Cardiovascular:      Rate and Rhythm: Normal rate and regular rhythm.      Pulses: Normal pulses.      Heart sounds: Normal heart sounds.   Pulmonary:      Effort: Pulmonary effort is normal.      Breath sounds: Normal breath sounds.   Musculoskeletal:         General: Normal range of motion.      Cervical back: Normal range of motion and neck supple. No tenderness.   Lymphadenopathy:      Cervical: No cervical adenopathy.   Skin:     General: Skin is warm and dry.   Neurological:      General: No focal deficit present.      Mental Status: She is alert and oriented to person, place, and time.   Psychiatric:         Mood and Affect: Mood normal.         Behavior: Behavior normal.         Thought Content: Thought content normal.         Judgment: Judgment normal.       Assessment:       Problem List Items Addressed This Visit    None  Visit Diagnoses       Acute URI    -  Primary    Relevant Medications    promethazine-dextromethorphan (PROMETHAZINE-DM) 6.25-15 mg/5 mL Syrp    fluticasone propionate (FLONASE) 50 mcg/actuation nasal spray    Other Relevant Orders    Influenza A & B by Molecular    Cough in adult        Relevant Medications    promethazine-dextromethorphan (PROMETHAZINE-DM) 6.25-15 mg/5 mL Syrp    Other Relevant Orders    Influenza A & B by Molecular    BMI 30.0-30.9,adult        Obesity (BMI 30-39.9)                Plan:       Rema was seen today for chest congestion, hoarse, epistaxis, cough, sore throat, fatigue and dizziness.    Diagnoses and all orders for this visit:    Acute URI  -      Influenza A & B by Molecular  -     promethazine-dextromethorphan (PROMETHAZINE-DM) 6.25-15 mg/5 mL Syrp; Take 5 mLs by mouth every 6 (six) hours as needed (cough and congestion).  -     fluticasone propionate (FLONASE) 50 mcg/actuation nasal spray; 1 spray (50 mcg total) by Each Nostril route 2 (two) times daily.    Cough in adult  -     Influenza A & B by Molecular  -     promethazine-dextromethorphan (PROMETHAZINE-DM) 6.25-15 mg/5 mL Syrp; Take 5 mLs by mouth every 6 (six) hours as needed (cough and congestion).    BMI 30.0-30.9,adult  BMI reviewed    Obesity (BMI 30-39.9)  BMI reviewed.    Diet and exercise to lose weight.    Flu and COVID tests pending, will message with results    Meds as prescribed    Rest and Fluids, Tylenol or Motrin otc as needed for pain and or fever    Warm liquids to thin mucus    Allergen avoidance discussed, humidified air recommended    Warm salt water gargles as needed for throat pain    Nasal spray, taught how to correctly use    Self care instructions provided in AVS    Follow up if symptoms worsen or fail to improve.

## 2022-10-10 LAB
SARS-COV-2 RNA RESP QL NAA+PROBE: NOT DETECTED
SARS-COV-2- CYCLE NUMBER: NORMAL

## 2022-10-12 ENCOUNTER — TELEPHONE (OUTPATIENT)
Dept: SLEEP MEDICINE | Facility: CLINIC | Age: 47
End: 2022-10-12
Payer: COMMERCIAL

## 2022-10-12 ENCOUNTER — PATIENT MESSAGE (OUTPATIENT)
Dept: SLEEP MEDICINE | Facility: CLINIC | Age: 47
End: 2022-10-12
Payer: COMMERCIAL

## 2023-02-03 ENCOUNTER — OFFICE VISIT (OUTPATIENT)
Dept: RHEUMATOLOGY | Facility: CLINIC | Age: 48
End: 2023-02-03
Payer: COMMERCIAL

## 2023-02-03 VITALS
SYSTOLIC BLOOD PRESSURE: 151 MMHG | BODY MASS INDEX: 30.28 KG/M2 | DIASTOLIC BLOOD PRESSURE: 92 MMHG | WEIGHT: 165.56 LBS | HEART RATE: 80 BPM

## 2023-02-03 DIAGNOSIS — F41.9 ANXIETY: Primary | ICD-10-CM

## 2023-02-03 DIAGNOSIS — G43.C0 PERIODIC HEADACHE SYNDROME, NOT INTRACTABLE: Primary | ICD-10-CM

## 2023-02-03 DIAGNOSIS — G89.4 CHRONIC PAIN DISORDER: ICD-10-CM

## 2023-02-03 PROCEDURE — 3080F DIAST BP >= 90 MM HG: CPT | Mod: CPTII,S$GLB,, | Performed by: INTERNAL MEDICINE

## 2023-02-03 PROCEDURE — 99999 PR PBB SHADOW E&M-EST. PATIENT-LVL IV: CPT | Mod: PBBFAC,,, | Performed by: INTERNAL MEDICINE

## 2023-02-03 PROCEDURE — 3080F PR MOST RECENT DIASTOLIC BLOOD PRESSURE >= 90 MM HG: ICD-10-PCS | Mod: CPTII,S$GLB,, | Performed by: INTERNAL MEDICINE

## 2023-02-03 PROCEDURE — 99214 PR OFFICE/OUTPT VISIT, EST, LEVL IV, 30-39 MIN: ICD-10-PCS | Mod: S$GLB,,, | Performed by: INTERNAL MEDICINE

## 2023-02-03 PROCEDURE — 3008F PR BODY MASS INDEX (BMI) DOCUMENTED: ICD-10-PCS | Mod: CPTII,S$GLB,, | Performed by: INTERNAL MEDICINE

## 2023-02-03 PROCEDURE — 3008F BODY MASS INDEX DOCD: CPT | Mod: CPTII,S$GLB,, | Performed by: INTERNAL MEDICINE

## 2023-02-03 PROCEDURE — 99999 PR PBB SHADOW E&M-EST. PATIENT-LVL IV: ICD-10-PCS | Mod: PBBFAC,,, | Performed by: INTERNAL MEDICINE

## 2023-02-03 PROCEDURE — 3077F SYST BP >= 140 MM HG: CPT | Mod: CPTII,S$GLB,, | Performed by: INTERNAL MEDICINE

## 2023-02-03 PROCEDURE — 1159F MED LIST DOCD IN RCRD: CPT | Mod: CPTII,S$GLB,, | Performed by: INTERNAL MEDICINE

## 2023-02-03 PROCEDURE — 1159F PR MEDICATION LIST DOCUMENTED IN MEDICAL RECORD: ICD-10-PCS | Mod: CPTII,S$GLB,, | Performed by: INTERNAL MEDICINE

## 2023-02-03 PROCEDURE — 99214 OFFICE O/P EST MOD 30 MIN: CPT | Mod: S$GLB,,, | Performed by: INTERNAL MEDICINE

## 2023-02-03 PROCEDURE — 3077F PR MOST RECENT SYSTOLIC BLOOD PRESSURE >= 140 MM HG: ICD-10-PCS | Mod: CPTII,S$GLB,, | Performed by: INTERNAL MEDICINE

## 2023-02-03 RX ORDER — GABAPENTIN 300 MG/1
300 CAPSULE ORAL 3 TIMES DAILY
Qty: 90 CAPSULE | Refills: 11 | Status: SHIPPED | OUTPATIENT
Start: 2023-02-03 | End: 2023-04-28

## 2023-02-03 NOTE — PROGRESS NOTES
Patient ID: Rema Horton is a 39 y.o. Female her for joint pain and +FELIPA     Chief Complaint: Joint Pain     HPI   38 yo F with new left breast nodule awaiting biopsy here for joint pain  -reports fatigue for 2010  - lower back and buttock pain when lying down since 2010  -+fatigue  -no recent change  -burning sensation IN UPPER BACK  -HAD mri pelvis in summer 2013  overall unrevealing  -had hair loss  For last 2 years  -no joint swelling or stiffness  -oral ulcers this week (painful with eating)  +photosensitivity since childhood                           Past Medical History   Diagnosis Date    Menarche         Age of onset 13            Interval history:  Last seen in 2020.  She continues to have poor memory. She has pain in right hand that gets weak.   Patient is here for f/u of pain. She is  off Savella. She snores.  Pain level can be as high as  8 /10 aching and non radiating.  She feels stressed. Denies SI or HI. Denies any rashes, oral ulcers, fevers, photosensitivity, or cough.              Review of Systems   Constitutional: Positive for fatigue. Negative for chills, diaphoresis, activity change and appetite change.   HENT: Negative for congestion, ear discharge, ear pain, facial swelling, sinus pressure, sneezing, sore throat, tinnitus and trouble swallowing.    Eyes: Negative for photophobia, pain, discharge, redness, itching and visual disturbance.   Respiratory: Negative for apnea, chest tightness, shortness of breath, wheezing and stridor.    Cardiovascular: Negative for chest pain, palpitations and leg swelling.   Gastrointestinal: Negative for nausea, abdominal pain, diarrhea, constipation, blood in stool, abdominal distention and anal bleeding.   Endocrine: Negative for cold intolerance and heat intolerance.   Genitourinary: Negative for dysuria and difficulty urinating.   Musculoskeletal: Positive for back pain and arthralgias. Negative for myalgias, joint swelling, gait problem, neck pain  and neck stiffness.   Skin: Negative for color change, pallor and wound.   Neurological: Negative for dizziness, seizures, light-headedness and numbness.   Hematological: Negative for adenopathy. Does not bruise/bleed easily.   Psychiatric/Behavioral: Negative for sleep disturbance. The patient is not nervous/anxious.           Objective:         Physical Exam   Constitutional: She is oriented to person, place, and time and well-developed, well-nourished, and in no distress.   HENT:   Head: Normocephalic and atraumatic.   Right Ear: External ear normal.   Left Ear: External ear normal.   Nose: Nose normal.   Mouth/Throat: Oropharynx is clear and moist. No oropharyngeal exudate.   Eyes: Conjunctivae and EOM are normal. Pupils are equal, round, and reactive to light. Right eye exhibits no discharge. Left eye exhibits no discharge. No scleral icterus.   Neck: Neck supple. No JVD present. No thyromegaly present.   Cardiovascular: Normal rate, regular rhythm, normal heart sounds and intact distal pulses.  Exam reveals no gallop and no friction rub.    No murmur heard.  Pulmonary/Chest: Effort normal and breath sounds normal. No respiratory distress. She has no wheezes. She has no rales. She exhibits no tenderness.   Abdominal: Soft. Bowel sounds are normal. She exhibits no distension and no mass. There is no tenderness. There is no rebound and no guarding.   Lymphadenopathy:     She has no cervical adenopathy.   Neurological: She is alert and oriented to person, place, and time. No cranial nerve deficit. Gait normal. Coordination normal.   Skin: Skin is dry. No rash noted.. No pallor.     Bilateral cheek and chin erythema   Psychiatric: Affect and judgment normal.   Musculoskeletal: Normal range of motion. She exhibits tenderness. She exhibits no edema.   Tender points throughout entire back          labs and imaging reviewed  Abbey-1:160  Neg marshall, ro, la, dsdna, rnp,APS panel  Rf,ccp-negative  -normal TSH,  T4  Esr-33<34        Lumbar spine xray (2014)- I personally reviewed films and it was negative.  10/2014-Arthritis survey- no erosions ( I personally reviewed films)     Assessment:     48 yo F with no significant PMH here for follow up of fibromyalgia. Reports diffuse body aches and is very anxious about her health.     1. Fibromyalgia-start gabapentin 300mg po TID  --PMR clinic consult   Psych consult            #.Obesity- encouraged diet and exercise.     30  minutes of face to face time spent with patient

## 2023-02-08 ENCOUNTER — PATIENT MESSAGE (OUTPATIENT)
Dept: ADMINISTRATIVE | Facility: HOSPITAL | Age: 48
End: 2023-02-08
Payer: COMMERCIAL

## 2023-02-08 DIAGNOSIS — Z12.11 SCREENING FOR COLON CANCER: ICD-10-CM

## 2023-03-01 ENCOUNTER — TELEPHONE (OUTPATIENT)
Dept: INTERNAL MEDICINE | Facility: CLINIC | Age: 48
End: 2023-03-01
Payer: COMMERCIAL

## 2023-03-02 ENCOUNTER — OFFICE VISIT (OUTPATIENT)
Dept: INTERNAL MEDICINE | Facility: CLINIC | Age: 48
End: 2023-03-02
Payer: COMMERCIAL

## 2023-03-02 ENCOUNTER — PATIENT MESSAGE (OUTPATIENT)
Dept: BEHAVIORAL HEALTH | Facility: CLINIC | Age: 48
End: 2023-03-02
Payer: COMMERCIAL

## 2023-03-02 ENCOUNTER — LAB VISIT (OUTPATIENT)
Dept: LAB | Facility: HOSPITAL | Age: 48
End: 2023-03-02
Payer: COMMERCIAL

## 2023-03-02 VITALS
WEIGHT: 163.38 LBS | DIASTOLIC BLOOD PRESSURE: 82 MMHG | SYSTOLIC BLOOD PRESSURE: 134 MMHG | BODY MASS INDEX: 30.07 KG/M2 | HEART RATE: 81 BPM | HEIGHT: 62 IN | OXYGEN SATURATION: 99 %

## 2023-03-02 DIAGNOSIS — Z86.2 HISTORY OF ANEMIA: ICD-10-CM

## 2023-03-02 DIAGNOSIS — H93.11 TINNITUS, RIGHT EAR: ICD-10-CM

## 2023-03-02 DIAGNOSIS — G47.33 OSA (OBSTRUCTIVE SLEEP APNEA): ICD-10-CM

## 2023-03-02 DIAGNOSIS — M79.7 FIBROMYALGIA: ICD-10-CM

## 2023-03-02 DIAGNOSIS — Z86.2 HISTORY OF ANEMIA: Primary | ICD-10-CM

## 2023-03-02 DIAGNOSIS — F41.9 ANXIETY: ICD-10-CM

## 2023-03-02 DIAGNOSIS — Z12.31 SCREENING MAMMOGRAM, ENCOUNTER FOR: ICD-10-CM

## 2023-03-02 LAB
BASOPHILS # BLD AUTO: 0.02 K/UL (ref 0–0.2)
BASOPHILS NFR BLD: 0.4 % (ref 0–1.9)
DIFFERENTIAL METHOD: ABNORMAL
EOSINOPHIL # BLD AUTO: 0 K/UL (ref 0–0.5)
EOSINOPHIL NFR BLD: 0.7 % (ref 0–8)
ERYTHROCYTE [DISTWIDTH] IN BLOOD BY AUTOMATED COUNT: 12.3 % (ref 11.5–14.5)
FERRITIN SERPL-MCNC: 43 NG/ML (ref 20–300)
HCT VFR BLD AUTO: 38.4 % (ref 37–48.5)
HGB BLD-MCNC: 12.2 G/DL (ref 12–16)
IMM GRANULOCYTES # BLD AUTO: 0.01 K/UL (ref 0–0.04)
IMM GRANULOCYTES NFR BLD AUTO: 0.2 % (ref 0–0.5)
IRON SERPL-MCNC: 70 UG/DL (ref 30–160)
LYMPHOCYTES # BLD AUTO: 2.3 K/UL (ref 1–4.8)
LYMPHOCYTES NFR BLD: 41.5 % (ref 18–48)
MCH RBC QN AUTO: 30 PG (ref 27–31)
MCHC RBC AUTO-ENTMCNC: 31.8 G/DL (ref 32–36)
MCV RBC AUTO: 95 FL (ref 82–98)
MONOCYTES # BLD AUTO: 0.4 K/UL (ref 0.3–1)
MONOCYTES NFR BLD: 7.6 % (ref 4–15)
NEUTROPHILS # BLD AUTO: 2.8 K/UL (ref 1.8–7.7)
NEUTROPHILS NFR BLD: 49.6 % (ref 38–73)
NRBC BLD-RTO: 0 /100 WBC
PLATELET # BLD AUTO: 244 K/UL (ref 150–450)
PMV BLD AUTO: 10 FL (ref 9.2–12.9)
RBC # BLD AUTO: 4.06 M/UL (ref 4–5.4)
SATURATED IRON: 24 % (ref 20–50)
TOTAL IRON BINDING CAPACITY: 293 UG/DL (ref 250–450)
TRANSFERRIN SERPL-MCNC: 198 MG/DL (ref 200–375)
WBC # BLD AUTO: 5.54 K/UL (ref 3.9–12.7)

## 2023-03-02 PROCEDURE — 3008F BODY MASS INDEX DOCD: CPT | Mod: CPTII,S$GLB,, | Performed by: PHYSICIAN ASSISTANT

## 2023-03-02 PROCEDURE — 3079F PR MOST RECENT DIASTOLIC BLOOD PRESSURE 80-89 MM HG: ICD-10-PCS | Mod: CPTII,S$GLB,, | Performed by: PHYSICIAN ASSISTANT

## 2023-03-02 PROCEDURE — 82728 ASSAY OF FERRITIN: CPT | Performed by: PHYSICIAN ASSISTANT

## 2023-03-02 PROCEDURE — 99214 OFFICE O/P EST MOD 30 MIN: CPT | Mod: S$GLB,,, | Performed by: PHYSICIAN ASSISTANT

## 2023-03-02 PROCEDURE — 99999 PR PBB SHADOW E&M-EST. PATIENT-LVL V: CPT | Mod: PBBFAC,,, | Performed by: PHYSICIAN ASSISTANT

## 2023-03-02 PROCEDURE — 3075F SYST BP GE 130 - 139MM HG: CPT | Mod: CPTII,S$GLB,, | Performed by: PHYSICIAN ASSISTANT

## 2023-03-02 PROCEDURE — 3008F PR BODY MASS INDEX (BMI) DOCUMENTED: ICD-10-PCS | Mod: CPTII,S$GLB,, | Performed by: PHYSICIAN ASSISTANT

## 2023-03-02 PROCEDURE — 1160F PR REVIEW ALL MEDS BY PRESCRIBER/CLIN PHARMACIST DOCUMENTED: ICD-10-PCS | Mod: CPTII,S$GLB,, | Performed by: PHYSICIAN ASSISTANT

## 2023-03-02 PROCEDURE — 36415 COLL VENOUS BLD VENIPUNCTURE: CPT | Performed by: PHYSICIAN ASSISTANT

## 2023-03-02 PROCEDURE — 85025 COMPLETE CBC W/AUTO DIFF WBC: CPT | Performed by: PHYSICIAN ASSISTANT

## 2023-03-02 PROCEDURE — 1159F PR MEDICATION LIST DOCUMENTED IN MEDICAL RECORD: ICD-10-PCS | Mod: CPTII,S$GLB,, | Performed by: PHYSICIAN ASSISTANT

## 2023-03-02 PROCEDURE — 3079F DIAST BP 80-89 MM HG: CPT | Mod: CPTII,S$GLB,, | Performed by: PHYSICIAN ASSISTANT

## 2023-03-02 PROCEDURE — 1160F RVW MEDS BY RX/DR IN RCRD: CPT | Mod: CPTII,S$GLB,, | Performed by: PHYSICIAN ASSISTANT

## 2023-03-02 PROCEDURE — 84466 ASSAY OF TRANSFERRIN: CPT | Performed by: PHYSICIAN ASSISTANT

## 2023-03-02 PROCEDURE — 99214 PR OFFICE/OUTPT VISIT, EST, LEVL IV, 30-39 MIN: ICD-10-PCS | Mod: S$GLB,,, | Performed by: PHYSICIAN ASSISTANT

## 2023-03-02 PROCEDURE — 3075F PR MOST RECENT SYSTOLIC BLOOD PRESS GE 130-139MM HG: ICD-10-PCS | Mod: CPTII,S$GLB,, | Performed by: PHYSICIAN ASSISTANT

## 2023-03-02 PROCEDURE — 99999 PR PBB SHADOW E&M-EST. PATIENT-LVL V: ICD-10-PCS | Mod: PBBFAC,,, | Performed by: PHYSICIAN ASSISTANT

## 2023-03-02 PROCEDURE — 1159F MED LIST DOCD IN RCRD: CPT | Mod: CPTII,S$GLB,, | Performed by: PHYSICIAN ASSISTANT

## 2023-03-02 NOTE — PROGRESS NOTES
Subjective:       Patient ID: Rema Horton is a 48 y.o. female.        Chief Complaint: Follow-up    Rema Horton is an established patient of Britta Clark MD here today for follow up visit.    More pain from her fibromyalgia since 12/2022, notes if she gets sick pain is always worse, stretching helps, started gabapentin per Dr. Nunez 2/2023, has upcoming appointment with Dr. Rodriguez    Iron deficiency, h/o anemia - not taking iron x 1 year as she has not been having menstrual cycles    Tinnitus right ear for many years, bothersome    Anxiety, depression - not taking effexor and defers medicatino, agrees to therapy    PERFECTO - followed by Dr. Pan          Review of Systems   Constitutional:  Negative for chills, diaphoresis, fatigue and fever.   HENT:  Negative for congestion and sore throat.    Eyes:  Negative for visual disturbance.   Respiratory:  Negative for cough, chest tightness and shortness of breath.    Cardiovascular:  Negative for chest pain, palpitations and leg swelling.   Gastrointestinal:  Negative for abdominal pain, blood in stool, constipation, diarrhea, nausea and vomiting.   Genitourinary:  Negative for dysuria, frequency, hematuria and urgency.   Musculoskeletal:  Negative for arthralgias and back pain.   Skin:  Negative for rash.   Neurological:  Negative for dizziness, syncope, weakness and headaches.   Psychiatric/Behavioral:  Negative for dysphoric mood and sleep disturbance. The patient is not nervous/anxious.      Objective:      Physical Exam  Vitals and nursing note reviewed.   Constitutional:       Appearance: Normal appearance. She is well-developed.   HENT:      Head: Normocephalic.      Right Ear: External ear normal.      Left Ear: External ear normal.   Eyes:      Pupils: Pupils are equal, round, and reactive to light.   Cardiovascular:      Rate and Rhythm: Normal rate and regular rhythm.      Heart sounds: Normal heart sounds. No murmur heard.    No friction rub. No  "gallop.   Pulmonary:      Effort: Pulmonary effort is normal. No respiratory distress.      Breath sounds: Normal breath sounds.   Abdominal:      Palpations: Abdomen is soft.      Tenderness: There is no abdominal tenderness.   Skin:     General: Skin is warm and dry.   Neurological:      Mental Status: She is alert.       Assessment:       1. History of anemia    2. Fibromyalgia    3. Anxiety    4. Screening mammogram, encounter for    5. Tinnitus, right ear    6. PERFECTO (obstructive sleep apnea)        Plan:       Rema was seen today for follow-up.    Diagnoses and all orders for this visit:    History of anemia - labs today, off iron but no menstrual cycle x 1 year, assess levels  -     CBC Auto Differential; Future  -     Iron and TIBC; Future  -     Ferritin; Future    Fibromyalgia - started gabapentin 2/2023 which has been helpful in managing pain, has upcoming appointment with Dr. Rodriguez as well    Anxiety - discussed at length, defers medication currently and prefers to start with therapy  -     Ambulatory referral/consult to Primary Care Behavioral Health (Non-Opioids); Future    Screening mammogram, encounter for  -     Mammo Digital Screening Bilat w/ Hossein; Future    Tinnitus, right ear  -     Ambulatory referral/consult to ENT; Future    PERFECTO (obstructive sleep apnea) - followed by sleep medicine    Pt has been given instructions populated from patient instructions database and has verbalized understanding of the after visit summary and information contained wherein.    Follow up with a primary care provider. May go to ER for acute shortness of breath, lightheadedness, fever, or any other emergent complaints or changes in condition.    "This note will be shared with the patient"    Future Appointments   Date Time Provider Department Center   4/20/2023 10:00 AM RENETTA BURKETT MAMMO2 [facility] MAMMO [facility]   4/27/2023  9:20 AM Greg Christina OD [facility]OM[facility]   4/28/2023 10:40 AM [facility] [place]   5/4/2023 11:30 AM Umberto Dan MD [facility] SLEEP [facility]   5/12/2023  1:00 PM Britta Clark MD [facility] [place]   8/24/2023 10:00 AM Britta Clark MD [facility] [place]

## 2023-03-03 ENCOUNTER — PATIENT MESSAGE (OUTPATIENT)
Dept: BEHAVIORAL HEALTH | Facility: CLINIC | Age: 48
End: 2023-03-03
Payer: COMMERCIAL

## 2023-03-03 ENCOUNTER — TELEPHONE (OUTPATIENT)
Dept: BEHAVIORAL HEALTH | Facility: CLINIC | Age: 48
End: 2023-03-03
Payer: COMMERCIAL

## 2023-03-03 NOTE — PROGRESS NOTES
Behavioral Health Community Health Worker  Initial Assessment  Completed by:  Sonny Adorno    Date:  3/3/2023    Patient Enrollment in Behavioral Health Program:  Patient verbalized understanding of Behavioral Health Integration services to include:  Patient understands that CHW, LCSW, PharmD and consulting Psychiatrist are members of the care team working collaboratively with his/her primary care provider: Yes  Patient understands that activation of their [facility] patient portal account is required for accessing the full scope of team services: Yes  Patient understands that some counseling sessions may occur via video: Yes  Clinic visits with the psychiatrist may be subject to a co-pay based on your insurance: Yes  Patient consents to enroll in BHI program: Yes    Assessments     Single Item Health Literacy Scale:  How often do you need to have someone help you read instructions, pamphlets or other written material from your doctor or pharmacy?: Never    Promis 10:  Promis 10 Responses  In general, would you say your health is: Poor  In general, would you say your quality of life is: Fair  In general, how would you rate your physical health?: Poor  In general, how would you rate your mental health, including your mood and your ability to think?: Poor  In general, how would you rate your satisfaction with your social activities and relationships?: Poor  In general, please rate how well you carry out your usual social activities and roles. (This includes activities at home, at work and in your community, and responsibilities as a parent, child, spouse, employee, friend, etc.): Fair  To what extent are you able to carry out your everyday physical activities such as walking, climbing stairs, carrying groceries, or moving a chair? : A little  How often have you been bothered by emotional problems such as feeling anxious, depressed or irritable?: Often  In the past 7 days, how would you rate your fatigue on average?:  Moderate  In the past 7 days, on a scale of 0 to 10 (where 0 is no pain and 10 is the worst pain imaginable) how would you rate your pain on average?: 9  Global Physical Health: 15  Global Mental health Score: 8    Depression PHQ:  PHQ9 3/3/2023   Little interest or pleasure in doing things: -   Feeling down, depressed or hopeless: -   Trouble falling asleep, staying asleep, or sleeping too much: -   Feeling tired or having little energy: -   Poor appetite or overeating: -   Feeling bad about yourself- or that you are a failure or have let yourself or family down -   Trouble concentrating on things, such as reading the newspaper or watching television: -   Moving or speaking so slowly that other people could have noticed. Or the opposite- being so fidgety or restless that you have been moving around a lot more than usual: -   Thoughts that you would be better off dead or hurting yourself in some way: -   If you indicated you have experienced any of the aforementioned problems, how difficult have these problems made it for you to do your work, take care of things at home or get along with other people? -   Total Score 17         Generalized Anxiety Disorder 7-Item Scale:  GAD7 3/3/2023   1. Feeling nervous, anxious, or on edge? 2   2. Not being able to stop or control worrying? 3   3. Worrying too much about different things? 3   4. Trouble relaxing? 1   5. Being so restless that it is hard to sit still? 1   6. Becoming easily annoyed or irritable? 0   7. Feeling afraid as if something awful might happen? 0   8. If you checked off any problems, how difficult have these problems made it for you to do your work, take care of things at home, or get along with other people? 1   MARIMAR-7 Score 10       History     Social History     Socioeconomic History    Marital status:    Tobacco Use    Smoking status: Never    Smokeless tobacco: Never   Substance and Sexual Activity    Alcohol use: Not Currently     Alcohol/week:  "0.0 standard drinks     Comment: Occasionally.    Drug use: No    Sexual activity: Yes     Partners: Male     Birth control/protection: None     Social Determinants of Health     Financial Resource Strain: Medium Risk    Difficulty of Paying Living Expenses: Somewhat hard   Food Insecurity: Food Insecurity Present    Worried About Running Out of Food in the Last Year: Sometimes true    Ran Out of Food in the Last Year: Sometimes true   Transportation Needs: No Transportation Needs    Lack of Transportation (Medical): No    Lack of Transportation (Non-Medical): No   Physical Activity: Inactive    Days of Exercise per Week: 0 days    Minutes of Exercise per Session: 0 min   Stress: Stress Concern Present    Feeling of Stress : Very much   Social Connections: Unknown    Frequency of Communication with Friends and Family: Once a week    Frequency of Social Gatherings with Friends and Family: Patient refused    Active Member of Clubs or Organizations: No    Attends Club or Organization Meetings: Never    Marital Status:    Housing Stability: Low Risk     Unable to Pay for Housing in the Last Year: No    Number of Places Lived in the Last Year: 1    Unstable Housing in the Last Year: No       Call Summary   Patient was referred to the BHI (Non-opioid) program by Primary Care Provider, Dr. Butcher CHW contacted Rema Horton who reports anxiety that limits [his/her] activities of daily living (ADLs).   Patient scored "17" on the PHQ9 and "10" on the MARIMAR 7. Based on these scores patient is eligible for the Behavioral health Integration (Non-opioid) Program. CHW completed the intake and scheduled an appointment for patient with Candace Alex LCSW, on 3/27/23. Crisis number given    "

## 2023-03-20 DIAGNOSIS — Z12.11 COLON CANCER SCREENING: Primary | ICD-10-CM

## 2023-04-05 ENCOUNTER — TELEPHONE (OUTPATIENT)
Dept: BEHAVIORAL HEALTH | Facility: CLINIC | Age: 48
End: 2023-04-05
Payer: COMMERCIAL

## 2023-04-19 ENCOUNTER — TELEPHONE (OUTPATIENT)
Dept: BEHAVIORAL HEALTH | Facility: CLINIC | Age: 48
End: 2023-04-19
Payer: COMMERCIAL

## 2023-04-20 ENCOUNTER — HOSPITAL ENCOUNTER (OUTPATIENT)
Dept: RADIOLOGY | Facility: HOSPITAL | Age: 48
Discharge: HOME | End: 2023-04-20
Attending: PHYSICIAN ASSISTANT
Payer: COMMERCIAL

## 2023-04-20 DIAGNOSIS — N63.20 LUMP OF LEFT BREAST: ICD-10-CM

## 2023-04-20 DIAGNOSIS — Z12.31 SCREENING MAMMOGRAM, ENCOUNTER FOR: ICD-10-CM

## 2023-04-20 PROCEDURE — 77062 BREAST TOMOSYNTHESIS BI: CPT | Mod: 26,,, | Performed by: RADIOLOGY

## 2023-04-20 PROCEDURE — 77066 DX MAMMO INCL CAD BI: CPT | Mod: 26,,, | Performed by: RADIOLOGY

## 2023-04-20 PROCEDURE — 77062 MAMMO DIGITAL DIAGNOSTIC BILAT WITH TOMO: ICD-10-PCS | Mod: 26,,, | Performed by: RADIOLOGY

## 2023-04-20 PROCEDURE — 77062 BREAST TOMOSYNTHESIS BI: CPT | Mod: TC

## 2023-04-20 PROCEDURE — 77066 MAMMO DIGITAL DIAGNOSTIC BILAT WITH TOMO: ICD-10-PCS | Mod: 26,,, | Performed by: RADIOLOGY

## 2023-04-27 ENCOUNTER — OFFICE VISIT (OUTPATIENT)
Dept: OPTOMETRY | Facility: CLINIC | Age: 48
End: 2023-04-27
Payer: COMMERCIAL

## 2023-04-27 DIAGNOSIS — H52.03 HYPEROPIA OF BOTH EYES WITH ASTIGMATISM AND PRESBYOPIA: ICD-10-CM

## 2023-04-27 DIAGNOSIS — H52.203 HYPEROPIA OF BOTH EYES WITH ASTIGMATISM AND PRESBYOPIA: ICD-10-CM

## 2023-04-27 DIAGNOSIS — Z01.00 EYE EXAM, ROUTINE: Primary | ICD-10-CM

## 2023-04-27 DIAGNOSIS — H52.4 HYPEROPIA OF BOTH EYES WITH ASTIGMATISM AND PRESBYOPIA: ICD-10-CM

## 2023-04-27 PROCEDURE — 1159F MED LIST DOCD IN RCRD: CPT | Mod: CPTII,S$GLB,, | Performed by: OPTOMETRIST

## 2023-04-27 PROCEDURE — 99999 PR PBB SHADOW E&M-EST. PATIENT-LVL III: CPT | Mod: PBBFAC,,, | Performed by: OPTOMETRIST

## 2023-04-27 PROCEDURE — 92004 COMPRE OPH EXAM NEW PT 1/>: CPT | Mod: S$GLB,,, | Performed by: OPTOMETRIST

## 2023-04-27 PROCEDURE — 1159F PR MEDICATION LIST DOCUMENTED IN MEDICAL RECORD: ICD-10-PCS | Mod: CPTII,S$GLB,, | Performed by: OPTOMETRIST

## 2023-04-27 PROCEDURE — 92015 PR REFRACTION: ICD-10-PCS | Mod: S$GLB,,, | Performed by: OPTOMETRIST

## 2023-04-27 PROCEDURE — 99999 PR PBB SHADOW E&M-EST. PATIENT-LVL III: ICD-10-PCS | Mod: PBBFAC,,, | Performed by: OPTOMETRIST

## 2023-04-27 PROCEDURE — 92015 DETERMINE REFRACTIVE STATE: CPT | Mod: S$GLB,,, | Performed by: OPTOMETRIST

## 2023-04-27 PROCEDURE — 92004 PR EYE EXAM, NEW PATIENT,COMPREHESV: ICD-10-PCS | Mod: S$GLB,,, | Performed by: OPTOMETRIST

## 2023-04-27 NOTE — PROGRESS NOTES
HPI    DLS: 4/3/19    Clear eyes PRN OU     Pt here for routine eye exam.  Pt states blurry VA OU at distance and near   x 3 years.  Pt lost her eyeglasses and just uses over the counter readers.   Pt states she gets occasional dizziness.  Pt states occasional flashes OU.    Pt states she see floaters OU at times when driving in the sun. Pt states   she gets headaches.  Pt states twitching OU. Pt states x 2-3 days ago she   had a stabbing eye pain OU. Pt denies eye pain OU today.  Pt states a lot   of redness OU and burning OU.  Pt denies itching or tearing OU.   Last edited by Melissa Brown MA on 4/27/2023  9:33 AM.            Assessment /Plan     For exam results, see Encounter Report.    Eye exam, routine  -Annual DFE    Hyperopia of both eyes with astigmatism and presbyopia  Eyeglass Final Rx       Eyeglass Final Rx         Sphere Cylinder Axis Dist VA Add    Right +0.25 +0.50 090 20/20 +1.75    Left +0.75 Sphere  20/20 +1.75      Type: PAL    Expiration Date: 4/27/2024                      RTC 1 yr

## 2023-04-28 ENCOUNTER — OFFICE VISIT (OUTPATIENT)
Dept: PHYSICAL MEDICINE AND REHAB | Facility: CLINIC | Age: 48
End: 2023-04-28
Payer: COMMERCIAL

## 2023-04-28 VITALS
DIASTOLIC BLOOD PRESSURE: 87 MMHG | HEIGHT: 62 IN | WEIGHT: 163.38 LBS | HEART RATE: 87 BPM | BODY MASS INDEX: 30.07 KG/M2 | SYSTOLIC BLOOD PRESSURE: 126 MMHG

## 2023-04-28 DIAGNOSIS — M94.0 COSTOCHONDRITIS: ICD-10-CM

## 2023-04-28 DIAGNOSIS — M79.7 FIBROMYALGIA SYNDROME: Primary | ICD-10-CM

## 2023-04-28 DIAGNOSIS — R20.0 NUMBNESS IN FEET: ICD-10-CM

## 2023-04-28 DIAGNOSIS — G89.4 CHRONIC PAIN DISORDER: ICD-10-CM

## 2023-04-28 PROCEDURE — 3074F PR MOST RECENT SYSTOLIC BLOOD PRESSURE < 130 MM HG: ICD-10-PCS | Mod: CPTII,S$GLB,, | Performed by: PHYSICAL MEDICINE & REHABILITATION

## 2023-04-28 PROCEDURE — 1159F PR MEDICATION LIST DOCUMENTED IN MEDICAL RECORD: ICD-10-PCS | Mod: CPTII,S$GLB,, | Performed by: PHYSICAL MEDICINE & REHABILITATION

## 2023-04-28 PROCEDURE — 99999 PR PBB SHADOW E&M-EST. PATIENT-LVL V: CPT | Mod: PBBFAC,,, | Performed by: PHYSICAL MEDICINE & REHABILITATION

## 2023-04-28 PROCEDURE — 99204 OFFICE O/P NEW MOD 45 MIN: CPT | Mod: S$GLB,,, | Performed by: PHYSICAL MEDICINE & REHABILITATION

## 2023-04-28 PROCEDURE — 3079F PR MOST RECENT DIASTOLIC BLOOD PRESSURE 80-89 MM HG: ICD-10-PCS | Mod: CPTII,S$GLB,, | Performed by: PHYSICAL MEDICINE & REHABILITATION

## 2023-04-28 PROCEDURE — 3008F BODY MASS INDEX DOCD: CPT | Mod: CPTII,S$GLB,, | Performed by: PHYSICAL MEDICINE & REHABILITATION

## 2023-04-28 PROCEDURE — 3079F DIAST BP 80-89 MM HG: CPT | Mod: CPTII,S$GLB,, | Performed by: PHYSICAL MEDICINE & REHABILITATION

## 2023-04-28 PROCEDURE — 3074F SYST BP LT 130 MM HG: CPT | Mod: CPTII,S$GLB,, | Performed by: PHYSICAL MEDICINE & REHABILITATION

## 2023-04-28 PROCEDURE — 99204 PR OFFICE/OUTPT VISIT, NEW, LEVL IV, 45-59 MIN: ICD-10-PCS | Mod: S$GLB,,, | Performed by: PHYSICAL MEDICINE & REHABILITATION

## 2023-04-28 PROCEDURE — 3008F PR BODY MASS INDEX (BMI) DOCUMENTED: ICD-10-PCS | Mod: CPTII,S$GLB,, | Performed by: PHYSICAL MEDICINE & REHABILITATION

## 2023-04-28 PROCEDURE — 1159F MED LIST DOCD IN RCRD: CPT | Mod: CPTII,S$GLB,, | Performed by: PHYSICAL MEDICINE & REHABILITATION

## 2023-04-28 PROCEDURE — 99999 PR PBB SHADOW E&M-EST. PATIENT-LVL V: ICD-10-PCS | Mod: PBBFAC,,, | Performed by: PHYSICAL MEDICINE & REHABILITATION

## 2023-04-28 RX ORDER — MELOXICAM 7.5 MG/1
7.5 TABLET ORAL DAILY
Qty: 60 TABLET | Refills: 3 | Status: SHIPPED | OUTPATIENT
Start: 2023-04-28 | End: 2023-11-13

## 2023-04-28 RX ORDER — DULOXETIN HYDROCHLORIDE 30 MG/1
30 CAPSULE, DELAYED RELEASE ORAL DAILY
Qty: 30 CAPSULE | Refills: 3 | Status: SHIPPED | OUTPATIENT
Start: 2023-04-28 | End: 2023-08-01

## 2023-04-28 RX ORDER — GABAPENTIN 300 MG/1
300 CAPSULE ORAL 2 TIMES DAILY
Start: 2023-04-28 | End: 2024-02-26 | Stop reason: SDUPTHER

## 2023-04-28 NOTE — PROGRESS NOTES
Subjective:       Patient ID: Rema Horton is a 48 y.o. female.    Chief Complaint: Leg Pain and Arm Pain      HPI      Mrs. Horton is a 48-year-old female with past medical history of anemia, left breast nodule, interstitial cystitis, positive FELIPA.  She was referred to the Physical Medicine Clinic by Rheumatology for help with fibromyalgia syndrome.    The patient has been complaining of generalized muscle and joint aching after her pregnancy in 2010.  She was evaluated by her Primary Care Provider and found to have positive inflammatory markers.  She was referred to Rheumatology and was seen in 2014.  She was noted to have positive FELIPA and elevated sedimentation rate.  However, she did not meet the criteria for lupus.  She was diagnosed with fibromyalgia.  She has been followed up by Rheumatology since.  She was recently referred to the Physical Medicine Clinic Rheumatology for help with fibromyalgia pain.    The patient continues to complain of generalized muscle and joint aching, stiffness, fatigue/poor energy, brain fog and hypersensitivity to pain.  Her pain is diffuse but worse in her costochondral junctions with tenderness over these areas.  It is also worse in her back and neck.  Her cervical spine x-rays in 07/2021 were unremarkable.  The pain is localized soreness.  She is been also complaining of painful bilateral foot numbness.    She is currently on:  Gabapentin 300 mg capsules, 1 capsule in the morning.  She reports mild sedation with its use.    Baclofen 10 mg p.r.n., usually twice per day  Venlafaxine XR 37.5 mg p.o. once per day.  However, she has not been taking it.  Naproxen 250 mg p.o. twice per day as needed.  However, she has not been taking it    She tries to stay active.  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      Past Medical History:   Diagnosis Date    Abnormal Pap smear of cervix     Anemia     Breast disorder     Fever blister     Fibromyalgia     IC (interstitial  cystitis)         Review of patient's allergies indicates:   Allergen Reactions    Flexeril  [cyclobenzaprine]      Other reaction(s): Rash    Lodine  [etodolac] Rash        Review of Systems   Constitutional:  Positive for chills and fatigue. Negative for fever.   Eyes:  Positive for visual disturbance.   Respiratory:  Negative for shortness of breath.    Cardiovascular:  Positive for chest pain.   Gastrointestinal:  Negative for blood in stool, nausea and vomiting.   Genitourinary:  Negative for difficulty urinating.   Musculoskeletal:  Positive for arthralgias, back pain, myalgias and neck pain. Negative for gait problem.   Neurological:  Positive for headaches and memory loss. Negative for dizziness and weakness.   Psychiatric/Behavioral:  Negative for behavioral problems and sleep disturbance.            Objective:      Physical Exam  Vitals reviewed.   Constitutional:       Appearance: She is well-developed.   HENT:      Head: Normocephalic and atraumatic.   Eyes:      Extraocular Movements: Extraocular movements intact.   Musculoskeletal:      Cervical back: Normal range of motion. No tenderness.      Comments: BUE:  ROM:   RUE: full.   LUE: full.  Strength:    RUE: 5-/5 at shoulder abduction, 5 elbow flexion, 5 elbow extension, 5 hand .   LUE: 5-/5 at shoulder abduction, 5 elbow flexion, 5 elbow extension, 5 hand .  Sensation to pinprick:   RUE: intact.   LUE: intact.  DTR:    RUE: +1 biceps, +1 triceps.   LUE:  +1 biceps, +1 triceps.      BLE:  ROM:   RLE: full.   LLE: full.  Knee crepitus:   RLE: -ve.   LLE: -ve.   Strength:    RLE: 5/5 at hip flexion, 5 knee extension, 5 ankle DF, 5 PF, 5 EHL.   LLE: 5/5 at hip flexion, 5 knee extension, 5 ankle DF, 5 PF, 5 EHL.  Sensation to pinprick:     RLE: intact.      LLE: intact.   DTR:     RLE: +2 knee, +1 ankle.    LLE: +2 knee, +1 ankle.  Clonus:    Rt ankle: -ve.    Lt ankle: -ve.  SLR (sitting):      RLE: -ve.      LLE: -ve.      +ve tenderness over  lumbar spine.  +ve tenderness over bilateral costochondral areas.  +ve diffuse tender points over the upper & lower back, anterior chest wall, hips, elbows & knees (16/18 fibromyalgia reference points)    Gait: WNL       Skin:     General: Skin is warm.   Neurological:      General: No focal deficit present.      Mental Status: She is alert.   Psychiatric:         Mood and Affect: Mood normal.         Behavior: Behavior normal.       Assessment:       1. Fibromyalgia syndrome    2. Chronic pain disorder    3. Costochondritis    4. Numbness in feet        Plan:         - Discontinue naproxen.    - Start  meloxicam (MOBIC) 7.5 MG tablet; Take 1 tablet (7.5 mg total) by mouth once daily. In 1-2 weeks, if no relief, may increase dose to two tablets once daily.  - Increase: gabapentin (NEURONTIN) 300 MG capsule; Take 1 capsule (300 mg total) by mouth 2 (two) times daily.  - Discontinue venlafaxine.    - Start DULoxetine (CYMBALTA) 30 MG capsule; Take 1 capsule (30 mg total) by mouth once daily.  - Ambulatory referral/consult to Physical Therapy for fibromyalgia.  She was also encouraged to pursue Marcel Chi exercises.  - She complains of some anxiety.  She was encouraged to seek help with psychiatry.  - Follow up in about 4 months (around 8/28/2023).      This was a 50 minute visit, 50% of which was spent educating the patient about the diagnosis and the treatment plan.    This note was partly generated with [employer] voice recognition software. I apologize for any possible typographical errors.

## 2023-05-04 ENCOUNTER — OFFICE VISIT (OUTPATIENT)
Dept: SLEEP MEDICINE | Facility: CLINIC | Age: 48
End: 2023-05-04
Payer: COMMERCIAL

## 2023-05-04 VITALS
BODY MASS INDEX: 29.82 KG/M2 | DIASTOLIC BLOOD PRESSURE: 94 MMHG | SYSTOLIC BLOOD PRESSURE: 138 MMHG | WEIGHT: 162.06 LBS | HEART RATE: 70 BPM | HEIGHT: 62 IN

## 2023-05-04 DIAGNOSIS — G47.30 SLEEP APNEA, UNSPECIFIED TYPE: ICD-10-CM

## 2023-05-04 DIAGNOSIS — R35.1 NOCTURIA: ICD-10-CM

## 2023-05-04 DIAGNOSIS — F51.09 OTHER INSOMNIA NOT DUE TO A SUBSTANCE OR KNOWN PHYSIOLOGICAL CONDITION: ICD-10-CM

## 2023-05-04 DIAGNOSIS — R06.83 SNORING: ICD-10-CM

## 2023-05-04 DIAGNOSIS — G47.10 HYPERSOMNOLENCE: ICD-10-CM

## 2023-05-04 DIAGNOSIS — G47.33 OSA (OBSTRUCTIVE SLEEP APNEA): Primary | ICD-10-CM

## 2023-05-04 PROCEDURE — 99999 PR PBB SHADOW E&M-EST. PATIENT-LVL IV: ICD-10-PCS | Mod: PBBFAC,,, | Performed by: INTERNAL MEDICINE

## 2023-05-04 PROCEDURE — 99204 PR OFFICE/OUTPT VISIT, NEW, LEVL IV, 45-59 MIN: ICD-10-PCS | Mod: S$GLB,,, | Performed by: INTERNAL MEDICINE

## 2023-05-04 PROCEDURE — 1159F MED LIST DOCD IN RCRD: CPT | Mod: CPTII,S$GLB,, | Performed by: INTERNAL MEDICINE

## 2023-05-04 PROCEDURE — 1159F PR MEDICATION LIST DOCUMENTED IN MEDICAL RECORD: ICD-10-PCS | Mod: CPTII,S$GLB,, | Performed by: INTERNAL MEDICINE

## 2023-05-04 PROCEDURE — 99204 OFFICE O/P NEW MOD 45 MIN: CPT | Mod: S$GLB,,, | Performed by: INTERNAL MEDICINE

## 2023-05-04 PROCEDURE — 3075F PR MOST RECENT SYSTOLIC BLOOD PRESS GE 130-139MM HG: ICD-10-PCS | Mod: CPTII,S$GLB,, | Performed by: INTERNAL MEDICINE

## 2023-05-04 PROCEDURE — 3008F PR BODY MASS INDEX (BMI) DOCUMENTED: ICD-10-PCS | Mod: CPTII,S$GLB,, | Performed by: INTERNAL MEDICINE

## 2023-05-04 PROCEDURE — 3075F SYST BP GE 130 - 139MM HG: CPT | Mod: CPTII,S$GLB,, | Performed by: INTERNAL MEDICINE

## 2023-05-04 PROCEDURE — 99999 PR PBB SHADOW E&M-EST. PATIENT-LVL IV: CPT | Mod: PBBFAC,,, | Performed by: INTERNAL MEDICINE

## 2023-05-04 PROCEDURE — 3080F PR MOST RECENT DIASTOLIC BLOOD PRESSURE >= 90 MM HG: ICD-10-PCS | Mod: CPTII,S$GLB,, | Performed by: INTERNAL MEDICINE

## 2023-05-04 PROCEDURE — 3080F DIAST BP >= 90 MM HG: CPT | Mod: CPTII,S$GLB,, | Performed by: INTERNAL MEDICINE

## 2023-05-04 PROCEDURE — 3008F BODY MASS INDEX DOCD: CPT | Mod: CPTII,S$GLB,, | Performed by: INTERNAL MEDICINE

## 2023-05-04 NOTE — PROGRESS NOTES
Referred by Britta Clark MD     NEW PATIENT VISIT    Rema Horton  is a pleasant 48 y.o. female  with PMH significant for LOV 1.17.20 Dr. Pan (no HST in [facility]) who presents for evaluation (had trouble getting a sleep study prior in 2020). She snores loudly which has developed over the past 15 years.    HST 2/28/2020: AHI 6, RDI 18    SLEEP SCHEDULE   Environment    Bed Time 8A   Sleep Latency 3 hours   Arousals A lot   Nocturia A lot   Back to sleep 1hr   Wake time 6-8   Naps    Work        Past Medical History:   Diagnosis Date    Abnormal Pap smear of cervix     Anemia     Breast disorder     Fever blister     Fibromyalgia     IC (interstitial cystitis)      Patient Active Problem List   Diagnosis    Chronic pelvic pain in female    Frequency-urgency syndrome    Slow transit constipation    Dysuria    Incomplete bladder emptying    Normocytic anemia    Voiding dysfunction    Fibromyalgia    Interstitial cystitis    Periodic headache syndrome, not intractable    Other insomnia    Snoring    PERFECTO (obstructive sleep apnea)    Pulmonary nodules    Pain    Decreased range of motion       Current Outpatient Medications:     baclofen (LIORESAL) 10 MG tablet, TAKE 1 TABLET (10 MG TOTAL) BY MOUTH DAILY AS NEEDED (SEVERE PAIN/MUSCLE ACHE)., Disp: 30 tablet, Rfl: 2    cholecalciferol, vitamin D3, (VITAMIN D3) 25 mcg (1,000 unit) capsule, TAKE 2 CAPSULES (2,000 UNITS TOTAL) BY MOUTH ONCE DAILY., Disp: 180 capsule, Rfl: 3    DULoxetine (CYMBALTA) 30 MG capsule, Take 1 capsule (30 mg total) by mouth once daily., Disp: 30 capsule, Rfl: 3    xavier prim-linoleic-gamolenic ac (PRIMROSE OIL) 1,000 mg Cap, , Disp: , Rfl:     ferrous sulfate (FEOSOL) 325 mg (65 mg iron) Tab tablet, Take 1 tablet (325 mg total) by mouth 2 (two) times daily. During period only, Disp: 180 tablet, Rfl: 3    fluconazole (DIFLUCAN) 150 MG Tab, TAKE 1 TABLET (150MG) NOW, THEN IN 3 DAYS., Disp: 2 tablet, Rfl: 1    fluocinolone (SYNALAR) 0.01 %  "external solution, AAA scalp qday - bid prn itching or scaling, Disp: 60 mL, Rfl: 3    fluticasone propionate (FLONASE) 50 mcg/actuation nasal spray, SPRAY 1 SPRAY INTO EACH NOSTRIL TWICE A DAY, Disp: 16 mL, Rfl: 5    gabapentin (NEURONTIN) 300 MG capsule, Take 1 capsule (300 mg total) by mouth 2 (two) times daily., Disp: , Rfl:     ketoconazole (NIZORAL) 2 % shampoo, Wash hair with medicated shampoo at least 2x/week - let sit on scalp at least 5 minutes prior to rinsing, Disp: 120 mL, Rfl: 5    Lactobacillus acidophilus (PROBIOTIC ORAL), Take by mouth once daily., Disp: , Rfl:     meloxicam (MOBIC) 7.5 MG tablet, Take 1 tablet (7.5 mg total) by mouth once daily. In 1-2 weeks, if no relief, may increase dose to two tablets once daily., Disp: 60 tablet, Rfl: 3    multivit with iron,hematinic (SUPER B-COMPLEX ORAL), , Disp: , Rfl:     MULTIVITAMIN ORAL, NADIA MULTIVITAMIN, Disp: , Rfl:     rizatriptan (MAXALT) 10 MG tablet, Take 1/2 to 1 tab at onset of headache.  If no improvement in 2 hours, take another.  Do not take more than 2 tabs in 24 hours., Disp: 9 tablet, Rfl: 11    triamcinolone acetonide 0.1% (KENALOG) 0.1 % cream, Apply topically 2 (two) times daily. To affected areas on arms and chest  x 1-2 wks then prn flares only. Avoid face, armpits, and groin., Disp: 45 g, Rfl: 2    TURMERIC ORAL, Take by mouth., Disp: , Rfl:     valACYclovir (VALTREX) 500 MG tablet, TAKE 1 TABLET BY MOUTH ONCE DAILY, Disp: 30 tablet, Rfl: 1    vitamin E acetate (VITAMIN E ORAL), Take by mouth once daily., Disp: , Rfl:        Vitals:    05/04/23 1103   BP: (!) 138/94   BP Location: Right arm   Patient Position: Sitting   BP Method: Small (Automatic)   Pulse: 70   Weight: 73.5 kg (162 lb 0.6 oz)   Height: 5' 2" (1.575 m)     Physical Exam:    GEN:   Well-appearing  Psych:  Appropriate affect, demonstrates insight  SKIN:  No rash on the face or bridge of the nose      LABS:   Lab Results   Component Value Date    HGB 12.2 03/02/2023 "       RECORDS REVIEWED PREVIOUSLY:         ASSESSMENT    EPWORTH SLEEPINESS SCALE 1/10/2020   Sitting and reading 3   Watching TV 2   Sitting, inactive in a public place (e.g. a theatre or a meeting) 3   As a passenger in a car for an hour without a break 3   Lying down to rest in the afternoon when circumstances permit 3   Sitting and talking to someone 3   Sitting quietly after a lunch without alcohol 3   In a car, while stopped for a few minutes in traffic 1   Total score 21     PROBLEM DESCRIPTION/ Sx on Presentation  STATUS   suspected PERFECTO   + snoring, + snoring arousals, + witnessed apneas  Both parents are loud snorers    New   Daytime Sx   + sleepiness when inactive   ESS 21/24 on intake  New   Insomnia   Trouble falling asleep: takes awhile  Maintenance:         waking frequently at night  Prior hypnotics:        Current hypnotics:     New   Nocturia   x frequent per sleep period  New   Other issues:     PLAN     -recommend sleep testing (will need updated sleep study for insurance)  -discussed trial therapy if PERFECTO present , she will think over a trial of CPAP or other device    RTC          The patient was given open opportunity to ask questions and/or express concerns about treatment plan.   All questions/concerns were discussed.     Two patient identifiers used prior to evaluation.

## 2023-05-06 DIAGNOSIS — L29.9 PRURITUS: ICD-10-CM

## 2023-05-08 ENCOUNTER — TELEPHONE (OUTPATIENT)
Dept: SLEEP MEDICINE | Facility: OTHER | Age: 48
End: 2023-05-08
Payer: COMMERCIAL

## 2023-05-08 RX ORDER — TRIAMCINOLONE ACETONIDE 1 MG/G
CREAM TOPICAL 2 TIMES DAILY
Qty: 45 G | Refills: 2 | Status: SHIPPED | OUTPATIENT
Start: 2023-05-08 | End: 2023-09-22

## 2023-05-09 ENCOUNTER — TELEPHONE (OUTPATIENT)
Dept: SLEEP MEDICINE | Facility: OTHER | Age: 48
End: 2023-05-09
Payer: COMMERCIAL

## 2023-05-11 ENCOUNTER — HOSPITAL ENCOUNTER (OUTPATIENT)
Dept: SLEEP MEDICINE | Facility: OTHER | Age: 48
Discharge: HOME | End: 2023-05-11
Attending: INTERNAL MEDICINE
Payer: COMMERCIAL

## 2023-05-11 DIAGNOSIS — R35.1 NOCTURIA: ICD-10-CM

## 2023-05-11 DIAGNOSIS — G47.30 SLEEP APNEA, UNSPECIFIED TYPE: ICD-10-CM

## 2023-05-11 DIAGNOSIS — G47.10 HYPERSOMNOLENCE: ICD-10-CM

## 2023-05-11 DIAGNOSIS — R06.83 SNORING: ICD-10-CM

## 2023-05-11 DIAGNOSIS — F51.09 OTHER INSOMNIA NOT DUE TO A SUBSTANCE OR KNOWN PHYSIOLOGICAL CONDITION: ICD-10-CM

## 2023-05-11 DIAGNOSIS — G47.33 OSA (OBSTRUCTIVE SLEEP APNEA): ICD-10-CM

## 2023-05-11 PROCEDURE — 95800 SLP STDY UNATTENDED: CPT

## 2023-05-12 ENCOUNTER — OFFICE VISIT (OUTPATIENT)
Dept: INTERNAL MEDICINE | Facility: CLINIC | Age: 48
End: 2023-05-12
Payer: COMMERCIAL

## 2023-05-12 VITALS
DIASTOLIC BLOOD PRESSURE: 78 MMHG | HEIGHT: 62 IN | HEART RATE: 82 BPM | SYSTOLIC BLOOD PRESSURE: 124 MMHG | WEIGHT: 164.56 LBS | BODY MASS INDEX: 30.28 KG/M2 | OXYGEN SATURATION: 96 %

## 2023-05-12 DIAGNOSIS — R06.83 SNORING: ICD-10-CM

## 2023-05-12 DIAGNOSIS — E66.9 OBESITY (BMI 30-39.9): ICD-10-CM

## 2023-05-12 DIAGNOSIS — M79.7 FIBROMYALGIA: Primary | ICD-10-CM

## 2023-05-12 DIAGNOSIS — Z86.2 HISTORY OF ANEMIA: ICD-10-CM

## 2023-05-12 PROCEDURE — 99999 PR PBB SHADOW E&M-EST. PATIENT-LVL IV: ICD-10-PCS | Mod: PBBFAC,,, | Performed by: INTERNAL MEDICINE

## 2023-05-12 PROCEDURE — 99999 PR PBB SHADOW E&M-EST. PATIENT-LVL IV: CPT | Mod: PBBFAC,,, | Performed by: INTERNAL MEDICINE

## 2023-05-12 PROCEDURE — 3078F DIAST BP <80 MM HG: CPT | Mod: CPTII,S$GLB,, | Performed by: INTERNAL MEDICINE

## 2023-05-12 PROCEDURE — 3074F PR MOST RECENT SYSTOLIC BLOOD PRESSURE < 130 MM HG: ICD-10-PCS | Mod: CPTII,S$GLB,, | Performed by: INTERNAL MEDICINE

## 2023-05-12 PROCEDURE — 3008F PR BODY MASS INDEX (BMI) DOCUMENTED: ICD-10-PCS | Mod: CPTII,S$GLB,, | Performed by: INTERNAL MEDICINE

## 2023-05-12 PROCEDURE — 99396 PR PREVENTIVE VISIT,EST,40-64: ICD-10-PCS | Mod: S$GLB,,, | Performed by: INTERNAL MEDICINE

## 2023-05-12 PROCEDURE — 1159F MED LIST DOCD IN RCRD: CPT | Mod: CPTII,S$GLB,, | Performed by: INTERNAL MEDICINE

## 2023-05-12 PROCEDURE — 3008F BODY MASS INDEX DOCD: CPT | Mod: CPTII,S$GLB,, | Performed by: INTERNAL MEDICINE

## 2023-05-12 PROCEDURE — 1159F PR MEDICATION LIST DOCUMENTED IN MEDICAL RECORD: ICD-10-PCS | Mod: CPTII,S$GLB,, | Performed by: INTERNAL MEDICINE

## 2023-05-12 PROCEDURE — 1160F PR REVIEW ALL MEDS BY PRESCRIBER/CLIN PHARMACIST DOCUMENTED: ICD-10-PCS | Mod: CPTII,S$GLB,, | Performed by: INTERNAL MEDICINE

## 2023-05-12 PROCEDURE — 99396 PREV VISIT EST AGE 40-64: CPT | Mod: S$GLB,,, | Performed by: INTERNAL MEDICINE

## 2023-05-12 PROCEDURE — 3074F SYST BP LT 130 MM HG: CPT | Mod: CPTII,S$GLB,, | Performed by: INTERNAL MEDICINE

## 2023-05-12 PROCEDURE — 3078F PR MOST RECENT DIASTOLIC BLOOD PRESSURE < 80 MM HG: ICD-10-PCS | Mod: CPTII,S$GLB,, | Performed by: INTERNAL MEDICINE

## 2023-05-12 PROCEDURE — 1160F RVW MEDS BY RX/DR IN RCRD: CPT | Mod: CPTII,S$GLB,, | Performed by: INTERNAL MEDICINE

## 2023-05-12 NOTE — PROGRESS NOTES
Subjective:       Patient ID: Rema Horton is a 48 y.o. female.    Chief Complaint: Annual Exam    HPI  Rema Horton is a 48 y.o. female here for a yearly preventative healthcare visit.     Starting in Nov '22 she has been having chest pain with movement. Hurts to raise arms etc. Goes away then comes back a few days later.    Iron deficiency; last hgb 12.2; previously w anemia  Previously w heavy periods. Iron when having period but none in months.      Fibromyalgia, anxiety, depression  Has seen Dr. Nunez and Dr. Duvall since our last visit.    Right lateral arm pain - radiates to ring and pinky finger. Worse in am.    Duloxetine 30mg daily   started meloxicam  Gabapentin increased to 300mg bid  Has a BHI appt w Candace Alex upcoming    Perimenopausal w hot flashes.  Previously took black cohosh; plans to go back. Lots of sweating at nights. Has to have a fan on her.  Venlfaxine started 8/2022 but stopped and now on duloxetine instead for fibromyalgia     jonny  HSS done yesterday night    Daughter is 12yo w special needs.  Son is 14 yo.  Review of Systems   Constitutional:  Positive for activity change. Negative for unexpected weight change.   HENT:  Negative for hearing loss, rhinorrhea and trouble swallowing.    Eyes:  Positive for visual disturbance. Negative for discharge.   Respiratory:  Negative for chest tightness and wheezing.    Cardiovascular:  Positive for chest pain and palpitations.   Gastrointestinal:  Positive for constipation. Negative for blood in stool, diarrhea and vomiting.   Endocrine: Positive for polydipsia. Negative for polyuria.   Genitourinary:  Positive for difficulty urinating. Negative for dysuria, hematuria and menstrual problem.   Musculoskeletal:  Positive for arthralgias and joint swelling. Negative for neck pain.   Neurological:  Positive for weakness and headaches.   Psychiatric/Behavioral:  Positive for confusion and dysphoric mood.      Objective:   /78 (BP Location:  "Right arm, Patient Position: Sitting, BP Method: Medium (Manual))   Pulse 82   Ht 5' 2" (1.575 m)   Wt 74.6 kg (164 lb 9.2 oz)   SpO2 96%   BMI 30.10 kg/m²      Physical Exam  Constitutional:       General: She is not in acute distress.     Appearance: She is well-developed. She is not diaphoretic.   HENT:      Head: Normocephalic and atraumatic.   Cardiovascular:      Rate and Rhythm: Normal rate and regular rhythm.   Pulmonary:      Effort: Pulmonary effort is normal. No respiratory distress.      Breath sounds: No wheezing or rales.   Skin:     General: Skin is warm and dry.      Comments: Tiny nodule (sub-centimeter) felt over right lower tibia  Lumpy bumpy texture of left outer breast in area of concern (chronic, stable)   Neurological:      Mental Status: She is alert and oriented to person, place, and time.   Psychiatric:         Behavior: Behavior normal.       Assessment:       1. Fibromyalgia    2. History of anemia    3. Obesity (BMI 30-39.9)    4. Snoring        Plan:       Rema was seen today for annual exam.    Diagnoses and all orders for this visit:    Fibromyalgia  Continue duloxetine, gabapentin  To start asad chi    History of anemia  No anemia on recent labs, no recent period    Obesity (BMI 30-39.9)  Weight loss encouraged    Snoring  HSS done last night; will follow up w sleep specialist re: results        Numbness of small area of right foot for few weeks  Continue gabapentin  Consider EMG (has had one before that was very painful)  "

## 2023-05-15 PROCEDURE — 95806 PR SLEEP STUDY, UNATTENDED, SIMUL RECORD HR/O2 SAT/RESP FLOW/RESP EFFT: ICD-10-PCS | Mod: 26,,, | Performed by: INTERNAL MEDICINE

## 2023-05-15 PROCEDURE — 95806 SLEEP STUDY UNATT&RESP EFFT: CPT | Mod: 26,,, | Performed by: INTERNAL MEDICINE

## 2023-05-16 ENCOUNTER — PATIENT MESSAGE (OUTPATIENT)
Dept: SLEEP MEDICINE | Facility: CLINIC | Age: 48
End: 2023-05-16
Payer: COMMERCIAL

## 2023-05-17 ENCOUNTER — TELEPHONE (OUTPATIENT)
Dept: BEHAVIORAL HEALTH | Facility: CLINIC | Age: 48
End: 2023-05-17
Payer: COMMERCIAL

## 2023-05-17 NOTE — PROGRESS NOTES
[facility] Care Behavioral Health Integration: Initial  Date:  5/19/2023  Referral Source:  Britta Clark MD  Type of Visit:  In person  Length of Appointment: 45  .  History of Present Illness:  Rema Horton, a 48 y.o. female referred by Britta Clark MD.  Patient was seen, examined and chart was reviewed.    Met with patient.     Per PCP: Anxiety, depression - not taking effexor and defers medicatino, agrees to therapy    HPI: Pt is  and has 2 children.  Pt has an 10 y/o daughter who has chromosone 17.  Pt used to exercise a lot.  Pt is from [place].  Her parents passed in 2014 and 2017.  Her son is 12 y/o.  Daughter had several issues when she was born.  Pt has lupus.  Mom had arthritis.  Pt was dx with fibromyalgia.  Pt has to limit herself to save her energy.    PT grew up with her grandparents.  Pt did not grow up with mother and father.  Grandmother was like a mother and father and everything to pt.  As a child pt would cry a lot for her mom.  Pt always loved to be by herself.  Pt cried a lot as a child.     helps with their daughter a lot as well.  PT feels weak at times physically because of her health.    As a child they gave her a medication.  (Glutamic acid)  Pt meditates 3 times a day or does yoga.  She especially does this when her daughter is at school.    Pt has a hx of starting and stopping medication for depression and anxiety.    Before Nov of last year she was doing well but went downhill.  She isn't sure what the trigger was.    Pt has numbness in her feet.    Pt tried to live day by day to help with stress.    Pt has had difficulty with sleep for years.  Sometimes difficulty falling asleep and wakes up often.    Pt is tangential and sometimes needs redirection to focus on question asked.  Discussed with pt that [facility] will be leaving [facility].  Pt will transfer to [facility].  Pt verbalized understanding. [facility] will send staff message to SRIDEVI Pereira to  schedule.    Current symptoms:  Depression: dysphoric mood, anhedonia, hopelessness, fatigue, difficulty concentrating, and decreased appetite.  Anxiety: excessive worrying, restlessness, muscle tension, and panic attacks.  Sleep: early morning awakening, frequent night time awakening, difficulty falling asleep, and non-restful sleep.  Kika:  pressured speech.  Psychosis: denies .    PHQ9 3/3/2023   Little interest or pleasure in doing things: -   Feeling down, depressed or hopeless: -   Trouble falling asleep, staying asleep, or sleeping too much: -   Feeling tired or having little energy: -   Poor appetite or overeating: -   Feeling bad about yourself - or that you are a failure or have let yourself or family down: -   Trouble concentrating on things, such as reading the newspaper or watching television: -   Moving or speaking so slowly that other people could have noticed. Or the opposite,being so fidgety or restless that you have been moving around a lot more than usual: -   Thoughts that you would be better off dead or hurting yourself in some way: -   If you indicated you have experienced any of the previous problems, how difficult have these problems made it for you to do your work, take care of things at home or get along with other people? -   Total Score 17     GAD7 5/16/2023   1. Feeling nervous, anxious, or on edge? 3   2. Not being able to stop or control worrying? 1   3. Worrying too much about different things? 3   4. Trouble relaxing? 1   5. Being so restless that it is hard to sit still? 2   6. Becoming easily annoyed or irritable? 0   7. Feeling afraid as if something awful might happen? 0   8. If you checked off any problems, how difficult have these problems made it for you to do your work, take care of things at home, or get along with other people? -   MARIMAR-7 Score 10       Risk assessment:  Patient reports no suicidal ideation  Patient reports no homicidal ideation  Patient reports no  self-injurious behavior  Patient reports no violent behavior    Past Medical History:   Diagnosis Date    Abnormal Pap smear of cervix     Anemia     Breast disorder     Fever blister     Fibromyalgia     IC (interstitial cystitis)          Current Outpatient Medications:     baclofen (LIORESAL) 10 MG tablet, TAKE 1 TABLET (10 MG TOTAL) BY MOUTH DAILY AS NEEDED (SEVERE PAIN/MUSCLE ACHE)., Disp: 30 tablet, Rfl: 2    cholecalciferol, vitamin D3, (VITAMIN D3) 25 mcg (1,000 unit) capsule, TAKE 2 CAPSULES (2,000 UNITS TOTAL) BY MOUTH ONCE DAILY., Disp: 180 capsule, Rfl: 3    DULoxetine (CYMBALTA) 30 MG capsule, Take 1 capsule (30 mg total) by mouth once daily., Disp: 30 capsule, Rfl: 3    xavier prim-linoleic-gamolenic ac (PRIMROSE OIL) 1,000 mg Cap, , Disp: , Rfl:     ferrous sulfate (FEOSOL) 325 mg (65 mg iron) Tab tablet, Take 1 tablet (325 mg total) by mouth 2 (two) times daily. During period only, Disp: 180 tablet, Rfl: 3    fluconazole (DIFLUCAN) 150 MG Tab, TAKE 1 TABLET (150MG) NOW, THEN IN 3 DAYS., Disp: 2 tablet, Rfl: 1    fluocinolone (SYNALAR) 0.01 % external solution, AAA scalp qday - bid prn itching or scaling, Disp: 60 mL, Rfl: 3    fluticasone propionate (FLONASE) 50 mcg/actuation nasal spray, SPRAY 1 SPRAY INTO EACH NOSTRIL TWICE A DAY, Disp: 16 mL, Rfl: 5    gabapentin (NEURONTIN) 300 MG capsule, Take 1 capsule (300 mg total) by mouth 2 (two) times daily., Disp: , Rfl:     ketoconazole (NIZORAL) 2 % shampoo, Wash hair with medicated shampoo at least 2x/week - let sit on scalp at least 5 minutes prior to rinsing, Disp: 120 mL, Rfl: 5    Lactobacillus acidophilus (PROBIOTIC ORAL), Take by mouth once daily., Disp: , Rfl:     meloxicam (MOBIC) 7.5 MG tablet, Take 1 tablet (7.5 mg total) by mouth once daily. In 1-2 weeks, if no relief, may increase dose to two tablets once daily., Disp: 60 tablet, Rfl: 3    multivit with iron,hematinic (SUPER B-COMPLEX ORAL), , Disp: , Rfl:     MULTIVITAMIN ORAL, NADIA  MULTIVITAMIN, Disp: , Rfl:     rizatriptan (MAXALT) 10 MG tablet, Take 1/2 to 1 tab at onset of headache.  If no improvement in 2 hours, take another.  Do not take more than 2 tabs in 24 hours., Disp: 9 tablet, Rfl: 11    triamcinolone acetonide 0.1% (KENALOG) 0.1 % cream, Apply topically 2 (two) times daily. To affected areas on arms and chest x 1-2 wks then prn flares only. Avoid face, armpits, and groin., Disp: 45 g, Rfl: 2    TURMERIC ORAL, Take by mouth., Disp: , Rfl:     valACYclovir (VALTREX) 500 MG tablet, TAKE 1 TABLET BY MOUTH ONCE DAILY, Disp: 30 tablet, Rfl: 1    vitamin E acetate (VITAMIN E ORAL), Take by mouth once daily., Disp: , Rfl:     Psychiatric History:  Psychiatric Diagnosis:  Pt is taking duloxetine (Cymbalta) 30 mg once daily for Depression. She feels cymbalta helps with her fibro and her legs.    They are not interested in medication changes.  Previous Medication Trials: yes but doesn't remember which ones  Previous Psychiatric Outpatient Treatment:  no  Previous Psychiatric Hospitalizations:  no  Previous Suicide Attempts:  no  History of Trauma:  no  Access to a Firearm:  no    Substance Use History:  Tobacco/Nicotine:  no  Alcohol: doesn't drink  Illicit Substances: no  Misuse of Prescription Medications:  no  Caffeine: drinks coffee in morning with her .      Mental Status Exam  General Appearance:  unremarkable, age appropriate   Speech: normal tone, normal rate, normal pitch, normal volume      Level of Cooperation: cooperative      Thought Processes: tangential   Mood: steady      Thought Content: normal, no suicidality, no homicidality, delusions, or paranoia   Affect: congruent and appropriate   Orientation: Oriented x3   Memory: recent >  intact, remote >  intact   Attention Span & Concentration: Not assessed   Fund of General Knowledge: Not assessed   Abstract Reasoning: Not assessed   Judgment & Insight: good     Language  intact       Impression:   My diagnostic  impression is Anxiety disorders; generalized anxiety disorder [F41.1] and Major Depressive Disorder, Recurrent, Moderate (F33.1), as evidenced by dysphoric mood, anhedonia, hopelessness, fatigue, difficulty concentrating, and decreased appetite, excessive worrying, restlessness, muscle tension, and panic attacks, early morning awakening, frequent night time awakening, difficulty falling asleep, and non-restful sleep    Provisional Diagnosis:  1. Depression, major, recurrent, moderate    2. Anxiety         Treatment Goals and Plan: Initial appointment focused on gathering history, identifying treatment goals and developing a treatment plan.      Anxiety: reducing negative automatic thoughts, reducing physical symptoms of anxiety, and reducing time spent worrying (<30 minutes/day)  Depression: reducing excessive guilt, reducing fatigue, and reducing negative automatic thoughts    Future treatment will utilize CBT, Mindfulness Techniques, Motivational Interviewing, Solution-focused Therapy, and Relaxation Techniques .      Return to Clinic: No follow-ups on file.

## 2023-05-18 ENCOUNTER — OFFICE VISIT (OUTPATIENT)
Dept: BEHAVIORAL HEALTH | Facility: CLINIC | Age: 48
End: 2023-05-18
Payer: COMMERCIAL

## 2023-05-18 DIAGNOSIS — F33.1 DEPRESSION, MAJOR, RECURRENT, MODERATE: Primary | ICD-10-CM

## 2023-05-18 DIAGNOSIS — F41.9 ANXIETY: ICD-10-CM

## 2023-05-18 PROCEDURE — 99999 PR PBB SHADOW E&M-EST. PATIENT-LVL I: ICD-10-PCS | Mod: PBBFAC,,, | Performed by: SOCIAL WORKER

## 2023-05-18 PROCEDURE — 99999 PR PBB SHADOW E&M-EST. PATIENT-LVL I: CPT | Mod: PBBFAC,,, | Performed by: SOCIAL WORKER

## 2023-05-18 PROCEDURE — 90791 PSYCH DIAGNOSTIC EVALUATION: CPT | Mod: ,,, | Performed by: SOCIAL WORKER

## 2023-05-18 PROCEDURE — 90791 PR PSYCHIATRIC DIAGNOSTIC EVALUATION: ICD-10-PCS | Mod: ,,, | Performed by: SOCIAL WORKER

## 2023-05-18 PROCEDURE — 99211 OFF/OP EST MAY X REQ PHY/QHP: CPT | Mod: PBBFAC | Performed by: SOCIAL WORKER

## 2023-05-19 ENCOUNTER — PATIENT MESSAGE (OUTPATIENT)
Dept: SLEEP MEDICINE | Facility: CLINIC | Age: 48
End: 2023-05-19
Payer: COMMERCIAL

## 2023-05-19 ENCOUNTER — TELEPHONE (OUTPATIENT)
Dept: BEHAVIORAL HEALTH | Facility: CLINIC | Age: 48
End: 2023-05-19
Payer: COMMERCIAL

## 2023-05-19 DIAGNOSIS — G47.10 HYPERSOMNOLENCE: ICD-10-CM

## 2023-05-19 DIAGNOSIS — F51.09 OTHER INSOMNIA NOT DUE TO A SUBSTANCE OR KNOWN PHYSIOLOGICAL CONDITION: ICD-10-CM

## 2023-05-19 DIAGNOSIS — G47.30 SLEEP APNEA, UNSPECIFIED TYPE: Primary | ICD-10-CM

## 2023-05-19 PROBLEM — F33.1 DEPRESSION, MAJOR, RECURRENT, MODERATE: Status: ACTIVE | Noted: 2023-05-19

## 2023-05-19 PROBLEM — F41.9 ANXIETY: Status: ACTIVE | Noted: 2023-05-19

## 2023-05-29 ENCOUNTER — TELEPHONE (OUTPATIENT)
Dept: SLEEP MEDICINE | Facility: OTHER | Age: 48
End: 2023-05-29
Payer: COMMERCIAL

## 2023-06-05 ENCOUNTER — TELEPHONE (OUTPATIENT)
Dept: SLEEP MEDICINE | Facility: OTHER | Age: 48
End: 2023-06-05
Payer: COMMERCIAL

## 2023-06-05 RX ORDER — VALACYCLOVIR HYDROCHLORIDE 500 MG/1
TABLET, FILM COATED ORAL
Qty: 30 TABLET | Refills: 1 | Status: SHIPPED | OUTPATIENT
Start: 2023-06-05 | End: 2023-08-02 | Stop reason: SDUPTHER

## 2023-06-08 ENCOUNTER — CLINICAL SUPPORT (OUTPATIENT)
Dept: BEHAVIORAL HEALTH | Facility: CLINIC | Age: 48
End: 2023-06-08
Payer: COMMERCIAL

## 2023-06-08 ENCOUNTER — PATIENT MESSAGE (OUTPATIENT)
Dept: BEHAVIORAL HEALTH | Facility: CLINIC | Age: 48
End: 2023-06-08

## 2023-06-08 DIAGNOSIS — F41.9 ANXIETY: Primary | ICD-10-CM

## 2023-06-08 DIAGNOSIS — F33.1 DEPRESSION, MAJOR, RECURRENT, MODERATE: ICD-10-CM

## 2023-06-08 PROCEDURE — 90791 PSYCH DIAGNOSTIC EVALUATION: CPT | Mod: 95,,, | Performed by: COUNSELOR

## 2023-06-08 PROCEDURE — 90791 PR PSYCHIATRIC DIAGNOSTIC EVALUATION: ICD-10-PCS | Mod: 95,,, | Performed by: COUNSELOR

## 2023-06-08 NOTE — PROGRESS NOTES
Primary Care Behavioral Health Integration: Initial  Date:  6/8/2023  Patient Name: Rema Horton  Referral Source:  Britta Clark MD  Type of Visit:  Video Session    Visit type: Virtual visit with synchronous audio and video  The patient location is:  Home   The patient location [place] is: [place]   The patient phone number is: [phone]    Each patient to whom he or she provides medical services by telemedicine is:  (1) informed of the relationship between the physician and patient and the respective role of any other health care provider with respect to management of the patient; and (2) notified that he or she may decline to receive medical services by telemedicine and may withdraw from such care at any time.    Chief complaint/reason for encounter: attention deficit, depression, mood swings, anxiety, sleep, appetite, and cognition      History of Present Illness:  Rema Horton, a 48 y.o.  female with history of Anxiety disorders; generalized anxiety disorder [F41.1] and Major Depressive Disorder, Recurrent, Moderate (F33.1) referred by Britta Clark MD.  Patient was seen, examined and chart was reviewed.    Met with patient.     Past Medical History:   Diagnosis Date    Abnormal Pap smear of cervix     Anemia     Breast disorder     Fever blister     Fibromyalgia     IC (interstitial cystitis)          Current Outpatient Medications:     baclofen (LIORESAL) 10 MG tablet, TAKE 1 TABLET (10 MG TOTAL) BY MOUTH DAILY AS NEEDED (SEVERE PAIN/MUSCLE ACHE)., Disp: 30 tablet, Rfl: 2    cholecalciferol, vitamin D3, (VITAMIN D3) 25 mcg (1,000 unit) capsule, TAKE 2 CAPSULES (2,000 UNITS TOTAL) BY MOUTH ONCE DAILY., Disp: 180 capsule, Rfl: 3    DULoxetine (CYMBALTA) 30 MG capsule, Take 1 capsule (30 mg total) by mouth once daily., Disp: 30 capsule, Rfl: 3    xavier prim-linoleic-gamolenic ac (PRIMROSE OIL) 1,000 mg Cap, , Disp: , Rfl:     ferrous sulfate (FEOSOL) 325 mg (65 mg iron) Tab tablet, Take  1 tablet (325 mg total) by mouth 2 (two) times daily. During period only, Disp: 180 tablet, Rfl: 3    fluconazole (DIFLUCAN) 150 MG Tab, TAKE 1 TABLET (150MG) NOW, THEN IN 3 DAYS., Disp: 2 tablet, Rfl: 1    fluocinolone (SYNALAR) 0.01 % external solution, AAA scalp qday - bid prn itching or scaling, Disp: 60 mL, Rfl: 3    fluticasone propionate (FLONASE) 50 mcg/actuation nasal spray, SPRAY 1 SPRAY INTO EACH NOSTRIL TWICE A DAY, Disp: 16 mL, Rfl: 5    gabapentin (NEURONTIN) 300 MG capsule, Take 1 capsule (300 mg total) by mouth 2 (two) times daily., Disp: , Rfl:     ketoconazole (NIZORAL) 2 % shampoo, Wash hair with medicated shampoo at least 2x/week - let sit on scalp at least 5 minutes prior to rinsing, Disp: 120 mL, Rfl: 5    Lactobacillus acidophilus (PROBIOTIC ORAL), Take by mouth once daily., Disp: , Rfl:     meloxicam (MOBIC) 7.5 MG tablet, Take 1 tablet (7.5 mg total) by mouth once daily. In 1-2 weeks, if no relief, may increase dose to two tablets once daily., Disp: 60 tablet, Rfl: 3    multivit with iron,hematinic (SUPER B-COMPLEX ORAL), , Disp: , Rfl:     MULTIVITAMIN ORAL, NADIA MULTIVITAMIN, Disp: , Rfl:     rizatriptan (MAXALT) 10 MG tablet, Take 1/2 to 1 tab at onset of headache.  If no improvement in 2 hours, take another.  Do not take more than 2 tabs in 24 hours., Disp: 9 tablet, Rfl: 11    triamcinolone acetonide 0.1% (KENALOG) 0.1 % cream, Apply topically 2 (two) times daily. To affected areas on arms and chest x 1-2 wks then prn flares only. Avoid face, armpits, and groin., Disp: 45 g, Rfl: 2    TURMERIC ORAL, Take by mouth., Disp: , Rfl:     valACYclovir (VALTREX) 500 MG tablet, TAKE 1 TABLET BY MOUTH EVERY DAY, Disp: 30 tablet, Rfl: 1    vitamin E acetate (VITAMIN E ORAL), Take by mouth once daily., Disp: , Rfl:     Current Symptoms:  Depression: insomnia, worthlessness/guilt, hopelessness, fatigue, difficulty concentrating, and decreased appetite.    Anxiety: excessive worrying, restlessness,  and muscle tension.    Sleep: frequent night time awakening, difficulty falling asleep, and non-restful sleep.    Kika/Hypomania:  easy distractibility and racing thoughts or rumination.    Psychosis: denies .  Suicidal thoughts/behaviors: Denied   Patient noted agreement to call 911/and or present to the ED if she experienced suicidal or homicidal ideation, plan or intent.    Self-injury:  Denied   Trauma: Confirmed     Risk Assessment:  Patient reports no suicidal ideation  Patient reports no homicidal ideation  Patient reports no self-injurious behavior  Patient reports no violent behavior    Patient advised to call [phone] or present the the nearest ED if they experience suicidal or homicidal ideation, plan or intent.    Discussed and agreed on a safety plan.    Psychiatric History:  Is the patient taking psychiatric medication: No   They are interested in medication changes.  Previous Medication Trials: No   Previous Psychiatric Outpatient Treatment:  No  Previous Psychiatric Hospitalizations:  No  Previous Suicide Attempts:  No  History of Trauma:  Yes  Access to a Firearm:  No    Substance Use History:  Tobacco/Nicotine:  No   Alcohol: none  Illicit Substances: No  Misuse of Prescription Medications:  No  Caffeine: Yes - Coffee    Mental Status Exam  General Appearance:  unremarkable, age appropriate     Speech: normal tone, normal pitch, normal volume, rapid         Level of Cooperation: cooperative        Thought Processes: racing      Mood: steady, anxious        Thought Content: normal, no suicidality, no homicidality, delusions, or paranoia     Affect: congruent and appropriate    Orientation: Oriented x3     Memory: recent >  intact, remote >  intact     Attention Span & Concentration: intact     Fund of General Knowledge: intact and appropriate to age and level of education   Abstract Reasoning: interpretation of similarities was abstract, interpretation of proverbs was abstract   Judgment & Insight:  poor       Language:  intact       Results of the PHQ8 5/16/2023 3/21/2023   1. Little interest or pleasure in doing things Nearly every day Nearly every day   2. Feeling down, depressed, or hopeless Several days Several days   3. Trouble falling or staying asleep, or sleeping too much Nearly every day Nearly every day   4. Feeling tired or having little energy Nearly every day Nearly every day   5. poor appetite or overeating More than half the days More than half the days   6. Feeling bad about yourself - or that you are a failure or have let yourself or your family down Nearly every day Nearly every day   7. Trouble concentrating on things, such as reading the newspaper or watching television Nearly every day Nearly every day   8. Moving or speaking so slowly that other people could have noticed? Or the opposite - being so fidgety or restless that you have been moving around a lot more than usual Nearly every day Nearly every day   Total Score  21 21      Depression Patient Health Questionnaire 5/4/2023 3/3/2023 5/20/2022 3/3/2022 6/4/2021 6/4/2021 1/10/2020   Over the last two weeks how often have you been bothered by little interest or pleasure in doing things Not at all Nearly every day Not at all Not at all Not at all Not at all Nearly every day   Over the last two weeks how often have you been bothered by feeling down, depressed or hopeless Not at all Nearly every day Not at all Several days Not at all Not at all Nearly every day   PHQ-2 Total Score 0 6 0 1 0 0 6   PHQ-9 Total Score - 17 - - - - 18   PHQ-9 Interpretation - Moderately Severe - - - - -      GAD7 5/16/2023 3/21/2023 3/21/2023   1. Feeling nervous, anxious, or on edge? 3 3 3   2. Not being able to stop or control worrying? 1 3 3   3. Worrying too much about different things? 3 3 3   4. Trouble relaxing? 1 3 3   5. Being so restless that it is hard to sit still? 2 3 3   6. Becoming easily annoyed or irritable? 0 0 0   7. Feeling afraid as if  something awful might happen? 0 0 0   8. If you checked off any problems, how difficult have these problems made it for you to do your work, take care of things at home, or get along with other people? - - -   MARIMAR-7 Score 10 15 15       Impression:   My diagnostic impression is pt was very anxious and racing in the appt. Reported having physical symptoms of fibromyalgia, headaches, chest pain and numbness in her feet impairing her daily life. Describes having a child with chromosome 17 who is non-verbal causing her distress since these symptoms have prevented her from cooking and tending to her like she used to. Spoke about her childhood being raised by her grandparents in [place] and crying a lot for her mother. Also, stated that she doesn't take her medications regularly. Worries a lot about bills and her lack of functioning since she handles so much at home.    Provisional Diagnosis:  1. Anxiety    2. Depression, major, recurrent, moderate        Treatment Goals and Plan: Initial appointment focused on gathering history, identifying treatment goals and developing a treatment plan.      Anxiety: reducing negative automatic thoughts and reducing physical symptoms of anxiety  Depression: acquiring relapse prevention skills    Future treatment will utilize CBT and Solution-focused Therapy.      Return to Clinic: 2 weeks    Plan to discuss case with [facility] consulting psychiatrist and further recommendations to be potentially be made at that time.  Refer to psychiatry    Length of Appointment: 45 Non face-to-face clinical time: 15

## 2023-06-08 NOTE — Clinical Note
Patient reported having daily headaches, chest pain and numbness in her feet that is impairing her daily life. She doesn't understand what is causing these symptoms but admits to not taking her medication regularly.

## 2023-06-14 ENCOUNTER — TELEPHONE (OUTPATIENT)
Dept: SLEEP MEDICINE | Facility: OTHER | Age: 48
End: 2023-06-14
Payer: COMMERCIAL

## 2023-06-15 ENCOUNTER — HOSPITAL ENCOUNTER (OUTPATIENT)
Dept: SLEEP MEDICINE | Facility: OTHER | Age: 48
Discharge: HOME | End: 2023-06-15
Attending: INTERNAL MEDICINE
Payer: COMMERCIAL

## 2023-06-15 DIAGNOSIS — G47.30 SLEEP APNEA, UNSPECIFIED TYPE: ICD-10-CM

## 2023-06-15 DIAGNOSIS — G47.10 HYPERSOMNOLENCE: ICD-10-CM

## 2023-06-15 DIAGNOSIS — F51.09 OTHER INSOMNIA NOT DUE TO A SUBSTANCE OR KNOWN PHYSIOLOGICAL CONDITION: ICD-10-CM

## 2023-06-15 PROCEDURE — 95810 POLYSOM 6/> YRS 4/> PARAM: CPT

## 2023-06-16 ENCOUNTER — PATIENT MESSAGE (OUTPATIENT)
Dept: BEHAVIORAL HEALTH | Facility: CLINIC | Age: 48
End: 2023-06-16
Payer: COMMERCIAL

## 2023-06-16 NOTE — PROGRESS NOTES
A baseline PSG study was performed on Rema Horton. The following was explained to the pt prior to the study: the time to bed and wake time, the set-up process timeframe and the purpose of each sensor, the reason (if sensors fall off) the technician will need to enter the room during the night, the possibility of the tech fitting a PAP mask on pt for treatment in the middle of the night, and how to call out for assistance during the night. A post-study letter was handed to the pt in the morning.

## 2023-06-20 ENCOUNTER — PATIENT MESSAGE (OUTPATIENT)
Dept: INTERNAL MEDICINE | Facility: CLINIC | Age: 48
End: 2023-06-20
Payer: COMMERCIAL

## 2023-06-22 ENCOUNTER — PATIENT MESSAGE (OUTPATIENT)
Dept: BEHAVIORAL HEALTH | Facility: CLINIC | Age: 48
End: 2023-06-22
Payer: COMMERCIAL

## 2023-06-22 ENCOUNTER — TELEPHONE (OUTPATIENT)
Dept: BEHAVIORAL HEALTH | Facility: CLINIC | Age: 48
End: 2023-06-22
Payer: COMMERCIAL

## 2023-06-26 PROCEDURE — 95810 POLYSOM 6/> YRS 4/> PARAM: CPT | Mod: 26,,, | Performed by: INTERNAL MEDICINE

## 2023-06-26 PROCEDURE — 95810 PR POLYSOMNOGRAPHY, 4 OR MORE: ICD-10-PCS | Mod: 26,,, | Performed by: INTERNAL MEDICINE

## 2023-06-26 NOTE — PROCEDURES
"[facility]    Polysomnography Interpretation Report    Patient Name:  Rema Horton  MRN#:  [id number]  :  1975  Study Date:  6/15/2023  Referring Provider:  Umberto Dan    Indications for Polysomnography:  The patient is a 48 year old Female who is 5' 2" and weighs 164.0 lbs.  Her BMI equals 30.2.  [place] was - and Neck Circumference was -.  A full night polysomnogram was performed to evaluate for -.    Polysomnogram Data  A full night polysomnogram recorded the standard physiologic parameters including EEG, EOG, EMG, EKG, nasal and oral airflow.  Respiratory parameters of chest and abdominal movements were recorded with [place]-Crystal motion transducers.  Oxygen saturation was recorded by pulse oximetry.    Sleep Architecture  The total recording time of the polysomnogram was 474.4 minutes.  The total sleep time was 408.5 minutes.  The patient spent 21.1% of total sleep time in Stage N1, 55.7% in Stage N2, 21.1% in Stages N3, and 2.2% in REM.  Sleep latency was 33.7 minutes.  REM latency was 430.5 minutes.  Sleep Efficiency was 86.1%.  Wake after sleep onset was 32.0 minutes.    Respiratory Events  The polysomnogram revealed a presence of 1 obstructive, - central, and - mixed apneas resulting in a Total Apnea index of 0.1 events per hour.  There were 139 hypopneas resulting in a Total Hypopnea index of 20.4 events per hour.  The combined Apnea/Hypopnea index was 20.6 events per hour.  There were a total of - RERA events resulting in a Respiratory Disturbance Index (RDI) of 20.6 events per hour.     Mean oxygen saturation was 93.7%.  The lowest oxygen saturation during sleep was 85.0%.  Time spent ?88% oxygen saturation was 1.3 minutes (0.3%).    Limb Activity  There were - limb movements recorded.      Cardiac:  single lead EKG revealed normal sinus rhythm     Oxygenation:  Hypoxemia was seen only in relation to obstructive events.    Impression:  -obstructive sleep apnea " "    Recommendations:    -treatment for PERFECTO seen in this study is recommended   -the patient has follow up with Sleep Medicine        Umberto Dan MD    (This Sleep Study was interpreted by a Board Certified Sleep Specialist who conducted an epoch-by-epoch review of the entire raw data recording.)  (The indication for this sleep study was reviewed and deemed appropriate by [facility] Practice Parameters or other reasons by a Board Certified Sleep Specialist.)    [facility]    Diagnostic PSG Report    Patient Name: Rema Horton Study Date: 6/15/2023   YOB: 1975 MRN #: [id number]   Age: 48 year VALENTINO #: [id number]   Sex: Female Referring Provider: Umberto Dan   Height: 5' 2" Recording Tech: Jono Palm RPSGT   Weight: 164.0 lbs Scoring Tech: Judson Oliveira RRT RPSGT   BMI: 30.2 Interpreting Physician: -   ESS: - Neck Circumference: -     Study Overview    Lights Off: 09:25:18 PM  Count Index   Lights On: 05:19:44 AM Awakenings: 28 4.1   Time in Bed: 474.4 min. Arousals: 139 20.4   Total Sleep Time: 408.5 min. Apneas & Hypopneas: 140 20.6    Sleep Efficiency: 86.1% Limb Movements: - -   Sleep Latency: 33.7 min. Snores: - -   Wake After Sleep Onset: 32.0 min. Desaturations: 170 25.0    REM Latency from Sleep Onset: 430.5 min. Minimum SpO2 TST: 85.0%        Sleep Architecture   % of Time in Bed  Stages Time (mins) % Sleep Time   Wake 66.5    Stage N1 86.0 21.1%   Stage N2 227.5 55.7%   Stage N3 86.0 21.1%   REM 9.0 2.2%         Arousal Summary     NREM REM Sleep Index   Respiratory Arousals 28 1 29 4.3   PLM Arousals - - - -   Isolated Limb Movement Arousals - - - -   Spontaneous Arousals 110 - 110 16.2   Total 138 1 139 20.4       Limb Movement Summary     Count Index   Isolated Limb Movements - -   Periodic Limb Movements (PLMs) - -   Total Limb Movements - -         Respiratory Summary     By Sleep Stage By Body Position Total    NREM REM Supine Non-Supine    Time (min) 399.5 9.0 " 210.5 198.0 408.5           Obstructive Apnea 1 - - 1 1   Mixed Apnea - - - - -   Central Apnea - - - - -   Total Apneas 1 - - 1 1   Total Apnea Index 0.2 - - 0.3 0.1           Hypopnea 130 9 102 37 139   Hypopnea Index 19.5 60.0 29.1 11.2 20.4           Apnea & Hypopnea 131 9 102 38 140   Apnea & Hypopnea Index 19.7 60.0 29.1 11.5 20.6           RERAs - - - - -   RERA Index - - - - -           RDI 19.7 60.0 29.1 11.5 20.6     Scoring Criteria: Hypopneas scored at Choose an item.% desaturation criteria.    Respiratory Event Durations     Apnea Hypopnea    NREM REM NREM REM   Average (seconds) 15.9 - 15.4 15.9   Maximum (seconds) 15.9 - 21.8 19.6       Oxygen Saturation Summary     Wake NREM REM TST TIB   Average SpO2 93.8% 93.7% 93.7% 93.7% 93.7%   Minimum SpO2 89.0% 87.0% 85.0% 85.0% 85.0%   Maximum SpO2 99.0% 97.0% 97.0% 97.0% 99.0%       Oxygen Saturation Distribution    Range (%) Time in range (min) Time in range (%)   90.0 - 100.0 465.5 98.2%   80.0 - 90.0 8.4 1.8%   70.0 - 80.0 - -   60.0 - 70.0 - -   50.0 - 60.0 - -   0.0 - 50.0 - -   Time Spent ?88% SpO2    Range (%) Time in range (min) Time in range (%)   0.0 - 88.0 1.3 0.3%          Count Index   Desaturations 170 25.0      Cardiac Summary     Wake NREM REM Sleep Total   Average Pulse Rate (BPM) 70.6 66.6 73.0 66.7 67.3   Minimum Pulse Rate (BPM) 59.0 52.0 59.0 52.0 52.0   Maximum Pulse Rate (BPM) 102.0 98.0 89.0 98.0 102.0     Pulse Rate Distribution    Range (bpm) Time in range (min) Time in range (%)   0.0 - 40.0 - -   40.0 - 60.0 53.4 11.3%   60.0 - 80.0 411.2 86.7%   80.0 - 100.0 9.7 2.0%   100.0 - 120.0 0.1 0.0%   120.0 - 140.0 - -   140.0 - 200.0 - -     EtCO2 Summary    Stage Min (mmHg) Average (mmHg) Max (mmHg)   Wake - - -   NREM(1+2+3) - - -   REM - - -     Range (mmHg) Time in range (min) Time in range (%)   - - -   - - -   - - -   - - -   - - -     TcCO2 Summary    Stage Min (mmHg) Average (mmHg) Max (mmHg)   Wake - - -   NREM(1+2+3) - - -    REM - - -     Range (mmHg) Time in range (min) Time in range (%)   20.0 - 40.0 - -   40.0 - 50.0 - -   50.0 - 55.0 - -   55.0 - 100.0 - -   Excluded data <20.0 & >65.0 475.0 100.0%     Comments    -

## 2023-06-27 ENCOUNTER — PATIENT MESSAGE (OUTPATIENT)
Dept: BEHAVIORAL HEALTH | Facility: CLINIC | Age: 48
End: 2023-06-27
Payer: COMMERCIAL

## 2023-06-27 ENCOUNTER — PATIENT MESSAGE (OUTPATIENT)
Dept: SLEEP MEDICINE | Facility: CLINIC | Age: 48
End: 2023-06-27
Payer: COMMERCIAL

## 2023-06-30 DIAGNOSIS — G47.33 OSA (OBSTRUCTIVE SLEEP APNEA): Primary | ICD-10-CM

## 2023-07-03 ENCOUNTER — TELEPHONE (OUTPATIENT)
Dept: BEHAVIORAL HEALTH | Facility: CLINIC | Age: 48
End: 2023-07-03
Payer: COMMERCIAL

## 2023-07-03 DIAGNOSIS — D50.0 IRON DEFICIENCY ANEMIA DUE TO CHRONIC BLOOD LOSS: ICD-10-CM

## 2023-07-03 DIAGNOSIS — D50.9 IRON DEFICIENCY ANEMIA, UNSPECIFIED IRON DEFICIENCY ANEMIA TYPE: ICD-10-CM

## 2023-07-03 NOTE — PROGRESS NOTES
CHW reached out to pt to remind her of virtual appointment with Dr. Lisette Archer, LPC Wednesday, No answer, LVM of the appointment.

## 2023-07-05 ENCOUNTER — PATIENT MESSAGE (OUTPATIENT)
Dept: BEHAVIORAL HEALTH | Facility: CLINIC | Age: 48
End: 2023-07-05

## 2023-07-05 ENCOUNTER — CLINICAL SUPPORT (OUTPATIENT)
Dept: BEHAVIORAL HEALTH | Facility: CLINIC | Age: 48
End: 2023-07-05
Payer: COMMERCIAL

## 2023-07-05 DIAGNOSIS — F33.1 DEPRESSION, MAJOR, RECURRENT, MODERATE: ICD-10-CM

## 2023-07-05 DIAGNOSIS — F41.9 ANXIETY: Primary | ICD-10-CM

## 2023-07-05 PROCEDURE — 90832 PR PSYCHOTHERAPY W/PATIENT, 30 MIN: ICD-10-PCS | Mod: 95,,, | Performed by: COUNSELOR

## 2023-07-05 PROCEDURE — 90832 PSYTX W PT 30 MINUTES: CPT | Mod: 95,,, | Performed by: COUNSELOR

## 2023-07-05 RX ORDER — FERROUS SULFATE 325(65) MG
325 TABLET ORAL 2 TIMES DAILY
Qty: 180 TABLET | Refills: 3
Start: 2023-07-05 | End: 2023-08-18

## 2023-07-05 NOTE — PROGRESS NOTES
Primary Care Behavioral Health Integration: Follow-up  Date:  7/5/2023  Patient Name: Rema Horton  Type of Visit:  Video Session  Site:  [place]  Referral Source:  Britta Clark MD    Visit type: Virtual visit with synchronous audio and video  The patient location is:  Home  [facility] [place] [facility]  The patient phone number is: [phone]    Each patient to whom he or she provides medical services by telemedicine is:  (1) informed of the relationship between the physician and patient and the respective role of any other health care provider with respect to management of the patient; and (2) notified that he or she may decline to receive medical services by telemedicine and may withdraw from such care at any time.    History of Present Illness:  Rema Horton, a 48 y.o.  female with history of Anxiety disorders; generalized anxiety disorder [F41.1] and Major Depressive Disorder, Recurrent, Moderate (F33.1) referred by Britta Clark MD.  Patient was seen, examined and chart was reviewed.    Met with patient.     Results of the PHQ8 7/3/2023 6/16/2023 5/16/2023 3/21/2023   1. Little interest or pleasure in doing things Nearly every day Nearly every day Nearly every day Nearly every day   2. Feeling down, depressed, or hopeless Nearly every day Nearly every day Several days Several days   3. Trouble falling or staying asleep, or sleeping too much Nearly every day Nearly every day Nearly every day Nearly every day   4. Feeling tired or having little energy Nearly every day Nearly every day Nearly every day Nearly every day   5. poor appetite or overeating Nearly every day Several days More than half the days More than half the days   6. Feeling bad about yourself - or that you are a failure or have let yourself or your family down Several days Several days Nearly every day Nearly every day   7. Trouble concentrating on things, such as reading the newspaper or watching television  Nearly every day Nearly every day Nearly every day Nearly every day   8. Moving or speaking so slowly that other people could have noticed? Or the opposite - being so fidgety or restless that you have been moving around a lot more than usual Nearly every day Nearly every day Nearly every day Nearly every day   Total Score  22 20 21 21     Depression Patient Health Questionnaire 5/4/2023 3/3/2023 5/20/2022 3/3/2022 6/4/2021 6/4/2021 1/10/2020   Over the last two weeks how often have you been bothered by little interest or pleasure in doing things Not at all Nearly every day Not at all Not at all Not at all Not at all Nearly every day   Over the last two weeks how often have you been bothered by feeling down, depressed or hopeless Not at all Nearly every day Not at all Several days Not at all Not at all Nearly every day   PHQ-2 Total Score 0 6 0 1 0 0 6   PHQ-9 Total Score - 17 - - - - 18   PHQ-9 Interpretation - Moderately Severe - - - - -      GAD7 7/3/2023 6/16/2023 5/16/2023   1. Feeling nervous, anxious, or on edge? 3 3 3   2. Not being able to stop or control worrying? 3 3 1   3. Worrying too much about different things? 3 3 3   4. Trouble relaxing? 2 2 1   5. Being so restless that it is hard to sit still? 1 2 2   6. Becoming easily annoyed or irritable? 1 0 0   7. Feeling afraid as if something awful might happen? 3 1 0   8. If you checked off any problems, how difficult have these problems made it for you to do your work, take care of things at home, or get along with other people? - - -   MARIMAR-7 Score 16 14 10       Treatment plan:  Target symptoms: depression, anxiety   Why chosen therapy is appropriate versus another modality: relevant to diagnosis  Outcome monitoring methods: self-report  Therapeutic intervention type: supportive psychotherapy    Risk parameters:  Patient reports no suicidal ideation  Patient reports no homicidal ideation  Patient reports no self-injurious behavior  Patient reports no  violent behavior    Verbal deficits: None    Patient's response to intervention:  The patient's response to intervention is accepting.    Progress toward goals and other mental status changes:  The patient's progress toward goals is not progressing.    Patient advised to call [phone] or present the the nearest ED if they experience suicidal or homicidal ideation, plan or intent.       Impression:  My diagnostic impression: Patient is struggling with pain in her body due to fibromyalgia and other conditions not yet defined. Will begin physical therapy. Some days are worse than others. Describes bladder pressure and constipation and is afraid to take her medications. Wants a referral to psychiatry for medication management and therapy.     Diagnosis:   Anxiety disorders; generalized anxiety disorder [F41.1] and Major Depressive Disorder, Recurrent, Moderate (F33.1)    Treatment Goals and Plan: Pt plans to continue CBT and Solution-focused Therapy    Future treatment will utilize CBT and Solution-focused Therapy    Return to Clinic: 2 weeks    Length of Appointment: 30 Non face-to-face clinical time: 15

## 2023-07-13 ENCOUNTER — OFFICE VISIT (OUTPATIENT)
Dept: INTERNAL MEDICINE | Facility: CLINIC | Age: 48
End: 2023-07-13
Payer: COMMERCIAL

## 2023-07-13 VITALS
DIASTOLIC BLOOD PRESSURE: 80 MMHG | WEIGHT: 165.44 LBS | HEIGHT: 62 IN | BODY MASS INDEX: 30.44 KG/M2 | OXYGEN SATURATION: 97 % | SYSTOLIC BLOOD PRESSURE: 136 MMHG | HEART RATE: 104 BPM

## 2023-07-13 DIAGNOSIS — Z00.00 HEALTH CARE MAINTENANCE: ICD-10-CM

## 2023-07-13 DIAGNOSIS — R07.9 CHEST PAIN, UNSPECIFIED TYPE: ICD-10-CM

## 2023-07-13 DIAGNOSIS — R41.3 MEMORY LOSS: ICD-10-CM

## 2023-07-13 DIAGNOSIS — F41.9 ANXIETY: ICD-10-CM

## 2023-07-13 DIAGNOSIS — R51.9 NONINTRACTABLE HEADACHE, UNSPECIFIED CHRONICITY PATTERN, UNSPECIFIED HEADACHE TYPE: Primary | ICD-10-CM

## 2023-07-13 PROCEDURE — 3075F SYST BP GE 130 - 139MM HG: CPT | Mod: CPTII,S$GLB,, | Performed by: PHYSICIAN ASSISTANT

## 2023-07-13 PROCEDURE — 1159F MED LIST DOCD IN RCRD: CPT | Mod: CPTII,S$GLB,, | Performed by: PHYSICIAN ASSISTANT

## 2023-07-13 PROCEDURE — 3075F PR MOST RECENT SYSTOLIC BLOOD PRESS GE 130-139MM HG: ICD-10-PCS | Mod: CPTII,S$GLB,, | Performed by: PHYSICIAN ASSISTANT

## 2023-07-13 PROCEDURE — 1159F PR MEDICATION LIST DOCUMENTED IN MEDICAL RECORD: ICD-10-PCS | Mod: CPTII,S$GLB,, | Performed by: PHYSICIAN ASSISTANT

## 2023-07-13 PROCEDURE — 99215 OFFICE O/P EST HI 40 MIN: CPT | Mod: S$GLB,,, | Performed by: PHYSICIAN ASSISTANT

## 2023-07-13 PROCEDURE — 99999 PR PBB SHADOW E&M-EST. PATIENT-LVL V: CPT | Mod: PBBFAC,,, | Performed by: PHYSICIAN ASSISTANT

## 2023-07-13 PROCEDURE — 99999 PR PBB SHADOW E&M-EST. PATIENT-LVL V: ICD-10-PCS | Mod: PBBFAC,,, | Performed by: PHYSICIAN ASSISTANT

## 2023-07-13 PROCEDURE — 3008F BODY MASS INDEX DOCD: CPT | Mod: CPTII,S$GLB,, | Performed by: PHYSICIAN ASSISTANT

## 2023-07-13 PROCEDURE — 1160F RVW MEDS BY RX/DR IN RCRD: CPT | Mod: CPTII,S$GLB,, | Performed by: PHYSICIAN ASSISTANT

## 2023-07-13 PROCEDURE — 3008F PR BODY MASS INDEX (BMI) DOCUMENTED: ICD-10-PCS | Mod: CPTII,S$GLB,, | Performed by: PHYSICIAN ASSISTANT

## 2023-07-13 PROCEDURE — 1160F PR REVIEW ALL MEDS BY PRESCRIBER/CLIN PHARMACIST DOCUMENTED: ICD-10-PCS | Mod: CPTII,S$GLB,, | Performed by: PHYSICIAN ASSISTANT

## 2023-07-13 PROCEDURE — 3079F PR MOST RECENT DIASTOLIC BLOOD PRESSURE 80-89 MM HG: ICD-10-PCS | Mod: CPTII,S$GLB,, | Performed by: PHYSICIAN ASSISTANT

## 2023-07-13 PROCEDURE — 99215 PR OFFICE/OUTPT VISIT, EST, LEVL V, 40-54 MIN: ICD-10-PCS | Mod: S$GLB,,, | Performed by: PHYSICIAN ASSISTANT

## 2023-07-13 PROCEDURE — 3079F DIAST BP 80-89 MM HG: CPT | Mod: CPTII,S$GLB,, | Performed by: PHYSICIAN ASSISTANT

## 2023-07-13 NOTE — PATIENT INSTRUCTIONS
Please call psychiatry/psychology to schedule an appointment at [phone] as we cannot schedule this appointment for you.

## 2023-07-13 NOTE — PROGRESS NOTES
Subjective:       Patient ID: Rema Horton is a 48 y.o. female.        Chief Complaint: Fatigue and food numbness    Rema Horton is an established patient of Britta Clark MD here today for urgent care visit.    Anxious, jumps from one issue to the next and hard to follow at times    Headache -   Top of head and started around 2018  Described as pressure and tingling at times  Occurs most days  Gradually worsening over the years  Saw Dr. Olivas in the past, tried elavil and maxalt and not much help in past  MRI brain normal in 2015    Memory -   Memory loss over time, ongoing for years  Now writing everything down in a journal  NP testing ordered 2019 but not completed    Fibromyalgia, chronic pain -   Meloxicam, baclofen, gabapentin, cymbalta    Anxiety -   Follow with I  She has not seen psychiatry yet but feel it would be helpful and will need ongoing therapy    Focal area of numbness top of foot, no weakness    Chest pain -   Started last year at some point, possibly around 12/2022, not quite sure  Comes/goes  Painful with movement and lifting arms  No associated shortness of breath  Not exertional           Review of Systems   Constitutional:  Positive for activity change. Negative for chills, diaphoresis, fatigue, fever and unexpected weight change.   HENT:  Negative for congestion, hearing loss, rhinorrhea, sore throat and trouble swallowing.    Eyes:  Positive for visual disturbance. Negative for discharge.   Respiratory:  Negative for cough, chest tightness, shortness of breath and wheezing.    Cardiovascular:  Positive for chest pain and palpitations. Negative for leg swelling.   Gastrointestinal:  Positive for constipation. Negative for abdominal pain, blood in stool, diarrhea, nausea and vomiting.   Endocrine: Positive for polydipsia and polyuria.   Genitourinary:  Positive for difficulty urinating. Negative for dysuria, frequency, hematuria, menstrual problem and urgency.  "  Musculoskeletal:  Positive for arthralgias, joint swelling and neck pain. Negative for back pain.   Skin:  Negative for rash.   Neurological:  Positive for weakness and headaches. Negative for dizziness and syncope.   Psychiatric/Behavioral:  Positive for confusion (memory loss) and dysphoric mood. Negative for sleep disturbance. The patient is not nervous/anxious.      Objective:      Physical Exam    Assessment:       1. Nonintractable headache, unspecified chronicity pattern, unspecified headache type    2. Memory loss    3. Anxiety    4. Chest pain, unspecified type    5. Health care maintenance        Plan:       Rema was seen today for fatigue and food numbness.    Diagnoses and all orders for this visit:    Nonintractable headache, unspecified chronicity pattern, unspecified headache type  -     MRI Brain Without Contrast; Future  -     Ambulatory referral/consult to Neurology; Future    Memory loss  -     Neuropsychological testing; Future    Anxiety  -     Ambulatory referral/consult to Psychiatry; Future    Chest pain, unspecified type  -     Stress Echo Which stress agent will be used? Treadmill Exercise; Color Flow Doppler? No; Future    Health care maintenance  -     CBC Auto Differential; Future  -     Comprehensive Metabolic Panel; Future  -     Hemoglobin A1C; Future  -     Lipid Panel; Future  -     TSH; Future  -     Vitamin B12; Future  -     Vitamin D; Future    Will work on getting her a sooner f/u with PCP  Schedule fasting lab work, MRI, stress test  Psychiatry referral placed    Pt has been given instructions populated from patient instructions database and has verbalized understanding of the after visit summary and information contained wherein.    Follow up with a primary care provider. May go to ER for acute shortness of breath, lightheadedness, fever, or any other emergent complaints or changes in condition.    "This note will be shared with the patient"    Future Appointments "   Date Time Provider Department Center   7/20/2023 10:45 AM Lisette Archer LPC [facility] [place]   9/8/2023 10:00 AM Ugo Rodriguez MD [facility] PHYSMED [facility]   11/3/2023 10:00 AM Britta Clark MD [facility] IM [facility] PCW   4/25/2024 10:00 AM RENETTA BURKETT MAMMO2 [facility]O [facility]

## 2023-07-14 ENCOUNTER — LAB VISIT (OUTPATIENT)
Dept: LAB | Facility: HOSPITAL | Age: 48
End: 2023-07-14
Payer: COMMERCIAL

## 2023-07-14 DIAGNOSIS — Z00.00 HEALTH CARE MAINTENANCE: ICD-10-CM

## 2023-07-14 LAB
25(OH)D3+25(OH)D2 SERPL-MCNC: 34 NG/ML (ref 30–96)
ALBUMIN SERPL BCP-MCNC: 3.9 G/DL (ref 3.5–5.2)
ALP SERPL-CCNC: 72 U/L (ref 55–135)
ALT SERPL W/O P-5'-P-CCNC: 15 U/L (ref 10–44)
ANION GAP SERPL CALC-SCNC: 7 MMOL/L (ref 8–16)
AST SERPL-CCNC: 19 U/L (ref 10–40)
BASOPHILS # BLD AUTO: 0.02 K/UL (ref 0–0.2)
BASOPHILS NFR BLD: 0.4 % (ref 0–1.9)
BILIRUB SERPL-MCNC: 0.3 MG/DL (ref 0.1–1)
BUN SERPL-MCNC: 15 MG/DL (ref 6–20)
CALCIUM SERPL-MCNC: 9.4 MG/DL (ref 8.7–10.5)
CHLORIDE SERPL-SCNC: 105 MMOL/L (ref 95–110)
CHOLEST SERPL-MCNC: 213 MG/DL (ref 120–199)
CHOLEST/HDLC SERPL: 3.9 {RATIO} (ref 2–5)
CO2 SERPL-SCNC: 27 MMOL/L (ref 23–29)
CREAT SERPL-MCNC: 0.7 MG/DL (ref 0.5–1.4)
DIFFERENTIAL METHOD: NORMAL
EOSINOPHIL # BLD AUTO: 0.1 K/UL (ref 0–0.5)
EOSINOPHIL NFR BLD: 1.5 % (ref 0–8)
ERYTHROCYTE [DISTWIDTH] IN BLOOD BY AUTOMATED COUNT: 12.6 % (ref 11.5–14.5)
EST. GFR  (NO RACE VARIABLE): >60 ML/MIN/1.73 M^2
ESTIMATED AVG GLUCOSE: 105 MG/DL (ref 68–131)
GLUCOSE SERPL-MCNC: 89 MG/DL (ref 70–110)
HBA1C MFR BLD: 5.3 % (ref 4–5.6)
HCT VFR BLD AUTO: 40 % (ref 37–48.5)
HDLC SERPL-MCNC: 54 MG/DL (ref 40–75)
HDLC SERPL: 25.4 % (ref 20–50)
HGB BLD-MCNC: 13 G/DL (ref 12–16)
IMM GRANULOCYTES # BLD AUTO: 0.01 K/UL (ref 0–0.04)
IMM GRANULOCYTES NFR BLD AUTO: 0.2 % (ref 0–0.5)
LDLC SERPL CALC-MCNC: 142.6 MG/DL (ref 63–159)
LYMPHOCYTES # BLD AUTO: 1.9 K/UL (ref 1–4.8)
LYMPHOCYTES NFR BLD: 40.5 % (ref 18–48)
MCH RBC QN AUTO: 30.3 PG (ref 27–31)
MCHC RBC AUTO-ENTMCNC: 32.5 G/DL (ref 32–36)
MCV RBC AUTO: 93 FL (ref 82–98)
MONOCYTES # BLD AUTO: 0.5 K/UL (ref 0.3–1)
MONOCYTES NFR BLD: 9.9 % (ref 4–15)
NEUTROPHILS # BLD AUTO: 2.2 K/UL (ref 1.8–7.7)
NEUTROPHILS NFR BLD: 47.5 % (ref 38–73)
NONHDLC SERPL-MCNC: 159 MG/DL
NRBC BLD-RTO: 0 /100 WBC
PLATELET # BLD AUTO: 227 K/UL (ref 150–450)
PMV BLD AUTO: 9.9 FL (ref 9.2–12.9)
POTASSIUM SERPL-SCNC: 4.4 MMOL/L (ref 3.5–5.1)
PROT SERPL-MCNC: 7.1 G/DL (ref 6–8.4)
RBC # BLD AUTO: 4.29 M/UL (ref 4–5.4)
SODIUM SERPL-SCNC: 139 MMOL/L (ref 136–145)
TRIGL SERPL-MCNC: 82 MG/DL (ref 30–150)
TSH SERPL DL<=0.005 MIU/L-ACNC: 2.33 UIU/ML (ref 0.4–4)
VIT B12 SERPL-MCNC: >2000 PG/ML (ref 210–950)
WBC # BLD AUTO: 4.67 K/UL (ref 3.9–12.7)

## 2023-07-14 PROCEDURE — 80053 COMPREHEN METABOLIC PANEL: CPT | Performed by: PHYSICIAN ASSISTANT

## 2023-07-14 PROCEDURE — 83036 HEMOGLOBIN GLYCOSYLATED A1C: CPT | Performed by: PHYSICIAN ASSISTANT

## 2023-07-14 PROCEDURE — 82607 VITAMIN B-12: CPT | Performed by: PHYSICIAN ASSISTANT

## 2023-07-14 PROCEDURE — 36415 COLL VENOUS BLD VENIPUNCTURE: CPT | Performed by: PHYSICIAN ASSISTANT

## 2023-07-14 PROCEDURE — 84443 ASSAY THYROID STIM HORMONE: CPT | Performed by: PHYSICIAN ASSISTANT

## 2023-07-14 PROCEDURE — 82306 VITAMIN D 25 HYDROXY: CPT | Performed by: PHYSICIAN ASSISTANT

## 2023-07-14 PROCEDURE — 85025 COMPLETE CBC W/AUTO DIFF WBC: CPT | Performed by: PHYSICIAN ASSISTANT

## 2023-07-14 PROCEDURE — 80061 LIPID PANEL: CPT | Performed by: PHYSICIAN ASSISTANT

## 2023-07-19 ENCOUNTER — TELEPHONE (OUTPATIENT)
Dept: INTERNAL MEDICINE | Facility: CLINIC | Age: 48
End: 2023-07-19
Payer: COMMERCIAL

## 2023-07-19 ENCOUNTER — PATIENT MESSAGE (OUTPATIENT)
Dept: OPTOMETRY | Facility: CLINIC | Age: 48
End: 2023-07-19
Payer: COMMERCIAL

## 2023-07-19 NOTE — TELEPHONE ENCOUNTER
----- Message from Sendy Butcher PA-C sent at 7/13/2023  2:03 PM CDT -----  Can you schedule patient a 4 week f/u with Dr. SCHWARTZ?  Thanks!  Sendy

## 2023-07-20 ENCOUNTER — CLINICAL SUPPORT (OUTPATIENT)
Dept: BEHAVIORAL HEALTH | Facility: CLINIC | Age: 48
End: 2023-07-20
Payer: COMMERCIAL

## 2023-07-20 DIAGNOSIS — F33.1 DEPRESSION, MAJOR, RECURRENT, MODERATE: ICD-10-CM

## 2023-07-20 DIAGNOSIS — F41.9 ANXIETY: Primary | ICD-10-CM

## 2023-07-20 PROCEDURE — 90832 PR PSYCHOTHERAPY W/PATIENT, 30 MIN: ICD-10-PCS | Mod: 95,,, | Performed by: COUNSELOR

## 2023-07-20 PROCEDURE — 90832 PSYTX W PT 30 MINUTES: CPT | Mod: 95,,, | Performed by: COUNSELOR

## 2023-07-20 NOTE — PROGRESS NOTES
Primary Care Behavioral Health Integration: Follow-up  Date:  7/20/2023  Patient Name: Rema Horton  Type of Visit:  Video Session  Site:  [facility]  Referral Source:  Britta Clark MD    Visit type: Virtual visit with synchronous audio and video  The patient location is:  Home  The patient location [place] is: [place]  The patient phone number is: [phone]    Each patient to whom he or she provides medical services by telemedicine is:  (1) informed of the relationship between the physician and patient and the respective role of any other health care provider with respect to management of the patient; and (2) notified that he or she may decline to receive medical services by telemedicine and may withdraw from such care at any time.    History of Present Illness:  Rema Horton, a 48 y.o.  female with history of Anxiety disorders; generalized anxiety disorder [F41.1] and Major Depressive Disorder, Recurrent, Moderate (F33.1) referred by Britta Clark MD.  Patient was seen, examined and chart was reviewed.    Met with patient.     Results of the PHQ8 7/17/2023 7/16/2023 7/3/2023 6/16/2023 5/16/2023 3/21/2023   1. Little interest or pleasure in doing things Nearly every day Nearly every day Nearly every day Nearly every day Nearly every day Nearly every day   2. Feeling down, depressed, or hopeless Nearly every day Nearly every day Nearly every day Nearly every day Several days Several days   3. Trouble falling or staying asleep, or sleeping too much Nearly every day Nearly every day Nearly every day Nearly every day Nearly every day Nearly every day   4. Feeling tired or having little energy Nearly every day Nearly every day Nearly every day Nearly every day Nearly every day Nearly every day   5. poor appetite or overeating More than half the days More than half the days Nearly every day Several days More than half the days More than half the days   6. Feeling bad about yourself - or that you  are a failure or have let yourself or your family down Nearly every day Nearly every day Several days Several days Nearly every day Nearly every day   7. Trouble concentrating on things, such as reading the newspaper or watching television Nearly every day Nearly every day Nearly every day Nearly every day Nearly every day Nearly every day   8. Moving or speaking so slowly that other people could have noticed? Or the opposite - being so fidgety or restless that you have been moving around a lot more than usual More than half the days Nearly every day Nearly every day Nearly every day Nearly every day Nearly every day   Total Score  22 23 22 20 21 21     Depression Patient Health Questionnaire 5/4/2023 3/3/2023 5/20/2022 3/3/2022 6/4/2021 6/4/2021 1/10/2020   Over the last two weeks how often have you been bothered by little interest or pleasure in doing things Not at all Nearly every day Not at all Not at all Not at all Not at all Nearly every day   Over the last two weeks how often have you been bothered by feeling down, depressed or hopeless Not at all Nearly every day Not at all Several days Not at all Not at all Nearly every day   PHQ-2 Total Score 0 6 0 1 0 0 6   PHQ-9 Total Score - 17 - - - - 18   PHQ-9 Interpretation - Moderately Severe - - - - -      GAD7 7/17/2023 7/16/2023 7/3/2023   1. Feeling nervous, anxious, or on edge? 3 3 3   2. Not being able to stop or control worrying? 3 3 3   3. Worrying too much about different things? 3 3 3   4. Trouble relaxing? 3 3 2   5. Being so restless that it is hard to sit still? 2 3 1   6. Becoming easily annoyed or irritable? 3 3 1   7. Feeling afraid as if something awful might happen? 3 3 3   8. If you checked off any problems, how difficult have these problems made it for you to do your work, take care of things at home, or get along with other people? - - -   MARIMAR-7 Score 20 21 16       Treatment plan:  Target symptoms: depression, anxiety , physical pain  Why  chosen therapy is appropriate versus another modality: relevant to diagnosis  Outcome monitoring methods: self-report  Therapeutic intervention type: supportive psychotherapy    Risk parameters:  Patient reports no suicidal ideation  Patient reports no homicidal ideation  Patient reports no self-injurious behavior  Patient reports no violent behavior    Verbal deficits: None    Patient's response to intervention:  The patient's response to intervention is accepting.    Progress toward goals and other mental status changes:  The patient's progress toward goals is limited.    Patient advised to call [phone] or present the the nearest ED if they experience suicidal or homicidal ideation, plan or intent.       Impression:  My diagnostic impression: Patient reported still suffering from physical pain in her body especially her legs. Is scheduled for a MRI soon. Has been busy with her special needs daughter going to doctor's appts and follow-up care. Described having social anxiety since childhood. Will check if she needs a referral for psychiatry as she has 2 visits left in [facility].    Diagnosis:   Anxiety disorders; generalized anxiety disorder [F41.1] and Major Depressive Disorder, Recurrent, Moderate (F33.1)    Treatment Goals and Plan: Pt plans to continue CBT and Solution-focused Therapy    Future treatment will utilize CBT and Solution-focused Therapy    Return to Clinic: 2 weeks    Length of Appointment: 30 Non face-to-face clinical time: 15

## 2023-07-28 ENCOUNTER — HOSPITAL ENCOUNTER (OUTPATIENT)
Dept: RADIOLOGY | Facility: HOSPITAL | Age: 48
Discharge: HOME | End: 2023-07-28
Attending: PHYSICIAN ASSISTANT
Payer: COMMERCIAL

## 2023-07-28 DIAGNOSIS — R51.9 NONINTRACTABLE HEADACHE, UNSPECIFIED CHRONICITY PATTERN, UNSPECIFIED HEADACHE TYPE: ICD-10-CM

## 2023-07-28 PROCEDURE — 70551 MRI BRAIN STEM W/O DYE: CPT | Mod: 26,,, | Performed by: RADIOLOGY

## 2023-07-28 PROCEDURE — 70551 MRI BRAIN STEM W/O DYE: CPT | Mod: TC

## 2023-07-28 PROCEDURE — 70551 MRI BRAIN WITHOUT CONTRAST: ICD-10-PCS | Mod: 26,,, | Performed by: RADIOLOGY

## 2023-07-31 ENCOUNTER — PATIENT MESSAGE (OUTPATIENT)
Dept: INTERNAL MEDICINE | Facility: CLINIC | Age: 48
End: 2023-07-31
Payer: COMMERCIAL

## 2023-07-31 ENCOUNTER — TELEPHONE (OUTPATIENT)
Dept: INTERNAL MEDICINE | Facility: CLINIC | Age: 48
End: 2023-07-31
Payer: COMMERCIAL

## 2023-07-31 DIAGNOSIS — R07.9 CHEST PAIN, UNSPECIFIED TYPE: Primary | ICD-10-CM

## 2023-07-31 NOTE — TELEPHONE ENCOUNTER
Stress echo denied by insurance.   Will change to exercise EKG stress test instead. Please cancel the stress echo and schedule the new one.

## 2023-08-01 DIAGNOSIS — M79.7 FIBROMYALGIA SYNDROME: ICD-10-CM

## 2023-08-01 RX ORDER — DULOXETIN HYDROCHLORIDE 30 MG/1
30 CAPSULE, DELAYED RELEASE ORAL
Qty: 90 CAPSULE | Refills: 2 | Status: SHIPPED | OUTPATIENT
Start: 2023-08-01 | End: 2023-10-13

## 2023-08-02 RX ORDER — VALACYCLOVIR HYDROCHLORIDE 500 MG/1
TABLET, FILM COATED ORAL
Qty: 30 TABLET | Refills: 1 | Status: SHIPPED | OUTPATIENT
Start: 2023-08-02 | End: 2023-10-18

## 2023-08-02 NOTE — TELEPHONE ENCOUNTER
Refill Routing Note   Medication(s) are not appropriate for processing by [facility] for the following reason(s):      No active prescription written by provider    [facility] action(s):  Route Care Due:  None identified            Appointments  past 12m or future 3m with PCP    Date Provider   Last Visit   5/12/2023 Britta Clark MD   Next Visit   8/18/2023 Britta Clark MD   ED visits in past 90 days: 0        Note composed:7:31 AM 08/02/2023

## 2023-08-03 ENCOUNTER — HOSPITAL ENCOUNTER (OUTPATIENT)
Dept: CARDIOLOGY | Facility: HOSPITAL | Age: 48
Discharge: HOME | End: 2023-08-03
Attending: INTERNAL MEDICINE
Payer: COMMERCIAL

## 2023-08-03 ENCOUNTER — TELEPHONE (OUTPATIENT)
Dept: NEUROLOGY | Facility: CLINIC | Age: 48
End: 2023-08-03
Payer: COMMERCIAL

## 2023-08-03 VITALS — HEIGHT: 62 IN | BODY MASS INDEX: 30.36 KG/M2 | WEIGHT: 165 LBS

## 2023-08-03 DIAGNOSIS — R07.9 CHEST PAIN, UNSPECIFIED TYPE: ICD-10-CM

## 2023-08-03 LAB
CV STRESS BASE HR: 72 BPM
DIASTOLIC BLOOD PRESSURE: 80 MMHG
OHS CV CPX 1 MINUTE RECOVERY HEART RATE: 114 BPM
OHS CV CPX 85 PERCENT MAX PREDICTED HEART RATE MALE: 139
OHS CV CPX ESTIMATED METS: 12
OHS CV CPX MAX PREDICTED HEART RATE: 164
OHS CV CPX PATIENT IS FEMALE: 1
OHS CV CPX PATIENT IS MALE: 0
OHS CV CPX PEAK DIASTOLIC BLOOD PRESSURE: 71 MMHG
OHS CV CPX PEAK HEAR RATE: 150 BPM
OHS CV CPX PEAK RATE PRESSURE PRODUCT: NORMAL
OHS CV CPX PEAK SYSTOLIC BLOOD PRESSURE: 130 MMHG
OHS CV CPX PERCENT MAX PREDICTED HEART RATE ACHIEVED: 92
OHS CV CPX RATE PRESSURE PRODUCT PRESENTING: 8064
STRESS ECHO POST EXERCISE DUR MIN: 7 MINUTES
STRESS ECHO POST EXERCISE DUR SEC: 0 SECONDS
SYSTOLIC BLOOD PRESSURE: 112 MMHG

## 2023-08-03 PROCEDURE — 93016 CV STRESS TEST SUPVJ ONLY: CPT | Mod: ,,, | Performed by: INTERNAL MEDICINE

## 2023-08-03 PROCEDURE — 93016 EXERCISE STRESS - EKG (CUPID ONLY): ICD-10-PCS | Mod: ,,, | Performed by: INTERNAL MEDICINE

## 2023-08-03 PROCEDURE — 93017 CV STRESS TEST TRACING ONLY: CPT

## 2023-08-03 PROCEDURE — 93018 EXERCISE STRESS - EKG (CUPID ONLY): ICD-10-PCS | Mod: ,,, | Performed by: INTERNAL MEDICINE

## 2023-08-03 PROCEDURE — 93018 CV STRESS TEST I&R ONLY: CPT | Mod: ,,, | Performed by: INTERNAL MEDICINE

## 2023-08-03 NOTE — TELEPHONE ENCOUNTER
Called Pt to reschedule / cancel her appt since it was scheduled incorrectly. I was unable to speak with her but left a vm.

## 2023-08-04 ENCOUNTER — CLINICAL SUPPORT (OUTPATIENT)
Dept: BEHAVIORAL HEALTH | Facility: CLINIC | Age: 48
End: 2023-08-04
Payer: COMMERCIAL

## 2023-08-04 DIAGNOSIS — F33.1 DEPRESSION, MAJOR, RECURRENT, MODERATE: ICD-10-CM

## 2023-08-04 DIAGNOSIS — F41.9 ANXIETY: Primary | ICD-10-CM

## 2023-08-04 PROCEDURE — 90832 PSYTX W PT 30 MINUTES: CPT | Mod: 95,,, | Performed by: COUNSELOR

## 2023-08-04 PROCEDURE — 90832 PR PSYCHOTHERAPY W/PATIENT, 30 MIN: ICD-10-PCS | Mod: 95,,, | Performed by: COUNSELOR

## 2023-08-16 ENCOUNTER — PATIENT MESSAGE (OUTPATIENT)
Dept: BEHAVIORAL HEALTH | Facility: CLINIC | Age: 48
End: 2023-08-16
Payer: COMMERCIAL

## 2023-08-16 ENCOUNTER — TELEPHONE (OUTPATIENT)
Dept: BEHAVIORAL HEALTH | Facility: CLINIC | Age: 48
End: 2023-08-16
Payer: COMMERCIAL

## 2023-08-16 NOTE — PROGRESS NOTES
CHW reached out to pt to remind her of virtual appointment with Dr. Lisette Archer, LPC tomorrow. No answer, LVM. Sent portal message of the appointment.

## 2023-08-17 ENCOUNTER — CLINICAL SUPPORT (OUTPATIENT)
Dept: BEHAVIORAL HEALTH | Facility: CLINIC | Age: 48
End: 2023-08-17
Payer: COMMERCIAL

## 2023-08-17 DIAGNOSIS — F33.1 DEPRESSION, MAJOR, RECURRENT, MODERATE: ICD-10-CM

## 2023-08-17 DIAGNOSIS — F41.9 ANXIETY: Primary | ICD-10-CM

## 2023-08-17 PROCEDURE — 90832 PSYTX W PT 30 MINUTES: CPT | Mod: 95,,, | Performed by: COUNSELOR

## 2023-08-17 PROCEDURE — 90832 PR PSYCHOTHERAPY W/PATIENT, 30 MIN: ICD-10-PCS | Mod: 95,,, | Performed by: COUNSELOR

## 2023-08-17 NOTE — PROGRESS NOTES
Primary Care Behavioral Health Integration: Follow-up  Date:  8/17/2023  Patient Name: Rema Horton  Type of Visit:  Video Session  Site:  [facility]  Referral Source:  Britta Clark MD    Visit type: Virtual visit with synchronous audio and video  The patient location is:  Home  The patient location [place] is: [facility]  The patient phone number is: [phone]    Each patient to whom he or she provides medical services by telemedicine is:  (1) informed of the relationship between the physician and patient and the respective role of any other health care provider with respect to management of the patient; and (2) notified that he or she may decline to receive medical services by telemedicine and may withdraw from such care at any time.    History of Present Illness:  Rema Horton, a 48 y.o.  female with history of Anxiety disorders; generalized anxiety disorder [F41.1] and Major Depressive Disorder, Recurrent, Moderate (F33.1) referred by Britta Clark MD.  Patient was seen, examined and chart was reviewed.    Met with patient.     Results of the PHQ8 8/16/2023 8/1/2023 7/17/2023 7/16/2023 7/3/2023 6/16/2023 5/16/2023   1. Little interest or pleasure in doing things Nearly every day Nearly every day Nearly every day Nearly every day Nearly every day Nearly every day Nearly every day   2. Feeling down, depressed, or hopeless Nearly every day Nearly every day Nearly every day Nearly every day Nearly every day Nearly every day Several days   3. Trouble falling or staying asleep, or sleeping too much Several days Nearly every day Nearly every day Nearly every day Nearly every day Nearly every day Nearly every day   4. Feeling tired or having little energy Nearly every day Nearly every day Nearly every day Nearly every day Nearly every day Nearly every day Nearly every day   5. poor appetite or overeating Nearly every day Nearly every day More than half the days More than half the days Nearly  every day Several days More than half the days   6. Feeling bad about yourself - or that you are a failure or have let yourself or your family down Nearly every day Nearly every day Nearly every day Nearly every day Several days Several days Nearly every day   7. Trouble concentrating on things, such as reading the newspaper or watching television Nearly every day Nearly every day Nearly every day Nearly every day Nearly every day Nearly every day Nearly every day   8. Moving or speaking so slowly that other people could have noticed? Or the opposite - being so fidgety or restless that you have been moving around a lot more than usual Nearly every day Nearly every day More than half the days Nearly every day Nearly every day Nearly every day Nearly every day   Total Score  22 24 22 23 22 20 21     Depression Patient Health Questionnaire 5/4/2023 3/3/2023 5/20/2022 3/3/2022 6/4/2021 6/4/2021 1/10/2020   Over the last two weeks how often have you been bothered by little interest or pleasure in doing things Not at all Nearly every day Not at all Not at all Not at all Not at all Nearly every day   Over the last two weeks how often have you been bothered by feeling down, depressed or hopeless Not at all Nearly every day Not at all Several days Not at all Not at all Nearly every day   PHQ-2 Total Score 0 6 0 1 0 0 6   PHQ-9 Total Score - 17 - - - - 18   PHQ-9 Interpretation - Moderately Severe - - - - -      GAD7 8/16/2023 8/1/2023 7/17/2023   1. Feeling nervous, anxious, or on edge? 3 3 3   2. Not being able to stop or control worrying? 3 3 3   3. Worrying too much about different things? 3 3 3   4. Trouble relaxing? 2 3 3   5. Being so restless that it is hard to sit still? 2 3 2   6. Becoming easily annoyed or irritable? 3 2 3   7. Feeling afraid as if something awful might happen? 3 3 3   8. If you checked off any problems, how difficult have these problems made it for you to do your work, take care of things at home,  or get along with other people? - - -   MARIMAR-7 Score 19 20 20       Treatment plan:  Target symptoms: depression, anxiety   Why chosen therapy is appropriate versus another modality: relevant to diagnosis  Outcome monitoring methods: self-report  Therapeutic intervention type: supportive psychotherapy    Risk parameters:  Patient reports no suicidal ideation  Patient reports no homicidal ideation  Patient reports no self-injurious behavior  Patient reports no violent behavior    Verbal deficits: None    Patient's response to intervention:  The patient's response to intervention is accepting.    Progress toward goals and other mental status changes:  The patient's progress toward goals is limited.    Patient advised to call [phone] or present the the nearest ED if they experience suicidal or homicidal ideation, plan or intent.       Impression:  My diagnostic impression: Patient discussed feeling overwhelmed with doctor's appointments for herself and her daughter. Having difficulty adjusting to sleep apnea mask. Taking off mask during the night. Will ask about changing mask. Still in a lot of pain in her right arm and legs. Legs still go numb and she feels pins and needles in legs. Already seeing someone in psychiatry. Last appointment with [facility].    Diagnosis:   Anxiety disorders; generalized anxiety disorder [F41.1] and Major Depressive Disorder, Recurrent, Moderate (F33.1)    Last visit with [facility]. Already seeing someone in psychiatry.    Length of Appointment: 30 Non face-to-face clinical time: 15

## 2023-08-18 ENCOUNTER — OFFICE VISIT (OUTPATIENT)
Dept: INTERNAL MEDICINE | Facility: CLINIC | Age: 48
End: 2023-08-18
Payer: COMMERCIAL

## 2023-08-18 VITALS
WEIGHT: 164 LBS | DIASTOLIC BLOOD PRESSURE: 82 MMHG | SYSTOLIC BLOOD PRESSURE: 121 MMHG | OXYGEN SATURATION: 99 % | HEART RATE: 82 BPM | HEIGHT: 61 IN | BODY MASS INDEX: 30.96 KG/M2

## 2023-08-18 DIAGNOSIS — G47.30 SLEEP APNEA, UNSPECIFIED TYPE: ICD-10-CM

## 2023-08-18 DIAGNOSIS — F33.1 DEPRESSION, MAJOR, RECURRENT, MODERATE: Primary | ICD-10-CM

## 2023-08-18 DIAGNOSIS — F41.9 ANXIETY: ICD-10-CM

## 2023-08-18 DIAGNOSIS — M79.7 FIBROMYALGIA: ICD-10-CM

## 2023-08-18 PROCEDURE — 1160F RVW MEDS BY RX/DR IN RCRD: CPT | Mod: CPTII,S$GLB,, | Performed by: INTERNAL MEDICINE

## 2023-08-18 PROCEDURE — 3044F PR MOST RECENT HEMOGLOBIN A1C LEVEL <7.0%: ICD-10-PCS | Mod: CPTII,S$GLB,, | Performed by: INTERNAL MEDICINE

## 2023-08-18 PROCEDURE — 3008F PR BODY MASS INDEX (BMI) DOCUMENTED: ICD-10-PCS | Mod: CPTII,S$GLB,, | Performed by: INTERNAL MEDICINE

## 2023-08-18 PROCEDURE — 3079F PR MOST RECENT DIASTOLIC BLOOD PRESSURE 80-89 MM HG: ICD-10-PCS | Mod: CPTII,S$GLB,, | Performed by: INTERNAL MEDICINE

## 2023-08-18 PROCEDURE — 3044F HG A1C LEVEL LT 7.0%: CPT | Mod: CPTII,S$GLB,, | Performed by: INTERNAL MEDICINE

## 2023-08-18 PROCEDURE — 3074F PR MOST RECENT SYSTOLIC BLOOD PRESSURE < 130 MM HG: ICD-10-PCS | Mod: CPTII,S$GLB,, | Performed by: INTERNAL MEDICINE

## 2023-08-18 PROCEDURE — 1160F PR REVIEW ALL MEDS BY PRESCRIBER/CLIN PHARMACIST DOCUMENTED: ICD-10-PCS | Mod: CPTII,S$GLB,, | Performed by: INTERNAL MEDICINE

## 2023-08-18 PROCEDURE — 1159F PR MEDICATION LIST DOCUMENTED IN MEDICAL RECORD: ICD-10-PCS | Mod: CPTII,S$GLB,, | Performed by: INTERNAL MEDICINE

## 2023-08-18 PROCEDURE — 3008F BODY MASS INDEX DOCD: CPT | Mod: CPTII,S$GLB,, | Performed by: INTERNAL MEDICINE

## 2023-08-18 PROCEDURE — 99999 PR PBB SHADOW E&M-EST. PATIENT-LVL IV: CPT | Mod: PBBFAC,,, | Performed by: INTERNAL MEDICINE

## 2023-08-18 PROCEDURE — 99214 PR OFFICE/OUTPT VISIT, EST, LEVL IV, 30-39 MIN: ICD-10-PCS | Mod: S$GLB,,, | Performed by: INTERNAL MEDICINE

## 2023-08-18 PROCEDURE — 1159F MED LIST DOCD IN RCRD: CPT | Mod: CPTII,S$GLB,, | Performed by: INTERNAL MEDICINE

## 2023-08-18 PROCEDURE — 3074F SYST BP LT 130 MM HG: CPT | Mod: CPTII,S$GLB,, | Performed by: INTERNAL MEDICINE

## 2023-08-18 PROCEDURE — 3079F DIAST BP 80-89 MM HG: CPT | Mod: CPTII,S$GLB,, | Performed by: INTERNAL MEDICINE

## 2023-08-18 PROCEDURE — 99214 OFFICE O/P EST MOD 30 MIN: CPT | Mod: S$GLB,,, | Performed by: INTERNAL MEDICINE

## 2023-08-18 PROCEDURE — 99999 PR PBB SHADOW E&M-EST. PATIENT-LVL IV: ICD-10-PCS | Mod: PBBFAC,,, | Performed by: INTERNAL MEDICINE

## 2023-08-18 NOTE — PROGRESS NOTES
Subjective:       Patient ID: Rema Horton is a 48 y.o. female.    Chief Complaint: Follow-up (1 mth follow up )    HPI  48 y.o. female here for follow up of    Iron deficiency; last hgb 13; previously w anemia  Previously w heavy periods. Iron when having period but none in months.      Fibromyalgia, anxiety, depression  Duloxetine 30mg daily  meloxicam  Gabapentin 300mg bid (only takes qhs; am makes her too drowsy)  [facility] appt w Candace Alex previously.  She is coming to realize just how much anxiety has affected her her whole life  Continues to have intermittent issues with her memory.   NP testing is scheduled for September and new psychiatry appt in October.      Numbness in feet, sensitive to touch.   Gabapentin just qhs, too strong for her to take a daytime dosage.    Chest pain  Stress echo ordered; not covered so  Stress ekg done 7/31/23 & was wnl    Right lateral arm pain - radiates to ring and pinky finger. Worse in am.     Perimenopausal w hot flashes.  Previously took black cohosh; plans to go back. Lots of sweating at nights. Has to have a fan on her.  Venlfaxine started 8/2022 but stopped and now on duloxetine instead for fibromyalgia     jonny  She has started cpap and fatigue seems to be not as bad as before.      Headache -   Top of head and started around 2018  Described as pressure and tingling at times  Occurs most days  Gradually worsening over the years  Saw Dr. Olivas in the past, tried elavil and maxalt and not much help in past  MRI brain normal in 2015  MRI brain 7/28/23: normal  Neurology appt is scheduled.     Memory -   Memory loss over time, ongoing for years  Now writing everything down in a journal  NP testing ordered 2019 but not completed    Daughter, Ernestina, is 10yo w special needs.  Son is 14 yo.  She was born in [place]. Recalls seeing psychologist as a child there but not sure why. Came to the [place] in [place]. She was raised by her (paternal) grandmother. 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 In 2015 she started doing meditation.  She reports that her maternal grandmother had memory issues.     Review of Systems   Constitutional:  Negative for fever.   Respiratory:  Negative for shortness of breath.    Cardiovascular:  Negative for chest pain.   Musculoskeletal: Negative.    Skin: Negative.        Objective:   /82 (BP Location: Right arm, Patient Position: Sitting, BP Method: Medium (Manual))   Pulse 82   Ht 5' 1" (1.549 m)   Wt 74.4 kg (164 lb 0.4 oz)   SpO2 99%   BMI 30.99 kg/m²      Physical Exam  Constitutional:       General: She is not in acute distress.     Appearance: She is well-developed. She is not diaphoretic.   HENT:      Head: Normocephalic and atraumatic.   Cardiovascular:      Rate and Rhythm: Normal rate and regular rhythm.   Pulmonary:      Effort: Pulmonary effort is normal. No respiratory distress.      Breath sounds: No wheezing or rales.   Skin:     General: Skin is warm and dry.   Neurological:      Mental Status: She is alert and oriented to person, place, and time.   Psychiatric:         Behavior: Behavior normal.         Assessment:       1. Depression, major, recurrent, moderate    2. Anxiety    3. Sleep apnea, unspecified type    4. Fibromyalgia        Plan:       1. Depression, major, recurrent, moderate  2. Anxiety  3. Sleep apnea, unspecified type  4. Fibromyalgia  - stable and controlled  - continue current regimen     38 minutes of total time spent on the encounter - this includes face to face time and non-face to face time preparing to see the patient (eg, review of tests), obtaining and/or reviewing separately obtained history, documenting clinical information in the electronic or other health record, independently interpreting results (not separately reported) and communicating results to the patient/family/caregiver, and/or care coordination (not separately reported.)        You are up to date for your primary preventive " health care, and there are no reminders at this time.

## 2023-08-22 NOTE — PROGRESS NOTES
NEUROPSYCHOLOGY CONSULT  [facility]      Referral Information  Name: Rema Horton    : 1975  Age: 48 y.o.    Race: White    Gender: female     MRN: [id number]  Handedness: Right  Education: 10 years      Referring Provider:   Britta Clark MD  Referral Reason/Medical Necessity: Ms. Horton has a history of anxiety, depression, PERFECTO, fibromyalgia, and memory loss. Neuropsychological evaluation was requested to assess current cognitive functioning, aid in differential diagnosis, and provide treatment recommendations.    Consent/Emergency Plan: The patient expressed an understanding of the purpose of the evaluation and consented to all procedures. I informed the patient of limits to confidentiality and discussed an emergency plan.   Procedures/Billing: Please see billing table at the end of this report.  Referral Diagnosis: R41.3 (ICD-10-CM) - Memory loss  Telemedicine:   Established Patient - Audio Only Telehealth Visit  The patient location is: [place]   The chief complaint leading to consultation is: R41.3 (ICD-10-CM) - Memory loss  Visit type: Virtual visit with audio only (telephone)  Total time spent with patient: 53 min  The reason for the audio only service rather than synchronous audio and video virtual visit was related to technical difficulties or patient preference/necessity.  Each patient to whom I provide medical services by telemedicine is:  (1) informed of the relationship between the physician and patient and the respective role of any other health care provider with respect to management of the patient; and (2) notified that they may decline to receive medical services by telemedicine and may withdraw from such care at any time. Patient verbally consented to receive this service via voice-only telephone call.      SUMMARY    Ms. Horton is a 48 y.o. White female with a history of very high anxiety, depression, PERFECTO, fibromyalgia, PN, iron deficiency anemia presenting for  evaluation of cognitive complaints. Pt reports worsening cognitive symptoms since 2010 following the birth of her first child, and worse with stress/anxiety. Neuroimaging is unremarkable. Reversible dementia labs are WNL. She is on baclofen and gabapentin for pain. Sleep is poor, she has been unable to tolerate CPAP. Pt reports significant longstanding anxiety with panic attacks and some depression. Also reports daily struggles with chronic pain and fatigue. Pt is Fully oriented, a detailed historian, and remains functionally independent. No immediate safety concerns.     Based on our conversation today, highest suspicion for non-neurologic etiology - her anxiety levels are extremely high, which could explain her attention-based complaints that began in her 30's. Likely also some contribution from poor sleep, chronic pain, fatigue.       IMPRESSIONS      1. Anxiety        2. Depression, major, recurrent, moderate        3. Panic disorder        4. Signs and symptoms involving cognition        5. Primary insomnia        6. Obstructive sleep apnea syndrome        7. Pain            PLAN     Ms. Horton is scheduled for testing. Full report to follow.       Thank you for allowing me to participate in Ms. Horton's care.  If you have any questions, please contact me.    Hannah Holman PsyD  Clinical Neuropsychologist  Department of Neurology  [facility]        SUBJECTIVE:     HISTORY OF PRESENT ILLNESS   Ms. Horton is a 48 y.o. White female with a history of iron deficiency anemia, fibromyalgia, MDD, anxiety, PN, perimenopause, PERFECTO, HA,  referred for neuropsychological evaluation.     Daughter, 11 with special needs  Born in [place], been in the [place] since [place]  Maternal grandmother had memory issues  Ongoing memory issues for years, writing everything down  Referred for testing in 2019 but did not complete     Still feels very anxious, gets anxious when she has to talk on the phone to someone new.   Notes she  "doesn't speak with many people outside of family and doctors.   Gets very anxious when she has to handle cash, her "mind goes blank"    Cognitive Symptoms:  Has to write things down in a journal now. Forgets what she needs at the store. Misplaces things around the house like her glasses and phone.     Onset/Course: Ms. Vuong symptoms were gradual in onset around 2010 around the birth of her first child and are worsening. Does recall some memory issues from childhood as well, often associated with anxiety. Since then, symptoms seem to get worse with stress. Some medications have also seemed to make her memory worse over the years, she notes she is very sensitive to medications. No waxing/waning of cognitive status.  Medication for Cognition: None    Sleep: decreased   Pattern: has difficulty falling asleep and has restless sleep, wakes up very easily   PERFECTO: Diagnosed with PERFECTO, has had difficulty tolerating CPAP mask, tends to take the mask off at night though this is getting a little better. Gained weight during first pregnancy, began snoring. Does feel fatigued during the day still. Often gets in bed very early due to fatigue.   Appetite: normal. Typical appetite is not very high, but no change.   Energy: decreased     Mood:  Treatment History: Saw a psychologist as a child in [place] but she isn't sure why. Thinks she was on medications. Does report she was a very anxious child. Didn't speak much, had some issues with pronunciation.   Self-Described Mood: anxious   Depressed Mood: Endorsed   Anxiety: Endorsed very high anxiety, especially in stores, public areas.  When she has to pay, often will find her hands are shaking.   Panic Attacks: Endorsed in a variety of situations   Current Stressors: daughter has disabilities, this is stressful   SI/HI: Denied   Psychiatric Hospitalization: Denied      Neuropsychiatric:  Personality Change: Denied    Delusional/Paranoid Thinking: Denied  Hallucinations: " Denied      Physical Sx:  Motor:  Denied abnormalities    Gait:  Pt ambulates independently. Does feel like it's harder for her to get going in the mornings due to pain and numbness in R leg   Sensory: No change to taste, smell, hearing or vision. R leg numbness  Pain: Ms. Horton described typical daily pain at a 5 or 6 on a scale of 1-10, with 10 being worst.     Current Functioning (I/ADLs):  ADLs: Independent   IADLs:  Finances: Independent   Medication Mgmt:Independent   Driving: Independent   Household Mgmt: Independent but shopping is very stressful for her     RELEVANT HISTORY:  Prior Testing:  None    Neurologic History  Seizures: Denied  Stroke: Denied  TBI: Denied    Substance Use History:  Social History     Tobacco Use    Smoking status: Never    Smokeless tobacco: Never   Substance and Sexual Activity    Alcohol use: Not Currently     Alcohol/week: 0.0 standard drinks of alcohol     Comment: Occasionally.    Drug use: No    Sexual activity: Yes     Partners: Male     Birth control/protection: None       Family History:  Family History   Problem Relation Age of Onset    Cancer Mother         bladder (not sure if was actually gynecologic)    No Known Problems Father     Stroke Maternal Grandfather     Breast cancer Paternal Grandmother     Amblyopia Daughter     Chromosomal disorder Daughter     No Known Problems Son     Melanoma Neg Hx     Ovarian cancer Neg Hx     Colon cancer Neg Hx     Colon polyps Neg Hx     Esophageal cancer Neg Hx     Stomach cancer Neg Hx     Rectal cancer Neg Hx     Blindness Neg Hx     Glaucoma Neg Hx     Macular degeneration Neg Hx     Retinal detachment Neg Hx      Family Neurologic History: Negative for heritable risk factors   Family Psychiatric History: Negative for heritable risk factors     Developmental/Academic Hx:  Developmental: Born and raised in [place]. Moved to the  in 1994. Normal developmental history. Bengali is her first language, she speaks both English and  Italian at home though more English.   Academic:  Learning Difficulties: Grades were low growing up. Difficulty with speech/pronunciation as a kid. She was also very anxious as a kid. Never repeated a grade.  Attention Difficulties: Endorsed difficulty with attention, used to talk a lot with friends and get in trouble. Never diagnosed with or treated for ADHD.  Educational Attainment: Completed 10th grade, left school in 11th grade. Left because she was so anxious being outside of her house. Cried every day. Earned GED equivalent later, but admits this was very difficult.     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 Later did [employer] type work, she admits she had a very difficult time due to anxiety. Left after she had her first child in 2010  Social:  Resides: at home with , daughter and son   Family Status: , two children   Social Support:  Pt endorses adequate social support from her family, but does not talk to many people outside of her family. Much of her extended family lives in [place].   Daily Activities: takes care of daughter, goes to doctor appointments       Objective:     Neurodiagnostics:  Results for orders placed or performed during the hospital encounter of 07/28/23   MRI Brain Without Contrast    Narrative    EXAMINATION:  MRI BRAIN WITHOUT CONTRAST    CLINICAL HISTORY:  headache;Headache, unspecified    TECHNIQUE:  Multiplanar multisequence MR imaging of the brain was performed without intravenous contrast.    COMPARISON:  MRI brain 10/20/2015.    FINDINGS:  Intracranial Compartment:    Ventricles are normal in size for age without evidence of hydrocephalus.    The brain parenchyma maintains normal signal intensity.  No mass effect, hemorrhage, or recent or remote major vascular distribution infarct.    No extra-axial blood  or fluid collections.    Normal vascular flow voids are preserved at the skull base.    Skull/Extracranial Contents (limited evaluation):    Bone marrow signal intensity is normal.  Visualized paranasal sinuses and mastoid air cells are clear.      Impression    No evidence of acute intracranial pathology.  Normal MR appearance of the brain.    Electronically signed by resident: Umberto Edward  Date:    07/28/2023  Time:    13:02    Electronically signed by: Greg Marie  Date:    07/28/2023  Time:    14:29   Results for orders placed or performed during the hospital encounter of 10/20/15   MRI Brain W WO Contrast    Narrative    Procedure comments: Multiplanar, multi-sequence MRI images of the brain were obtained pre-and post the administration of 7 cc of Gadavist IV contrast.    Comparison: None    Findings:    The brain exhibits a normal architecture without congenital malformation. No intracranial mass lesion, hemorrhage, or infarction is detected. There is no restricted diffusion to suggest acute ischemia.  The ventricles are normal in size without hydrocephalus.  No extra-axial or extracranial abnormalities are detected.    The globes, orbits, pituitary gland, pineal gland, craniocervical junction, and upper cervical junction are normal in configuration. After the administration of contrast, there are no abnormal areas of enhancement.  The major vascular flow voids are patent.    Impression         Normal brain MRI.  ______________________________________     Electronically signed by resident: Kathy Manning  Date:     10/20/15  Time:    13:51          As the supervising and teaching physician, I personally reviewed the images and resident's interpretation and I agree with the findings.          Electronically signed by: Dr. Jono Gomez MD  Date:     10/20/15  Time:    14:26        Pertinent Labs:  Lab Results   Component Value Date    VZXTNLEU85 >2000 (H) 07/14/2023     No results found for:  ""VITAMINB1"  Lab Results   Component Value Date    RPR Non-Reactive 03/15/2012     Lab Results   Component Value Date    FOLATE 16.4 09/12/2018     Lab Results   Component Value Date    TSH 2.329 07/14/2023     Lab Results   Component Value Date    CALCIUM 9.4 07/14/2023      Lab Results   Component Value Date    HGBA1C 5.3 07/14/2023     Lab Results   Component Value Date    HIV1X2 Negative 03/15/2012           Current Outpatient Medications:     baclofen (LIORESAL) 10 MG tablet, TAKE 1 TABLET (10 MG TOTAL) BY MOUTH DAILY AS NEEDED (SEVERE PAIN/MUSCLE ACHE)., Disp: 30 tablet, Rfl: 2    cholecalciferol, vitamin D3, (VITAMIN D3) 25 mcg (1,000 unit) capsule, TAKE 2 CAPSULES (2,000 UNITS TOTAL) BY MOUTH ONCE DAILY., Disp: 180 capsule, Rfl: 3    DULoxetine (CYMBALTA) 30 MG capsule, TAKE 1 CAPSULE BY MOUTH EVERY DAY, Disp: 90 capsule, Rfl: 2    xavier prim-linoleic-gamolenic ac (PRIMROSE OIL) 1,000 mg Cap, , Disp: , Rfl:     fluocinolone (SYNALAR) 0.01 % external solution, AAA scalp qday - bid prn itching or scaling, Disp: 60 mL, Rfl: 3    fluticasone propionate (FLONASE) 50 mcg/actuation nasal spray, SPRAY 1 SPRAY INTO EACH NOSTRIL TWICE A DAY, Disp: 16 mL, Rfl: 5    gabapentin (NEURONTIN) 300 MG capsule, Take 1 capsule (300 mg total) by mouth 2 (two) times daily., Disp: , Rfl:     ketoconazole (NIZORAL) 2 % shampoo, Wash hair with medicated shampoo at least 2x/week - let sit on scalp at least 5 minutes prior to rinsing, Disp: 120 mL, Rfl: 5    Lactobacillus acidophilus (PROBIOTIC ORAL), Take by mouth once daily., Disp: , Rfl:     meloxicam (MOBIC) 7.5 MG tablet, Take 1 tablet (7.5 mg total) by mouth once daily. In 1-2 weeks, if no relief, may increase dose to two tablets once daily., Disp: 60 tablet, Rfl: 3    multivit with iron,hematinic (SUPER B-COMPLEX ORAL), , Disp: , Rfl:     MULTIVITAMIN ORAL, NADIA MULTIVITAMIN, Disp: , Rfl:     triamcinolone acetonide 0.1% (KENALOG) 0.1 % cream, Apply topically 2 (two) times daily. " To affected areas on arms and chest x 1-2 wks then prn flares only. Avoid face, armpits, and groin., Disp: 45 g, Rfl: 2    TURMERIC ORAL, Take by mouth., Disp: , Rfl:     valACYclovir (VALTREX) 500 MG tablet, TAKE 1 TABLET BY MOUTH EVERY DAY, Disp: 30 tablet, Rfl: 1    vitamin E acetate (VITAMIN E ORAL), Take by mouth once daily., Disp: , Rfl:     Medical history  Patient Active Problem List   Diagnosis    Chronic pelvic pain in female    Frequency-urgency syndrome    Slow transit constipation    Dysuria    Incomplete bladder emptying    Normocytic anemia    Voiding dysfunction    Fibromyalgia    Interstitial cystitis    Periodic headache syndrome, not intractable    Other insomnia    Snoring    Sleep apnea    Pulmonary nodules    Pain    Decreased range of motion    Anxiety    Depression, major, recurrent, moderate     Past Medical History:   Diagnosis Date    Abnormal Pap smear of cervix     Anemia     Breast disorder     Fever blister     Fibromyalgia     IC (interstitial cystitis)      Past Surgical History:   Procedure Laterality Date    BREAST BIOPSY Left 2014    fibroadenoma     SECTION  2012    X 2---JST FOR [facility] ([facility])    DILATION AND CURETTAGE OF UTERUS      [facility]         MENTAL STATUS AND BEHAVIORAL OBSERVATIONS:  Appearance:  Not observed (telephone visit)  Behavior:   alert, calm, cooperative, and rapport easily established  Orientation:   Fully oriented  Sensory:   Hearing and vision adequate for virtual/telephone visit  Gait:   Not observed (virtual/telephone visit)   Psychomotor:  Not observed (telephone visit)  Speech:  Fluent and spontaneous. Normal volume, rate, pitch, tone, and prosody. Cymro is her first language. She speaks very quickly.   Language:  Receptive and expressive language appear intact. Comprehends conversational speech., No evidence of word-finding difficulties in conversational speech.  Mood:   euthymic  Affect:   mood-congruent  Thought Process: tangential  Thought  Content: Denied current SI/HI. and No evidence of psychotic symptoms.  Memory:  recent and remote appear grossly intact  Attn/Concentration:  Easily distracted  Judgment/Insight: Grossly intact      BILLING INFORMATION:  Billing/Services Summary          Psychiatric Diagnostic Interview Base Code (13754)  Total Units: 1    Face-to-face Total Time: 53 min.         Neurobehavioral Status Exam Base Code (87166)   Total Units: 0 Add-on (94917)  Total Units: 0   Face-to-face 0 min.    Record Review, Integration, Report Generation 0 min.     Total Time: 0 min.    DOS is the date of the evaluation unless specified            This service was not originating from a related E/M service provided within the previous 7 days nor will  to an E/M service or procedure within the next 24 hours or my soonest available appointment.  Prevailing standard of care was able to be met in this audio-only visit.

## 2023-08-23 ENCOUNTER — OFFICE VISIT (OUTPATIENT)
Dept: NEUROLOGY | Facility: CLINIC | Age: 48
End: 2023-08-23
Payer: COMMERCIAL

## 2023-08-23 DIAGNOSIS — R52 PAIN: ICD-10-CM

## 2023-08-23 DIAGNOSIS — R41.89 SIGNS AND SYMPTOMS INVOLVING COGNITION: ICD-10-CM

## 2023-08-23 DIAGNOSIS — F41.9 ANXIETY: Primary | ICD-10-CM

## 2023-08-23 DIAGNOSIS — G47.33 OBSTRUCTIVE SLEEP APNEA SYNDROME: ICD-10-CM

## 2023-08-23 DIAGNOSIS — F33.1 DEPRESSION, MAJOR, RECURRENT, MODERATE: ICD-10-CM

## 2023-08-23 DIAGNOSIS — F41.0 PANIC DISORDER: ICD-10-CM

## 2023-08-23 DIAGNOSIS — F51.01 PRIMARY INSOMNIA: ICD-10-CM

## 2023-08-23 PROCEDURE — 99499 NO LOS: ICD-10-PCS | Mod: FQ,,, | Performed by: PSYCHIATRY & NEUROLOGY

## 2023-08-23 PROCEDURE — 90791 PR PSYCHIATRIC DIAGNOSTIC EVALUATION: ICD-10-PCS | Mod: FQ,,, | Performed by: PSYCHIATRY & NEUROLOGY

## 2023-08-23 PROCEDURE — 99499 UNLISTED E&M SERVICE: CPT | Mod: FQ,,, | Performed by: PSYCHIATRY & NEUROLOGY

## 2023-08-23 PROCEDURE — 90791 PSYCH DIAGNOSTIC EVALUATION: CPT | Mod: FQ,,, | Performed by: PSYCHIATRY & NEUROLOGY

## 2023-08-25 ENCOUNTER — PATIENT MESSAGE (OUTPATIENT)
Dept: NEUROLOGY | Facility: CLINIC | Age: 48
End: 2023-08-25
Payer: COMMERCIAL

## 2023-09-15 ENCOUNTER — OFFICE VISIT (OUTPATIENT)
Dept: NEUROLOGY | Facility: CLINIC | Age: 48
End: 2023-09-15
Payer: COMMERCIAL

## 2023-09-15 DIAGNOSIS — R52 PAIN: ICD-10-CM

## 2023-09-15 DIAGNOSIS — F41.0 PANIC DISORDER: ICD-10-CM

## 2023-09-15 DIAGNOSIS — G47.33 OBSTRUCTIVE SLEEP APNEA SYNDROME: ICD-10-CM

## 2023-09-15 DIAGNOSIS — F41.9 ANXIETY: Primary | ICD-10-CM

## 2023-09-15 DIAGNOSIS — F51.01 PRIMARY INSOMNIA: ICD-10-CM

## 2023-09-15 DIAGNOSIS — F33.1 DEPRESSION, MAJOR, RECURRENT, MODERATE: ICD-10-CM

## 2023-09-15 DIAGNOSIS — R41.89 SIGNS AND SYMPTOMS INVOLVING COGNITION: ICD-10-CM

## 2023-09-15 PROCEDURE — 96133 NRPSYC TST EVAL PHYS/QHP EA: CPT | Mod: S$GLB,,, | Performed by: PSYCHIATRY & NEUROLOGY

## 2023-09-15 PROCEDURE — 99499 NO LOS: ICD-10-PCS | Mod: S$GLB,,, | Performed by: PSYCHIATRY & NEUROLOGY

## 2023-09-15 PROCEDURE — 96133 PR NEUROPSYCHOLOGIC TEST EVAL SVCS, EA ADDTL HR: ICD-10-PCS | Mod: S$GLB,,, | Performed by: PSYCHIATRY & NEUROLOGY

## 2023-09-15 PROCEDURE — 99499 UNLISTED E&M SERVICE: CPT | Mod: S$GLB,,, | Performed by: PSYCHIATRY & NEUROLOGY

## 2023-09-15 PROCEDURE — 96132 PR NEUROPSYCHOLOGIC TEST EVAL SVCS, 1ST HR: ICD-10-PCS | Mod: S$GLB,,, | Performed by: PSYCHIATRY & NEUROLOGY

## 2023-09-15 PROCEDURE — 96139 PR PSYCH/NEUROPSYCH TEST ADMIN/SCORING, BY TECH, 2+ TESTS, EA ADDTL 30 MIN: ICD-10-PCS | Mod: S$GLB,,, | Performed by: PSYCHIATRY & NEUROLOGY

## 2023-09-15 PROCEDURE — 96132 NRPSYC TST EVAL PHYS/QHP 1ST: CPT | Mod: S$GLB,,, | Performed by: PSYCHIATRY & NEUROLOGY

## 2023-09-15 PROCEDURE — 96139 PSYCL/NRPSYC TST TECH EA: CPT | Mod: S$GLB,,, | Performed by: PSYCHIATRY & NEUROLOGY

## 2023-09-15 PROCEDURE — 96138 PSYCL/NRPSYC TECH 1ST: CPT | Mod: S$GLB,,, | Performed by: PSYCHIATRY & NEUROLOGY

## 2023-09-15 PROCEDURE — 96138 PR PSYCH/NEUROPSYCH TEST ADMIN/SCORING, BY TECH, 2+ TESTS, 1ST 30 MIN: ICD-10-PCS | Mod: S$GLB,,, | Performed by: PSYCHIATRY & NEUROLOGY

## 2023-09-18 DIAGNOSIS — L29.9 PRURITUS: ICD-10-CM

## 2023-09-22 RX ORDER — TRIAMCINOLONE ACETONIDE 1 MG/G
CREAM TOPICAL 2 TIMES DAILY
Qty: 45 G | Refills: 2 | Status: SHIPPED | OUTPATIENT
Start: 2023-09-22 | End: 2024-02-14

## 2023-09-27 NOTE — PROGRESS NOTES
NEUROPSYCHOLOGY CONSULT  [facility]      Referral Information  Name: Rema Horton    : 1975  Age: 48 y.o.    Race: White    Gender: female     MRN: [id number]  Handedness: Right  Education: 10 years      Referring Provider:   Britta Clark MD  Referral Reason/Medical Necessity: Ms. Horton has a history of anxiety, depression, PERFECTO, fibromyalgia, and memory loss. Neuropsychological evaluation was requested to assess current cognitive functioning, aid in differential diagnosis, and provide treatment recommendations.    Consent/Emergency Plan: The patient expressed an understanding of the purpose of the evaluation and consented to all procedures. I informed the patient of limits to confidentiality and discussed an emergency plan.   Procedures/Billing: Please see billing table at the end of this report.  Referral Diagnosis: R41.3 (ICD-10-CM) - Memory loss    SUMMARY    Ms. Horton is a 48 y.o. White female with a history of very high anxiety, depression, PERFECTO, fibromyalgia, PN, iron deficiency anemia presenting for evaluation of cognitive complaints. Pt reports worsening cognitive symptoms since  following the birth of her first child, and worse with stress/anxiety. Neuroimaging is unremarkable. Reversible dementia labs are WNL. She is on baclofen and gabapentin for pain. Sleep is poor, she has been unable to tolerate CPAP. Pt reports significant longstanding anxiety with panic attacks and some depression. Also reports daily struggles with chronic pain and fatigue. History notable for longstanding difficulty with learning and speech since childhood, even in her native Maldivian. Also reports inattentiveness in childhood and extremely high anxiety with agoraphobia, eventually leading her to drop out after 10th grade. Pt is Fully oriented, a detailed historian, and remains functionally independent. No immediate safety concerns.     Neuropsychological testing is interpreted with caution due to  English being her second language and due to some within-domain variability suggestive of interference from anxiety. As such, low scores may under-represent her actual abilities. Testing is also interpreted in the context of low average to borderline premorbid functioning and longstanding issues with learning and weakness in verbal skills.     Testing is most notable for weakness on fronto-subcortical functions, including attention, processing speed, and some aspects of executive functioning. Similarly, she has a weakness in frontal aspects of memory (encoding, retrieval) rather than mesial temporal that is likely driven by variable attention. Language is difficult to interpret due to ESL but does not appear to be cortical problem. Visuospatial functioning is normal. Visual memory is intact. She endorsed very high levels of both depression and anxiety on self-report screeners. An objective personality inventory was invalidated and uninterpretable due to over-reporting, which is again suggestive of very high levels of distress.     Intermittent fronto-subcortical weaknesses most consistent with interference from anxiety, poor sleep/untreated PERFECTO, chronic pain in the context of someone with a longstanding weakness in verbal abilities. Testing is not suggestive of an early onset neurodegenerative process. I would very much like to get her into appropriate treatment for her anxiety, panic attacks, and depression. Ms. Horton is very pleasant, insightful, and open to this as the primary etiology, and I am hopeful that she will do well in therapy.     IMPRESSIONS      1. Anxiety        2. Panic disorder        3. Depression, major, recurrent, moderate        4. Primary insomnia        5. Obstructive sleep apnea syndrome        6. Pain        7. Signs and symptoms involving cognition              PLAN     Ms. Horton is scheduled for feedback. Will discuss results, recommendations, provide handouts on behavioral management of  "chronic pain, compensatory strategies, sleep hygiene, PERFECTO   She may be a good candidate for CBT for insomnia   Will refer to psychiatry, try to get her in with a therapist. May be a good candidate for CBT for panic attacks?       Thank you for allowing me to participate in Ms. Horton's care.  If you have any questions, please contact me.    Hannah Holman PsyD  Clinical Neuropsychologist  Department of Neurology  [facility]        SUBJECTIVE:     HISTORY OF PRESENT ILLNESS   Ms. Horton is a 48 y.o. White female with a history of iron deficiency anemia, fibromyalgia, MDD, anxiety, PN, perimenopause, PERFECTO, HA,  referred for neuropsychological evaluation.     Daughter, 11 with special needs  Born in [place], been [place] the [place]  Maternal grandmother had memory issues  Ongoing memory issues for years, writing everything down  Referred for testing in 2019 but did not complete     Still feels very anxious, gets anxious when she has to talk on the phone to someone new.   Notes she doesn't speak with many people outside of family and doctors.   Gets very anxious when she has to handle cash, her "mind goes blank"    Cognitive Symptoms:  Has to write things down in a journal now. Forgets what she needs at the store. Misplaces things around the house like her glasses and phone.     Onset/Course: Ms. Rojass symptoms were gradual in onset around 2010 around the birth of her first child and are worsening. Does recall some memory issues from childhood as well, often associated with anxiety. Since then, symptoms seem to get worse with stress. Some medications have also seemed to make her memory worse over the years, she notes she is very sensitive to medications. No waxing/waning of cognitive status.  Medication for Cognition: None    Sleep: decreased   Pattern: has difficulty falling asleep and has restless sleep, wakes up very easily   PERFECTO: Diagnosed with PERFECTO, has had difficulty tolerating CPAP mask, tends to " take the mask off at night though this is getting a little better. Gained weight during first pregnancy, began snoring. Does feel fatigued during the day still. Often gets in bed very early due to fatigue.   Appetite: normal. Typical appetite is not very high, but no change.   Energy: decreased     Mood:  Treatment History: Saw a psychologist as a child in [place] but she isn't sure why. Thinks she was on medications. Does report she was a very anxious child. Didn't speak much, had some issues with pronunciation.   Self-Described Mood: anxious   Depressed Mood: Endorsed   Anxiety: Endorsed very high anxiety, especially in stores, public areas.  When she has to pay, often will find her hands are shaking.   Panic Attacks: Endorsed in a variety of situations   Current Stressors: daughter has disabilities, this is stressful   SI/HI: Denied   Psychiatric Hospitalization: Denied      Neuropsychiatric:  Personality Change: Denied    Delusional/Paranoid Thinking: Denied  Hallucinations: Denied      Physical Sx:  Motor:  Denied abnormalities    Gait:  Pt ambulates independently. Does feel like it's harder for her to get going in the mornings due to pain and numbness in R leg   Sensory: No change to taste, smell, hearing or vision. R leg numbness  Pain: Ms. Horton described typical daily pain at a 5 or 6 on a scale of 1-10, with 10 being worst.     Current Functioning (I/ADLs):  ADLs: Independent   IADLs:  Finances: Independent   Medication Mgmt:Independent   Driving: Independent   Household Mgmt: Independent but shopping is very stressful for her     RELEVANT HISTORY:  Prior Testing:  None    Neurologic History  Seizures: Denied  Stroke: Denied  TBI: Denied    Substance Use History:  Social History     Tobacco Use    Smoking status: Never    Smokeless tobacco: Never   Substance and Sexual Activity    Alcohol use: Not Currently     Alcohol/week: 0.0 standard drinks of alcohol     Comment: Occasionally.    Drug use: No    Sexual  activity: Yes     Partners: Male     Birth control/protection: None       Family History:  Family History   Problem Relation Age of Onset    Cancer Mother         bladder (not sure if was actually gynecologic)    No Known Problems Father     Stroke Maternal Grandfather     Breast cancer Paternal Grandmother     Amblyopia Daughter     Chromosomal disorder Daughter     No Known Problems Son     Melanoma Neg Hx     Ovarian cancer Neg Hx     Colon cancer Neg Hx     Colon polyps Neg Hx     Esophageal cancer Neg Hx     Stomach cancer Neg Hx     Rectal cancer Neg Hx     Blindness Neg Hx     Glaucoma Neg Hx     Macular degeneration Neg Hx     Retinal detachment Neg Hx      Family Neurologic History: Negative for heritable risk factors   Family Psychiatric History: Negative for heritable risk factors     Developmental/Academic Hx:  Developmental: Born and raised in [place]. Moved to the  in 1994. Normal developmental history. Indian is her first language, she speaks both English and Indian at home though more English.   Academic:  Learning Difficulties: Grades were low growing up. Difficulty with speech/pronunciation as a kid. She was also very anxious as a kid. Never repeated a grade.  Attention Difficulties: Endorsed difficulty with attention, used to talk a lot with friends and get in trouble. Never diagnosed with or treated for ADHD.  Educational Attainment: Completed 10th grade, left school in 11th grade. Left because she was so anxious being outside of her house. Cried every day. Earned GED equivalent later, but admits this was very difficult.     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 Left after she had her first child in 2010  Social:  Resides: at home with ,  daughter and son   Family Status: , two children   Social Support:  Pt endorses adequate social support from her family, but does not talk to many people outside of her family. Much of her extended family lives in [place].   Daily Activities: takes care of daughter, goes to doctor appointments       Objective:     Neurodiagnostics:  Results for orders placed or performed during the hospital encounter of 07/28/23   MRI Brain Without Contrast    Narrative    EXAMINATION:  MRI BRAIN WITHOUT CONTRAST    CLINICAL HISTORY:  headache;Headache, unspecified    TECHNIQUE:  Multiplanar multisequence MR imaging of the brain was performed without intravenous contrast.    COMPARISON:  MRI brain 10/20/2015.    FINDINGS:  Intracranial Compartment:    Ventricles are normal in size for age without evidence of hydrocephalus.    The brain parenchyma maintains normal signal intensity.  No mass effect, hemorrhage, or recent or remote major vascular distribution infarct.    No extra-axial blood or fluid collections.    Normal vascular flow voids are preserved at the skull base.    Skull/Extracranial Contents (limited evaluation):    Bone marrow signal intensity is normal.  Visualized paranasal sinuses and mastoid air cells are clear.      Impression    No evidence of acute intracranial pathology.  Normal MR appearance of the brain.    Electronically signed by resident: Umberto Edward  Date:    07/28/2023  Time:    13:02    Electronically signed by: Greg Marie  Date:    07/28/2023  Time:    14:29   Results for orders placed or performed during the hospital encounter of 10/20/15   MRI Brain W WO Contrast    Narrative    Procedure comments: Multiplanar, multi-sequence MRI images of the brain were obtained pre-and post the administration of 7 cc of Gadavist IV contrast.    Comparison: None    Findings:    The brain exhibits a normal architecture without congenital malformation. No intracranial mass lesion, hemorrhage, or infarction  "is detected. There is no restricted diffusion to suggest acute ischemia.  The ventricles are normal in size without hydrocephalus.  No extra-axial or extracranial abnormalities are detected.    The globes, orbits, pituitary gland, pineal gland, craniocervical junction, and upper cervical junction are normal in configuration. After the administration of contrast, there are no abnormal areas of enhancement.  The major vascular flow voids are patent.    Impression         Normal brain MRI.  ______________________________________     Electronically signed by resident: Kathy Manning  Date:     10/20/15  Time:    13:51          As the supervising and teaching physician, I personally reviewed the images and resident's interpretation and I agree with the findings.          Electronically signed by: Dr. Jono Gomez MD  Date:     10/20/15  Time:    14:26        Pertinent Labs:  Lab Results   Component Value Date    YRGRNPWV76 >2000 (H) 07/14/2023     No results found for: "VITAMINB1"  Lab Results   Component Value Date    RPR Non-Reactive 03/15/2012     Lab Results   Component Value Date    FOLATE 16.4 09/12/2018     Lab Results   Component Value Date    TSH 2.329 07/14/2023     Lab Results   Component Value Date    CALCIUM 9.4 07/14/2023      Lab Results   Component Value Date    HGBA1C 5.3 07/14/2023     Lab Results   Component Value Date    HIV1X2 Negative 03/15/2012           Current Outpatient Medications:     baclofen (LIORESAL) 10 MG tablet, TAKE 1 TABLET (10 MG TOTAL) BY MOUTH DAILY AS NEEDED (SEVERE PAIN/MUSCLE ACHE)., Disp: 30 tablet, Rfl: 2    cholecalciferol, vitamin D3, (VITAMIN D3) 25 mcg (1,000 unit) capsule, TAKE 2 CAPSULES (2,000 UNITS TOTAL) BY MOUTH ONCE DAILY., Disp: 180 capsule, Rfl: 3    DULoxetine (CYMBALTA) 30 MG capsule, TAKE 1 CAPSULE BY MOUTH EVERY DAY, Disp: 90 capsule, Rfl: 2    xavier prim-linoleic-gamolenic ac (PRIMROSE OIL) 1,000 mg Cap, , Disp: , Rfl:     fluocinolone (SYNALAR) 0.01 % " external solution, AAA scalp qday - bid prn itching or scaling, Disp: 60 mL, Rfl: 3    fluticasone propionate (FLONASE) 50 mcg/actuation nasal spray, SPRAY 1 SPRAY INTO EACH NOSTRIL TWICE A DAY, Disp: 16 mL, Rfl: 5    gabapentin (NEURONTIN) 300 MG capsule, Take 1 capsule (300 mg total) by mouth 2 (two) times daily., Disp: , Rfl:     ketoconazole (NIZORAL) 2 % shampoo, Wash hair with medicated shampoo at least 2x/week - let sit on scalp at least 5 minutes prior to rinsing, Disp: 120 mL, Rfl: 5    Lactobacillus acidophilus (PROBIOTIC ORAL), Take by mouth once daily., Disp: , Rfl:     meloxicam (MOBIC) 7.5 MG tablet, Take 1 tablet (7.5 mg total) by mouth once daily. In 1-2 weeks, if no relief, may increase dose to two tablets once daily., Disp: 60 tablet, Rfl: 3    multivit with iron,hematinic (SUPER B-COMPLEX ORAL), , Disp: , Rfl:     MULTIVITAMIN ORAL, NADIA MULTIVITAMIN, Disp: , Rfl:     triamcinolone acetonide 0.1% (KENALOG) 0.1 % cream, Apply topically 2 (two) times daily. To affected areas on arms and chest  x 1-2 wks then prn flares only. Avoid face, armpits, and groin., Disp: 45 g, Rfl: 2    TURMERIC ORAL, Take by mouth., Disp: , Rfl:     valACYclovir (VALTREX) 500 MG tablet, TAKE 1 TABLET BY MOUTH EVERY DAY, Disp: 30 tablet, Rfl: 1    vitamin E acetate (VITAMIN E ORAL), Take by mouth once daily., Disp: , Rfl:     Medical history  Patient Active Problem List   Diagnosis    Chronic pelvic pain in female    Frequency-urgency syndrome    Slow transit constipation    Dysuria    Incomplete bladder emptying    Normocytic anemia    Voiding dysfunction    Fibromyalgia    Interstitial cystitis    Periodic headache syndrome, not intractable    Other insomnia    Snoring    Sleep apnea    Pulmonary nodules    Pain    Decreased range of motion    Anxiety    Depression, major, recurrent, moderate     Past Medical History:   Diagnosis Date    Abnormal Pap smear of cervix     Anemia     Breast disorder     Fever blister      Fibromyalgia     IC (interstitial cystitis)      Past Surgical History:   Procedure Laterality Date    BREAST BIOPSY Left 2014    fibroadenoma     SECTION  2012    X 2---JST FOR [facility] ([facility])    DILATION AND CURETTAGE OF UTERUS  2000    [facility]         OBJECTIVE:       MENTAL STATUS AND BEHAVIORAL OBSERVATIONS:  Appearance:  Casually dressed and Well groomed  Behavior:   alert, calm, cooperative, rapport easily established, and Appropriate interpersonal skills. Good interpretation of nonverbal cues.   Orientation:   Fully oriented  Sensory:   Hearing and vision appeared adequate for testing purposes.  Gait:   Unremarkable and Pt ambulates independently.   Psychomotor:  Within Normal Limits  Speech:  Fluent and spontaneous. Normal volume, rate, pitch, tone, and prosody.  Language:  Receptive and expressive language appear intact. Comprehends conversational speech., No evidence of word-finding difficulties in conversational speech.  Mood:   anxious  Affect:   mood-congruent  Thought Process: goal directed and linear  Thought Content: Denied current SI/HI. and No evidence of psychotic symptoms.  Judgment/Insight: Grossly intact  Validity:  Performance on standalone and embedded performance validity measures all suggested adequate engagement. However, she was very anxious during testing, and testing was not administered in her native language (per per preference). She was allowed to respond to language tests in English or Mohawk. However, all of these factors make it difficult to interpret testing as a fully accurate picture of her functioning. Scores may underrepresent her abilities. Review of performance validity scales on an objective personality inventory raise concern for the validity of the profile due to over-reporting. The pt reported an unusually high number of symptoms, at a level that is statistically suggestive of over-reporting. The resulting profile is invalid. As a result, clinical scales cannot be  interpreted further.   Test Taking Bx:  Per psychometrist observations, Mrs. Horton arrived to testing alone. She wore readers; she reported she has trouble processing what people say in larger crowds, but had no hearing problems. She occasionally 'spaced out' and required directions to be repeated and clairfied. She appeared anxious towards testing as she was tangential and talkative. For example, she stated, 'my brain cannot function to do the numbers...' and trailed off during Arithemtic. Mrs. Horton responded to some items in German for NAB Naming. These items were scored as correct. Trails B was discontinued as she reported she could not remember the English Alphabet; psychometrist prompted her several times to try her best, but stated, 'M' came after 'G' several times. She gave up easily and often reported, 'I can't.' or 'I forgot.' She needed encouragement.      PROCEDURES/TESTS ADMINISTERED:  In addition to performing a review of pertinent medical records, reviewing limits to confidentiality, conducting a clinical interview, and explaining procedures, the following measures were administered:   Green's MSVT, Test of Premorbid Functioning (TOPF), Wechsler Adult Intelligence Scale - Fourth Edition (WAIS-IV), selected subtests, Ruff 2&7 Selective Attention Test , Neuropsychological Assessment Battery (NAB) Naming subtest, Controlled Oral Word Association Test (FAS/Imelda et al., 2004), Animal Naming (Imelda et al., 2004), Trail Making Test (Imelda et al., 2004), Stroop Color Word Test, [place] Card Sorting Test (WCST-64), Chong Complex Figure Test  (Copy Trial) , [place] Verbal Learning Test - Second Edition (CVLT-II), Standard Form, Wechsler Memory Scale - Fourth Edition (WMS-IV), selected subtests, State Trait Anxiety Inventory: Short Form (STAI-SF), Gray Depression Inventory - Second Edition (BDI-II), and [place] Multiphasic Personality Inventory -3 (MMPI-3). Manual norms were used unless otherwise  indicated.     NEUROPSYCHOLOGICAL ASSESSMENT RESULTS:  The following should not be interpreted in isolation from the neuropsychological evaluation report.  Scores on stand-alone and embedded performance validity measures were WNL.      Raw Score Score Type Standardized Score Percentile/CP Descriptor   MSVT IR 90 - - - -   MSVT DR 90 - - - -   MSVT Cons 90 - - - -   MSVT PA 80 - - - -   MSVT FR 40 - - - -   ACS LM II Rec 17 - - - -   ACS VR II Rec 5 - - - -   ACS RDS 8 - - - -   CVLT-II  - - - -   PREMORBID FUNCTIONING Raw Score Score Type Standardized Score Percentile/CP Descriptor   TOPF simple dem. eFSIQ - SS 75 5 Below Average   TOPF pred. eFSIQ - SS 77 6 Below Average   TOPF simple + pred. eFSIQ - SS 77 6 Below Average   LANGUAGE FUNCTIONING Raw Score Score Type Standardized Score Percentile/CP Descriptor   WAIS-IV Similarities 15 ss 5 5 Below Average   TOPF Word Reading 20 SS 78 7 Below Average   NAB Naming 23 Tscore 24 0.5 Exceptionally Low    NAB Naming Percent Correct After Semantic Cuing 38 - - 58 Average   NAB Naming Percent Correct After Phonemic Cuing 40 - - 29 Average   FAS 18 Tscore 31 3 Below Average   Animal Naming 16 Tscore 40 16 Low Average   VISUOSPATIAL FUNCTIONING Raw Score Score Type Standardized Score Percentile/CP Descriptor   WAIS-IV LAURA - SS 86 18 Low Average   WAIS-IV Block Design 37 ss 9 37 Average   WAIS-IV Matrix Reasoning 9 ss 6 9 Low Average   RCFT Copy 36 - - >16 WNL   RCFT Time to Copy 104 - - >16 WNL   VR Copy 42 - - 26-50 Average   LEARNING & MEMORY Raw Score Score Type Standardized Score Percentile/CP Descriptor   CVLT-II         Trials 1-5 (T-Score) 28 Tscore 27 1 Exceptionally Low    List A Trial 1 3 zscore -2 2.275 Below Average   List A Trial 5 9 zscore -1.5 7 Below Average   List B 5 zscore -0.05 48 Average   SDFR 7 zscore -1.5 7 Below Average   SDCR 7 zscore -2 2 Below Average   LDFR 5 zscore -2.5 1 Exceptionally Low    LDCR 6 zscore -2.5 1 Exceptionally Low     Semantic Clustering 0.3 zscore -0.5 31 Average   Learning [place] 1.5 zscore 0 50 Average   Repetitions 2 zscore -0.5 31 Average   Intrusions 4 zscore 0 50 Average   Recognition Hits 11 zscore -2.5 1 Exceptionally Low    False Positives 0 zscore -1 16 Low Average   Discriminability 2.6 zscore -0.5 31 Average   WMS-IV         LM I 9 ss 2 0.4 Exceptionally Low    LM II 8 ss 4 2 Below Average   LM Recognition 17 - - <2 Exceptionally Low   VR I 29 ss 7 16 Low Average   VR II 21 ss 9 37 Average   VR II Recognition 5 - - 26-50 Average   VR Copy 42 - - 26-50 Average   ATTENTION/WORKING MEMORY Raw Score Score Type Standardized Score Percentile/CP Descriptor   WAIS-IV WMI - SS 66 1 Exceptionally Low    WAIS-IV Digit Span 17 ss 5 5 Below Average         DS Forward 7 ss 6 9 Low Average         DS Backward 5 ss 6 9 Low Average         DS Sequence 5 ss 6 9 Low Average         Longest Forward 5 - - - -         Longest Backward 3 - - - -         Longest Sequence 4 - - - -   WAIS-IV Arithmetic 6 ss 3 1 Exceptionally Low    Ruff 2&7 Total Speed 79 Tscore 41 18 Low Average   Ruff 2&7 Total Accuracy 68 Tscore 34 5 Below Average   Ruff 2&7 Total Difference  7  0.05 - -   MENTAL PROCESSING SPEED Raw Score Score Type Standardized Score Percentile/CP Descriptor   WAIS-IV Coding 49 ss 7 16 Low Average   TMT A  36 Tscore 45 31 Average   TMT A errors 0 - - - -   Stroop: Word Reading 70 Tscore 32 4 Below Average   Stroop: Color Naming 33 Tscore 17 <0.1 Exceptionally Low    EXECUTIVE FUNCTIONING Raw Score Score Type Standardized Score Percentile/CP Descriptor   TMT B DC Tscore - - -   TMT B errors DC - - - -   Stroop: C/W 26 Tscore 41 18 Low Average   Stroop: Interference 3 Tscore 53 62 Average   WCST-64 N/A        Total Correct 28 SS - - -   Total Errors 36 SS 71 3 Below Average   Perseverative Resp. 29 SS 75 5 Below Average   Perseverative Err. 25 SS 73 4 Below Average   Nonperseverative Err. 11 SS 87 19 Low Average   Concept. Level Response  22 SS 76 5 Below Average   Categories Completed 2 - - 11-16 Low Average   FMS 0 - - N/A N/A   Learning to Learn N/A - - N/A N/A   WAIS-IV Similarities 15 ss 5 5 Below Average   WAIS-IV Matrix Reasoning 9 ss 6 9 Low Average   MOOD & PERSONALITY Raw Score Score Type Standardized Score Percentile/CP Descriptor   BDI-2 45 - - - Severe   STAISF:State 34 - - 99 Exceptionally High   STAISF:Trait 31 - - 98 Exceptionally High   MMPI-3  -  - -   CRIN 7 Tscore 54 - -   VRIN 4 Tscore 51 - -   MIS 16 Tscore 67T - -   F 30 Tscore 120 - -   Fp 7 Tscore 101 - -   Fs 13 Tscore 114 - -   TBS 23 Tscore 89 - -   RBS 23 Tscore 113 - -   L 6 Tscore 61 - -   K 2 Tscore 38 - -   TIANNA 36 Tscore 83 - -   THD 21 Tscore 100 - -   BXD 11 Tscore 58 - -   RCd 15 Tscore 74 - -   RC1 19 Tscore 90 - -   RC2 12 Tscore 82 - -   RC4 6 Tscore 55 - -   RC6 11 Tscore 83 - -   RC7 16 Tscore 75 - -   RC8 17 Tscore 100 - -   RC9 11 Tscore 67 - -   ss = scaled score (mean = 10, SD = 3); SS = standard score (mean = 100, SD = 15); Tscore mean = 50, SD = 10; zscore (mean = 0.00, SD = 1)         Billing/Services Summary          Neurobehavioral Status Exam Base Code (68602)  Total Units: 0    Face-to-face Total Time: 0 min.         Professional Neuropsychological Testing Evaluation Services Base Code (79452)   Total Units: 1 Add-on (40370)  Total Units: 2   Referral review/initial test selection 15 min.    Intra-session Clinical Decision-Making          Tech consult/test review/modification 0 min.         Patient behavior management 0 min.    Face-to-face Interpretive Feedback 60 min.    Record Review/Integration/Report Generation 120 min.     Total Time: 195 min.         Test Administration by Psychologist Base Code (20396)   Total Units: 0 Add-on (11554)  Total Units: 0   Testing 0 min.    Scoring 0 min.     Total Time: 0 min.         Test Administration by Technician  Technician Name: AMERICA Garcia Base Code ([id number])   Total Units: 1 Add-on (17755)\  Total  Units: 6   Face-to-Face Testin min.    Scoring 75 min.     Total Time: 221 min.    DOS is the date of the evaluation unless specified

## 2023-09-28 ENCOUNTER — OFFICE VISIT (OUTPATIENT)
Dept: NEUROLOGY | Facility: CLINIC | Age: 48
End: 2023-09-28
Payer: COMMERCIAL

## 2023-09-28 DIAGNOSIS — R52 PAIN: ICD-10-CM

## 2023-09-28 DIAGNOSIS — G47.33 OBSTRUCTIVE SLEEP APNEA SYNDROME: ICD-10-CM

## 2023-09-28 DIAGNOSIS — F51.01 PRIMARY INSOMNIA: ICD-10-CM

## 2023-09-28 DIAGNOSIS — F41.0 PANIC DISORDER: Primary | ICD-10-CM

## 2023-09-28 DIAGNOSIS — F33.1 DEPRESSION, MAJOR, RECURRENT, MODERATE: ICD-10-CM

## 2023-09-28 PROCEDURE — 99999 PR PBB SHADOW E&M-EST. PATIENT-LVL II: ICD-10-PCS | Mod: PBBFAC,,, | Performed by: PSYCHIATRY & NEUROLOGY

## 2023-09-28 PROCEDURE — 99999 PR PBB SHADOW E&M-EST. PATIENT-LVL II: CPT | Mod: PBBFAC,,, | Performed by: PSYCHIATRY & NEUROLOGY

## 2023-09-28 PROCEDURE — 99499 UNLISTED E&M SERVICE: CPT | Mod: S$GLB,,, | Performed by: PSYCHIATRY & NEUROLOGY

## 2023-09-28 PROCEDURE — 99499 NO LOS: ICD-10-PCS | Mod: S$GLB,,, | Performed by: PSYCHIATRY & NEUROLOGY

## 2023-09-28 NOTE — PATIENT INSTRUCTIONS
"What is mindfulness?  Mindfulness is the basic human ability to be fully present, aware of where we are and what were doing, and not overly reactive or overwhelmed by whats going on around us.    While mindfulness is something we all naturally possess, its more readily available to us when we practice on a daily basis.    Whenever you bring awareness to what youre directly experiencing via your senses, or to your state of mind via your thoughts and emotions, youre being mindful. And theres growing research showing that when you train your brain to be mindful, youre actually remodeling the physical structure of your brain.    What is meditation?  Meditation is exploring. Its not a fixed destination. Your head doesnt become vacuumed free of thought, utterly undistracted. When we meditate we venture into the workings of our minds: our sensations (air blowing on our skin or a harsh smell wafting into the room), our emotions (love this, hate that, crave this, loathe that) and thoughts (wouldnt it be weird to see an elephant playing a trumpet).    Mindfulness meditation asks us to suspend judgment and unleash our natural curiosity about the workings of the mind, approaching our experience with warmth and kindness, to ourselves and others.    How do I practice mindfulness and meditation?  Mindfulness is available to us in every moment, whether through meditations and body scans, or mindful moment practices like taking time to pause and breathe when the phone rings instead of rushing to answer it. Reminding yourself to take notice of your thoughts, feelings, body sensations and the world around you is the first step to mindfulness.    Notice the everyday  "Even as we go about our daily lives, we can notice the sensations of things, the food we eat, the air moving past the body as we walk," says Professor Irby. "All this may sound very small, but it has huge power to interrupt the 'autopilot' mode we often " "engage day to day, and to give us new perspectives on life."    Keep it regular  It can be helpful to pick a regular time - the morning journey to work or a walk at lunchtime - during which you decide to be aware of the sensations created by the world around you.    Try something new  Trying new things, such as sitting in a different seat in meetings or going somewhere new for lunch, can also help you notice the world in a new way.    Watch your thoughts  "Some people find it very difficult to practice mindfulness. As soon as they stop what they're doing, lots of thoughts and worries crowd in," says Professor Irby.    "It might be useful to remember that mindfulness isn't about making these thoughts go away, but rather about seeing them as mental events.    "Imagine standing at a bus station and seeing 'thought buses' coming and going without having to get on them and be taken away. This can be very hard at first, but with gentle persistence it is possible.    "Some people find that it is easier to cope with an over-busy mind if they are doing gentle yoga or walking."    Name thoughts and feelings  To develop an awareness of thoughts and feelings, some people find it helpful to silently name them: "Here's the thought that I might fail that exam". Or, "This is anxiety".    Free yourself from the past and future  You can practise mindfulness anywhere, but it can be especially helpful to take a mindful approach if you realise that, for several minutes, you have been "trapped" in reliving past problems or "pre-living" future worries.    Different mindfulness practices  As well as practising mindfulness in daily life, it can be helpful to set aside time for a more formal mindfulness practice.    Mindfulness meditation involves sitting silently and paying attention to thoughts, sounds, the sensations of breathing or parts of the body, bringing your attention back whenever the mind starts to wander.    Yoga and asad-chi " "can also help with developing awareness of your breathing.    Mindfulness Resources:  www.mindful.org  HeadSpace Jenn (free access to "[employer] in 2020 for healthcare providers with an NPI number)  Calm Jenn    Mindfulness Books:  Mindfulness for Beginners, by Andrey Lambert  The Mindful Way Through Anxiety, by Tita Tran  The Little Book of Mindfulness, by Rajani Powell              Material adapted from: https://www.nhs.uk/conditions/stress-anxiety-depression/mindfulness/ and mindful.org      Why does anxiety impact my thinking?    Your brain's number one job is to keep you alive, and our threat-response system is called the fight, flight, or freeze response.      When this gets turned on, our body automatically reacts within milliseconds -   Pupils dilate so we can have better vision of our surroundings  Skin becomes pale or flushed as the body redirects blood flow to the muscles, preparing us to run or fight   Heart rate and breathing become more rapid as our bodies try to take in as much oxygen as possible  Muscles tense and become primed for action, and may actually tremble or shake   Extra oxygen gets sent to the brain, increasing alertness and vigilance   Sight, hearing, and other senses get sharper     When we say "anxiety," what we are really describing is the subjective experience when this fight/flight/freeze system is activated. So, anxiety is a normal (and necessary!) human emotion. Without it, we probably wouldn't survive!     This system is designed to be FAST, but not necessarily ACCURATE. And that's ok! When there is a threat in your environment, it is usually more important that you respond quickly. For example, you may dodge out of the way of an oncoming car before you have time to really think about what's happening. In fact, the fight/flight/freeze response will OVERRIDE the more logical, thinking parts of your brain. It doesn't want you to waste time or energy " "wondering  why the bear is chasing you, it just wants you to run faster! That's why it feels impossible to think clearly when we are acutely stressed.     However, when this system gets dysregulated, we start to have problems.     If we are under chronic stress, the system is activated repeatedly. It becomes sensitized, turns on more easily, and becomes difficult to turn off. We have trouble sleeping because our body is on high alert all the time. Chronic stress is like a computer program running in the background, quietly draining all of our cognitive resources.    If we experience a trauma, this system gets turned up even more, and can get stuck in the "on" position. Often, people with a trauma history learn to live with this level of arousal after a while, and might not even notice it most of the time. However, when they get exposed to a new stressor - even a relatively minor one - they have less resources available to cope with it, because their  fight/flight/freeze sytem is ALREADY activated. So they are more prone to their body becoming overwhelmed, and may experience dissociative episodes, panic attacks, insomnia, stomach issues, and trouble thinking.      Effects of Anxiety on the Body          Central nervous system  Long-term anxiety and panic attacks can cause your brain to release stress hormones on a regular basis. This can increase the frequency of symptoms such as headaches, dizziness, and depression.    When you feel anxious and stressed, your brain floods your nervous system with hormones and chemicals designed to help you respond to a threat. Adrenaline and cortisol are two examples.    While helpful for the occasional high-stress event, long-term exposure to stress hormones can be more harmful to your physical health in the long run. For example, long-term exposure to cortisol can contribute to weight gain, insomnia, and difficulty concentrating.    Cardiovascular system  Anxiety disorders can " cause rapid heart rate, palpitations, and chest pain. You may also be at an increased risk of high blood pressure and heart disease. If you already have heart disease, anxiety disorders may raise the risk of coronary events.    Excretory and digestive systems  Anxiety also affects your excretory and digestive systems. You may have stomachaches, nausea, diarrhea, and other digestive issues. Loss of appetite can also occur.    There may be a connection between anxiety disorders and the development of irritable bowel syndrome (IBS) after a bowel infection. IBS can cause vomiting, diarrhea, or constipation.    Immune system  Anxiety can trigger your flight-or-fight stress response and release a flood of chemicals and hormones, like adrenaline, into your system.    In the short term, this increases your pulse and breathing rate, so your brain can get more oxygen. This prepares you to respond appropriately to an intense situation. Your immune system may even get a brief boost. With occasional stress, your body returns to normal functioning when the stress passes.    But if you repeatedly feel anxious and stressed or it lasts a long time, your body never gets the signal to return to normal functioning. This can weaken your immune system, leaving you more vulnerable to viral infections and frequent illnesses. Also, your regular vaccines may not work as well if you have anxiety.    Respiratory system  Anxiety causes rapid, shallow breathing. If you have chronic obstructive pulmonary disease (COPD), you may be at an increased risk of hospitalization from anxiety-related complications. Anxiety can also make asthma symptoms worse.    Other effects  Anxiety disorder can cause other symptoms, including:  Headaches  muscle tension  Insomnia  Depression  social isolation    If you have a trauma-related disorder, you may experience flashbacks, reliving a traumatic experience over and over. You might get angry or startle easily, and  perhaps become emotionally withdrawn. Other symptoms include nightmares, insomnia, and sadness.    https://www.healthline.[employer]/health/anxiety/effects-on-body          Anxiety Coping Strategies: Try these when you're feeling anxious or stressed:  Stress management: Limit potential triggers by managing stress levels. Keep an eye on pressures and deadlines, organize daunting tasks in to-do lists, and take enough time off from professional or educational obligations.  Take a time-out. Practice yoga, listen to music, meditate, get a massage, or learn relaxation techniques. Stepping back from the problem helps clear your head.  Eat well-balanced meals. Do not skip any meals. Do keep healthful, energy-boosting snacks on hand.  Limit alcohol and caffeine, which can aggravate anxiety and trigger panic attacks.  Get enough sleep. When stressed, your body needs additional sleep and rest.  Exercise daily: Physical exertion and an active lifestyle can improve self-image and trigger the release of chemicals in the brain that stimulate positive emotions.  Welcome humor. A good laugh goes a long way.  Maintain a positive attitude. Make an effort to replace negative thoughts with positive ones.  Get involved. Volunteer or find another way to be active in your community, which creates a support network and gives you a break from everyday stress.  Learn what triggers your anxiety. Is it work, family, school, or something else you can identify? Write in a journal when youre feeling stressed or anxious, and look for a pattern.  Support network: Talk to a person who is supportive, such as a family member or friend. Avoid storing up and suppressing anxious feelings as this can worsen anxiety disorders.  Relaxation techniques: Certain measures can help reduce signs of anxiety, including deep-breathing exercises, long baths, meditation, yoga, and resting in the dark.  Exercises to replace negative thoughts with positive ones: Write down a  list of any negative thoughts, and make another list of positive thoughts to replace them. Picturing yourself successfully facing and conquering a specific fear can also provide benefits if the anxiety symptoms link to a specific stressor.  Slow breathing. When youre anxious, your breathing becomes faster and shallower. Try deliberately slowing down your breathing. Count to three as you breathe in slowly - then count to three as you breathe out slowly.  Progressive muscle relaxation. Find a quiet location. Close your eyes and slowly tense and then relax each of your muscle groups from your toes to your head. Hold the tension for three seconds and then release quickly. This can help reduce the feelings of muscle tension that often comes with anxiety.  Stay in the present moment. Anxiety can make your thoughts live in a terrible future that hasnt happened yet. Try to bring yourself back to where you are.   Practice a grounding technique: Name 5 things you can see, 4 things you can hear, 3 things you can feel, 2 things you can smell, and 1 thing you can taste.  Healthy lifestyle. Keeping active, eating well, going out into nature, spending time with family and friends, reducing stress and doing the activities you enjoy are all effective in reducing anxiety and improving your wellbeing.     Take small acts of bravery. Avoiding what makes you anxious provides some relief in the short term, but can make you more anxious in the long term. Try approaching something that makes you anxious - even in a small way. The way through anxiety is by learning that what you fear isnt likely to happen - and if it does, youll be able to cope with it.  Challenge your self-talk. How you think affects how you feel. Anxiety can make you overestimate the danger in a situation and underestimate your ability to handle it. Try to think of different interpretations to a situation thats making you anxious, rather than jumping to the  "worst-case scenario. Look at the facts for and against your thought being true.  Plan worry time. Its hard to stop worrying entirely so set aside some time to indulge your worries. Even 10 minutes each evening to write them down or go over them in your head can help stop your worries from taking over at other times.  Get to know your anxiety. Keep a diary of when its at its best - and worst. Find the patterns and plan your week - or day - to proactively manage your anxiety.  Learn from others. Talking with others who also experience anxiety - or are going through something similar - can help you feel less alone.   Be kind to yourself. Remember that you are not your anxiety. You are not weak. You are not inferior. Your body is trying to keep you safe. Be gentle with it.        Pain Management Techniques  Here is a list of techniques to help reduce your pain that don't require an invasive procedure -- or even taking a pill.    1. Cold and heat. These two tried-and-true methods are still the cornerstone of relieving pain for certain kinds of injuries. If a homemade hot or cold pack doesn't do the trick, try asking a physical therapist or chiropractor for their versions of these treatments, which can penetrate deeper into the muscle and tissue.    2. Exercise. Physical activity plays a crucial role in interrupting the "vicious cycle" of pain and reduced mobility found in some chronic conditions such as arthritis and fibromyalgia. Try gentle aerobic activities such as walking, swimming, or cycling.    3. Physical therapy and occupational therapy. Physical therapists guide you through a series of exercises designed to preserve or improve your strength and mobility. Occupational therapists help you learn to perform a range of daily activities in a way that doesn't aggravate your pain.    4. Mind-body techniques. These techniques, which include meditation, mindfulness, and breathing exercises (among many others), help " "you restore a sense of control over your body and turn down the "fight or flight" response, which can worsen chronic muscle tension and pain. Research shows that mindfulness techniques can be especially helpful, although they require daily practice to see the benefits. There are many free resources for mindfulness exercises, including videos on YouTube, websites like www.mindful.org, and smart phone apps like Calm or [employer].    5. Yoga and asad chi. These two exercise practices incorporate breath control, meditation, and gentle movements to stretch and strengthen muscles. Many studies have shown that they can help people manage pain caused by a host of conditions, from headaches to arthritis to lingering injuries.    6. Biofeedback. This technique involves learning relaxation and breathing exercises with the help of a biofeedback machine, which turns data on physiological functions (such as heart rate and blood pressure) into visual cues such as a graph, a blinking light, or even an animation. Watching and modifying the visualizations gives you a degree of control over your body's response to pain.    7. Music therapy. Studies have shown that music can help relieve pain during and after surgery and childbirth. Classical music has proven to work especially well, but there's no harm in trying your favorite genre -- listening to any kind of music can distract you from pain or discomfort.    8. Therapeutic massage. Not just an indulgence, massage can ease pain by working tension out of muscles and joints, relieving stress and anxiety, and possibly helping to distract you from pain by introducing a "competing" sensation that overrides pain signals.    9. Activity Pacing. If you have chronic pain you may push yourself to do lots of physical activity when you are having a good day. Unfortunately, later your pain is usually so severe that you have to rest for a long time to recover. This is called the Pain Cycle. It " "looks like this: People usually find themselves repeating the pain cycle over and over. Unfortunately, doing this can make your pain worse, and can make you more tired, tense, and worried. In the long term you may find that you end up avoiding things that are fun and make you feel good to keep from having severe pain. The best way to avoid the pain cycle is to develop an activity-rest cycle. You can do this by pacing yourself. That means you alternate planned periods of activity with regular rest periods. It looks like this:   There are three steps to pacing yourself:   1. Make a list of some of the things that you tend to overdo.   2. Make a time limit for the activity and then STOP and REST.   3. Keep track of how you are doing (how many times did you stop yourself from overdoing it?).      Sleep Hygiene:     Avoid watching TV, eating, and discussing emotional issues in bed. The bed should be used for sleep and sex only. If not, we can associate the bed with other activities and it often becomes difficult to fall asleep.  Minimize noise, light, and temperature extremes during sleep with ear plugs, window blinds, or an electric blanket or air conditioner. Even the slightest nighttime noises or luminescent lights can disrupt the quality of your sleep. Try to keep your bedroom at a comfortable temperature -- not too hot (above 75 degrees) or too cold (below 54 degrees).  Try to go to bed and wake up at the same time every day. A strict routine can help encourage stability in your circadian rhythm. Get out of bed even if you're still tired - sleeping in much later will make it harder to fall asleep the next night, and the cycle will continue. You may need to run a "sleep deficit" for a day to help you fall asleep more easily.    Do not watch TV in bed, and do not fall asleep to TV. Many people say leaving the TV on helps them fall asleep. However, the flickering "blue light" released by screens  is absorbed even " "through closed eyelids, and suppresses melatonin release in your brain. This can cause us to linger in the lightest stages of sleep, and miss out on more restorative, deeper sleep. A better option might be a white noise machine or caitlin without any light.   Try not to drink fluids after 8 p.m. This may reduce awakenings due to urination.  Avoid naps, but if you do nap, make it no more than about 25 minutes about eight hours after you awake. But if you have problems falling asleep, then no naps for you.  Do not expose your self to bright light if you need to get up at night. Use a small night-light instead. Blue light can be particularly detrimental, including the light from your cellphone, TV, or computer screen.  Nicotine is a stimulant and should be avoided particularly near bedtime and upon night awakenings. Having a smoke before bed, although it may feel relaxing, is actually putting a stimulant into your bloodstream.  Caffeine is also a stimulant and is present in coffee (100-200 mg), soda (50-75 mg), tea (50-75 mg), and various over-the-counter medications. Caffeine should be discontinued at least four to six hours before bedtime. If you consume large amounts of caffeine and you cut your self off too quickly, beware; you may get headaches that could keep you awake. Sometimes people feel they are "immune" to the effects of caffeine, and that a warm cup of coffee before bed actually helps them relax. But similar to nicotine, despite that it may feel relaxing in the moment, you are still consuming a potent stimulant, and it will act on your nervous system throughout the night causing restless and disrupted sleep even if you don't notice feeling jittery at bedtime.   Avoid alcohol in the evenings. Although alcohol is a depressant and may help you fall asleep, the subsequent metabolism that clears it from your body when you are sleeping causes a withdrawal syndrome. This withdrawal causes awakenings and is often " "associated with nightmares and sweats.  A light snack may be sleep-inducing, but a heavy meal too close to bedtime interferes with sleep. Stay away from protein and stick to carbohydrates or dairy products. Milk contains the amino acid L-tryptophan, which has been shown in research to help people go to sleep. So milk and cookies or crackers (without chocolate) may be useful and taste good as well.  Be active during the day. Get regular exercise, help burn off excess energy, and promote more sound sleep at night.   Try to get outside into the sunlight at least once per day (with sunscreen, of course!). Your circadian rhythm is primarily regulated by the light coming through your eyes, so making sure it's fully dark at night and making sure you get some sunlight during the day can reinforce your circadian rhythm.   Use mindfulness or meditation strategies to quiet a busy mind. Many people report having difficulty sleeping due to being unable "turn off" their minds. Practicing mindfulness exercises before bed - like Progressive Muscle Relaxation or a body scan - can help promote relaxation and aid in anxiety reduction. Apps such as [employer]kick and Calm can be useful, and there are many freely available mindfulness videos on YouTube. If anxiety continues to interfere with your sleep, seeing a behavioral health professional to learn anxiety reduction strategies can be helpful.   If you are still struggling with sleep, or struggling to implement any of these tips, behavioral health professionals who are specially trained in sleep medicine can be helpful. Modalities such as Cognitive Behavioral Therapy for Insomnia (CBT-I) can be particularly useful.     "

## 2023-09-28 NOTE — PROGRESS NOTES
NEUROPSYCHOLOGICAL EVALUATION FEEDBACK    Rema Horton attended a feedback session today.  We discussed the results of the neuropsychological evaluation (63 minutes).        Handouts provided in AVS. All of her questions were answered.    1. Panic disorder        2. Depression, major, recurrent, moderate        3. Primary insomnia        4. Obstructive sleep apnea syndrome        5. Pain            PLAN:   Referred to CBT for panic attacks in [facility]. She has upcoming psychiatry appt    Hannah Holman PsyD  Licensed Clinical Neuropsychologist  [facility] - Department of Neurology

## 2023-09-29 ENCOUNTER — PATIENT MESSAGE (OUTPATIENT)
Dept: PSYCHIATRY | Facility: CLINIC | Age: 48
End: 2023-09-29
Payer: COMMERCIAL

## 2023-10-13 ENCOUNTER — OFFICE VISIT (OUTPATIENT)
Dept: PSYCHIATRY | Facility: CLINIC | Age: 48
End: 2023-10-13
Payer: COMMERCIAL

## 2023-10-13 VITALS
WEIGHT: 165 LBS | SYSTOLIC BLOOD PRESSURE: 126 MMHG | BODY MASS INDEX: 31.18 KG/M2 | DIASTOLIC BLOOD PRESSURE: 77 MMHG | HEART RATE: 70 BPM

## 2023-10-13 DIAGNOSIS — R11.0 NAUSEA: ICD-10-CM

## 2023-10-13 DIAGNOSIS — F41.9 ANXIETY: ICD-10-CM

## 2023-10-13 DIAGNOSIS — M79.7 FIBROMYALGIA: ICD-10-CM

## 2023-10-13 DIAGNOSIS — F41.0 PANIC ATTACKS: ICD-10-CM

## 2023-10-13 DIAGNOSIS — F32.2 SEVERE MAJOR DEPRESSION WITHOUT PSYCHOTIC FEATURES: Primary | ICD-10-CM

## 2023-10-13 PROCEDURE — 3074F SYST BP LT 130 MM HG: CPT | Mod: CPTII,S$GLB,, | Performed by: STUDENT IN AN ORGANIZED HEALTH CARE EDUCATION/TRAINING PROGRAM

## 2023-10-13 PROCEDURE — 3044F HG A1C LEVEL LT 7.0%: CPT | Mod: CPTII,S$GLB,, | Performed by: STUDENT IN AN ORGANIZED HEALTH CARE EDUCATION/TRAINING PROGRAM

## 2023-10-13 PROCEDURE — 3078F PR MOST RECENT DIASTOLIC BLOOD PRESSURE < 80 MM HG: ICD-10-PCS | Mod: CPTII,S$GLB,, | Performed by: STUDENT IN AN ORGANIZED HEALTH CARE EDUCATION/TRAINING PROGRAM

## 2023-10-13 PROCEDURE — 3044F PR MOST RECENT HEMOGLOBIN A1C LEVEL <7.0%: ICD-10-PCS | Mod: CPTII,S$GLB,, | Performed by: STUDENT IN AN ORGANIZED HEALTH CARE EDUCATION/TRAINING PROGRAM

## 2023-10-13 PROCEDURE — 3078F DIAST BP <80 MM HG: CPT | Mod: CPTII,S$GLB,, | Performed by: STUDENT IN AN ORGANIZED HEALTH CARE EDUCATION/TRAINING PROGRAM

## 2023-10-13 PROCEDURE — 99999 PR PBB SHADOW E&M-EST. PATIENT-LVL II: ICD-10-PCS | Mod: PBBFAC,,, | Performed by: STUDENT IN AN ORGANIZED HEALTH CARE EDUCATION/TRAINING PROGRAM

## 2023-10-13 PROCEDURE — 99205 OFFICE O/P NEW HI 60 MIN: CPT | Mod: S$GLB,,, | Performed by: STUDENT IN AN ORGANIZED HEALTH CARE EDUCATION/TRAINING PROGRAM

## 2023-10-13 PROCEDURE — 3008F BODY MASS INDEX DOCD: CPT | Mod: CPTII,S$GLB,, | Performed by: STUDENT IN AN ORGANIZED HEALTH CARE EDUCATION/TRAINING PROGRAM

## 2023-10-13 PROCEDURE — 99205 PR OFFICE/OUTPT VISIT, NEW, LEVL V, 60-74 MIN: ICD-10-PCS | Mod: S$GLB,,, | Performed by: STUDENT IN AN ORGANIZED HEALTH CARE EDUCATION/TRAINING PROGRAM

## 2023-10-13 PROCEDURE — 3008F PR BODY MASS INDEX (BMI) DOCUMENTED: ICD-10-PCS | Mod: CPTII,S$GLB,, | Performed by: STUDENT IN AN ORGANIZED HEALTH CARE EDUCATION/TRAINING PROGRAM

## 2023-10-13 PROCEDURE — 3074F PR MOST RECENT SYSTOLIC BLOOD PRESSURE < 130 MM HG: ICD-10-PCS | Mod: CPTII,S$GLB,, | Performed by: STUDENT IN AN ORGANIZED HEALTH CARE EDUCATION/TRAINING PROGRAM

## 2023-10-13 PROCEDURE — 99999 PR PBB SHADOW E&M-EST. PATIENT-LVL II: CPT | Mod: PBBFAC,,, | Performed by: STUDENT IN AN ORGANIZED HEALTH CARE EDUCATION/TRAINING PROGRAM

## 2023-10-13 RX ORDER — ONDANSETRON 4 MG/1
4 TABLET, ORALLY DISINTEGRATING ORAL EVERY 12 HOURS PRN
Qty: 14 TABLET | Refills: 0 | Status: SHIPPED | OUTPATIENT
Start: 2023-10-13 | End: 2023-10-20

## 2023-10-13 RX ORDER — DULOXETIN HYDROCHLORIDE 60 MG/1
60 CAPSULE, DELAYED RELEASE ORAL DAILY
Qty: 30 CAPSULE | Refills: 2 | Status: SHIPPED | OUTPATIENT
Start: 2023-10-13 | End: 2024-01-19 | Stop reason: SDUPTHER
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
## 2023-10-13 NOTE — PROGRESS NOTES
I have reviewed the notes, assessments, and/or procedures performed by Dr. Silva, I concur with her/his documentation of Rema Horton.  Date of Service: 10/13/2023    I personally saw the patient with Dr. Silva.     With reasonable medical certainty, based on history, chart review, available collateral information, and a present-state examination:  - the patient does not currently meet the criteria for psychiatric hospitalization  - the patient can be safely and effectively managed in the community  - adequate support, monitoring, and/or structures are in place     Thank you for allowing me to participate in the care of this patient.  Please contact me with any questions or needed clarifications.    STAFF Psychiatrist  Board Certification: Psychiatry and Addiction Medicine  Isra Norman MD

## 2023-10-18 RX ORDER — VALACYCLOVIR HYDROCHLORIDE 500 MG/1
TABLET, FILM COATED ORAL
Qty: 30 TABLET | Refills: 1 | Status: SHIPPED | OUTPATIENT
Start: 2023-10-18 | End: 2024-02-26 | Stop reason: SDUPTHER

## 2023-10-18 NOTE — TELEPHONE ENCOUNTER
Refill Routing Note   Medication(s) are not appropriate for processing by [facility] for the following reason(s):      New or recently adjusted medication    [facility] action(s):  Defer Care Due:  None identified            Appointments  past 12m or future 3m with PCP    Date Provider   Last Visit   8/18/2023 Britta Clark MD   Next Visit   11/3/2023 Britta Clark MD   ED visits in past 90 days: 0        Note composed:11:33 AM 10/18/2023

## 2023-10-27 ENCOUNTER — PATIENT OUTREACH (OUTPATIENT)
Dept: ADMINISTRATIVE | Facility: HOSPITAL | Age: 48
End: 2023-10-27
Payer: COMMERCIAL

## 2023-10-27 NOTE — PROGRESS NOTES
Health Maintenance Due   Topic Date Due    Pneumococcal Vaccines (Age 0-64) (1 - PCV) Never done    Influenza Vaccine (1) 09/01/2023    COVID-19 Vaccine (5 - 2023-24 season) 09/01/2023       HM updated. Triggered LINKS and Care Everywhere. Chart reviewed.     Jessica Simpson LPN   Clinical Care Coordinator  Primary Care and Wellness

## 2023-10-30 ENCOUNTER — OFFICE VISIT (OUTPATIENT)
Dept: NEUROLOGY | Facility: CLINIC | Age: 48
End: 2023-10-30
Payer: COMMERCIAL

## 2023-10-30 ENCOUNTER — LAB VISIT (OUTPATIENT)
Dept: LAB | Facility: HOSPITAL | Age: 48
End: 2023-10-30
Attending: PSYCHIATRY & NEUROLOGY
Payer: COMMERCIAL

## 2023-10-30 VITALS
HEIGHT: 62 IN | WEIGHT: 164.44 LBS | SYSTOLIC BLOOD PRESSURE: 123 MMHG | HEART RATE: 92 BPM | BODY MASS INDEX: 30.26 KG/M2 | DIASTOLIC BLOOD PRESSURE: 82 MMHG

## 2023-10-30 DIAGNOSIS — R51.9 NONINTRACTABLE HEADACHE, UNSPECIFIED CHRONICITY PATTERN, UNSPECIFIED HEADACHE TYPE: ICD-10-CM

## 2023-10-30 DIAGNOSIS — R20.2 PARESTHESIA: ICD-10-CM

## 2023-10-30 DIAGNOSIS — R20.2 PARESTHESIA: Primary | ICD-10-CM

## 2023-10-30 PROCEDURE — 3008F BODY MASS INDEX DOCD: CPT | Mod: CPTII,S$GLB,, | Performed by: PSYCHIATRY & NEUROLOGY

## 2023-10-30 PROCEDURE — 1159F MED LIST DOCD IN RCRD: CPT | Mod: CPTII,S$GLB,, | Performed by: PSYCHIATRY & NEUROLOGY

## 2023-10-30 PROCEDURE — 99999 PR PBB SHADOW E&M-EST. PATIENT-LVL IV: CPT | Mod: PBBFAC,,, | Performed by: PSYCHIATRY & NEUROLOGY

## 2023-10-30 PROCEDURE — 3044F PR MOST RECENT HEMOGLOBIN A1C LEVEL <7.0%: ICD-10-PCS | Mod: CPTII,S$GLB,, | Performed by: PSYCHIATRY & NEUROLOGY

## 2023-10-30 PROCEDURE — 99215 OFFICE O/P EST HI 40 MIN: CPT | Mod: S$GLB,,, | Performed by: PSYCHIATRY & NEUROLOGY

## 2023-10-30 PROCEDURE — 36415 COLL VENOUS BLD VENIPUNCTURE: CPT | Performed by: PSYCHIATRY & NEUROLOGY

## 2023-10-30 PROCEDURE — 99215 PR OFFICE/OUTPT VISIT, EST, LEVL V, 40-54 MIN: ICD-10-PCS | Mod: S$GLB,,, | Performed by: PSYCHIATRY & NEUROLOGY

## 2023-10-30 PROCEDURE — 3074F PR MOST RECENT SYSTOLIC BLOOD PRESSURE < 130 MM HG: ICD-10-PCS | Mod: CPTII,S$GLB,, | Performed by: PSYCHIATRY & NEUROLOGY

## 2023-10-30 PROCEDURE — 99417 PROLNG OP E/M EACH 15 MIN: CPT | Mod: S$GLB,,, | Performed by: PSYCHIATRY & NEUROLOGY

## 2023-10-30 PROCEDURE — 99999 PR PBB SHADOW E&M-EST. PATIENT-LVL IV: ICD-10-PCS | Mod: PBBFAC,,, | Performed by: PSYCHIATRY & NEUROLOGY

## 2023-10-30 PROCEDURE — 1160F RVW MEDS BY RX/DR IN RCRD: CPT | Mod: CPTII,S$GLB,, | Performed by: PSYCHIATRY & NEUROLOGY

## 2023-10-30 PROCEDURE — 99417 PR PROLONGED SVC, OUTPT, W/WO DIRECT PT CONTACT,  EA ADDTL 15 MIN: ICD-10-PCS | Mod: S$GLB,,, | Performed by: PSYCHIATRY & NEUROLOGY

## 2023-10-30 PROCEDURE — 3044F HG A1C LEVEL LT 7.0%: CPT | Mod: CPTII,S$GLB,, | Performed by: PSYCHIATRY & NEUROLOGY

## 2023-10-30 PROCEDURE — 1160F PR REVIEW ALL MEDS BY PRESCRIBER/CLIN PHARMACIST DOCUMENTED: ICD-10-PCS | Mod: CPTII,S$GLB,, | Performed by: PSYCHIATRY & NEUROLOGY

## 2023-10-30 PROCEDURE — 1159F PR MEDICATION LIST DOCUMENTED IN MEDICAL RECORD: ICD-10-PCS | Mod: CPTII,S$GLB,, | Performed by: PSYCHIATRY & NEUROLOGY

## 2023-10-30 PROCEDURE — 3079F PR MOST RECENT DIASTOLIC BLOOD PRESSURE 80-89 MM HG: ICD-10-PCS | Mod: CPTII,S$GLB,, | Performed by: PSYCHIATRY & NEUROLOGY

## 2023-10-30 PROCEDURE — 3074F SYST BP LT 130 MM HG: CPT | Mod: CPTII,S$GLB,, | Performed by: PSYCHIATRY & NEUROLOGY

## 2023-10-30 PROCEDURE — 82300 ASSAY OF CADMIUM: CPT | Performed by: PSYCHIATRY & NEUROLOGY

## 2023-10-30 PROCEDURE — 3079F DIAST BP 80-89 MM HG: CPT | Mod: CPTII,S$GLB,, | Performed by: PSYCHIATRY & NEUROLOGY

## 2023-10-30 PROCEDURE — 3008F PR BODY MASS INDEX (BMI) DOCUMENTED: ICD-10-PCS | Mod: CPTII,S$GLB,, | Performed by: PSYCHIATRY & NEUROLOGY

## 2023-10-30 RX ORDER — LANOLIN ALCOHOL/MO/W.PET/CERES
400 CREAM (GRAM) TOPICAL 2 TIMES DAILY
Qty: 60 TABLET | Refills: 5 | Status: SHIPPED | OUTPATIENT
Start: 2023-10-30

## 2023-10-30 NOTE — PROGRESS NOTES
[facility] - NEUROLOGY 7TH [place] [facility], [place]    Date: 10/30/23  Patient Name: Rema Horton   MRN: [id number]   Referring Provider: Sendy Butcher PA-C    Thank you so much Sendy Butcher PA-C for your patient referral to Neuromuscular team at [facility]. We take pride in our care coordination and look forward to your feedback and questions.    Assessment:   Rema Horton is a 48 y.o. female is a very pleasant patient with paresthesia in the setting of normal neuro examination for evaluation.  I will complete her polyneuropathy workup as she was previously noted to have low vitamin levels and I will do supplementation if it is still low.  NCS/EMG can be considered in future for further evaluation if need be.     I addressed her complaints. I provided information about fall precautions and healthy lifestyle. I would wish her very best for improvement/recovery in her condition.    Future direction based on feedback:    Plan:     Problem List Items Addressed This Visit    None  Visit Diagnoses       Paresthesia    -  Primary    Relevant Medications    magnesium oxide (MAG-OX) 400 mg (241.3 mg magnesium) tablet    Other Relevant Orders    EMG W/ ULTRASOUND AND NERVE CONDUCTION TEST 2 Extremities    HEAVY METALS SCREEN, BLOOD (QUANTITATIVE)    Nonintractable headache, unspecified chronicity pattern, unspecified headache type        Relevant Medications    magnesium oxide (MAG-OX) 400 mg (241.3 mg magnesium) tablet            Leonie Gillette MD    This evaluation was completed in >75  Minutes over 50% of the time spent on education & counseling. This includes face to face time and non-face to face time preparing to see the patient (eg, review of tests), obtaining and/or reviewing separately obtained history, documenting clinical information in the electronic or other health record, independently interpreting results and communicating results to the  patient/family/caregiver, or care coordinator.    Patient note was created using MModal Dictation.  Any errors in syntax or even information may not have been identified and edited on initial review prior to signing this note.    Details provided by:    Patient    Reason for visit:  Numbness in feet    HISTORY OF PRESENT ILLNESS   Ms. Rema Horton is a 48 y.o. female presenting for evaluation of feet numbness.  She has PMH of headache, memory loss, anxiety.      She noticed an electric shock-like sensation going up her right leg after shaving earlier this year. She cannot touch the outer side of her leg without having pain. The pain occurs in her right foot up to her knees and on her left foot. She also has numbness in the front of her lower legs bilaterally. The numbness is constant, and it gets better with socks. She also noticed tingling in both her feet occasionally, which is more in the afternoon. She has noticed occasional loss of balance but has had no falls. She has difficulty walking and going upstairs and downstairs because of knee pain. She feels pressure-like chest pain that started this year, but there is no shortness of breath. There was an episode of severe pain two months ago and she was unable to move.     She has blurred vision occasionally. She also had a history of headaches since 2015. She has had a history of speech difficulty since childhood for which she saw a neurologist in 2016. She has also noticed eye swelling after certain foods. She has sensitivity to sunlight and her body turns red. She has a daughter with special needs who was born in 2012.  She feels restricted in her activities because of her daughter. She has a history of fibromyalgia. She has high blood pressure occasionally but is not on any medication. She does not smoke or drink.    Pertinent work up based on chart review for current condition:  Vitamin-D level 34   Vitamin B12 level more than 2000   TSH 2.3   Hemoglobin A1c  5.3   Comprehensive metabolic panel normal   CBC normal   Tissue transglutaminase antibody negative   Anti CCP negative   Rheumatoid factor negative   C-reactive protein 4.8   ESR 19   FELIPA negative   Vitamin B6 level 4   Vitamin B12 level 6 normal   Vitamin B1 level 45 normal    NCS/EMG on 10/20/2015.    Normal study.      MRI brain on 07/28/2023.    No evidence of acute intracranial pathology.  Normal MR appearance of the brain    Review of Systems:  12 system review of systems is negative except for the symptoms mentioned in HPI.       Past Medical History Social History   Past Medical History:   Diagnosis Date    Abnormal Pap smear of cervix     Anemia     Breast disorder     Fever blister     Fibromyalgia     IC (interstitial cystitis)       Social History     Socioeconomic History    Marital status:    Tobacco Use    Smoking status: Never    Smokeless tobacco: Never   Substance and Sexual Activity    Alcohol use: Not Currently     Alcohol/week: 0.0 standard drinks of alcohol     Comment: Occasionally.    Drug use: No    Sexual activity: Yes     Partners: Male     Birth control/protection: None     Social Determinants of Health     Financial Resource Strain: High Risk (10/6/2023)    Overall Financial Resource Strain (CARDIA)     Difficulty of Paying Living Expenses: Very hard   Food Insecurity: Food Insecurity Present (10/6/2023)    Hunger Vital Sign     Worried About Running Out of Food in the Last Year: Often true     Ran Out of Food in the Last Year: Often true   Transportation Needs: No Transportation Needs (10/6/2023)    PRAPARE - Transportation     Lack of Transportation (Medical): No     Lack of Transportation (Non-Medical): No   Physical Activity: Inactive (10/6/2023)    Exercise Vital Sign     Days of Exercise per Week: 0 days     Minutes of Exercise per Session: 0 min   Stress: Stress Concern Present (10/6/2023)    [employer] - Occupational Stress Questionnaire      Feeling of Stress : Very much   Social Connections: Unknown (10/6/2023)    Social Connection and Isolation Panel [NHANES]     Frequency of Communication with Friends and Family: Patient refused     Frequency of Social Gatherings with Friends and Family: Patient refused     Active Member of Clubs or Organizations: No     Attends Club or Organization Meetings: Never     Marital Status:    Housing Stability: High Risk (10/6/2023)    Housing Stability Vital Sign     Unable to Pay for Housing in the Last Year: Yes     Number of Places Lived in the Last Year: 1     Unstable Housing in the Last Year: No        Family History Past Surgical History   Family History   Problem Relation Age of Onset    Cancer Mother         bladder (not sure if was actually gynecologic)    No Known Problems Father     Stroke Maternal Grandfather     Breast cancer Paternal Grandmother     Amblyopia Daughter     Chromosomal disorder Daughter     No Known Problems Son     Melanoma Neg Hx     Ovarian cancer Neg Hx     Colon cancer Neg Hx     Colon polyps Neg Hx     Esophageal cancer Neg Hx     Stomach cancer Neg Hx     Rectal cancer Neg Hx     Blindness Neg Hx     Glaucoma Neg Hx     Macular degeneration Neg Hx     Retinal detachment Neg Hx      Past Surgical History:   Procedure Laterality Date    BREAST BIOPSY Left 2014    fibroadenoma     SECTION  2012    X 2---JST FOR KJB (BREECH)    DILATION AND CURETTAGE OF UTERUS      KJB        Allergies    Review of patient's allergies indicates:   Allergen Reactions    Flexeril  [cyclobenzaprine]      Other reaction(s): Rash    Lodine  [etodolac] Rash            Medications     Current Outpatient Medications   Medication Sig Dispense Refill    baclofen (LIORESAL) 10 MG tablet TAKE 1 TABLET (10 MG TOTAL) BY MOUTH DAILY AS NEEDED (SEVERE PAIN/MUSCLE ACHE). 30 tablet 2    cholecalciferol, vitamin D3, (VITAMIN D3) 25 mcg (1,000 unit) capsule TAKE 2 CAPSULES (2,000 UNITS TOTAL) BY MOUTH ONCE  DAILY. 180 capsule 3    DULoxetine (CYMBALTA) 60 MG capsule Take 1 capsule (60 mg total) by mouth once daily. 30 capsule 2    xavier prim-linoleic-gamolenic ac (PRIMROSE OIL) 1,000 mg Cap       fluocinolone (SYNALAR) 0.01 % external solution AAA scalp qday - bid prn itching or scaling 60 mL 3    fluticasone propionate (FLONASE) 50 mcg/actuation nasal spray SPRAY 1 SPRAY INTO EACH NOSTRIL TWICE A DAY 16 mL 5    gabapentin (NEURONTIN) 300 MG capsule Take 1 capsule (300 mg total) by mouth 2 (two) times daily.      ketoconazole (NIZORAL) 2 % shampoo Wash hair with medicated shampoo at least 2x/week - let sit on scalp at least 5 minutes prior to rinsing 120 mL 5    Lactobacillus acidophilus (PROBIOTIC ORAL) Take by mouth once daily.      meloxicam (MOBIC) 7.5 MG tablet Take 1 tablet (7.5 mg total) by mouth once daily. In 1-2 weeks, if no relief, may increase dose to two tablets once daily. 60 tablet 3    multivit with iron,hematinic (SUPER B-COMPLEX ORAL)       MULTIVITAMIN ORAL NADIA MULTIVITAMIN      triamcinolone acetonide 0.1% (KENALOG) 0.1 % cream Apply topically 2 (two) times daily. To affected areas on arms and chest  x 1-2 wks then prn flares only. Avoid face, armpits, and groin. 45 g 2    TURMERIC ORAL Take by mouth.      valACYclovir (VALTREX) 500 MG tablet TAKE 1 TABLET BY MOUTH EVERY DAY 30 tablet 1    vitamin E acetate (VITAMIN E ORAL) Take by mouth once daily.       No current facility-administered medications for this visit.         LABS   Last CBC Results:   Lab Results   Component Value Date    WBC 4.67 07/14/2023    HGB 13.0 07/14/2023    HCT 40.0 07/14/2023     07/14/2023       Last CMP Results  Lab Results   Component Value Date     07/14/2023    K 4.4 07/14/2023     07/14/2023    CO2 27 07/14/2023    BUN 15 07/14/2023    CREATININE 0.7 07/14/2023    CALCIUM 9.4 07/14/2023    ALBUMIN 3.9 07/14/2023    AST 19 07/14/2023    ALT 15 07/14/2023       Last Lipids  Lab Results   Component  "Value Date    CHOL 213 (H) 07/14/2023    TRIG 82 07/14/2023    HDL 54 07/14/2023    LDLCALC 142.6 07/14/2023       Last A1C  Lab Results   Component Value Date    HGBA1C 5.3 07/14/2023       Last TSH  Lab Results   Component Value Date    TSH 2.329 07/14/2023         PHYSICAL EXAMINATION     Vitals:    10/30/23 1106   BP: 123/82   Pulse: 92   Weight: 74.6 kg (164 lb 7.4 oz)   Height: 5' 2" (1.575 m)     Wt Readings from Last 3 Encounters:   08/18/23 1322 74.4 kg (164 lb 0.4 oz)   08/03/23 1102 74.8 kg (165 lb)   07/13/23 1317 75 kg (165 lb 7.3 oz)     Body mass index is 30.08 kg/m².     General: No acute distress, calm & cooperative  Head: Atraumatic, normocephalic  HEENT: PERRLA, EOMI, Oral mucosa moist   Neck: Supple, trachea midline  Cardiovascular: Regular rate and rhythm.  Pulmonary: CTAB, no increased work of breathing, no rhonchi or wheezing  Extremities: Warm, well-perfused, no significant edema  Psychiatric: Normal mood & affect; behavior normal & appropriate  Skin: No jaundice, rashes    GENERAL/CONSTITUTIONAL:    -Well appearing; well nourished    HIGHER INTEGRATIVE FUNCTIONS:   -Attention & concentration: Normal   -Orientation: Oriented to person, place & time  -Memory: Normal  -Language: Normal   -Fund of Knowledge: Normal     FUNDOSCOPY:  Fundi visualized with clear disc margins    CRANIAL NERVES:   -CN 2: Visual fields full  -CN 2,3: PERRL  -CN 3,4,6: EOMI  -CN 5: Facial sensation intact bilaterally  -CN 7: Facial strength/movement intact bilaterally  -CN 8: Hearing normal bilaterally  -CN 9,10: Palate elevates symmetrically  -CN 11: Normal shoulder shrug and head turn  -CN 12: Tongue protrudes midline     MOTOR:   -Tone: normal in upper and lower extremities  -UE/LE motor: 5/5 throughout, no pronator drift     SENSATION:   -Intact bilaterally to Vibration and pin prick    REFLEXES:   -2/4 upper and lower extremities bilaterally  -Flexor plantar reflex bilaterally    COORDINATION:   -FNF normal " bilaterally    GAIT:   -Normal casual gait. Able to stand on heels and toes without difficulty.  -straight leg raising test is negative    Scheduled Follow-up :  Future Appointments   Date Time Provider Department Center   10/30/2023 11:00 AM Leonie Gillette MD [facility] NEURO [address]   11/3/2023 10:00 AM Britta Clark MD [facility] IM [address] PCW   11/24/2023 10:00 AM Ugo Rodrgiuez MD [facility] PHYSMED [address]   1/25/2024 11:30 AM Umberto Dan MD [facility] SLEEP [facility]   4/25/2024 10:00 AM [facility] TANDION MAMMO2 [facility] MAMMO [address]       After Visit Medication List :     Medication List            Accurate as of October 30, 2023 10:18 AM. If you have any questions, ask your nurse or doctor.                CONTINUE taking these medications      baclofen 10 MG tablet  Commonly known as: LIORESAL  TAKE 1 TABLET (10 MG TOTAL) BY MOUTH DAILY AS NEEDED (SEVERE PAIN/MUSCLE ACHE).     cholecalciferol (vitamin D3) 25 mcg (1,000 unit) capsule  Commonly known as: VITAMIN D3  TAKE 2 CAPSULES (2,000 UNITS TOTAL) BY MOUTH ONCE DAILY.     DULoxetine 60 MG capsule  Commonly known as: CYMBALTA  Take 1 capsule (60 mg total) by mouth once daily.     fluocinolone 0.01 % external solution  Commonly known as: SYNALAR  AAA scalp qday - bid prn itching or scaling     fluticasone propionate 50 mcg/actuation nasal spray  Commonly known as: FLONASE  SPRAY 1 SPRAY INTO EACH NOSTRIL TWICE A DAY     gabapentin 300 MG capsule  Commonly known as: NEURONTIN  Take 1 capsule (300 mg total) by mouth 2 (two) times daily.     ketoconazole 2 % shampoo  Commonly known as: NIZORAL  Wash hair with medicated shampoo at least 2x/week - let sit on scalp at least 5 minutes prior to rinsing     meloxicam 7.5 MG tablet  Commonly known as: MOBIC  Take 1 tablet (7.5 mg total) by mouth once daily. In 1-2 weeks, if no relief, may increase dose to two tablets once daily.     MULTIVITAMIN ORAL     PRIMROSE OIL 1,000 mg Cap  Generic drug: xavier  prim-linoleic-gamolenic ac     PROBIOTIC ORAL     SUPER B-COMPLEX ORAL     triamcinolone acetonide 0.1% 0.1 % cream  Commonly known as: KENALOG  Apply topically 2 (two) times daily. To affected areas on arms and chest  x 1-2 wks then prn flares only. Avoid face, armpits, and groin.     TURMERIC ORAL     valACYclovir 500 MG tablet  Commonly known as: VALTREX  TAKE 1 TABLET BY MOUTH EVERY DAY     VITAMIN E ORAL              Signing Physician:          Leonie Gillette MD  , [employer] [employer] [facility] [employer] [facility]  Neurology Consultant. [facility].   [address],  [facility]. [address]th [place] [address].

## 2023-10-31 LAB
ARSENIC BLD-MCNC: <1 NG/ML
CADMIUM BLD-MCNC: 0.3 NG/ML
CITY: NORMAL
COUNTY: NORMAL
GUARDIAN FIRST NAME: NORMAL
GUARDIAN LAST NAME: NORMAL
HOME PHONE: NORMAL
LEAD BLD-MCNC: 1.2 MCG/DL
MERCURY BLD-MCNC: <1 NG/ML
RACE: NORMAL
STATE: NORMAL
STREET ADDRESS: NORMAL
VENOUS/CAPILLARY: NORMAL
ZIP: NORMAL

## 2023-11-03 ENCOUNTER — IMMUNIZATION (OUTPATIENT)
Dept: INTERNAL MEDICINE | Facility: CLINIC | Age: 48
End: 2023-11-03
Payer: COMMERCIAL

## 2023-11-03 ENCOUNTER — OFFICE VISIT (OUTPATIENT)
Dept: INTERNAL MEDICINE | Facility: CLINIC | Age: 48
End: 2023-11-03
Payer: COMMERCIAL

## 2023-11-03 VITALS
HEIGHT: 62 IN | DIASTOLIC BLOOD PRESSURE: 84 MMHG | WEIGHT: 163.69 LBS | SYSTOLIC BLOOD PRESSURE: 126 MMHG | HEART RATE: 67 BPM | OXYGEN SATURATION: 99 % | BODY MASS INDEX: 30.12 KG/M2

## 2023-11-03 DIAGNOSIS — F41.9 ANXIETY: ICD-10-CM

## 2023-11-03 DIAGNOSIS — M79.7 FIBROMYALGIA: Primary | ICD-10-CM

## 2023-11-03 DIAGNOSIS — N30.10 INTERSTITIAL CYSTITIS: ICD-10-CM

## 2023-11-03 DIAGNOSIS — G47.33 OBSTRUCTIVE SLEEP APNEA SYNDROME: ICD-10-CM

## 2023-11-03 DIAGNOSIS — F33.1 DEPRESSION, MAJOR, RECURRENT, MODERATE: ICD-10-CM

## 2023-11-03 DIAGNOSIS — F41.0 PANIC ATTACK: ICD-10-CM

## 2023-11-03 PROCEDURE — 90686 FLU VACCINE (QUAD) GREATER THAN OR EQUAL TO 3YO PRESERVATIVE FREE IM: ICD-10-PCS | Mod: S$GLB,,, | Performed by: INTERNAL MEDICINE

## 2023-11-03 PROCEDURE — 3008F PR BODY MASS INDEX (BMI) DOCUMENTED: ICD-10-PCS | Mod: CPTII,S$GLB,, | Performed by: INTERNAL MEDICINE

## 2023-11-03 PROCEDURE — 3044F HG A1C LEVEL LT 7.0%: CPT | Mod: CPTII,S$GLB,, | Performed by: INTERNAL MEDICINE

## 2023-11-03 PROCEDURE — 99999 PR PBB SHADOW E&M-EST. PATIENT-LVL IV: ICD-10-PCS | Mod: PBBFAC,,, | Performed by: INTERNAL MEDICINE

## 2023-11-03 PROCEDURE — 3079F PR MOST RECENT DIASTOLIC BLOOD PRESSURE 80-89 MM HG: ICD-10-PCS | Mod: CPTII,S$GLB,, | Performed by: INTERNAL MEDICINE

## 2023-11-03 PROCEDURE — 1160F RVW MEDS BY RX/DR IN RCRD: CPT | Mod: CPTII,S$GLB,, | Performed by: INTERNAL MEDICINE

## 2023-11-03 PROCEDURE — 3008F BODY MASS INDEX DOCD: CPT | Mod: CPTII,S$GLB,, | Performed by: INTERNAL MEDICINE

## 2023-11-03 PROCEDURE — 1159F PR MEDICATION LIST DOCUMENTED IN MEDICAL RECORD: ICD-10-PCS | Mod: CPTII,S$GLB,, | Performed by: INTERNAL MEDICINE

## 2023-11-03 PROCEDURE — 1160F PR REVIEW ALL MEDS BY PRESCRIBER/CLIN PHARMACIST DOCUMENTED: ICD-10-PCS | Mod: CPTII,S$GLB,, | Performed by: INTERNAL MEDICINE

## 2023-11-03 PROCEDURE — 90471 FLU VACCINE (QUAD) GREATER THAN OR EQUAL TO 3YO PRESERVATIVE FREE IM: ICD-10-PCS | Mod: S$GLB,,, | Performed by: INTERNAL MEDICINE

## 2023-11-03 PROCEDURE — 99999 PR PBB SHADOW E&M-EST. PATIENT-LVL IV: CPT | Mod: PBBFAC,,, | Performed by: INTERNAL MEDICINE

## 2023-11-03 PROCEDURE — 90686 IIV4 VACC NO PRSV 0.5 ML IM: CPT | Mod: S$GLB,,, | Performed by: INTERNAL MEDICINE

## 2023-11-03 PROCEDURE — 90471 IMMUNIZATION ADMIN: CPT | Mod: S$GLB,,, | Performed by: INTERNAL MEDICINE

## 2023-11-03 PROCEDURE — 99214 PR OFFICE/OUTPT VISIT, EST, LEVL IV, 30-39 MIN: ICD-10-PCS | Mod: S$GLB,,, | Performed by: INTERNAL MEDICINE

## 2023-11-03 PROCEDURE — 3074F PR MOST RECENT SYSTOLIC BLOOD PRESSURE < 130 MM HG: ICD-10-PCS | Mod: CPTII,S$GLB,, | Performed by: INTERNAL MEDICINE

## 2023-11-03 PROCEDURE — 3044F PR MOST RECENT HEMOGLOBIN A1C LEVEL <7.0%: ICD-10-PCS | Mod: CPTII,S$GLB,, | Performed by: INTERNAL MEDICINE

## 2023-11-03 PROCEDURE — 3079F DIAST BP 80-89 MM HG: CPT | Mod: CPTII,S$GLB,, | Performed by: INTERNAL MEDICINE

## 2023-11-03 PROCEDURE — 3074F SYST BP LT 130 MM HG: CPT | Mod: CPTII,S$GLB,, | Performed by: INTERNAL MEDICINE

## 2023-11-03 PROCEDURE — 1159F MED LIST DOCD IN RCRD: CPT | Mod: CPTII,S$GLB,, | Performed by: INTERNAL MEDICINE

## 2023-11-03 PROCEDURE — 99214 OFFICE O/P EST MOD 30 MIN: CPT | Mod: S$GLB,,, | Performed by: INTERNAL MEDICINE

## 2023-11-03 NOTE — PROGRESS NOTES
Subjective:       Patient ID: Rema Horton is a 48 y.o. female.    Chief Complaint: Follow-up (6 mth follow up )    HPI  48 y.o. female here for follow up of    Iron deficiency; last hgb 13; previously w anemia  Previously w heavy periods. Iron when having period but none in months.      Fibromyalgia, anxiety, depression  Duloxetine 30mg daily -> 60mg  meloxicam  Gabapentin 300mg bid (only takes qhs; am makes her too drowsy)  Referred to CBT - [facility] for anxiety/panic attacks. She has referral but has not scheduled yet because her daughter was sick. She will call.   Cold weather is aggravating right elbow pain but overall feels pretty good pain wise.   Doing meditation which is helpful.    paresthesias  Numbness in feet, sensitive to touch.   Gabapentin just qhs, too strong for her to take a daytime dosage.  Neurology 10/30/23 -   Magnesium bid  Heavy metal screen negative    Perimenopausal w hot flashes.  Previously took black cohosh; plans to go back. Lots of sweating at nights. Has to have a fan on her.  Venlfaxine started 8/2022 but stopped and now on duloxetine instead for fibromyalgia     jonny  She has started cpap and fatigue seems to be not as bad as before.   Has an appt w Dr. Dan in January    Headache -   Saw Dr. Olivas in the past, tried elavil and maxalt and not much help in past. Saw Dr. Gillette on 10/30/23.  MRI brain 7/28/23: normal  Magnesium      Daughter, Ernestina, is 10yo w special needs.  Son is 12 yo.    She does not work outside of the home.  is working. He takes some FMLA to help daughter.. Financial difficulties. May look in to disability evaluation for herself.   Review of Systems   Constitutional:  Negative for fever.   Respiratory:  Negative for shortness of breath.    Cardiovascular:  Negative for chest pain.   Musculoskeletal: Negative.    Skin: Negative.        Objective:   /84 (BP Location: Left arm, Patient Position: Sitting, BP Method: Medium (Manual))   Pulse 67   Ht  "5' 2" (1.575 m)   Wt 74.2 kg (163 lb 11.1 oz)   SpO2 99%   BMI 29.94 kg/m²      Physical Exam  Constitutional:       General: She is not in acute distress.     Appearance: She is well-developed. She is not diaphoretic.   HENT:      Head: Normocephalic and atraumatic.   Cardiovascular:      Rate and Rhythm: Normal rate.   Pulmonary:      Effort: Pulmonary effort is normal. No respiratory distress.   Skin:     General: Skin is warm and dry.   Neurological:      Mental Status: She is alert and oriented to person, place, and time.   Psychiatric:         Behavior: Behavior normal.         Assessment:       1. Fibromyalgia    2. Depression, major, recurrent, moderate    3. Interstitial cystitis    4. Obstructive sleep apnea syndrome        Plan:       1. Fibromyalgia  2. Depression, major, recurrent, moderate  3. Interstitial cystitis  4. Obstructive sleep apnea syndrome  anxiety  - stable and controlled  - continue current regimen  Encouraged her to schedule     CBT - [facility] for anxiety/panic attacks.      You are up to date for your primary preventive health care, and there are no reminders at this time.     "

## 2023-11-08 ENCOUNTER — TELEPHONE (OUTPATIENT)
Dept: NEUROLOGY | Facility: CLINIC | Age: 48
End: 2023-11-08
Payer: COMMERCIAL

## 2023-11-09 PROBLEM — F41.0 PANIC ATTACK: Status: ACTIVE | Noted: 2023-11-09

## 2023-11-13 DIAGNOSIS — M94.0 COSTOCHONDRITIS: ICD-10-CM

## 2023-11-13 RX ORDER — MELOXICAM 7.5 MG/1
TABLET ORAL
Qty: 60 TABLET | Refills: 3 | Status: SHIPPED | OUTPATIENT
Start: 2023-11-13

## 2023-11-29 ENCOUNTER — IMMUNIZATION (OUTPATIENT)
Dept: INTERNAL MEDICINE | Facility: CLINIC | Age: 48
End: 2023-11-29
Payer: COMMERCIAL

## 2023-11-29 DIAGNOSIS — Z23 NEED FOR VACCINATION: Primary | ICD-10-CM

## 2023-11-29 PROCEDURE — 90480 ADMN SARSCOV2 VAC 1/ONLY CMP: CPT | Mod: S$GLB,,, | Performed by: INTERNAL MEDICINE

## 2023-11-29 PROCEDURE — 91322 COVID-19 VAC, MRNA 2023 (MODERNA)(PF) 50 MCG/0.5 ML IM SUSR (12+): ICD-10-PCS | Mod: S$GLB,,, | Performed by: INTERNAL MEDICINE

## 2023-11-29 PROCEDURE — 90480 COVID-19 VAC, MRNA 2023 (MODERNA)(PF) 50 MCG/0.5 ML IM SUSR (12+): ICD-10-PCS | Mod: S$GLB,,, | Performed by: INTERNAL MEDICINE

## 2023-11-29 PROCEDURE — 91322 SARSCOV2 VAC 50 MCG/0.5ML IM: CPT | Mod: S$GLB,,, | Performed by: INTERNAL MEDICINE

## 2024-01-02 ENCOUNTER — PATIENT MESSAGE (OUTPATIENT)
Dept: PSYCHIATRY | Facility: CLINIC | Age: 49
End: 2024-01-02
Payer: COMMERCIAL

## 2024-01-03 DIAGNOSIS — J06.9 ACUTE URI: ICD-10-CM

## 2024-01-03 RX ORDER — FLUTICASONE PROPIONATE 50 MCG
1 SPRAY, SUSPENSION (ML) NASAL 2 TIMES DAILY
Qty: 48 ML | Refills: 3 | Status: SHIPPED | OUTPATIENT
Start: 2024-01-03

## 2024-01-03 NOTE — TELEPHONE ENCOUNTER
No care due was identified.  Health [facility] Embedded Care Due Messages. Reference number: [id number].   1/03/2024 9:46:18 AM CST

## 2024-01-03 NOTE — TELEPHONE ENCOUNTER
Refill Decision Note   Rema Horton  is requesting a refill authorization.  Brief Assessment and Rationale for Refill:  Approve     Medication Therapy Plan:         Comments:     Note composed:1:14 PM 01/03/2024             Appointments     Last Visit   11/3/2023 Britta Clark MD   Next Visit   5/10/2024 Britta Clark MD

## 2024-01-18 ENCOUNTER — PATIENT MESSAGE (OUTPATIENT)
Dept: INTERNAL MEDICINE | Facility: CLINIC | Age: 49
End: 2024-01-18
Payer: COMMERCIAL

## 2024-01-19 ENCOUNTER — OFFICE VISIT (OUTPATIENT)
Dept: PSYCHIATRY | Facility: CLINIC | Age: 49
End: 2024-01-19
Payer: COMMERCIAL

## 2024-01-19 VITALS
BODY MASS INDEX: 30.36 KG/M2 | DIASTOLIC BLOOD PRESSURE: 73 MMHG | SYSTOLIC BLOOD PRESSURE: 137 MMHG | WEIGHT: 166 LBS | HEART RATE: 82 BPM

## 2024-01-19 DIAGNOSIS — F33.1 MDD (MAJOR DEPRESSIVE DISORDER), RECURRENT EPISODE, MODERATE: ICD-10-CM

## 2024-01-19 DIAGNOSIS — F90.2 ATTENTION DEFICIT HYPERACTIVITY DISORDER (ADHD), COMBINED TYPE: Primary | ICD-10-CM

## 2024-01-19 DIAGNOSIS — F41.9 ANXIETY: ICD-10-CM

## 2024-01-19 DIAGNOSIS — M79.7 FIBROMYALGIA: ICD-10-CM

## 2024-01-19 PROCEDURE — 99215 OFFICE O/P EST HI 40 MIN: CPT | Mod: S$GLB,,, | Performed by: STUDENT IN AN ORGANIZED HEALTH CARE EDUCATION/TRAINING PROGRAM

## 2024-01-19 PROCEDURE — 3078F DIAST BP <80 MM HG: CPT | Mod: CPTII,S$GLB,, | Performed by: STUDENT IN AN ORGANIZED HEALTH CARE EDUCATION/TRAINING PROGRAM

## 2024-01-19 PROCEDURE — 3075F SYST BP GE 130 - 139MM HG: CPT | Mod: CPTII,S$GLB,, | Performed by: STUDENT IN AN ORGANIZED HEALTH CARE EDUCATION/TRAINING PROGRAM

## 2024-01-19 PROCEDURE — 3008F BODY MASS INDEX DOCD: CPT | Mod: CPTII,S$GLB,, | Performed by: STUDENT IN AN ORGANIZED HEALTH CARE EDUCATION/TRAINING PROGRAM

## 2024-01-19 PROCEDURE — 99999 PR PBB SHADOW E&M-EST. PATIENT-LVL II: CPT | Mod: PBBFAC,,, | Performed by: STUDENT IN AN ORGANIZED HEALTH CARE EDUCATION/TRAINING PROGRAM

## 2024-01-19 PROCEDURE — 99212 OFFICE O/P EST SF 10 MIN: CPT | Mod: PBBFAC | Performed by: STUDENT IN AN ORGANIZED HEALTH CARE EDUCATION/TRAINING PROGRAM

## 2024-01-19 RX ORDER — DEXTROAMPHETAMINE SACCHARATE, AMPHETAMINE ASPARTATE, DEXTROAMPHETAMINE SULFATE AND AMPHETAMINE SULFATE 1.25; 1.25; 1.25; 1.25 MG/1; MG/1; MG/1; MG/1
5 TABLET ORAL DAILY
Qty: 30 TABLET | Refills: 0 | Status: SHIPPED | OUTPATIENT
Start: 2024-01-19 | End: 2024-01-24 | Stop reason: SDUPTHER

## 2024-01-19 RX ORDER — DULOXETIN HYDROCHLORIDE 60 MG/1
60 CAPSULE, DELAYED RELEASE ORAL DAILY
Qty: 90 CAPSULE | Refills: 1 | Status: SHIPPED | OUTPATIENT
Start: 2024-01-19 | End: 2024-03-15 | Stop reason: SDUPTHER

## 2024-01-19 NOTE — PROGRESS NOTES
STAFF NOTE    The chart was reviewed and the case was discussed, including the assessment and management plan.  I agree with the overall findings, with corrections or clarifications, if any, noted herein.    *Consider further titration of duloxetine dose.*    Isra Norman MD  Board Certification: Psychiatry and Addiction Medicine

## 2024-01-19 NOTE — PATIENT INSTRUCTIONS
AFTER VISIT INSTRUCTIONS    Take medications as prescribed.  If questions or concerns arise, or if experiencing side effects, adverse reactions or worsening symptoms, contact me through the [facility] portal or call [phone] to speak with office staff.  In cases of emergencies, call [phone] or present to the emergency department for immediate assistance.      ADJUSTMENTS TO REGIMEN:   - Start taking Adderall 5mg once daily for inattention, memory problems, and low mood. You can take 1 or 2 tablets in the morning. We will make sure this does not make you more restless or anxious. If you are having more panic attacks, stop taking the medication.  - Continue taking Cymbalta 60mg daily for anxiety and depression.    RESOURCES:    National [place] of Mental Health: information on mental health medications.  https://www.nimh.nih.gov/health/topics/mental-health-medications/    The National Suicide Prevention Lifeline: 1-525.356.9654.  Provides 24/7, free and confidential support for people in distress, prevention and crisis resources for you or your loved ones, and best practices for professionals.  https://suicidepreventionlifeline.org/         Thank you for allowing me to participate as part of your health care team, and thank you for choosing [facility].    Snehal Silva DO  [facility] Resident, PGY-4
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
## 2024-01-23 ENCOUNTER — OFFICE VISIT (OUTPATIENT)
Dept: INTERNAL MEDICINE | Facility: CLINIC | Age: 49
End: 2024-01-23
Payer: COMMERCIAL

## 2024-01-23 VITALS
BODY MASS INDEX: 30.79 KG/M2 | OXYGEN SATURATION: 98 % | DIASTOLIC BLOOD PRESSURE: 72 MMHG | HEIGHT: 62 IN | WEIGHT: 167.31 LBS | SYSTOLIC BLOOD PRESSURE: 106 MMHG | HEART RATE: 83 BPM

## 2024-01-23 DIAGNOSIS — L24.5 IRRITANT CONTACT DERMATITIS DUE TO OTHER CHEMICAL PRODUCTS: Primary | ICD-10-CM

## 2024-01-23 PROCEDURE — 99213 OFFICE O/P EST LOW 20 MIN: CPT | Mod: S$GLB,,, | Performed by: INTERNAL MEDICINE

## 2024-01-23 PROCEDURE — 3078F DIAST BP <80 MM HG: CPT | Mod: CPTII,S$GLB,, | Performed by: INTERNAL MEDICINE

## 2024-01-23 PROCEDURE — 3074F SYST BP LT 130 MM HG: CPT | Mod: CPTII,S$GLB,, | Performed by: INTERNAL MEDICINE

## 2024-01-23 PROCEDURE — 1160F RVW MEDS BY RX/DR IN RCRD: CPT | Mod: CPTII,S$GLB,, | Performed by: INTERNAL MEDICINE

## 2024-01-23 PROCEDURE — 3008F BODY MASS INDEX DOCD: CPT | Mod: CPTII,S$GLB,, | Performed by: INTERNAL MEDICINE

## 2024-01-23 PROCEDURE — 99999 PR PBB SHADOW E&M-EST. PATIENT-LVL IV: CPT | Mod: PBBFAC,,, | Performed by: INTERNAL MEDICINE

## 2024-01-23 PROCEDURE — 1159F MED LIST DOCD IN RCRD: CPT | Mod: CPTII,S$GLB,, | Performed by: INTERNAL MEDICINE

## 2024-01-23 RX ORDER — TRIAMCINOLONE ACETONIDE 1 MG/G
OINTMENT TOPICAL 2 TIMES DAILY
Qty: 80 G | Refills: 1 | Status: SHIPPED | OUTPATIENT
Start: 2024-01-23 | End: 2024-03-18

## 2024-01-23 NOTE — PROGRESS NOTES
"Subjective:       Patient ID: Rema Horton is a 48 y.o. female.    Chief Complaint: Rash    HPI  48 y.o. female here for evaluation of rash.     Left hand started with red round patch on finger on 1/13. Very itchy and red.  Derm appt scheduled 2/9.     Started after cleaning bathroom. Was using lysol toilet bowl , not new to her. She felt a burning sensation at the time. It showly has expanded to be larger over this time. Has used benadryl cream and triamcinolone cream without relief. No skin issues elsewhere.   Review of Systems   Constitutional:  Negative for fever.   Respiratory:  Negative for shortness of breath.    Cardiovascular:  Negative for chest pain.   Musculoskeletal: Negative.    Skin: Negative.        Objective:   /72 (BP Location: Left arm, Patient Position: Sitting, BP Method: Medium (Manual))   Pulse 83   Ht 5' 2" (1.575 m)   Wt 75.9 kg (167 lb 5.3 oz)   SpO2 98%   BMI 30.60 kg/m²      Physical Exam  Constitutional:       General: She is not in acute distress.     Appearance: She is well-developed. She is not diaphoretic.   HENT:      Head: Normocephalic and atraumatic.   Cardiovascular:      Rate and Rhythm: Normal rate.   Pulmonary:      Effort: Pulmonary effort is normal. No respiratory distress.   Skin:     General: Skin is warm and dry.   Neurological:      Mental Status: She is alert and oriented to person, place, and time.   Psychiatric:         Behavior: Behavior normal.         Hyperpigmented plaque on left thumb not extending past joints, white peeling at outer edges.   Assessment:       1. Irritant contact dermatitis due to other chemical products        Plan:       1. Irritant contact dermatitis due to other chemical products  -     triamcinolone acetonide 0.1% (KENALOG) 0.1 % ointment; Apply topically 2 (two) times daily. For left thumb  Dispense: 80 g; Refill: 1  Aquaphor or Working hands with cotton gloves over it to sleep.   Triamcinolone ointment twice a day      "    Health Maintenance Due   Topic    Colorectal Cancer Screening     Mammogram

## 2024-01-23 NOTE — PATIENT INSTRUCTIONS
Aquaphor or Working hands with cotton gloves over it to sleep.   Triamcinolone ointment twice a day

## 2024-01-24 ENCOUNTER — PATIENT MESSAGE (OUTPATIENT)
Dept: PSYCHIATRY | Facility: CLINIC | Age: 49
End: 2024-01-24
Payer: COMMERCIAL

## 2024-01-24 DIAGNOSIS — F90.2 ATTENTION DEFICIT HYPERACTIVITY DISORDER (ADHD), COMBINED TYPE: ICD-10-CM

## 2024-01-24 RX ORDER — DEXTROAMPHETAMINE SACCHARATE, AMPHETAMINE ASPARTATE, DEXTROAMPHETAMINE SULFATE AND AMPHETAMINE SULFATE 1.25; 1.25; 1.25; 1.25 MG/1; MG/1; MG/1; MG/1
5 TABLET ORAL DAILY
Qty: 30 TABLET | Refills: 0 | Status: SHIPPED | OUTPATIENT
Start: 2024-01-24 | End: 2024-03-15

## 2024-01-30 ENCOUNTER — OFFICE VISIT (OUTPATIENT)
Dept: PSYCHIATRY | Facility: CLINIC | Age: 49
End: 2024-01-30
Payer: COMMERCIAL

## 2024-01-30 DIAGNOSIS — M79.7 FIBROMYALGIA: ICD-10-CM

## 2024-01-30 DIAGNOSIS — F41.9 ANXIETY: ICD-10-CM

## 2024-01-30 DIAGNOSIS — F90.2 ATTENTION DEFICIT HYPERACTIVITY DISORDER (ADHD), COMBINED TYPE: ICD-10-CM

## 2024-01-30 DIAGNOSIS — F33.1 MDD (MAJOR DEPRESSIVE DISORDER), RECURRENT EPISODE, MODERATE: ICD-10-CM

## 2024-01-30 DIAGNOSIS — F41.0 PANIC ATTACKS: Primary | ICD-10-CM

## 2024-01-30 PROCEDURE — 99499 UNLISTED E&M SERVICE: CPT | Mod: S$GLB,,, | Performed by: PSYCHOLOGIST

## 2024-01-30 PROCEDURE — 90791 PSYCH DIAGNOSTIC EVALUATION: CPT | Mod: S$GLB,,, | Performed by: PSYCHOLOGIST
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
## 2024-02-06 ENCOUNTER — TELEPHONE (OUTPATIENT)
Dept: PSYCHIATRY | Facility: CLINIC | Age: 49
End: 2024-02-06
Payer: COMMERCIAL

## 2024-02-06 NOTE — TELEPHONE ENCOUNTER
Attempted phone call to schedule F/U panic tx sessions. Left vm with request for call back and direct phone line.

## 2024-02-09 ENCOUNTER — OFFICE VISIT (OUTPATIENT)
Dept: DERMATOLOGY | Facility: CLINIC | Age: 49
End: 2024-02-09
Payer: COMMERCIAL

## 2024-02-09 ENCOUNTER — LAB VISIT (OUTPATIENT)
Dept: LAB | Facility: HOSPITAL | Age: 49
End: 2024-02-09
Attending: STUDENT IN AN ORGANIZED HEALTH CARE EDUCATION/TRAINING PROGRAM
Payer: COMMERCIAL

## 2024-02-09 DIAGNOSIS — R21 RASH: ICD-10-CM

## 2024-02-09 DIAGNOSIS — L71.9 ROSACEA: Primary | ICD-10-CM

## 2024-02-09 DIAGNOSIS — L30.9 DERMATITIS: ICD-10-CM

## 2024-02-09 DIAGNOSIS — L21.9 ACUTE SEBORRHEIC DERMATITIS: ICD-10-CM

## 2024-02-09 PROCEDURE — 99999 PR PBB SHADOW E&M-EST. PATIENT-LVL III: CPT | Mod: PBBFAC,,, | Performed by: STUDENT IN AN ORGANIZED HEALTH CARE EDUCATION/TRAINING PROGRAM

## 2024-02-09 PROCEDURE — 86235 NUCLEAR ANTIGEN ANTIBODY: CPT | Mod: 59 | Performed by: STUDENT IN AN ORGANIZED HEALTH CARE EDUCATION/TRAINING PROGRAM

## 2024-02-09 PROCEDURE — 99214 OFFICE O/P EST MOD 30 MIN: CPT | Mod: S$GLB,,, | Performed by: STUDENT IN AN ORGANIZED HEALTH CARE EDUCATION/TRAINING PROGRAM

## 2024-02-09 PROCEDURE — 36415 COLL VENOUS BLD VENIPUNCTURE: CPT | Performed by: STUDENT IN AN ORGANIZED HEALTH CARE EDUCATION/TRAINING PROGRAM

## 2024-02-09 PROCEDURE — 1160F RVW MEDS BY RX/DR IN RCRD: CPT | Mod: CPTII,S$GLB,, | Performed by: STUDENT IN AN ORGANIZED HEALTH CARE EDUCATION/TRAINING PROGRAM

## 2024-02-09 PROCEDURE — 83516 IMMUNOASSAY NONANTIBODY: CPT | Mod: 59 | Performed by: STUDENT IN AN ORGANIZED HEALTH CARE EDUCATION/TRAINING PROGRAM

## 2024-02-09 PROCEDURE — 1159F MED LIST DOCD IN RCRD: CPT | Mod: CPTII,S$GLB,, | Performed by: STUDENT IN AN ORGANIZED HEALTH CARE EDUCATION/TRAINING PROGRAM

## 2024-02-09 RX ORDER — KETOCONAZOLE 20 MG/ML
SHAMPOO, SUSPENSION TOPICAL
Qty: 120 ML | Refills: 5 | Status: SHIPPED | OUTPATIENT
Start: 2024-02-09

## 2024-02-09 RX ORDER — CLOBETASOL PROPIONATE 0.5 MG/G
OINTMENT TOPICAL 2 TIMES DAILY
Qty: 45 G | Refills: 2 | Status: SHIPPED | OUTPATIENT
Start: 2024-02-09

## 2024-02-09 NOTE — PROGRESS NOTES
Subjective:      Patient ID:  Rema Horton is a 48 y.o. female who presents for   Chief Complaint   Patient presents with    Lesion     History of Present Illness: The patient presents for lesion on L thumb and redness on chest/arms.    The patient was last seen on: 9/2/2022 for facial redness. Pt using Triamcinolone ointment on chest and arms.     Patient with new complaint of lesion(s)  Location: L thumb  Duration: 3 weeks  Symptoms: sharp pain, dry  Relieving factors/Previous treatments: Triamcinolone ointment          Patient previously followed with Dr. Li. History of rash as below. She is here today for that as well as a new spot on her thumb. It was itching initally but not currently. It has gotten larger    Red rash that occurs when exposed to sunlight/heat . Areas affected are the face, chest, and arms. They itch and are being treated with Aveeno baby. States arms and chest have become pink and darker over the past 6 months.  States she starts getting fatigued and weak when she's out in the heat.     Prev HPI:  Patient biopsied on neck, 12/30/15, which showed subacute spong derm consistent with atopic dermatitis, allergic contact dermatitis, nummular dermatitis or id reaction. Based on patient's clinical presentation, most likely nummular eczema. She has been using TAC 0.1% cream BID to affected areas including her left back, right and left lower leg, and john-umbilical area with improvement.     Being followed by rheum for + FELIPA (1:160 speckled atypical pattern in 9/2014). FELIPA in 10/2020 was negative.  Neg marshall, ro, la, dsdna, rnp, APS panel  Rf, ccp-negative  normal TSH, T4  Esr:33, 34    1. Skin, upper chest, punch biopsy:   - MILD EPIDERMAL ATROPHY WITH A SPARSE SUPERFICIAL PERIVASCULAR AND PERIFOLLICULAR LYMPHOHISTIOCYTIC INFILTRATE (SEE COMMENT).   COMMENT:  These histologic features are mild and nonspecific.  Active   interface alteration is not appreciated, nor is there any significant    epidermal spongiosis.  A viral exanthem or a resolving eczematous process   could show similar histology.  Colloidal iron stain shows some mildly   increased dermal mucin within the superficial dermis, though this finding is   nonspecific.     Review of Systems   Constitutional:  Negative for fever, chills, weight loss, weight gain, fatigue and malaise.   Skin:  Positive for itching and activity-related sunscreen use. Negative for daily sunscreen use and recent sunburn.   Hematologic/Lymphatic: Does not bruise/bleed easily.       Objective:   Physical Exam   Constitutional: She appears well-developed and well-nourished.   Neurological: She is alert and oriented to person, place, and time.   Psychiatric: She has a normal mood and affect.   Skin:   Areas Examined (abnormalities noted in diagram):   Head / Face Inspection Performed  Neck Inspection Performed  Chest / Axilla Inspection Performed  RUE Inspected  LUE Inspection Performed                     Diagram Legend     Erythematous scaling macule/papule c/w actinic keratosis       Vascular papule c/w angioma      Pigmented verrucoid papule/plaque c/w seborrheic keratosis      Yellow umbilicated papule c/w sebaceous hyperplasia      Irregularly shaped tan macule c/w lentigo     1-2 mm smooth white papules consistent with Milia      Movable subcutaneous cyst with punctum c/w epidermal inclusion cyst      Subcutaneous movable cyst c/w pilar cyst      Firm pink to brown papule c/w dermatofibroma      Pedunculated fleshy papule(s) c/w skin tag(s)      Evenly pigmented macule c/w junctional nevus     Mildly variegated pigmented, slightly irregular-bordered macule c/w mildly atypical nevus      Flesh colored to evenly pigmented papule c/w intradermal nevus       Pink pearly papule/plaque c/w basal cell carcinoma      Erythematous hyperkeratotic cursted plaque c/w SCC      Surgical scar with no sign of skin cancer recurrence      Open and closed comedones       Inflammatory papules and pustules      Verrucoid papule consistent consistent with wart     Erythematous eczematous patches and plaques     Dystrophic onycholytic nail with subungual debris c/w onychomycosis     Umbilicated papule    Erythematous-base heme-crusted tan verrucoid plaque consistent with inflamed seborrheic keratosis     Erythematous Silvery Scaling Plaque c/w Psoriasis     See annotation      Assessment / Plan:        Rosacea  Rash  -     MyoMarker Panel 3; Future; Expected date: 02/09/2024  - erythema and telangiectasias on cheeks and chin. 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 Favor rosacea with photodamage causing poikiloderma rather than a connective tissue disease. She has had w/u for CTD by rheum which was negative. Morst recent FELIPA negative. No raynauds. No nail fold capillary changes or cuticle changes. Nonetheless, will get myomarker, which she has not had previously  - counseled on chronic nature and triggers of rosacea    Dermatitis--thumb  - limit hand washing. [place] bland emollient  -     clobetasol 0.05% (TEMOVATE) 0.05 % Oint; Apply topically 2 (two) times daily. Use to affected areas for up to 2 weeks then take a 1 week break or decrease to 3 times weekly. Do not apply to groin or face. Use to spot on hand  Dispense: 45 g; Refill: 2    Acute seborrheic dermatitis  -     ketoconazole (NIZORAL) 2 % shampoo; Wash hair with medicated shampoo at least 2x/week - let sit on scalp at least 5 minutes prior to rinsing  Dispense: 120 mL; Refill: 5           Follow up if symptoms worsen or fail to improve.

## 2024-02-09 NOTE — PATIENT INSTRUCTIONS
Hand Dermatitis  Hand dermatitis or hand eczema is a common problem because most people's hands are exposed to soaps, cold weather, and other irritating substances. Some people may be especially sensitive to detergents, oils, foods, cleaning products, etc. Avoiding these irritating substances are important parts of treatment.    Avoid excessive exposure to water. When washing your hands, use very little soap, rinse off all of the soap and apply moisturizers after drying your hands.   Protect your hands by wearing vinyl gloves whenever you are doing housework, cleaning, or any work where your hands will be exposed to chemicals or water. If water gets into the gloves, change your pair.  Remove rings when washing your hands or doing housework as chemicals can be trapped beneath the rings.  When outdoors in cool weather, wear soft-lined gloves to protect your hands from chapping.  Use ample moisturizer frequently. Keep a bottle by each sink. Some good options:  Cerave Cream  Neutrogena Hand Cream  Vanicream  Cerave Healing Ointment  Aveeno Hand Cream   Eucerin Hand Cream

## 2024-02-13 DIAGNOSIS — L29.9 PRURITUS: ICD-10-CM

## 2024-02-13 DIAGNOSIS — M54.2 NECK PAIN ON RIGHT SIDE: ICD-10-CM

## 2024-02-14 RX ORDER — TRIAMCINOLONE ACETONIDE 1 MG/G
CREAM TOPICAL
Qty: 45 G | Refills: 2 | Status: SHIPPED | OUTPATIENT
Start: 2024-02-14

## 2024-02-14 RX ORDER — BACLOFEN 10 MG/1
TABLET ORAL
Qty: 30 TABLET | Refills: 2 | Status: SHIPPED | OUTPATIENT
Start: 2024-02-14

## 2024-02-14 NOTE — TELEPHONE ENCOUNTER
Encounter date: 02/09/2023    Pruritus  -     triamcinolone acetonide 0.1% (KENALOG) 0.1 % cream; Apply topically 2 (two) times daily. To affected areas on arms and chest  x 1-2 wks then prn flares only. Avoid face, armpits, and groin.  Dispense: 45 g; Refill: 2

## 2024-02-14 NOTE — TELEPHONE ENCOUNTER
Refill Routing Note   Medication(s) are not appropriate for processing by [facility] for the following reason(s):        Outside of protocol    [facility] action(s):  Route               Appointments  past 12m or future 3m with PCP    Date Provider   Last Visit   1/23/2024 Britta Clark MD   Next Visit   5/10/2024 Britta Clark MD   ED visits in past 90 days: 0        Note composed:10:27 AM 02/14/2024

## 2024-02-20 DIAGNOSIS — M79.7 FIBROMYALGIA SYNDROME: ICD-10-CM

## 2024-02-20 DIAGNOSIS — R20.0 NUMBNESS IN FEET: ICD-10-CM

## 2024-02-21 RX ORDER — GABAPENTIN 300 MG/1
300 CAPSULE ORAL 3 TIMES DAILY
Qty: 90 CAPSULE | Refills: 11 | OUTPATIENT
Start: 2024-02-21

## 2024-02-21 NOTE — TELEPHONE ENCOUNTER
----- Message from Bridgett Nunez MD sent at 2/21/2024  8:50 AM CST -----  This patient requested gabapentin.  Please let her know Dr. Rodriguez is prescribing this for her so she needs to request it from him.  Dr. DÍAZ

## 2024-02-22 ENCOUNTER — PATIENT MESSAGE (OUTPATIENT)
Dept: RHEUMATOLOGY | Facility: CLINIC | Age: 49
End: 2024-02-22
Payer: COMMERCIAL

## 2024-02-26 DIAGNOSIS — M79.7 FIBROMYALGIA SYNDROME: ICD-10-CM

## 2024-02-26 DIAGNOSIS — R20.0 NUMBNESS IN FEET: ICD-10-CM

## 2024-02-27 ENCOUNTER — PATIENT MESSAGE (OUTPATIENT)
Dept: PHYSICAL MEDICINE AND REHAB | Facility: CLINIC | Age: 49
End: 2024-02-27
Payer: COMMERCIAL

## 2024-02-27 DIAGNOSIS — R20.0 NUMBNESS IN FEET: ICD-10-CM

## 2024-02-27 DIAGNOSIS — M79.7 FIBROMYALGIA SYNDROME: ICD-10-CM

## 2024-02-27 RX ORDER — VALACYCLOVIR HYDROCHLORIDE 500 MG/1
500 TABLET, FILM COATED ORAL DAILY
Qty: 30 TABLET | Refills: 5 | Status: SHIPPED | OUTPATIENT
Start: 2024-02-27

## 2024-02-27 RX ORDER — GABAPENTIN 300 MG/1
300 CAPSULE ORAL 2 TIMES DAILY
Qty: 60 CAPSULE | Refills: 0 | Status: SHIPPED | OUTPATIENT
Start: 2024-02-27 | End: 2024-04-08

## 2024-02-27 RX ORDER — GABAPENTIN 300 MG/1
300 CAPSULE ORAL 2 TIMES DAILY
Qty: 60 CAPSULE | Refills: 2
Start: 2024-02-27 | End: 2024-02-27 | Stop reason: SDUPTHER

## 2024-02-27 NOTE — TELEPHONE ENCOUNTER
No care due was identified.  [facility] Embedded Care Due Messages. Reference number: [id number].   2/26/2024 7:00:52 PM CST

## 2024-02-27 NOTE — TELEPHONE ENCOUNTER
Refill Decision Note   Rema Horton  is requesting a refill authorization.  Brief Assessment and Rationale for Refill:  Approve     Medication Therapy Plan:         Comments:     Note composed:9:24 AM 02/27/2024

## 2024-02-28 LAB
ANTI-PM/SCL AB: <20 UNITS
ANTI-SS-A 52 KD AB, IGG: <20 UNITS
EJ AB SER QL: NEGATIVE
ENA JO1 AB SER IA-ACNC: <20 UNITS
ENA SM+RNP AB SER IA-ACNC: <20 UNITS
FIBRILLARIN (U3 RNP): NEGATIVE
KU AB SER QL: NEGATIVE
MDA-5 (P140): <20 UNITS
MI2 AB SER QL: NEGATIVE
NXP-2 (P140): <20 UNITS
OJ AB SER QL: NEGATIVE
PL12 AB SER QL: NEGATIVE
PL7 AB SER QL: NEGATIVE
SRP AB SERPL QL: NEGATIVE
TIF1 GAMMA (P155/140): <20 UNITS
U2 SNRNP: NEGATIVE

## 2024-03-06 ENCOUNTER — OFFICE VISIT (OUTPATIENT)
Dept: PSYCHIATRY | Facility: CLINIC | Age: 49
End: 2024-03-06
Payer: COMMERCIAL

## 2024-03-06 DIAGNOSIS — F33.1 MDD (MAJOR DEPRESSIVE DISORDER), RECURRENT EPISODE, MODERATE: ICD-10-CM

## 2024-03-06 DIAGNOSIS — F41.0 PANIC ATTACKS: ICD-10-CM

## 2024-03-06 DIAGNOSIS — F41.1 GAD (GENERALIZED ANXIETY DISORDER): Primary | ICD-10-CM

## 2024-03-06 PROCEDURE — 90837 PSYTX W PT 60 MINUTES: CPT | Mod: S$GLB,,, | Performed by: PSYCHOLOGIST

## 2024-03-06 NOTE — PROGRESS NOTES
Individual Psychotherapy (PhD/LCSW)    3/6/2024    Site:  [facility]         Therapeutic Intervention: Met with patient.  Outpatient - Supportive psychotherapy 60 min - CPT Code 84178    Chief complaint/reason for encounter: anxiety     Interval history and content of current session: Patient presented for session one of CBT for Panic Disorder. However, upon learning more about the exposure component of the treatment, patient expressed fear and concern that she would not be able to commit to the protocol. Patient shared extensive details about her history of anxiety, panic attacks, and depression. She reviewed tools she has learned on her own to try to help with her symptoms, such as meditation and affirmations. She stated she is looking to build on this by learning more tools for stress management while also having a place to discuss all of the challenges she faces daily caring for her special needs daughter while struggling with her own health issues. Patient agreed to complete 4-5 sessions of stress management skill building with this provider instead of CBT for Panic. Will re-evaluate at that time for next steps.    Treatment plan:  Target symptoms: anxiety   Why chosen therapy is appropriate versus another modality: relevant to diagnosis  Outcome monitoring methods: self-report, checklist/rating scale  Therapeutic intervention type: supportive psychotherapy    Risk parameters:  Patient reports no suicidal ideation  Patient reports no homicidal ideation  Patient reports no self-injurious behavior  Patient reports no violent behavior    Verbal deficits: None    Patient's response to intervention:  The patient's response to intervention is accepting.    Progress toward goals and other mental status changes:  The patient's progress toward goals is fair .    Diagnosis:     ICD-10-CM ICD-9-CM   1. MARIMAR (generalized anxiety disorder)  F41.1 300.02   2. Panic attacks  F41.0 300.01   3. MDD (major depressive  disorder), recurrent episode, moderate  F33.1 296.32       Plan:  individual psychotherapy  Patient agreed to complete 4-5 sessions of stress management skill building with this provider instead of CBT for Panic. Will re-evaluate at that time for next steps.    Return to clinic: 1 week    Length of Service (minutes): 60

## 2024-03-13 ENCOUNTER — PATIENT MESSAGE (OUTPATIENT)
Dept: PSYCHIATRY | Facility: CLINIC | Age: 49
End: 2024-03-13
Payer: COMMERCIAL

## 2024-03-13 ENCOUNTER — OFFICE VISIT (OUTPATIENT)
Dept: PSYCHIATRY | Facility: CLINIC | Age: 49
End: 2024-03-13
Payer: COMMERCIAL

## 2024-03-13 DIAGNOSIS — F33.1 MDD (MAJOR DEPRESSIVE DISORDER), RECURRENT EPISODE, MODERATE: ICD-10-CM

## 2024-03-13 DIAGNOSIS — F41.1 GAD (GENERALIZED ANXIETY DISORDER): Primary | ICD-10-CM

## 2024-03-13 PROCEDURE — 90837 PSYTX W PT 60 MINUTES: CPT | Mod: S$GLB,,, | Performed by: PSYCHOLOGIST
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
## 2024-03-15 ENCOUNTER — OFFICE VISIT (OUTPATIENT)
Dept: PSYCHIATRY | Facility: CLINIC | Age: 49
End: 2024-03-15
Payer: COMMERCIAL

## 2024-03-15 VITALS
DIASTOLIC BLOOD PRESSURE: 73 MMHG | WEIGHT: 165.25 LBS | BODY MASS INDEX: 30.22 KG/M2 | HEART RATE: 80 BPM | SYSTOLIC BLOOD PRESSURE: 130 MMHG

## 2024-03-15 DIAGNOSIS — L24.5 IRRITANT CONTACT DERMATITIS DUE TO OTHER CHEMICAL PRODUCTS: ICD-10-CM

## 2024-03-15 DIAGNOSIS — F90.2 ATTENTION DEFICIT HYPERACTIVITY DISORDER (ADHD), COMBINED TYPE: Primary | ICD-10-CM

## 2024-03-15 DIAGNOSIS — F41.9 ANXIETY: ICD-10-CM

## 2024-03-15 DIAGNOSIS — F33.1 DEPRESSION, MAJOR, RECURRENT, MODERATE: ICD-10-CM

## 2024-03-15 DIAGNOSIS — F33.1 MDD (MAJOR DEPRESSIVE DISORDER), RECURRENT EPISODE, MODERATE: ICD-10-CM

## 2024-03-15 DIAGNOSIS — M79.7 FIBROMYALGIA: ICD-10-CM

## 2024-03-15 PROCEDURE — 3078F DIAST BP <80 MM HG: CPT | Mod: CPTII,S$GLB,, | Performed by: STUDENT IN AN ORGANIZED HEALTH CARE EDUCATION/TRAINING PROGRAM

## 2024-03-15 PROCEDURE — 3075F SYST BP GE 130 - 139MM HG: CPT | Mod: CPTII,S$GLB,, | Performed by: STUDENT IN AN ORGANIZED HEALTH CARE EDUCATION/TRAINING PROGRAM

## 2024-03-15 PROCEDURE — 3008F BODY MASS INDEX DOCD: CPT | Mod: CPTII,S$GLB,, | Performed by: STUDENT IN AN ORGANIZED HEALTH CARE EDUCATION/TRAINING PROGRAM

## 2024-03-15 PROCEDURE — 99214 OFFICE O/P EST MOD 30 MIN: CPT | Mod: S$GLB,,, | Performed by: STUDENT IN AN ORGANIZED HEALTH CARE EDUCATION/TRAINING PROGRAM

## 2024-03-15 PROCEDURE — 99999 PR PBB SHADOW E&M-EST. PATIENT-LVL II: CPT | Mod: PBBFAC,,, | Performed by: STUDENT IN AN ORGANIZED HEALTH CARE EDUCATION/TRAINING PROGRAM

## 2024-03-15 RX ORDER — DULOXETIN HYDROCHLORIDE 60 MG/1
60 CAPSULE, DELAYED RELEASE ORAL DAILY
Qty: 90 CAPSULE | Refills: 1 | Status: SHIPPED | OUTPATIENT
Start: 2024-03-15 | End: 2024-06-14 | Stop reason: SDUPTHER

## 2024-03-15 RX ORDER — DEXTROAMPHETAMINE SACCHARATE, AMPHETAMINE ASPARTATE MONOHYDRATE, DEXTROAMPHETAMINE SULFATE AND AMPHETAMINE SULFATE 2.5; 2.5; 2.5; 2.5 MG/1; MG/1; MG/1; MG/1
10 CAPSULE, EXTENDED RELEASE ORAL EVERY MORNING
Qty: 30 CAPSULE | Refills: 0 | Status: SHIPPED | OUTPATIENT
Start: 2024-03-15 | End: 2024-06-14 | Stop reason: SDUPTHER
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
## 2024-03-15 NOTE — PROGRESS NOTES
STAFF NOTE    The chart was reviewed and the case was discussed, including the assessment and management plan.  I agree with the overall findings, with corrections or clarifications, if any, noted herein.    **    Isra Norman MD  Board Certification: Psychiatry and Addiction Medicine

## 2024-03-16 NOTE — TELEPHONE ENCOUNTER
Refill Routing Note   Medication(s) are not appropriate for processing by [facility] for the following reason(s):        New or recently adjusted medication    [facility] action(s):  Defer               Appointments  past 12m or future 3m with PCP    Date Provider   Last Visit   1/23/2024 Britta Clark MD   Next Visit   5/10/2024 Britta Clark MD   ED visits in past 90 days: 0        Note composed:11:12 AM 03/16/2024

## 2024-03-16 NOTE — TELEPHONE ENCOUNTER
No care due was identified.  [facility] Embedded Care Due Messages. Reference number: [id number].   3/15/2024 7:51:00 PM CDT

## 2024-03-18 ENCOUNTER — PATIENT MESSAGE (OUTPATIENT)
Dept: PSYCHIATRY | Facility: CLINIC | Age: 49
End: 2024-03-18
Payer: COMMERCIAL

## 2024-03-18 DIAGNOSIS — F90.2 ATTENTION DEFICIT HYPERACTIVITY DISORDER (ADHD), COMBINED TYPE: Primary | ICD-10-CM

## 2024-03-18 RX ORDER — TRIAMCINOLONE ACETONIDE 1 MG/G
OINTMENT TOPICAL 2 TIMES DAILY
Qty: 80 G | Refills: 1 | Status: SHIPPED | OUTPATIENT
Start: 2024-03-18 | End: 2024-05-19

## 2024-03-19 RX ORDER — DEXTROAMPHETAMINE SACCHARATE, AMPHETAMINE ASPARTATE MONOHYDRATE, DEXTROAMPHETAMINE SULFATE AND AMPHETAMINE SULFATE 2.5; 2.5; 2.5; 2.5 MG/1; MG/1; MG/1; MG/1
10 CAPSULE, EXTENDED RELEASE ORAL EVERY MORNING
Qty: 30 CAPSULE | Refills: 0 | Status: SHIPPED | OUTPATIENT
Start: 2024-03-19 | End: 2024-06-14 | Stop reason: SDUPTHER

## 2024-03-20 ENCOUNTER — OFFICE VISIT (OUTPATIENT)
Dept: PSYCHIATRY | Facility: CLINIC | Age: 49
End: 2024-03-20
Payer: COMMERCIAL

## 2024-03-20 DIAGNOSIS — F90.2 ATTENTION DEFICIT HYPERACTIVITY DISORDER (ADHD), COMBINED TYPE: Primary | ICD-10-CM

## 2024-03-20 DIAGNOSIS — F33.1 MDD (MAJOR DEPRESSIVE DISORDER), RECURRENT EPISODE, MODERATE: ICD-10-CM

## 2024-03-20 PROCEDURE — 99499 UNLISTED E&M SERVICE: CPT | Mod: S$GLB,,, | Performed by: PSYCHOLOGIST
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
## 2024-03-26 ENCOUNTER — PATIENT MESSAGE (OUTPATIENT)
Dept: ADMINISTRATIVE | Facility: HOSPITAL | Age: 49
End: 2024-03-26
Payer: COMMERCIAL

## 2024-03-27 ENCOUNTER — OFFICE VISIT (OUTPATIENT)
Dept: PSYCHIATRY | Facility: CLINIC | Age: 49
End: 2024-03-27
Payer: COMMERCIAL

## 2024-03-27 DIAGNOSIS — F41.9 ANXIETY: ICD-10-CM

## 2024-03-27 DIAGNOSIS — Z12.11 SCREENING FOR COLON CANCER: ICD-10-CM

## 2024-03-27 DIAGNOSIS — F90.2 ATTENTION DEFICIT HYPERACTIVITY DISORDER (ADHD), COMBINED TYPE: Primary | ICD-10-CM

## 2024-03-27 DIAGNOSIS — F33.1 MDD (MAJOR DEPRESSIVE DISORDER), RECURRENT EPISODE, MODERATE: ICD-10-CM

## 2024-03-27 PROCEDURE — 99499 UNLISTED E&M SERVICE: CPT | Mod: S$GLB,,, | Performed by: PSYCHOLOGIST

## 2024-03-28 NOTE — PROGRESS NOTES
Individual Psychotherapy (PhD/LCSW)    3/27/2024    Site:  [facility]         Therapeutic Intervention: Met with patient.  Outpatient - Supportive psychotherapy 60 min - CPT Code 95888    Chief complaint/reason for encounter: attention deficit, depression, anxiety, and interpersonal     Interval history and content of current session: Follow up session with Mrs. Horton. During todays session, patient began by acknowledging that during the initial visit, she felt the need to share as much as possible and expressed deep gratitude towards the clinician. She expressed that she can now express herself comfortably in her first language. Patient continued to delve into her life experiences growing up in [place] and the significant events that have shaped her identity. She opened up about her oldest son, a topic she had not disclosed during the initial visit, as well as her relationship with her son's father and her biological mother. Throughout the session, patient pointed out generational patterns and trauma, highlighting her efforts to break away from those patterns and the challenges that comes from that. Additionally, patient mentioned that her Adderall XR medication is proving to be effective for her.    Treatment plan:  Target symptoms: depression, distractability, anxiety , adjustment  Why chosen therapy is appropriate versus another modality: patient responds to this modality  Outcome monitoring methods: self-report  Therapeutic intervention type: supportive psychotherapy    Risk parameters:  Patient reports no suicidal ideation  Patient reports no homicidal ideation  Patient reports no self-injurious behavior  Patient reports no violent behavior    Verbal deficits: None    Patient's response to intervention:  The patient's response to intervention is accepting.    Progress toward goals and other mental status changes:  The patient's progress toward goals is good.    Mental Status Exam:  General Appearance:   unremarkable, age appropriate   Speech: normal tone, normal rate, normal pitch, normal volume      Level of Cooperation: cooperative      Thought Processes: tangential   Mood: anxious      Thought Content: normal, no suicidality, no homicidality, delusions, or paranoia   Affect: congruent and appropriate   Orientation: Oriented x3   Attention Span & Concentration: intact   Judgment & Insight: good     Language  intact     Diagnosis:     ICD-10-CM ICD-9-CM   1. Attention deficit hyperactivity disorder (ADHD), combined type  F90.2 314.01   2. MDD (major depressive disorder), recurrent episode, moderate  F33.1 296.32   3. Anxiety  F41.9 300.00     Plan:  individual psychotherapy    Return to clinic: 1 week    Length of Service (minutes): 60

## 2024-04-03 ENCOUNTER — OFFICE VISIT (OUTPATIENT)
Dept: PSYCHIATRY | Facility: CLINIC | Age: 49
End: 2024-04-03
Payer: COMMERCIAL

## 2024-04-03 DIAGNOSIS — F90.2 ATTENTION DEFICIT HYPERACTIVITY DISORDER (ADHD), COMBINED TYPE: Primary | ICD-10-CM

## 2024-04-03 DIAGNOSIS — F33.1 MDD (MAJOR DEPRESSIVE DISORDER), RECURRENT EPISODE, MODERATE: ICD-10-CM

## 2024-04-03 DIAGNOSIS — F41.9 ANXIETY: ICD-10-CM

## 2024-04-03 PROCEDURE — 99499 UNLISTED E&M SERVICE: CPT | Mod: S$GLB,,, | Performed by: PSYCHOLOGIST

## 2024-04-08 ENCOUNTER — PATIENT MESSAGE (OUTPATIENT)
Dept: PSYCHIATRY | Facility: CLINIC | Age: 49
End: 2024-04-08
Payer: COMMERCIAL

## 2024-04-08 DIAGNOSIS — M79.7 FIBROMYALGIA SYNDROME: ICD-10-CM

## 2024-04-08 DIAGNOSIS — R20.0 NUMBNESS IN FEET: ICD-10-CM

## 2024-04-08 RX ORDER — GABAPENTIN 300 MG/1
300 CAPSULE ORAL 2 TIMES DAILY
Qty: 60 CAPSULE | Refills: 1 | Status: SHIPPED | OUTPATIENT
Start: 2024-04-08 | End: 2024-04-16

## 2024-04-08 NOTE — PROGRESS NOTES
Individual Psychotherapy (PhD/LCSW)    4/3/2024    Site:  [facility]         Therapeutic Intervention: Met with patient.  Outpatient - Supportive psychotherapy 60 min - CPT Code 94456    Chief complaint/reason for encounter: attention deficit, depression, anxiety, and interpersonal     Interval history and content of current session: Follow up session with Mrs. Horton. Session began with a check in during which she mentioned experiencing redness on her skin, indicating a possible skin reaction. She informed that she had a doctors appointment coming up to her it checked. Clinician acknowledged the importance of her upcoming dermatologist appointment and discussed the potential influence of stress and anxiety. Throughout today's session, Mrs. Horton continued to share her life story, expressing how past experiences still impact her. She also shared she started questioning whether delving into these experiences was beneficial since she believed she had buried them. Additionally, Mrs. Horton elaborated on a previous 10 year relationship, who hadn't been disclosed yet, with her first  while in the , who was from [place]. Patient described the relationship as unhealthy and also shared about the immigration challenges he faced before voluntarily leaving the country. She also recounted a promise made to her grandfather about not letting anyone take her inheritance and expressed feeling like she had failed him. Clinician validated Mrs. Horton emotions and explored her thoughts. Clinician also emphasized the importance of understanding one's past patterns and roots to comprehend current triggers. Furthermore, the clinician addressed time management for session and proposed the possibility of diving the session to address present life stressors and coping strategies effectively.       Treatment plan:  Target symptoms: depression, distractability, anxiety , adjustment  Why chosen therapy is appropriate versus  another modality: patient responds to this modality  Outcome monitoring methods: self-report  Therapeutic intervention type: supportive psychotherapy    Risk parameters:  Patient reports no suicidal ideation  Patient reports no homicidal ideation  Patient reports no self-injurious behavior  Patient reports no violent behavior    Verbal deficits: None    Patient's response to intervention:  The patient's response to intervention is accepting.    Progress toward goals and other mental status changes:  The patient's progress toward goals is good.    Mental Status Exam:  General Appearance:  unremarkable, age appropriate   Speech: normal tone, normal rate, normal pitch, normal volume      Level of Cooperation: cooperative      Thought Processes: tangential   Mood: anxious      Thought Content: normal, no suicidality, no homicidality, delusions, or paranoia   Affect: congruent and appropriate   Orientation: Oriented x3   Attention Span & Concentration: intact   Judgment & Insight: good     Language  intact     Diagnosis:     ICD-10-CM ICD-9-CM   1. Attention deficit hyperactivity disorder (ADHD), combined type  F90.2 314.01   2. MDD (major depressive disorder), recurrent episode, moderate  F33.1 296.32   3. Anxiety  F41.9 300.00     Plan:  individual psychotherapy    Return to clinic: 1 week    Length of Service (minutes): 60

## 2024-04-12 DIAGNOSIS — R20.2 PARESTHESIA: ICD-10-CM

## 2024-04-12 DIAGNOSIS — R51.9 NONINTRACTABLE HEADACHE, UNSPECIFIED CHRONICITY PATTERN, UNSPECIFIED HEADACHE TYPE: ICD-10-CM

## 2024-04-16 ENCOUNTER — HOSPITAL ENCOUNTER (OUTPATIENT)
Dept: RADIOLOGY | Facility: HOSPITAL | Age: 49
Discharge: HOME | End: 2024-04-16
Attending: PHYSICAL MEDICINE & REHABILITATION
Payer: COMMERCIAL

## 2024-04-16 ENCOUNTER — OFFICE VISIT (OUTPATIENT)
Dept: PHYSICAL MEDICINE AND REHAB | Facility: CLINIC | Age: 49
End: 2024-04-16
Payer: COMMERCIAL

## 2024-04-16 VITALS
BODY MASS INDEX: 30.49 KG/M2 | DIASTOLIC BLOOD PRESSURE: 90 MMHG | HEIGHT: 62 IN | HEART RATE: 81 BPM | SYSTOLIC BLOOD PRESSURE: 138 MMHG | WEIGHT: 165.69 LBS

## 2024-04-16 DIAGNOSIS — M94.0 COSTOCHONDRITIS: ICD-10-CM

## 2024-04-16 DIAGNOSIS — G89.4 CHRONIC PAIN DISORDER: ICD-10-CM

## 2024-04-16 DIAGNOSIS — M25.562 ACUTE PAIN OF LEFT KNEE: ICD-10-CM

## 2024-04-16 DIAGNOSIS — R20.0 NUMBNESS IN FEET: ICD-10-CM

## 2024-04-16 DIAGNOSIS — M79.7 FIBROMYALGIA SYNDROME: Primary | ICD-10-CM

## 2024-04-16 PROCEDURE — 3075F SYST BP GE 130 - 139MM HG: CPT | Mod: CPTII,S$GLB,, | Performed by: PHYSICAL MEDICINE & REHABILITATION

## 2024-04-16 PROCEDURE — 3080F DIAST BP >= 90 MM HG: CPT | Mod: CPTII,S$GLB,, | Performed by: PHYSICAL MEDICINE & REHABILITATION

## 2024-04-16 PROCEDURE — 3008F BODY MASS INDEX DOCD: CPT | Mod: CPTII,S$GLB,, | Performed by: PHYSICAL MEDICINE & REHABILITATION

## 2024-04-16 PROCEDURE — 99215 OFFICE O/P EST HI 40 MIN: CPT | Mod: S$GLB,,, | Performed by: PHYSICAL MEDICINE & REHABILITATION

## 2024-04-16 PROCEDURE — 99999 PR PBB SHADOW E&M-EST. PATIENT-LVL IV: CPT | Mod: PBBFAC,,, | Performed by: PHYSICAL MEDICINE & REHABILITATION

## 2024-04-16 PROCEDURE — 73562 X-RAY EXAM OF KNEE 3: CPT | Mod: 26,LT,, | Performed by: RADIOLOGY

## 2024-04-16 PROCEDURE — 1159F MED LIST DOCD IN RCRD: CPT | Mod: CPTII,S$GLB,, | Performed by: PHYSICAL MEDICINE & REHABILITATION

## 2024-04-16 PROCEDURE — 73562 X-RAY EXAM OF KNEE 3: CPT | Mod: TC,LT

## 2024-04-16 RX ORDER — ACETAMINOPHEN 500 MG
500-1000 TABLET ORAL 3 TIMES DAILY PRN
COMMUNITY
Start: 2024-04-16

## 2024-04-16 RX ORDER — GABAPENTIN 300 MG/1
300 CAPSULE ORAL SEE ADMIN INSTRUCTIONS
Start: 2024-04-16

## 2024-04-16 NOTE — PROGRESS NOTES
Subjective:       Patient ID: Rema Horton is a 49 y.o. female.    Chief Complaint: Follow-up      HPI      Mrs. Horton is a 49-year-old female with past medical history of anemia, left breast nodule, interstitial cystitis, positive FELIPA,  anxiety/depression, obstructive sleep apnea.  She was referred to the Physical Medicine Clinic by Rheumatology and presented on 4/28/24 for help with fibromyalgia syndrome.  She was also complaining of painful bilateral foot numbness.  She was started on meloxicam.  Her dose of gabapentin was increased.  She was switched from venlafaxine to duloxetine.  She was encouraged get an appointment with psychiatry due to anxiety.    The patient was lost to follow-up.  She is presenting today to the clinic to follow-up on her pain.  Her fibromyalgia symptoms including generalized muscle and joint aching, stiffness, fatigue/poor energy, brain fog and hypersensitivity to pain been stable with occasional flare ups, usually related to excess activity and stress.  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    Continues to complain pain in bilateral costochondral junctions.  She has been complaining for the last month of pain in her left knee.  It is an intermittent sharp pain mostly anteriorly.  It is triggered by needing on her knee.  Her maximum pain is 10/10 and minimum 0/10.  Today it is 0/10.  She denies any swelling.  She thinks she has mild warmth.  She denies any episodes of her knee freezing up or giving out on her.  She is been also complaining of painful bilateral foot numbness.    She continues to follow-up with Psychiatry for anxiety/depression.  She indicates she was also diagnosed with ADHD.  She has sleep apnea and started since her last visit using a CPAP.  She was explained that getting better sleep should help with her fibromyalgia syndrome.    She is currently on:  Ibuprofen 200 mg p.r.n., usually once daily  Gabapentin 300 mg  capsules, 1 capsule 2 times per day  Duloxetine 60 mg capsule, 1 capsule daily (prescribed by Psychiatry).    Baclofen 10 mg p.r.n., usually once per day    She tries to stay active.  He has a stationary bicycle that she uses often.  She was referred to physical therapy last visit but could not commit.    Past Medical History:   Diagnosis Date    Abnormal Pap smear of cervix     Anemia     Breast disorder     Fever blister     Fibromyalgia     IC (interstitial cystitis)         Review of patient's allergies indicates:   Allergen Reactions    Flexeril  [cyclobenzaprine]      Other reaction(s): Rash    Lodine  [etodolac] Rash        Review of Systems   Constitutional:  Positive for fatigue.   Eyes:  Positive for visual disturbance.   Respiratory:  Negative for shortness of breath.    Cardiovascular:  Positive for chest pain.   Gastrointestinal:  Positive for nausea and vomiting. Negative for blood in stool.   Genitourinary:  Negative for difficulty urinating.   Musculoskeletal:  Positive for arthralgias, back pain, myalgias and neck pain. Negative for gait problem.   Neurological:  Positive for dizziness, headaches and memory loss. Negative for weakness.   Psychiatric/Behavioral:  Positive for sleep disturbance. Negative for behavioral problems.              Objective:      Physical Exam  Vitals reviewed.   Constitutional:       Appearance: She is well-developed.   HENT:      Head: Normocephalic and atraumatic.   Eyes:      Extraocular Movements: Extraocular movements intact.   Musculoskeletal:      Cervical back: Normal range of motion. No tenderness.      Comments: BUE:  ROM:   RUE: full.   LUE: full.  Strength:    RUE: 5-/5 at shoulder abduction, 5 elbow flexion, 5 elbow extension, 5 hand .   LUE: 5-/5 at shoulder abduction, 5 elbow flexion, 5 elbow extension, 5 hand .  Sensation to pinprick:   RUE: intact.   LUE: intact.  DTR:    RUE: +1 biceps, +1 triceps.   LUE:  +1 biceps, +1 triceps.      BLE:  ROM:    RLE: full.   LLE: full.  Knee crepitus:   RLE: -ve.   LLE: -ve.   Strength:    RLE: 5/5 at hip flexion, 5 knee extension, 5 ankle DF, 5 PF.   LLE: 5/5 at hip flexion, 5 knee extension, 5 ankle DF, 5 PF.  Sensation to pinprick:     RLE: intact.      LLE: intact.   DTR:     RLE: +1 knee, +1 ankle.    LLE: +1 knee, +1 ankle.  Clonus:    Rt ankle: -ve.    Lt ankle: -ve.  SLR (sitting):      RLE: -ve.      LLE: -ve.    Left knee:  -ve crepitus.  -ve swelling.  -ve warmth.  +ve tenderness lateral joint line.  -ve patellar grind.  -ve Victor Hugo's.        +ve tenderness over lumbar spine.  +ve tenderness over bilateral costochondral areas.  +ve diffuse tender points over the upper & lower back, anterior chest wall, hips, elbows & knees    Gait: WNL       Skin:     General: Skin is warm.   Neurological:      General: No focal deficit present.      Mental Status: She is alert.   Psychiatric:         Mood and Affect: Mood normal.         Behavior: Behavior normal.         Assessment:       1. Fibromyalgia syndrome    2. Costochondritis    3. Chronic pain disorder    4. Numbness in feet    5. Acute pain of left knee        Plan:         - Discontinue ibuprofen.  - Restart meloxicam (MOBIC) 7.5 MG tablet; Take 1 tablet (7.5 mg total) by mouth once daily. In 1-2 weeks, if no relief, may increase dose to two tablets once daily.  - trial of increasing gabapentin:  gabapentin (NEURONTIN) 300 MG capsule; Take 1 capsule by mouth in the morning and 2 capsules at bedtime  - Continue DULoxetine (CYMBALTA) 60 MG capsule; Take 1 capsule (60 mg total) by mouth once daily.  - The patient is not able to commit to a course of Physical Therapy at this point.  - Regular home exercise program was encouraged.  - Quadriceps strengthening through sitting straight leg raising exercises were demonstrated.  - Follow up in about 4 months (around 8/16/2024).      This was a 45 minute visit, 50% of which was spent educating the patient about the diagnosis  and the treatment plan.    This note was partly generated with [employer] voice recognition software. I apologize for any possible typographical errors.

## 2024-04-19 RX ORDER — LANOLIN ALCOHOL/MO/W.PET/CERES
1 CREAM (GRAM) TOPICAL 2 TIMES DAILY
Qty: 60 TABLET | Refills: 5 | Status: SHIPPED | OUTPATIENT
Start: 2024-04-19

## 2024-04-25 ENCOUNTER — HOSPITAL ENCOUNTER (OUTPATIENT)
Dept: RADIOLOGY | Facility: HOSPITAL | Age: 49
Discharge: HOME | End: 2024-04-25
Attending: PHYSICIAN ASSISTANT
Payer: COMMERCIAL

## 2024-04-25 PROCEDURE — 77067 SCR MAMMO BI INCL CAD: CPT | Mod: 26,,, | Performed by: RADIOLOGY

## 2024-04-25 PROCEDURE — 77067 SCR MAMMO BI INCL CAD: CPT | Mod: TC

## 2024-04-25 PROCEDURE — 77063 BREAST TOMOSYNTHESIS BI: CPT | Mod: 26,,, | Performed by: RADIOLOGY

## 2024-04-29 ENCOUNTER — TELEPHONE (OUTPATIENT)
Dept: NEUROLOGY | Facility: CLINIC | Age: 49
End: 2024-04-29
Payer: COMMERCIAL

## 2024-05-09 ENCOUNTER — OFFICE VISIT (OUTPATIENT)
Dept: SLEEP MEDICINE | Facility: CLINIC | Age: 49
End: 2024-05-09
Payer: COMMERCIAL

## 2024-05-09 VITALS
WEIGHT: 165 LBS | BODY MASS INDEX: 30.36 KG/M2 | SYSTOLIC BLOOD PRESSURE: 129 MMHG | HEART RATE: 85 BPM | HEIGHT: 62 IN | DIASTOLIC BLOOD PRESSURE: 86 MMHG

## 2024-05-09 DIAGNOSIS — R40.0 SOMNOLENCE: Primary | ICD-10-CM

## 2024-05-09 DIAGNOSIS — G47.09 OTHER INSOMNIA: ICD-10-CM

## 2024-05-09 DIAGNOSIS — G47.33 OSA (OBSTRUCTIVE SLEEP APNEA): ICD-10-CM

## 2024-05-09 PROCEDURE — 3008F BODY MASS INDEX DOCD: CPT | Mod: CPTII,S$GLB,, | Performed by: INTERNAL MEDICINE

## 2024-05-09 PROCEDURE — 3074F SYST BP LT 130 MM HG: CPT | Mod: CPTII,S$GLB,, | Performed by: INTERNAL MEDICINE

## 2024-05-09 PROCEDURE — 1159F MED LIST DOCD IN RCRD: CPT | Mod: CPTII,S$GLB,, | Performed by: INTERNAL MEDICINE

## 2024-05-09 PROCEDURE — 99214 OFFICE O/P EST MOD 30 MIN: CPT | Mod: S$GLB,,, | Performed by: INTERNAL MEDICINE

## 2024-05-09 PROCEDURE — 99999 PR PBB SHADOW E&M-EST. PATIENT-LVL IV: CPT | Mod: PBBFAC,,, | Performed by: INTERNAL MEDICINE

## 2024-05-09 PROCEDURE — 3079F DIAST BP 80-89 MM HG: CPT | Mod: CPTII,S$GLB,, | Performed by: INTERNAL MEDICINE

## 2024-05-09 NOTE — PROGRESS NOTES
ESTABLISHED PATIENT VISIT    Rema Horton  is a pleasant 49 y.o. female  with PMH significant for LOV 1.17.20 Dr. Pan (no HST in [facility]) who presents for evaluation (had trouble getting a sleep study prior in 2020). She snores loudly which has developed over the past 15 years.      Here today for:  follow-up     Since last visit:   See assessment below      Past Medical History:   Diagnosis Date    Abnormal Pap smear of cervix     Anemia     Breast disorder     Fever blister     Fibromyalgia     IC (interstitial cystitis)      Patient Active Problem List   Diagnosis    Chronic pelvic pain in female    Frequency-urgency syndrome    Slow transit constipation    Dysuria    Incomplete bladder emptying    Normocytic anemia    Voiding dysfunction    Fibromyalgia    Interstitial cystitis    Periodic headache syndrome, not intractable    Other insomnia    Snoring    Sleep apnea    Pulmonary nodules    Pain    Decreased range of motion    Anxiety    Depression, major, recurrent, moderate    Panic attack       Current Outpatient Medications:     acetaminophen (TYLENOL) 500 MG tablet, Take 1-2 tablets (500-1,000 mg total) by mouth 3 (three) times daily as needed for Pain., Disp: , Rfl:     baclofen (LIORESAL) 10 MG tablet, TAKE 1 TABLET BY MOUTH EVERY DAY AS NEEDED SEVERE PAIN AND MUSCLE ACHE, Disp: 30 tablet, Rfl: 2    cholecalciferol, vitamin D3, (VITAMIN D3) 25 mcg (1,000 unit) capsule, TAKE 2 CAPSULES (2,000 UNITS TOTAL) BY MOUTH ONCE DAILY., Disp: 180 capsule, Rfl: 3    clobetasol 0.05% (TEMOVATE) 0.05 % Oint, Apply topically 2 (two) times daily. Use to affected areas for up to 2 weeks then take a 1 week break or decrease to 3 times weekly. Do not apply to groin or face. Use to spot on hand, Disp: 45 g, Rfl: 2    dextroamphetamine-amphetamine (ADDERALL XR) 10 MG 24 hr capsule, Take 1 capsule (10 mg total) by mouth every morning., Disp: 30 capsule, Rfl: 0    dextroamphetamine-amphetamine (ADDERALL XR) 10 MG 24 hr  capsule, Take 1 capsule (10 mg total) by mouth every morning., Disp: 30 capsule, Rfl: 0    DULoxetine (CYMBALTA) 60 MG capsule, Take 1 capsule (60 mg total) by mouth once daily., Disp: 90 capsule, Rfl: 1    xavier prim-linoleic-gamolenic ac (PRIMROSE OIL) 1,000 mg Cap, , Disp: , Rfl:     fluocinolone (SYNALAR) 0.01 % external solution, AAA scalp qday - bid prn itching or scaling, Disp: 60 mL, Rfl: 3    fluticasone propionate (FLONASE) 50 mcg/actuation nasal spray, USE 1 SPRAY INTO EACH NOSTRIL TWICE A DAY, Disp: 48 mL, Rfl: 3    gabapentin (NEURONTIN) 300 MG capsule, Take 1 capsule (300 mg total) by mouth As instructed (Take 1 capsul in the morning, 2 at bedtime)., Disp: , Rfl:     ketoconazole (NIZORAL) 2 % shampoo, Wash hair with medicated shampoo at least 2x/week - let sit on scalp at least 5 minutes prior to rinsing, Disp: 120 mL, Rfl: 5    Lactobacillus acidophilus (PROBIOTIC ORAL), Take by mouth once daily., Disp: , Rfl:     magnesium oxide (MAG-OX) 400 mg (241.3 mg magnesium) tablet, TAKE 1 TABLET BY MOUTH TWICE A DAY, Disp: 60 tablet, Rfl: 5    meloxicam (MOBIC) 7.5 MG tablet, TAKE 1 TABLET BY MOUTH EVERY DAY IN 1 TO 2 WEEKS MAY INCREASE DOSE TO 2 TABLETS ONCE DAILY, Disp: 60 tablet, Rfl: 3    multivit with iron,hematinic (SUPER B-COMPLEX ORAL), , Disp: , Rfl:     MULTIVITAMIN ORAL, NADIA MULTIVITAMIN, Disp: , Rfl:     triamcinolone acetonide 0.1% (KENALOG) 0.1 % cream, APPLY TOPICALLY 2 (TWO) TIMES DAILY. TO AFFECTED AREAS ON ARMS AND CHEST X 1-2 WKS THEN AS NEEDED FOR FLARES ONLY. AVOID FACE, ARMPITS, AND GROIN., Disp: 45 g, Rfl: 2    triamcinolone acetonide 0.1% (KENALOG) 0.1 % ointment, APPLY TOPICALLY 2 (TWO) TIMES DAILY. FOR LEFT THUMB, Disp: 80 g, Rfl: 1    TURMERIC ORAL, Take by mouth., Disp: , Rfl:     valACYclovir (VALTREX) 500 MG tablet, Take 1 tablet (500 mg total) by mouth once daily., Disp: 30 tablet, Rfl: 5    vitamin E acetate (VITAMIN E ORAL), Take by mouth once daily., Disp: , Rfl:   "      Vitals:    05/09/24 0954   BP: 129/86   BP Location: Left arm   Patient Position: Sitting   Pulse: 85   Weight: 74.8 kg (165 lb)   Height: 5' 2" (1.575 m)     Physical Exam:    GEN:   Well-appearing  Psych:  Appropriate affect, demonstrates insight  SKIN:  No rash on the face or bridge of the nose      LABS:   No results found for: "HGB", "CO2"      RECORDS REVIEWED PREVIOUSLY:         ASSESSMENT      PROBLEM DESCRIPTION/ Sx on Presentation Interval Hx STATUS PLAN   PERFECTO   + snoring, + snoring arousals, + witnessed apneas  Both parents are loud snorers    HST 2/28/2020: AHI 6, RDI 18        PAP history   Dx Study    Bed partner    Position    Mask nasal   Pressure    DME HME   My Air Using!   Machine age Mid 2023   Extras    PAP altn    Benefits    PROBS             HST 5/11/23: AHI 2, (3% 4) RDI 19  PSG 6.15.23: AHI 20.6, MARK 29 vs 11, no REM sleep      1.24.24: 24/30 x 6h 14min (6-12) 8.9/10.8/11.6, Leak 0/5/21, AHI 2.4    5.28.24: 25/30 x [id number], 6-12 (8.4/10.3/11), leak 2/12/23    AHI 2.1   Largely controlled but still sleepy   PAP PLAN   EPAP min Increase to 9 cwp   IPAP max Increase to 14 cwp   PS    RAMP 5 x 15 minutes   EPR 2   Mask    Other    Altn.        -sent msg to [facility] to clarify billing      The patient is using and benefitting from PAP therapy     Hypersomnia   + sleepiness when inactive   ESS 21/24 on intake    Shell Sleepiness Scale:  Total  10/24     Less tired during the day  50-60% improved   uncontrolled   -will proceed with CPAP titrationif not improving after auto CPAP adjustments**  due to RESIDUAL SLEEPINESS on auto-CPAP despite adequate usage        Insomnia       SLEEP SCHEDULE   Environment    Bed Time 8A   Sleep Latency 3 hours   Arousals A lot   Nocturia A lot   Back to sleep 1hr   Wake time 6-8   Naps    Work         Can take a long time to fall asleep    Waking frequently            Not taking as long to get back to sleep    Can take a long time to sleep       -discussed " limiting time in bed    -setting regular BT and WT    -uncertain if she would benefit from CBTi   Other issues:  still waking frequently          RTC in 6 months

## 2024-05-10 ENCOUNTER — LAB VISIT (OUTPATIENT)
Dept: LAB | Facility: HOSPITAL | Age: 49
End: 2024-05-10
Attending: INTERNAL MEDICINE
Payer: COMMERCIAL

## 2024-05-10 ENCOUNTER — OFFICE VISIT (OUTPATIENT)
Dept: INTERNAL MEDICINE | Facility: CLINIC | Age: 49
End: 2024-05-10
Payer: COMMERCIAL

## 2024-05-10 VITALS
RESPIRATION RATE: 15 BRPM | HEIGHT: 62 IN | HEART RATE: 71 BPM | WEIGHT: 168.88 LBS | TEMPERATURE: 98 F | OXYGEN SATURATION: 98 % | BODY MASS INDEX: 31.08 KG/M2 | DIASTOLIC BLOOD PRESSURE: 86 MMHG | SYSTOLIC BLOOD PRESSURE: 118 MMHG

## 2024-05-10 DIAGNOSIS — Z00.00 HEALTH CARE MAINTENANCE: ICD-10-CM

## 2024-05-10 DIAGNOSIS — F33.1 DEPRESSION, MAJOR, RECURRENT, MODERATE: ICD-10-CM

## 2024-05-10 DIAGNOSIS — M79.7 FIBROMYALGIA: Primary | ICD-10-CM

## 2024-05-10 DIAGNOSIS — F41.9 ANXIETY: ICD-10-CM

## 2024-05-10 DIAGNOSIS — G47.33 OBSTRUCTIVE SLEEP APNEA SYNDROME: ICD-10-CM

## 2024-05-10 DIAGNOSIS — N30.10 INTERSTITIAL CYSTITIS: ICD-10-CM

## 2024-05-10 DIAGNOSIS — R30.0 DYSURIA: ICD-10-CM

## 2024-05-10 LAB
25(OH)D3+25(OH)D2 SERPL-MCNC: 31 NG/ML (ref 30–96)
ALBUMIN SERPL BCP-MCNC: 4.2 G/DL (ref 3.5–5.2)
ALP SERPL-CCNC: 73 U/L (ref 55–135)
ALT SERPL W/O P-5'-P-CCNC: 23 U/L (ref 10–44)
ANION GAP SERPL CALC-SCNC: 11 MMOL/L (ref 8–16)
AST SERPL-CCNC: 23 U/L (ref 10–40)
BACTERIA #/AREA URNS AUTO: NORMAL /HPF
BASOPHILS # BLD AUTO: 0.01 K/UL (ref 0–0.2)
BASOPHILS NFR BLD: 0.2 % (ref 0–1.9)
BILIRUB SERPL-MCNC: 0.3 MG/DL (ref 0.1–1)
BILIRUB SERPL-MCNC: NEGATIVE MG/DL
BILIRUB UR QL STRIP: NEGATIVE
BLOOD, POC UA: NEGATIVE
BUN SERPL-MCNC: 14 MG/DL (ref 6–20)
CALCIUM SERPL-MCNC: 10.1 MG/DL (ref 8.7–10.5)
CHLORIDE SERPL-SCNC: 107 MMOL/L (ref 95–110)
CHOLEST SERPL-MCNC: 214 MG/DL (ref 120–199)
CHOLEST/HDLC SERPL: 3.8 {RATIO} (ref 2–5)
CLARITY UR REFRACT.AUTO: CLEAR
CO2 SERPL-SCNC: 23 MMOL/L (ref 23–29)
COLOR UR AUTO: COLORLESS
CREAT SERPL-MCNC: 0.7 MG/DL (ref 0.5–1.4)
DIFFERENTIAL METHOD BLD: ABNORMAL
EOSINOPHIL # BLD AUTO: 0 K/UL (ref 0–0.5)
EOSINOPHIL NFR BLD: 0.6 % (ref 0–8)
ERYTHROCYTE [DISTWIDTH] IN BLOOD BY AUTOMATED COUNT: 12.4 % (ref 11.5–14.5)
EST. GFR  (NO RACE VARIABLE): >60 ML/MIN/1.73 M^2
ESTIMATED AVG GLUCOSE: 105 MG/DL (ref 68–131)
GLUCOSE SERPL-MCNC: 84 MG/DL (ref 70–110)
GLUCOSE UR QL STRIP: NEGATIVE
GLUCOSE UR QL STRIP: NEGATIVE
HBA1C MFR BLD: 5.3 % (ref 4–5.6)
HCT VFR BLD AUTO: 40.3 % (ref 37–48.5)
HDLC SERPL-MCNC: 56 MG/DL (ref 40–75)
HDLC SERPL: 26.2 % (ref 20–50)
HGB BLD-MCNC: 13.2 G/DL (ref 12–16)
HGB UR QL STRIP: NEGATIVE
IMM GRANULOCYTES # BLD AUTO: 0.01 K/UL (ref 0–0.04)
IMM GRANULOCYTES NFR BLD AUTO: 0.2 % (ref 0–0.5)
KETONES UR QL STRIP: NEGATIVE
KETONES UR QL STRIP: NEGATIVE
LDLC SERPL CALC-MCNC: 139 MG/DL (ref 63–159)
LEUKOCYTE ESTERASE UR QL STRIP: ABNORMAL
LEUKOCYTE ESTERASE URINE, POC: NORMAL
LYMPHOCYTES # BLD AUTO: 2 K/UL (ref 1–4.8)
LYMPHOCYTES NFR BLD: 41.4 % (ref 18–48)
MCH RBC QN AUTO: 31.3 PG (ref 27–31)
MCHC RBC AUTO-ENTMCNC: 32.8 G/DL (ref 32–36)
MCV RBC AUTO: 96 FL (ref 82–98)
MICROSCOPIC COMMENT: NORMAL
MONOCYTES # BLD AUTO: 0.4 K/UL (ref 0.3–1)
MONOCYTES NFR BLD: 8.2 % (ref 4–15)
NEUTROPHILS # BLD AUTO: 2.3 K/UL (ref 1.8–7.7)
NEUTROPHILS NFR BLD: 49.4 % (ref 38–73)
NITRITE UR QL STRIP: NEGATIVE
NITRITE, POC UA: NEGATIVE
NONHDLC SERPL-MCNC: 158 MG/DL
NRBC BLD-RTO: 0 /100 WBC
PH UR STRIP: 7 [PH] (ref 5–8)
PH, POC UA: 7
PLATELET # BLD AUTO: 254 K/UL (ref 150–450)
PMV BLD AUTO: 10.1 FL (ref 9.2–12.9)
POTASSIUM SERPL-SCNC: 4 MMOL/L (ref 3.5–5.1)
PROT SERPL-MCNC: 7.6 G/DL (ref 6–8.4)
PROT UR QL STRIP: NEGATIVE
PROTEIN, POC: NEGATIVE
RBC # BLD AUTO: 4.22 M/UL (ref 4–5.4)
RBC #/AREA URNS AUTO: 1 /HPF (ref 0–4)
SODIUM SERPL-SCNC: 141 MMOL/L (ref 136–145)
SP GR UR STRIP: 1 (ref 1–1.03)
SPECIFIC GRAVITY, POC UA: 1.01
SQUAMOUS #/AREA URNS AUTO: 3 /HPF
TRIGL SERPL-MCNC: 95 MG/DL (ref 30–150)
TSH SERPL DL<=0.005 MIU/L-ACNC: 2.64 UIU/ML (ref 0.4–4)
URN SPEC COLLECT METH UR: ABNORMAL
UROBILINOGEN, POC UA: 0.2
VIT B12 SERPL-MCNC: 1651 PG/ML (ref 210–950)
WBC # BLD AUTO: 4.73 K/UL (ref 3.9–12.7)
WBC #/AREA URNS AUTO: 4 /HPF (ref 0–5)

## 2024-05-10 PROCEDURE — 82607 VITAMIN B-12: CPT | Performed by: INTERNAL MEDICINE

## 2024-05-10 PROCEDURE — 80061 LIPID PANEL: CPT | Performed by: INTERNAL MEDICINE

## 2024-05-10 PROCEDURE — 81003 URINALYSIS AUTO W/O SCOPE: CPT | Mod: QW,S$GLB,, | Performed by: INTERNAL MEDICINE

## 2024-05-10 PROCEDURE — 3074F SYST BP LT 130 MM HG: CPT | Mod: CPTII,S$GLB,, | Performed by: INTERNAL MEDICINE

## 2024-05-10 PROCEDURE — 1159F MED LIST DOCD IN RCRD: CPT | Mod: CPTII,S$GLB,, | Performed by: INTERNAL MEDICINE

## 2024-05-10 PROCEDURE — 84443 ASSAY THYROID STIM HORMONE: CPT | Performed by: INTERNAL MEDICINE

## 2024-05-10 PROCEDURE — 85025 COMPLETE CBC W/AUTO DIFF WBC: CPT | Performed by: INTERNAL MEDICINE

## 2024-05-10 PROCEDURE — 82306 VITAMIN D 25 HYDROXY: CPT | Performed by: INTERNAL MEDICINE

## 2024-05-10 PROCEDURE — 3079F DIAST BP 80-89 MM HG: CPT | Mod: CPTII,S$GLB,, | Performed by: INTERNAL MEDICINE

## 2024-05-10 PROCEDURE — 99999 PR PBB SHADOW E&M-EST. PATIENT-LVL V: CPT | Mod: PBBFAC,,, | Performed by: INTERNAL MEDICINE

## 2024-05-10 PROCEDURE — 83036 HEMOGLOBIN GLYCOSYLATED A1C: CPT | Performed by: INTERNAL MEDICINE

## 2024-05-10 PROCEDURE — 81001 URINALYSIS AUTO W/SCOPE: CPT | Performed by: INTERNAL MEDICINE

## 2024-05-10 PROCEDURE — 99214 OFFICE O/P EST MOD 30 MIN: CPT | Mod: S$GLB,,, | Performed by: INTERNAL MEDICINE

## 2024-05-10 PROCEDURE — 3008F BODY MASS INDEX DOCD: CPT | Mod: CPTII,S$GLB,, | Performed by: INTERNAL MEDICINE

## 2024-05-10 PROCEDURE — 36415 COLL VENOUS BLD VENIPUNCTURE: CPT | Performed by: INTERNAL MEDICINE

## 2024-05-10 PROCEDURE — 1160F RVW MEDS BY RX/DR IN RCRD: CPT | Mod: CPTII,S$GLB,, | Performed by: INTERNAL MEDICINE

## 2024-05-10 PROCEDURE — 80053 COMPREHEN METABOLIC PANEL: CPT | Performed by: INTERNAL MEDICINE

## 2024-05-10 NOTE — PROGRESS NOTES
Subjective:       Patient ID: Rema Horton is a 49 y.o. female.    Chief Complaint: Follow-up (6 month )    HPI  49 y.o. female here for follow up of     Irritant contact dermatitis  Left hand over thumb and palm.   Feels like skin is going to crack due to dryness.   Clobetasol ointment    Rosacea  Followed by derm    Seb derm  Ketoconazole shampoo    Iron deficiency; last hgb 13; previously w anemia  Previously w heavy periods. Iron when having period but none in months.      Fibromyalgia, anxiety, depression  Duloxetine 30mg daily -> 60mg  meloxicam  Gabapentin 300mg bid (only takes qhs; am makes her too drowsy)  Often trouble going down stairs in the am.   Very tired. Pain lower back/upper buttocks.  She often gets confused, forgetful.  Followed by Dr. Rodriguez.    paresthesias  Numbness in feet, sensitive to touch.   Gabapentin just qhs, too strong for her to take a daytime dosage.  Neurology 10/30/23 -   Magnesium bid  Heavy metal screen negative     Perimenopausal w hot flashes.  Previously took black cohosh; plans to go back. Lots of sweating at nights. Has to have a fan on her.  Venlfaxine started 8/2022 but stopped and now on duloxetine instead for fibromyalgia     perfecto  She has started cpap and fatigue seems to be not as bad as before.   Has an appt w Dr. Dan in January     Headache -   Saw Dr. Olivas in the past, tried elavil and maxalt and not much help in past. Saw Dr. Gillette on 10/30/23.  MRI brain 7/28/23: normal  Magnesium      PERFECTO  On CPAP    Daughter, Ernestina, is 12yo w special needs.  Son is 14 yo.     BP Readings from Last 5 Encounters:   05/10/24 118/86   05/09/24 129/86   04/16/24 (!) 138/90   03/15/24 130/73   01/23/24 106/72          Review of Systems   Constitutional:  Negative for fever.   Respiratory:  Negative for shortness of breath.    Cardiovascular:  Negative for chest pain.   Musculoskeletal:  Positive for back pain.   Skin:  Positive for color change.   Psychiatric/Behavioral:   "Positive for decreased concentration. The patient is nervous/anxious.        Objective:   /86   Pulse 71   Temp 97.5 °F (36.4 °C) (Oral)   Resp 15   Ht 5' 2" (1.575 m)   Wt 76.6 kg (168 lb 14 oz)   SpO2 98%   BMI 30.89 kg/m²      Physical Exam  Constitutional:       General: She is not in acute distress.     Appearance: She is well-developed. She is not diaphoretic.   HENT:      Head: Normocephalic and atraumatic.   Cardiovascular:      Rate and Rhythm: Normal rate and regular rhythm.   Pulmonary:      Effort: Pulmonary effort is normal. No respiratory distress.      Breath sounds: No wheezing or rales.   Skin:     General: Skin is warm and dry.   Neurological:      Mental Status: She is alert and oriented to person, place, and time.   Psychiatric:         Behavior: Behavior normal.         Assessment:       1. Fibromyalgia    2. Health care maintenance    3. Anxiety    4. Depression, major, recurrent, moderate    5. Interstitial cystitis    6. Obstructive sleep apnea syndrome    7. Dysuria        Plan:       Fibromyalgia  Continue current regimen    Health care maintenance  -     CBC Auto Differential; Future; Expected date: 05/10/2024  -     Hemoglobin A1C; Future; Expected date: 05/10/2024  -     Comprehensive Metabolic Panel; Future; Expected date: 05/10/2024  -     Lipid Panel; Future; Expected date: 05/10/2024  -     TSH; Future; Expected date: 05/10/2024  -     Vitamin D; Future; Expected date: 05/10/2024  -     Vitamin B12; Future; Expected date: 05/10/2024    Anxiety  Depression, major, recurrent, moderate  Continue current regimen  Does not want to continue with therapy but is finding meditation very helpful    Interstitial cystitis  -     Urinalysis, Reflex to Urine Culture Urine, Clean Catch  -     POCT Urinalysis    Obstructive sleep apnea syndrome  Continue cpap    Dysuria  -     Urinalysis, Reflex to Urine Culture Urine, Clean Catch  -     POCT Urinalysis          Health Maintenance Due "   Topic    Colorectal Cancer Screening       Fasting labs

## 2024-05-14 ENCOUNTER — PATIENT MESSAGE (OUTPATIENT)
Dept: INTERNAL MEDICINE | Facility: CLINIC | Age: 49
End: 2024-05-14
Payer: COMMERCIAL

## 2024-05-14 DIAGNOSIS — Z12.12 ENCOUNTER FOR COLORECTAL CANCER SCREENING: Primary | ICD-10-CM

## 2024-05-14 DIAGNOSIS — Z12.11 ENCOUNTER FOR COLORECTAL CANCER SCREENING: Primary | ICD-10-CM

## 2024-05-14 NOTE — TELEPHONE ENCOUNTER
Ok to cut back on B12 supplementation since level is above normal range.   I would not worry about the MCH level.   Urine sample did NOT show signs of a UTI.

## 2024-05-18 DIAGNOSIS — L24.5 IRRITANT CONTACT DERMATITIS DUE TO OTHER CHEMICAL PRODUCTS: ICD-10-CM

## 2024-05-18 NOTE — TELEPHONE ENCOUNTER
No care due was identified.  [facility] Embedded Care Due Messages. Reference number: [id number].   5/18/2024 7:28:52 AM CDT

## 2024-05-19 RX ORDER — TRIAMCINOLONE ACETONIDE 1 MG/G
OINTMENT TOPICAL
Qty: 80 G | Refills: 1 | Status: SHIPPED | OUTPATIENT
Start: 2024-05-19

## 2024-05-20 ENCOUNTER — PATIENT MESSAGE (OUTPATIENT)
Dept: ADMINISTRATIVE | Facility: HOSPITAL | Age: 49
End: 2024-05-20
Payer: COMMERCIAL

## 2024-05-20 NOTE — TELEPHONE ENCOUNTER
Refill Decision Note   Rema Horton  is requesting a refill authorization.  Brief Assessment and Rationale for Refill:  Approve     Medication Therapy Plan:        Comments:     Note composed:8:08 PM 05/19/2024

## 2024-05-21 ENCOUNTER — DOCUMENTATION ONLY (OUTPATIENT)
Dept: INTERNAL MEDICINE | Facility: CLINIC | Age: 49
End: 2024-05-21
Payer: COMMERCIAL

## 2024-05-21 ENCOUNTER — OFFICE VISIT (OUTPATIENT)
Dept: OBSTETRICS AND GYNECOLOGY | Facility: CLINIC | Age: 49
End: 2024-05-21
Payer: COMMERCIAL

## 2024-05-21 VITALS
HEIGHT: 62 IN | DIASTOLIC BLOOD PRESSURE: 84 MMHG | SYSTOLIC BLOOD PRESSURE: 126 MMHG | BODY MASS INDEX: 29.98 KG/M2 | WEIGHT: 162.94 LBS

## 2024-05-21 DIAGNOSIS — N95.1 MENOPAUSAL SYMPTOMS: ICD-10-CM

## 2024-05-21 DIAGNOSIS — M54.9 BACK PAIN, UNSPECIFIED BACK LOCATION, UNSPECIFIED BACK PAIN LATERALITY, UNSPECIFIED CHRONICITY: ICD-10-CM

## 2024-05-21 DIAGNOSIS — R23.2 HOT FLASHES: ICD-10-CM

## 2024-05-21 DIAGNOSIS — Z01.419 ENCOUNTER FOR GYNECOLOGICAL EXAMINATION WITHOUT ABNORMAL FINDING: Primary | ICD-10-CM

## 2024-05-21 DIAGNOSIS — R10.9 ABDOMINAL CRAMPING: ICD-10-CM

## 2024-05-21 LAB — HEMOCCULT STL QL IA: NORMAL

## 2024-05-21 PROCEDURE — 3074F SYST BP LT 130 MM HG: CPT | Mod: CPTII,S$GLB,, | Performed by: OBSTETRICS & GYNECOLOGY

## 2024-05-21 PROCEDURE — 1159F MED LIST DOCD IN RCRD: CPT | Mod: CPTII,S$GLB,, | Performed by: OBSTETRICS & GYNECOLOGY

## 2024-05-21 PROCEDURE — 99396 PREV VISIT EST AGE 40-64: CPT | Mod: S$GLB,,, | Performed by: OBSTETRICS & GYNECOLOGY

## 2024-05-21 PROCEDURE — 3044F HG A1C LEVEL LT 7.0%: CPT | Mod: CPTII,S$GLB,, | Performed by: OBSTETRICS & GYNECOLOGY

## 2024-05-21 PROCEDURE — 99999 PR PBB SHADOW E&M-EST. PATIENT-LVL IV: CPT | Mod: PBBFAC,,, | Performed by: OBSTETRICS & GYNECOLOGY

## 2024-05-21 PROCEDURE — 3079F DIAST BP 80-89 MM HG: CPT | Mod: CPTII,S$GLB,, | Performed by: OBSTETRICS & GYNECOLOGY

## 2024-05-21 PROCEDURE — 3008F BODY MASS INDEX DOCD: CPT | Mod: CPTII,S$GLB,, | Performed by: OBSTETRICS & GYNECOLOGY

## 2024-05-21 PROCEDURE — 1160F RVW MEDS BY RX/DR IN RCRD: CPT | Mod: CPTII,S$GLB,, | Performed by: OBSTETRICS & GYNECOLOGY

## 2024-05-21 NOTE — TELEPHONE ENCOUNTER
"Pt may need fit kit resent states she got a message saying the order was canceled. Pt was seen on 05/10 and is still complaining of lower back pain and "twitching" feeling in her stomach   "

## 2024-05-21 NOTE — PROGRESS NOTES
CC: Well woman exam    Rema Horton is a 49 y.o. female  presents for a well woman exam.    She is having hot flashes.  Perimenopausal w hot flashes but wants to avoid HRT.  SHe is taking OTC supplements for hot flashes  Previously took black cohosh; plans to go back. Lots of sweating at nights. Has to have a fan on her.  Venlfaxine started 2022 but stopped and now on duloxetine instead for fibromyalgia.  She has Lower back pain and pain in her lower abdomen       Past Medical History:   Diagnosis Date    Abnormal Pap smear of cervix     Anemia     Breast disorder     Fever blister     Fibromyalgia     IC (interstitial cystitis)        Past Surgical History:   Procedure Laterality Date    BREAST BIOPSY Left 2014    fibroadenoma     SECTION  2012    X 2---JST FOR KJB ([facility])    DILATION AND CURETTAGE OF UTERUS      KJB       OB History    Para Term  AB Living   4 3 3   1 3   SAB IAB Ectopic Multiple Live Births   1       3      # Outcome Date GA Lbr Jem/2nd Weight Sex Type Anes PTL Lv   4 Term 12    F CS-Unspec   BARRY   3 Term 03/24/10    M Vag-Spont   BARRY   2 SAB      SAB   FD   1 Term 10/23/92    M Vag-Spont   BARRY       Family History   Problem Relation Name Age of Onset    Cancer Mother          bladder (not sure if was actually gynecologic)    No Known Problems Father      Stroke Maternal Grandfather      Breast cancer Paternal Grandmother      Amblyopia Daughter Ernestina     Chromosomal disorder Daughter Ernestina     No Known Problems Son      Melanoma Neg Hx      Ovarian cancer Neg Hx      Colon cancer Neg Hx      Colon polyps Neg Hx      Esophageal cancer Neg Hx      Stomach cancer Neg Hx      Rectal cancer Neg Hx      Blindness Neg Hx      Glaucoma Neg Hx      Macular degeneration Neg Hx      Retinal detachment Neg Hx         Social History     Tobacco Use    Smoking status: Never    Smokeless tobacco: Never   Substance Use Topics    Alcohol use: Not Currently      "Alcohol/week: 0.0 standard drinks of alcohol     Comment: Occasionally.    Drug use: No       /84   Ht 5' 2" (1.575 m)   Wt 73.9 kg (162 lb 14.7 oz)   LMP 05/06/2024 (Approximate)   BMI 29.80 kg/m²     ROS:  GENERAL: Denies weight gain or weight loss. Feeling well overall.   SKIN: Denies rash or lesions.   HEAD: Denies head injury or headache.   NODES: Denies enlarged lymph nodes.   CHEST: Denies chest pain or shortness of breath.   CARDIOVASCULAR: Denies palpitations or left sided chest pain.   ABDOMEN: No abdominal pain, constipation, diarrhea, nausea, vomiting or rectal bleeding.   URINARY: No frequency, dysuria, hematuria, or burning on urination.  REPRODUCTIVE: See HPI.   BREASTS: The patient performs breast self-examination and denies pain, lumps, or nipple discharge.   HEMATOLOGIC: No easy bruisability or excessive bleeding.  MUSCULOSKELETAL: Denies joint pain or swelling.   NEUROLOGIC: Denies syncope or weakness.   PSYCHIATRIC: Denies depression, anxiety or mood swings.    Physical Exam:    APPEARANCE: Well nourished, well developed, in no acute distress.  AFFECT: WNL, alert and oriented x 3  SKIN: No acne or hirsutism  NECK: Neck symmetric without masses or thyromegaly  NODES: No inguinal, cervical, axillary, or femoral lymph node enlargement  CHEST: Good respiratory effect  ABDOMEN: Soft.  No tenderness or masses.  No hepatosplenomegaly.  No hernias.  BREASTS: Symmetrical, no skin changes or visible lesions.  No palpable masses, nipple discharge bilaterally.  PELVIC: Normal external genitalia without lesions.  Normal hair distribution.  Adequate perineal body, normal urethral meatus.  Vagina moist and well rugated without lesions or discharge.  Cervix pink, without lesions, discharge or tenderness.  No significant cystocele or rectocele.  Bimanual exam shows uterus to be normal size, regular, mobile and nontender.  Adnexa without masses or tenderness.    EXTREMITIES: No edema.    ASSESSMENT AND " PLAN  1. Encounter for gynecological examination without abnormal finding        2. Menopausal symptoms        3. Hot flashes        4. Back pain, unspecified back location, unspecified back pain laterality, unspecified chronicity  US Pelvis Comp with Transvag NON-OB (xpd      5. Abdominal cramping  US Pelvis Comp with Transvag NON-OB (xpd        [facility] for supplements    Patient was counseled today on A.C.S. Pap guidelines and recommendations for yearly pelvic exams, mammograms and monthly self breast exams; to see her PCP for other health maintenance.   F/U for PCP if no pelvic abnormality   Consider renal source of pain  NSAIDs PRN  Follow up if symptoms worsen or fail to improve.

## 2024-05-21 NOTE — TELEPHONE ENCOUNTER
Repeat FIT KIT please.   Looks like gynecologist is doing an US. If she doesn't find cause for pain and it's persisting please let us know so we can evaluate further.

## 2024-05-30 ENCOUNTER — LAB VISIT (OUTPATIENT)
Dept: LAB | Facility: HOSPITAL | Age: 49
End: 2024-05-30
Attending: INTERNAL MEDICINE
Payer: COMMERCIAL

## 2024-05-30 DIAGNOSIS — Z12.11 ENCOUNTER FOR COLORECTAL CANCER SCREENING: ICD-10-CM

## 2024-05-30 DIAGNOSIS — Z12.12 ENCOUNTER FOR COLORECTAL CANCER SCREENING: ICD-10-CM

## 2024-05-30 PROCEDURE — 82274 ASSAY TEST FOR BLOOD FECAL: CPT | Performed by: INTERNAL MEDICINE

## 2024-05-31 ENCOUNTER — OFFICE VISIT (OUTPATIENT)
Dept: DERMATOLOGY | Facility: CLINIC | Age: 49
End: 2024-05-31
Payer: COMMERCIAL

## 2024-05-31 DIAGNOSIS — R21 RASH: ICD-10-CM

## 2024-05-31 DIAGNOSIS — L30.9 DERMATITIS: ICD-10-CM

## 2024-05-31 DIAGNOSIS — L71.9 ROSACEA: Primary | ICD-10-CM

## 2024-05-31 PROCEDURE — 1159F MED LIST DOCD IN RCRD: CPT | Mod: CPTII,S$GLB,, | Performed by: STUDENT IN AN ORGANIZED HEALTH CARE EDUCATION/TRAINING PROGRAM

## 2024-05-31 PROCEDURE — 99214 OFFICE O/P EST MOD 30 MIN: CPT | Mod: S$GLB,,, | Performed by: STUDENT IN AN ORGANIZED HEALTH CARE EDUCATION/TRAINING PROGRAM

## 2024-05-31 PROCEDURE — 99999 PR PBB SHADOW E&M-EST. PATIENT-LVL III: CPT | Mod: PBBFAC,,, | Performed by: STUDENT IN AN ORGANIZED HEALTH CARE EDUCATION/TRAINING PROGRAM

## 2024-05-31 PROCEDURE — 3044F HG A1C LEVEL LT 7.0%: CPT | Mod: CPTII,S$GLB,, | Performed by: STUDENT IN AN ORGANIZED HEALTH CARE EDUCATION/TRAINING PROGRAM

## 2024-05-31 PROCEDURE — 1160F RVW MEDS BY RX/DR IN RCRD: CPT | Mod: CPTII,S$GLB,, | Performed by: STUDENT IN AN ORGANIZED HEALTH CARE EDUCATION/TRAINING PROGRAM

## 2024-05-31 RX ORDER — TACROLIMUS 1 MG/G
OINTMENT TOPICAL 2 TIMES DAILY
Qty: 100 G | Refills: 4 | Status: SHIPPED | OUTPATIENT
Start: 2024-05-31

## 2024-05-31 NOTE — PROGRESS NOTES
Subjective:      Patient ID:  Rema Horton is a 49 y.o. female who presents for   Chief Complaint   Patient presents with    Dermatitis     Follow up: generalized      Dermatitis - Follow-up  Diagnosis: Seborrheic dermatitis and Dermatitis.  Symptom course: worsening  Currently using: Triamcinolone (ont and cream), Clobetasole cream, and benedryl.  Affected locations: diffuse  Signs / symptoms: dryness, itching and pain (Stinging sensation)  Severity: moderate        Patient has two main issues    She has flushing and redness of her face, v of chest and dorsal forearms. Worse in the heat. Sometimes burns or feels warm but overall relatively asymptomatic. She has been worked up for connective tissue disease. Prior biopsy negative. FELIPA 1:160 -> negative, myomarker negative, no raynauds, etc. Favored to be rosacea or poikiloderma of sun damage    She also has dermatitis on her hands and body. Frequently washing dishes. Applying clobetasol which helps but the texture still not fully back to normal     Prev HPI:  Patient biopsied on neck, 12/30/15, which showed subacute spong derm consistent with atopic dermatitis, allergic contact dermatitis, nummular dermatitis or id reaction. Based on patient's clinical presentation, most likely nummular eczema. She has been using TAC 0.1% cream BID to affected areas including her left back, right and left lower leg, and john-umbilical area with improvement.     Being followed by rheum for + FELIPA (1:160 speckled atypical pattern in 9/2014). FELIPA in 10/2020 was negative.  Neg marshall, ro, la, dsdna, rnp, APS panel  Myomarker panel negative  Rf, ccp-negative  normal TSH, T4  Esr:33, 34     1. Skin, upper chest, punch biopsy:   - MILD EPIDERMAL ATROPHY WITH A SPARSE SUPERFICIAL PERIVASCULAR AND PERIFOLLICULAR LYMPHOHISTIOCYTIC INFILTRATE (SEE COMMENT).   COMMENT:  These histologic features are mild and nonspecific.  Active   interface alteration is not appreciated, nor is there any  significant   epidermal spongiosis.  A viral exanthem or a resolving eczematous process   could show similar histology.  Colloidal iron stain shows some mildly   increased dermal mucin within the superficial dermis, though this finding is   nonspecific.     Review of Systems   Skin:  Positive for wears hat. Negative for daily sunscreen use, activity-related sunscreen use and recent sunburn.   Hematologic/Lymphatic: Bruises/bleeds easily (Bruises).       Objective:   Physical Exam   Constitutional: She appears well-developed and well-nourished. No distress.   Neurological: She is alert and oriented to person, place, and time. She is not disoriented.   Psychiatric: She has a normal mood and affect.   Skin:   Areas Examined (abnormalities noted in diagram):   Head / Face Inspection Performed  Neck Inspection Performed  Chest / Axilla Inspection Performed  RUE Inspected  LUE Inspection Performed                     Diagram Legend     Erythematous scaling macule/papule c/w actinic keratosis       Vascular papule c/w angioma      Pigmented verrucoid papule/plaque c/w seborrheic keratosis      Yellow umbilicated papule c/w sebaceous hyperplasia      Irregularly shaped tan macule c/w lentigo     1-2 mm smooth white papules consistent with Milia      Movable subcutaneous cyst with punctum c/w epidermal inclusion cyst      Subcutaneous movable cyst c/w pilar cyst      Firm pink to brown papule c/w dermatofibroma      Pedunculated fleshy papule(s) c/w skin tag(s)      Evenly pigmented macule c/w junctional nevus     Mildly variegated pigmented, slightly irregular-bordered macule c/w mildly atypical nevus      Flesh colored to evenly pigmented papule c/w intradermal nevus       Pink pearly papule/plaque c/w basal cell carcinoma      Erythematous hyperkeratotic cursted plaque c/w SCC      Surgical scar with no sign of skin cancer recurrence      Open and closed comedones      Inflammatory papules and pustules      Verrucoid  papule consistent consistent with wart     Erythematous eczematous patches and plaques     Dystrophic onycholytic nail with subungual debris c/w onychomycosis     Umbilicated papule    Erythematous-base heme-crusted tan verrucoid plaque consistent with inflamed seborrheic keratosis     Erythematous Silvery Scaling Plaque c/w Psoriasis     See annotation      Assessment / Plan:             Rosacea  - erythema and telangiectasias on cheeks and chin. 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 Favor rosacea with photodamage causing poikiloderma rather than a connective tissue disease. She has had w/u for CTD by rheum which was negative. Morst recent FELIPA negative. Myomarker panel negative. No raynauds. No nail fold capillary changes or cuticle changes. Nonetheless, will get myomarker, which she has not had previously  - counseled on chronic nature and triggers of rosacea     Dermatitis--Rash most consistent with nummular dermatitis. Overall not very active today. Resolving with mild PIH  - limit hand washing. [place] bland emollient. Counseled on dry skin care    - c/w clobetasol bid prn with protopic as steroid sparing agent    -     tacrolimus (PROTOPIC) 0.1 % ointment; Apply topically 2 (two) times daily. Non-steroid. Use to areas of eczema when taking a break from clobetasol  Dispense: 100 g; Refill: 4         Follow up if symptoms worsen or fail to improve.

## 2024-05-31 NOTE — PATIENT INSTRUCTIONS
Hand Dermatitis  Hand dermatitis or hand eczema is a common problem because most people's hands are exposed to soaps, cold weather, and other irritating substances. Some people may be especially sensitive to detergents, oils, foods, cleaning products, etc. Avoiding these irritating substances are important parts of treatment.    Avoid excessive exposure to water. When washing your hands, use very little soap, rinse off all of the soap and apply moisturizers after drying your hands.   Protect your hands by wearing vinyl gloves whenever you are doing housework, cleaning, or any work where your hands will be exposed to chemicals or water. If water gets into the gloves, change your pair.  Remove rings when washing your hands or doing housework as chemicals can be trapped beneath the rings.  When outdoors in cool weather, wear soft-lined gloves to protect your hands from chapping.  Use ample moisturizer frequently. Keep a bottle by each sink. Some good options:  Cerave Cream  Neutrogena Hand Cream  Vanicream  Cerave Healing Ointment  Aveeno Hand Cream   Eucerin Hand Cream        XEROSIS (DRY SKIN)        Definition    Xerosis is the term for dry skin.  We all have a natural oil coating over our skin produced by the skin oil glands.  If this oil is removed, the skin becomes dry which can lead to cracking, which can lead to inflammation.  Xerosis is usually a long-term problem that recurs often, especially in the winter.    Cause    Long hot baths or showers can remove our natural oil and lead to xerosis.  One should never take more than one bath or shower a day and for no longer than ten minutes.  Use of harsh soaps such as Zest, Dial, and Ivory can worsen and cause xerosis.  Cold winter weather worsens xerosis because the amount of moisture contained in cold air is much less than the amount of moisture in warm air.    Treatment    Treatment is intended to restore the natural oil to your skin.  Keep the skin  lubricated.    Do not take more than one bath or shower a day.  Use lukewarm water, not hot.  Hot water dries out the skin.    Use a gentle moisturizing soap such as Cetaphil soap, Oil of Olay, Dove, Basis, Ivory moisture care, Restoraderm cleanser.    When toweling dry, dont rub.  Blot the skin so there is still some water left on the skin.  You should apply a moisturizing cream to all of the skin such as Cerave cream, Cetaphil cream, Lipikar [place] AP+ Intense Repair Moisturizing Cream or Restoraderm or Eucerin Original Formula cream.   Alpha hydroxyacid lotions, i.e., AmLactin, also work very well for preventing dry skin, but may burn when used on inflamed or reddened skin.    If you like to swim during the winter months, you should not use soap when getting out of the pool.  When you have finished swimming, rinse off the chlorine with cool to warm water.  If this will be the only shower of the day, then you may use Cetaphil or another mild soap to cleanse your skin.  After the shower, apply a moisturizing cream to all of the skin as above.        [address] [place] [address]/ [phone] FAX/ [email or web address]

## 2024-06-04 LAB — HEMOCCULT STL QL IA: NEGATIVE

## 2024-06-10 ENCOUNTER — PATIENT MESSAGE (OUTPATIENT)
Dept: INTERNAL MEDICINE | Facility: CLINIC | Age: 49
End: 2024-06-10
Payer: COMMERCIAL

## 2024-06-13 ENCOUNTER — HOSPITAL ENCOUNTER (OUTPATIENT)
Dept: RADIOLOGY | Facility: HOSPITAL | Age: 49
Discharge: HOME | End: 2024-06-13
Attending: OBSTETRICS & GYNECOLOGY
Payer: COMMERCIAL

## 2024-06-13 DIAGNOSIS — R10.9 ABDOMINAL CRAMPING: ICD-10-CM

## 2024-06-13 DIAGNOSIS — M54.9 BACK PAIN, UNSPECIFIED BACK LOCATION, UNSPECIFIED BACK PAIN LATERALITY, UNSPECIFIED CHRONICITY: ICD-10-CM

## 2024-06-13 PROCEDURE — 76830 TRANSVAGINAL US NON-OB: CPT | Mod: TC

## 2024-06-13 PROCEDURE — 76830 TRANSVAGINAL US NON-OB: CPT | Mod: 26,,, | Performed by: INTERNAL MEDICINE

## 2024-06-13 PROCEDURE — 76856 US EXAM PELVIC COMPLETE: CPT | Mod: 26,,, | Performed by: INTERNAL MEDICINE

## 2024-06-13 PROCEDURE — 76856 US EXAM PELVIC COMPLETE: CPT | Mod: TC

## 2024-06-14 ENCOUNTER — OFFICE VISIT (OUTPATIENT)
Dept: PSYCHIATRY | Facility: CLINIC | Age: 49
End: 2024-06-14
Payer: COMMERCIAL

## 2024-06-14 VITALS
WEIGHT: 168 LBS | HEART RATE: 79 BPM | SYSTOLIC BLOOD PRESSURE: 133 MMHG | BODY MASS INDEX: 30.73 KG/M2 | DIASTOLIC BLOOD PRESSURE: 77 MMHG

## 2024-06-14 DIAGNOSIS — M79.7 FIBROMYALGIA: ICD-10-CM

## 2024-06-14 DIAGNOSIS — F41.9 ANXIETY: ICD-10-CM

## 2024-06-14 DIAGNOSIS — F33.1 MDD (MAJOR DEPRESSIVE DISORDER), RECURRENT EPISODE, MODERATE: ICD-10-CM

## 2024-06-14 DIAGNOSIS — F90.2 ATTENTION DEFICIT HYPERACTIVITY DISORDER (ADHD), COMBINED TYPE: ICD-10-CM

## 2024-06-14 PROCEDURE — 3008F BODY MASS INDEX DOCD: CPT | Mod: CPTII,S$GLB,, | Performed by: STUDENT IN AN ORGANIZED HEALTH CARE EDUCATION/TRAINING PROGRAM

## 2024-06-14 PROCEDURE — 99999 PR PBB SHADOW E&M-EST. PATIENT-LVL II: CPT | Mod: PBBFAC,,, | Performed by: STUDENT IN AN ORGANIZED HEALTH CARE EDUCATION/TRAINING PROGRAM

## 2024-06-14 PROCEDURE — 3075F SYST BP GE 130 - 139MM HG: CPT | Mod: CPTII,S$GLB,, | Performed by: STUDENT IN AN ORGANIZED HEALTH CARE EDUCATION/TRAINING PROGRAM

## 2024-06-14 PROCEDURE — 99214 OFFICE O/P EST MOD 30 MIN: CPT | Mod: S$GLB,,, | Performed by: STUDENT IN AN ORGANIZED HEALTH CARE EDUCATION/TRAINING PROGRAM

## 2024-06-14 PROCEDURE — 3078F DIAST BP <80 MM HG: CPT | Mod: CPTII,S$GLB,, | Performed by: STUDENT IN AN ORGANIZED HEALTH CARE EDUCATION/TRAINING PROGRAM

## 2024-06-14 PROCEDURE — 3044F HG A1C LEVEL LT 7.0%: CPT | Mod: CPTII,S$GLB,, | Performed by: STUDENT IN AN ORGANIZED HEALTH CARE EDUCATION/TRAINING PROGRAM

## 2024-06-14 RX ORDER — DEXTROAMPHETAMINE SACCHARATE, AMPHETAMINE ASPARTATE MONOHYDRATE, DEXTROAMPHETAMINE SULFATE AND AMPHETAMINE SULFATE 2.5; 2.5; 2.5; 2.5 MG/1; MG/1; MG/1; MG/1
10 CAPSULE, EXTENDED RELEASE ORAL EVERY MORNING
Qty: 30 CAPSULE | Refills: 0 | Status: SHIPPED | OUTPATIENT
Start: 2024-06-14

## 2024-06-14 RX ORDER — DULOXETIN HYDROCHLORIDE 60 MG/1
60 CAPSULE, DELAYED RELEASE ORAL DAILY
Qty: 90 CAPSULE | Refills: 1 | Status: SHIPPED | OUTPATIENT
Start: 2024-06-14 | End: 2024-12-11

## 2024-06-14 RX ORDER — DEXTROAMPHETAMINE SACCHARATE, AMPHETAMINE ASPARTATE MONOHYDRATE, DEXTROAMPHETAMINE SULFATE AND AMPHETAMINE SULFATE 2.5; 2.5; 2.5; 2.5 MG/1; MG/1; MG/1; MG/1
10 CAPSULE, EXTENDED RELEASE ORAL EVERY MORNING
Qty: 30 CAPSULE | Refills: 0 | Status: SHIPPED | OUTPATIENT
Start: 2024-07-12

## 2024-06-14 RX ORDER — DEXTROAMPHETAMINE SACCHARATE, AMPHETAMINE ASPARTATE MONOHYDRATE, DEXTROAMPHETAMINE SULFATE AND AMPHETAMINE SULFATE 2.5; 2.5; 2.5; 2.5 MG/1; MG/1; MG/1; MG/1
10 CAPSULE, EXTENDED RELEASE ORAL EVERY MORNING
Qty: 30 CAPSULE | Refills: 0 | Status: SHIPPED | OUTPATIENT
Start: 2024-08-09
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
## 2024-06-14 NOTE — PATIENT INSTRUCTIONS
AFTER VISIT INSTRUCTIONS    Take medications as prescribed.  If questions or concerns arise, or if experiencing side effects, adverse reactions or worsening symptoms, contact me through the [facility] portal or call [phone] to speak with office staff.  In cases of emergencies, call [phone] or present to the emergency department for immediate assistance.  Expect a transition to a new resident doctor in July. Be sure to schedule a follow up appointment with me before June 30th or with the new resident after June 30th to ensure consistent medication refills and a smooth transition.      RESOURCES:    National [place] of Mental Health: information on mental health medications.  https://www.nimh.nih.gov/health/topics/mental-health-medications/    The National Suicide Prevention Lifeline: 1-849.294.6262.  Provides 24/7, free and confidential support for people in distress, prevention and crisis resources for you or your loved ones, and best practices for professionals.  https://suicidepreventionlifeline.org/         Thank you for allowing me to participate as part of your health care team, and thank you for choosing [facility].    Snehal Silva DO  [facility] Resident, PGY-4

## 2024-06-17 ENCOUNTER — PATIENT MESSAGE (OUTPATIENT)
Dept: OBSTETRICS AND GYNECOLOGY | Facility: CLINIC | Age: 49
End: 2024-06-17
Payer: COMMERCIAL

## 2024-06-18 ENCOUNTER — TELEPHONE (OUTPATIENT)
Dept: OBSTETRICS AND GYNECOLOGY | Facility: CLINIC | Age: 49
End: 2024-06-18
Payer: COMMERCIAL

## 2024-06-18 NOTE — TELEPHONE ENCOUNTER
Your US is  suggestive of adenomyosis and Prominent left adnexal vessels, which could represent pelvic congestion syndrome.  These conditions can cause pain and abnormal bleeding. If you would like to discuss more I am happy to schedule a virtual   Veronika ARAIZA

## 2024-06-26 DIAGNOSIS — M54.2 NECK PAIN ON RIGHT SIDE: ICD-10-CM

## 2024-06-26 RX ORDER — BACLOFEN 10 MG/1
TABLET ORAL
Qty: 90 TABLET | Refills: 0 | Status: SHIPPED | OUTPATIENT
Start: 2024-06-26

## 2024-06-27 ENCOUNTER — OFFICE VISIT (OUTPATIENT)
Dept: NEUROLOGY | Facility: CLINIC | Age: 49
End: 2024-06-27
Payer: COMMERCIAL

## 2024-06-27 ENCOUNTER — LAB VISIT (OUTPATIENT)
Dept: LAB | Facility: HOSPITAL | Age: 49
End: 2024-06-27
Attending: PSYCHIATRY & NEUROLOGY
Payer: COMMERCIAL

## 2024-06-27 VITALS
SYSTOLIC BLOOD PRESSURE: 119 MMHG | BODY MASS INDEX: 31.28 KG/M2 | DIASTOLIC BLOOD PRESSURE: 84 MMHG | HEART RATE: 73 BPM | WEIGHT: 170 LBS | HEIGHT: 62 IN

## 2024-06-27 DIAGNOSIS — R20.2 PARESTHESIA: Primary | ICD-10-CM

## 2024-06-27 DIAGNOSIS — R20.2 PARESTHESIA: ICD-10-CM

## 2024-06-27 PROCEDURE — 3074F SYST BP LT 130 MM HG: CPT | Mod: CPTII,S$GLB,, | Performed by: PSYCHIATRY & NEUROLOGY

## 2024-06-27 PROCEDURE — 99213 OFFICE O/P EST LOW 20 MIN: CPT | Mod: S$GLB,,, | Performed by: PSYCHIATRY & NEUROLOGY

## 2024-06-27 PROCEDURE — 3079F DIAST BP 80-89 MM HG: CPT | Mod: CPTII,S$GLB,, | Performed by: PSYCHIATRY & NEUROLOGY

## 2024-06-27 PROCEDURE — 3044F HG A1C LEVEL LT 7.0%: CPT | Mod: CPTII,S$GLB,, | Performed by: PSYCHIATRY & NEUROLOGY

## 2024-06-27 PROCEDURE — 1159F MED LIST DOCD IN RCRD: CPT | Mod: CPTII,S$GLB,, | Performed by: PSYCHIATRY & NEUROLOGY

## 2024-06-27 PROCEDURE — 36415 COLL VENOUS BLD VENIPUNCTURE: CPT | Performed by: PSYCHIATRY & NEUROLOGY

## 2024-06-27 PROCEDURE — 84207 ASSAY OF VITAMIN B-6: CPT | Performed by: PSYCHIATRY & NEUROLOGY

## 2024-06-27 PROCEDURE — 3008F BODY MASS INDEX DOCD: CPT | Mod: CPTII,S$GLB,, | Performed by: PSYCHIATRY & NEUROLOGY

## 2024-06-27 PROCEDURE — 99999 PR PBB SHADOW E&M-EST. PATIENT-LVL IV: CPT | Mod: PBBFAC,,, | Performed by: PSYCHIATRY & NEUROLOGY

## 2024-06-27 NOTE — PROGRESS NOTES
Goal Outcome Evaluation:  Plan of Care Reviewed With: patient        Progress: improving  Outcome Evaluation: Pt c/o pain throughout shift. Medicated with PO pain meds per pt request. Safety maintained. A/o x4. IV IID. RA,  in place. Dressing to left shoulder CDI. N/v check WNL. Pulses palpable. Immobolizer sling in place to left arm. Up with asst x 1-2 with walker to chair. BLE swollen, left worse than right (MD aware). Voiding per purewick. Awaiting dc plans. Cont to monitor.          [facility] - NEUROLOGY 7TH [address] [facility], [place]    Date: 6/27/24  Patient Name: Rema Horton   MRN: [id number]   Referring Provider: No ref. provider found    Thank you so much No ref. provider found for your patient referral to Neuromuscular team at [facility]. We take pride in our care coordination and look forward to your feedback and questions.    Assessment:   Rema Horton is a 49 y.o. female is a very pleasant patient with multi-regional paresthesia and complaints without objective abnormality on clinical assessment and work up for follow-up.  She mentioned ongoing psychosocial stresses due to her ongoing commitment about her daughter who is mentally challenged as well as she mentioned about previous h/o orgasm related headaches but she mentioned she has not been in physical relationship recently. I requested her to increase magnesium to twice daily and try Tylenol before intimacy.    I addressed her complaints. I would wish her very best for improvement/recovery in her condition. Follow up as needed.    Future direction based on feedback:    Plan:     Problem List Items Addressed This Visit    None  Visit Diagnoses       Paresthesia    -  Primary    Relevant Orders    VITAMIN B6              Leonie Gillette MD    This evaluation was completed in >25  Minutes over 50% of the time spent on education & counseling. This includes face to face time and non-face to face time preparing to see the patient (eg, review of tests), obtaining and/or reviewing separately obtained history, documenting clinical information in the electronic or other health record, independently interpreting results and communicating results to the patient/family/caregiver, or care coordinator.    Patient note was created using MModal Dictation.  Any errors in syntax or even information may not have been identified and edited on initial review prior to signing this note.    Details provided by:     Patient    Reason for visit:  Numbness in feet    Interval history on 6/27/24   The patient visited the clinic for a follow-up today to discuss sudden onset right heel pain, bilateral numbness below the joint line of the knees, and tactile sensations in her right lateral leg for the past 2-4 weeks.     The patient was unaccompanied by any family members during the encounter. She has stretching pain and pulling sensations in the outer parts of the right heel when she walks or stands in a certain position intermittently. She has noticed this for the past month and complains that the pain is sharp and lasts for a few seconds until it resolves spontaneously. It is non-radiating in character. She does not recognize any triggers but states that resting helps her. She graded the severity of the pain as 9/10. Movements are not always associated with pain, and she denies any other associated symptoms.     She complained of constant numbness on the medial and lateral sides below the knee joint lines in both lower extremities for the past 2 weeks. The numbness is not aggravated or relieved by any factors. It is associated with aching pain on kneeling, sometimes hindering her prayers. She denies tingling or any other associated symptoms. She graded the severity of her knee pain as 9/10, which improves with standing. She has also experienced right leg tingling on prolonged standing for the past 2 years. Stretching her arms out for a long period reproduces tingling.   She had had tactile sensations in her right leg twice in the past 2 weeks, where she felt someone tapping her leg. It was at the same site both times when she was resting. She also has an occasional crawling sensation in her right leg on prolonged standing. She does not feel any pain, numbness, or weakness associated with it. It is not exacerbated or eliminated by any factors.     Since her last visit, she has appreciated improvement in ascending and descending  the stairs. There are no changes in her sleep quality or pattern but experiences fatigue sometimes during the day. She uses CPAP regularly. She has not had any falls and denies weight loss. She denies stool incontinence, and instead reports severe constipation since 2015. She takes mucin to help her bowel movements. She has had minimal urinary leakage with coughing for the past 10 years occasionally. She added that she used to experience post-orgasm headaches previously when she was sexually active, lasting for a week sometimes. She also complained of dry mouth on waking up in the morning recently. There are no changes in the past medical, surgical, family, or social histories. However, she repeatedly expressed experiencing stress, anxiety, and great difficulty dealing with her day-to-day life. She also reiterated that she had a daughter with special needs and is unable to share the same emotional connection with her as compared to her son.  She is compliant with her medications but revealed that she is not sure if Magnesium once daily is effective in her case. She has been seeing her Psychiatrist, Dr. Silva, regularly and has been prescribed Adderall lately.     Interval work up:  Heavy metal screen normal   MyoMarker panel  normal     ===========================================================================================  Initial encounter on 10/30/2023  HISTORY OF PRESENT ILLNESS   Ms. Rema Horton is a 49 y.o. female presenting for evaluation of feet numbness.  She has PMH of headache, memory loss, anxiety.      She noticed an electric shock-like sensation going up her right leg after shaving earlier this year. She cannot touch the outer side of her leg without having pain. The pain occurs in her right foot up to her knees and on her left foot. She also has numbness in the front of her lower legs bilaterally. The numbness is constant, and it gets better with socks. She also noticed tingling in both her  feet occasionally, which is more in the afternoon. She has noticed occasional loss of balance but has had no falls. She has difficulty walking and going upstairs and downstairs because of knee pain. She feels pressure-like chest pain that started this year, but there is no shortness of breath. There was an episode of severe pain two months ago and she was unable to move.     She has blurred vision occasionally. She also had a history of headaches since 2015. She has had a history of speech difficulty since childhood for which she saw a neurologist in 2016. She has also noticed eye swelling after certain foods. She has sensitivity to sunlight and her body turns red. She has a daughter with special needs who was born in 2012.  She feels restricted in her activities because of her daughter. She has a history of fibromyalgia. She has high blood pressure occasionally but is not on any medication. She does not smoke or drink.    Pertinent work up based on chart review for current condition:  Vitamin-D level 34   Vitamin B12 level more than 2000   TSH 2.3   Hemoglobin A1c 5.3   Comprehensive metabolic panel normal   CBC normal   Tissue transglutaminase antibody negative   Anti CCP negative   Rheumatoid factor negative   C-reactive protein 4.8   ESR 19   FELIPA negative   Vitamin B6 level 4   Vitamin B12 level normal   Vitamin B1 level 45 normal    NCS/EMG on 10/20/2015.    Normal study.      MRI brain on 07/28/2023.    No evidence of acute intracranial pathology.  Normal MR appearance of the brain    Review of Systems:  12 system review of systems is negative except for the symptoms mentioned in HPI.       Past Medical History Social History   Past Medical History:   Diagnosis Date    Abnormal Pap smear of cervix     Anemia     Breast disorder     Fever blister     Fibromyalgia     IC (interstitial cystitis)       Social History     Socioeconomic History    Marital status:    Tobacco Use    Smoking status: Never     Smokeless tobacco: Never   Substance and Sexual Activity    Alcohol use: Not Currently     Alcohol/week: 0.0 standard drinks of alcohol     Comment: Occasionally.    Drug use: No    Sexual activity: Yes     Partners: Male     Birth control/protection: None     Social Determinants of Health     Financial Resource Strain: High Risk (5/8/2024)    Overall Financial Resource Strain (CARDIA)     Difficulty of Paying Living Expenses: Very hard   Food Insecurity: Food Insecurity Present (5/8/2024)    Hunger Vital Sign     Worried About Running Out of Food in the Last Year: Often true     Ran Out of Food in the Last Year: Often true   Transportation Needs: No Transportation Needs (5/8/2024)    PRAPARE - Transportation     Lack of Transportation (Medical): No     Lack of Transportation (Non-Medical): No   Physical Activity: Inactive (5/8/2024)    Exercise Vital Sign     Days of Exercise per Week: 0 days     Minutes of Exercise per Session: 10 min   Stress: Stress Concern Present (5/8/2024)    [employer] [place] [employer] - Occupational Stress Questionnaire     Feeling of Stress : To some extent   Housing Stability: Unknown (5/8/2024)    Housing Stability Vital Sign     Unable to Pay for Housing in the Last Year: No        Family History Past Surgical History   Family History   Problem Relation Name Age of Onset    Cancer Mother          bladder (not sure if was actually gynecologic)    No Known Problems Father      Stroke Maternal Grandfather      Breast cancer Paternal Grandmother      Amblyopia Daughter Ernestina     Chromosomal disorder Daughter Ernestina     No Known Problems Son      Melanoma Neg Hx      Ovarian cancer Neg Hx      Colon cancer Neg Hx      Colon polyps Neg Hx      Esophageal cancer Neg Hx      Stomach cancer Neg Hx      Rectal cancer Neg Hx      Blindness Neg Hx      Glaucoma Neg Hx      Macular degeneration Neg Hx      Retinal detachment Neg Hx       Past Surgical History:   Procedure Laterality  Date    BREAST BIOPSY Left 2014    fibroadenoma     SECTION  2012    X 2---JST FOR KJB (BREECH)    DILATION AND CURETTAGE OF UTERUS      KJB        Allergies    Review of patient's allergies indicates:   Allergen Reactions    Flexeril  [cyclobenzaprine]      Other reaction(s): Rash    Lodine  [etodolac] Rash            Medications     Current Outpatient Medications   Medication Sig Dispense Refill    acetaminophen (TYLENOL) 500 MG tablet Take 1-2 tablets (500-1,000 mg total) by mouth 3 (three) times daily as needed for Pain.      baclofen (LIORESAL) 10 MG tablet TAKE 1 TABLET BY MOUTH EVERY DAY AS NEEDED SEVERE PAIN AND MUSCLE ACHE 90 tablet 0    BLACK COHOSH ORAL Take by mouth.      cholecalciferol, vitamin D3, (VITAMIN D3) 25 mcg (1,000 unit) capsule TAKE 2 CAPSULES (2,000 UNITS TOTAL) BY MOUTH ONCE DAILY. 180 capsule 3    clobetasol 0.05% (TEMOVATE) 0.05 % Oint Apply topically 2 (two) times daily. Use to affected areas for up to 2 weeks then take a 1 week break or decrease to 3 times weekly. Do not apply to groin or face. Use to spot on hand 45 g 2    dextroamphetamine-amphetamine (ADDERALL XR) 10 MG 24 hr capsule Take 1 capsule (10 mg total) by mouth every morning. 30 capsule 0    [START ON 2024] dextroamphetamine-amphetamine (ADDERALL XR) 10 MG 24 hr capsule Take 1 capsule (10 mg total) by mouth every morning. 30 capsule 0    [START ON 2024] dextroamphetamine-amphetamine (ADDERALL XR) 10 MG 24 hr capsule Take 1 capsule (10 mg total) by mouth every morning. 30 capsule 0    DULoxetine (CYMBALTA) 60 MG capsule Take 1 capsule (60 mg total) by mouth once daily. 90 capsule 1    xavier prim-linoleic-gamolenic ac (PRIMROSE OIL) 1,000 mg Cap       xavier primrose/linoleic/g-lenic (PRIMROSE OIL ORAL) Take by mouth.      fluocinolone (SYNALAR) 0.01 % external solution AAA scalp qday - bid prn itching or scaling 60 mL 3    fluticasone propionate (FLONASE) 50 mcg/actuation nasal spray USE 1 SPRAY INTO EACH  NOSTRIL TWICE A DAY 48 mL 3    gabapentin (NEURONTIN) 300 MG capsule Take 1 capsule (300 mg total) by mouth As instructed (Take 1 capsul in the morning, 2 at bedtime).      ketoconazole (NIZORAL) 2 % shampoo Wash hair with medicated shampoo at least 2x/week - let sit on scalp at least 5 minutes prior to rinsing 120 mL 5    magnesium oxide (MAG-OX) 400 mg (241.3 mg magnesium) tablet TAKE 1 TABLET BY MOUTH TWICE A DAY 60 tablet 5    meloxicam (MOBIC) 7.5 MG tablet TAKE 1 TABLET BY MOUTH EVERY DAY IN 1 TO 2 WEEKS MAY INCREASE DOSE TO 2 TABLETS ONCE DAILY 60 tablet 3    tacrolimus (PROTOPIC) 0.1 % ointment Apply topically 2 (two) times daily. Non-steroid. Use to areas of eczema when taking a break from clobetasol 100 g 4    triamcinolone acetonide 0.1% (KENALOG) 0.1 % cream APPLY TOPICALLY 2 (TWO) TIMES DAILY. TO AFFECTED AREAS ON ARMS AND CHEST X 1-2 WKS THEN AS NEEDED FOR FLARES ONLY. AVOID FACE, ARMPITS, AND GROIN. 45 g 2    triamcinolone acetonide 0.1% (KENALOG) 0.1 % ointment APPLY TOPICALLY 2 (TWO) TIMES DAILY TO LEFT THUMB 80 g 1    TURMERIC ORAL Take by mouth.      valACYclovir (VALTREX) 500 MG tablet Take 1 tablet (500 mg total) by mouth once daily. 30 tablet 5    vitamin E acetate (VITAMIN E ORAL) Take by mouth once daily.       No current facility-administered medications for this visit.         LABS   Last CBC Results:   Lab Results   Component Value Date    WBC 4.73 05/10/2024    HGB 13.2 05/10/2024    HCT 40.3 05/10/2024     05/10/2024       Last CMP Results  Lab Results   Component Value Date     05/10/2024    K 4.0 05/10/2024     05/10/2024    CO2 23 05/10/2024    BUN 14 05/10/2024    CREATININE 0.7 05/10/2024    CALCIUM 10.1 05/10/2024    ALBUMIN 4.2 05/10/2024    AST 23 05/10/2024    ALT 23 05/10/2024       Last Lipids  Lab Results   Component Value Date    CHOL 214 (H) 05/10/2024    TRIG 95 05/10/2024    HDL 56 05/10/2024    LDLCALC 139.0 05/10/2024       Last A1C  Lab Results    Component Value Date    HGBA1C 5.3 05/10/2024       Last TSH  Lab Results   Component Value Date    TSH 2.641 05/10/2024         PHYSICAL EXAMINATION     There were no vitals filed for this visit.    Wt Readings from Last 3 Encounters:   05/21/24 1106 73.9 kg (162 lb 14.7 oz)   05/10/24 1003 76.6 kg (168 lb 14 oz)   05/09/24 0954 74.8 kg (165 lb)     There is no height or weight on file to calculate BMI.     General: No acute distress, calm & cooperative  Head: Atraumatic, normocephalic  HEENT: PERRLA, EOMI, Oral mucosa moist   Neck: Supple, trachea midline  Cardiovascular: Regular rate and rhythm.  Pulmonary: CTAB, no increased work of breathing, no rhonchi or wheezing  Extremities: Warm, well-perfused, no significant edema  Psychiatric: Normal mood & affect; behavior normal & appropriate  Skin: No jaundice, rashes    GENERAL/CONSTITUTIONAL:    -Well appearing; well nourished    HIGHER INTEGRATIVE FUNCTIONS:   -Attention & concentration: Normal   -Orientation: Oriented to person, place & time  -Memory: Normal  -Language: Normal   -Fund of Knowledge: Normal     FUNDOSCOPY:  Fundi visualized with clear disc margins    CRANIAL NERVES:   -CN 2: Visual fields full  -CN 2,3: PERRL  -CN 3,4,6: EOMI  -CN 5: Facial sensation intact bilaterally  -CN 7: Facial strength/movement intact bilaterally  -CN 8: Hearing normal bilaterally  -CN 9,10: Palate elevates symmetrically  -CN 11: Normal shoulder shrug and head turn  -CN 12: Tongue protrudes midline     MOTOR:   -Tone: normal in upper and lower extremities  -UE/LE motor: 5/5 throughout, no pronator drift     SENSATION:   -Intact bilaterally to Vibration and pin prick    REFLEXES:   -2/4 upper and lower extremities bilaterally  -Flexor plantar reflex bilaterally    COORDINATION:   -FNF normal bilaterally    GAIT:   -Normal casual gait. Able to stand on heels and toes without difficulty.  -straight leg raising test is negative    Scheduled Follow-up :  Future Appointments    Date Time Provider Department Center   6/27/2024 12:30 PM Leonie Gillette MD [facility] NEURO [facility]   8/5/2024  9:00 AM Bridgett Nunez MD [facility] RHEUM [facility]   8/20/2024 10:00 AM Ugo Rodriguez MD [facility] PHYSMED [facility]   10/24/2024 10:30 AM Umberto Dan MD [facility] SLEEP [facility]   11/7/2024 10:30 AM Umberto Dan MD [facility] SLEEP [facility]   11/14/2024 10:00 AM Britta Clark MD [facility] IM [facility] PCW       After Visit Medication List :     Medication List            Accurate as of June 27, 2024 11:33 AM. If you have any questions, ask your nurse or doctor.                CONTINUE taking these medications      acetaminophen 500 MG tablet  Commonly known as: TYLENOL  Take 1-2 tablets (500-1,000 mg total) by mouth 3 (three) times daily as needed for Pain.     baclofen 10 MG tablet  Commonly known as: LIORESAL  TAKE 1 TABLET BY MOUTH EVERY DAY AS NEEDED SEVERE PAIN AND MUSCLE ACHE     BLACK COHOSH ORAL     cholecalciferol (vitamin D3) 25 mcg (1,000 unit) capsule  Commonly known as: VITAMIN D3  TAKE 2 CAPSULES (2,000 UNITS TOTAL) BY MOUTH ONCE DAILY.     clobetasol 0.05% 0.05 % Oint  Commonly known as: TEMOVATE  Apply topically 2 (two) times daily. Use to affected areas for up to 2 weeks then take a 1 week break or decrease to 3 times weekly. Do not apply to groin or face. Use to spot on hand     * dextroamphetamine-amphetamine 10 MG 24 hr capsule  Commonly known as: ADDERALL XR  Take 1 capsule (10 mg total) by mouth every morning.     * dextroamphetamine-amphetamine 10 MG 24 hr capsule  Commonly known as: ADDERALL XR  Take 1 capsule (10 mg total) by mouth every morning.  Start taking on: July 12, 2024     * dextroamphetamine-amphetamine 10 MG 24 hr capsule  Commonly known as: ADDERALL XR  Take 1 capsule (10 mg total) by mouth every morning.  Start taking on: August 9, 2024     DULoxetine 60 MG capsule  Commonly known as: CYMBALTA  Take 1 capsule (60 mg total) by mouth once daily.      fluocinolone 0.01 % external solution  Commonly known as: SYNALAR  AAA scalp qday - bid prn itching or scaling     fluticasone propionate 50 mcg/actuation nasal spray  Commonly known as: FLONASE  USE 1 SPRAY INTO EACH NOSTRIL TWICE A DAY     gabapentin 300 MG capsule  Commonly known as: NEURONTIN  Take 1 capsule (300 mg total) by mouth As instructed (Take 1 capsul in the morning, 2 at bedtime).     ketoconazole 2 % shampoo  Commonly known as: NIZORAL  Wash hair with medicated shampoo at least 2x/week - let sit on scalp at least 5 minutes prior to rinsing     magnesium oxide 400 mg (241.3 mg magnesium) tablet  Commonly known as: MAG-OX  TAKE 1 TABLET BY MOUTH TWICE A DAY     meloxicam 7.5 MG tablet  Commonly known as: MOBIC  TAKE 1 TABLET BY MOUTH EVERY DAY IN 1 TO 2 WEEKS MAY INCREASE DOSE TO 2 TABLETS ONCE DAILY     * PRIMROSE OIL ORAL     * PRIMROSE OIL 1,000 mg Cap  Generic drug: xavier prim-linoleic-gamolenic ac     tacrolimus 0.1 % ointment  Commonly known as: PROTOPIC  Apply topically 2 (two) times daily. Non-steroid. Use to areas of eczema when taking a break from clobetasol     * triamcinolone acetonide 0.1% 0.1 % cream  Commonly known as: KENALOG  APPLY TOPICALLY 2 (TWO) TIMES DAILY. TO AFFECTED AREAS ON ARMS AND CHEST X 1-2 WKS THEN AS NEEDED FOR FLARES ONLY. AVOID FACE, ARMPITS, AND GROIN.     * triamcinolone acetonide 0.1% 0.1 % ointment  Commonly known as: KENALOG  APPLY TOPICALLY 2 (TWO) TIMES DAILY TO LEFT THUMB     TURMERIC ORAL     valACYclovir 500 MG tablet  Commonly known as: VALTREX  Take 1 tablet (500 mg total) by mouth once daily.     VITAMIN E ORAL           * This list has 7 medication(s) that are the same as other medications prescribed for you. Read the directions carefully, and ask your doctor or other care provider to review them with you.                  Signing Physician:          Leonie Gillette MD  , [employer]  ([place]).  Neurology Consultant. [facility].   [address],  [facility]. [address]th [place] [address].

## 2024-07-02 LAB — PYRIDOXAL SERPL-MCNC: 12 UG/L (ref 5–50)

## 2024-07-15 DIAGNOSIS — L30.9 DERMATITIS: ICD-10-CM

## 2024-07-15 DIAGNOSIS — M94.0 COSTOCHONDRITIS: ICD-10-CM

## 2024-07-15 DIAGNOSIS — L24.5 IRRITANT CONTACT DERMATITIS DUE TO OTHER CHEMICAL PRODUCTS: ICD-10-CM

## 2024-07-15 RX ORDER — TRIAMCINOLONE ACETONIDE 1 MG/G
OINTMENT TOPICAL
Qty: 80 G | Refills: 1 | Status: SHIPPED | OUTPATIENT
Start: 2024-07-15

## 2024-07-15 NOTE — TELEPHONE ENCOUNTER
Refill Decision Note   Rema Horton  is requesting a refill authorization.  Brief Assessment and Rationale for Refill:  Approve     Medication Therapy Plan:        Comments:     Note composed:6:47 PM 07/15/2024

## 2024-07-15 NOTE — TELEPHONE ENCOUNTER
No care due was identified.  [facility] Embedded Care Due Messages. Reference number: [id number].   7/15/2024 1:19:25 PM CDT

## 2024-07-16 RX ORDER — MELOXICAM 7.5 MG/1
7.5 TABLET ORAL DAILY
Qty: 90 TABLET | Refills: 1 | Status: SHIPPED | OUTPATIENT
Start: 2024-07-16

## 2024-07-22 RX ORDER — CLOBETASOL PROPIONATE 0.5 MG/G
OINTMENT TOPICAL 2 TIMES DAILY
Qty: 45 G | Refills: 2 | Status: SHIPPED | OUTPATIENT
Start: 2024-07-22

## 2024-08-05 ENCOUNTER — OFFICE VISIT (OUTPATIENT)
Dept: RHEUMATOLOGY | Facility: CLINIC | Age: 49
End: 2024-08-05
Payer: COMMERCIAL

## 2024-08-05 VITALS
BODY MASS INDEX: 30.39 KG/M2 | HEART RATE: 78 BPM | SYSTOLIC BLOOD PRESSURE: 123 MMHG | WEIGHT: 165.13 LBS | HEIGHT: 62 IN | DIASTOLIC BLOOD PRESSURE: 85 MMHG

## 2024-08-05 DIAGNOSIS — M79.642 BILATERAL HAND PAIN: Primary | ICD-10-CM

## 2024-08-05 DIAGNOSIS — M79.89 BILATERAL HAND SWELLING: ICD-10-CM

## 2024-08-05 DIAGNOSIS — M79.671 RIGHT FOOT PAIN: ICD-10-CM

## 2024-08-05 DIAGNOSIS — M79.641 BILATERAL HAND PAIN: Primary | ICD-10-CM

## 2024-08-05 DIAGNOSIS — M79.89 SWELLING OF RIGHT FOOT: ICD-10-CM

## 2024-08-05 PROCEDURE — 3008F BODY MASS INDEX DOCD: CPT | Mod: CPTII,S$GLB,, | Performed by: INTERNAL MEDICINE

## 2024-08-05 PROCEDURE — 3044F HG A1C LEVEL LT 7.0%: CPT | Mod: CPTII,S$GLB,, | Performed by: INTERNAL MEDICINE

## 2024-08-05 PROCEDURE — 99214 OFFICE O/P EST MOD 30 MIN: CPT | Mod: S$GLB,,, | Performed by: INTERNAL MEDICINE

## 2024-08-05 PROCEDURE — 3079F DIAST BP 80-89 MM HG: CPT | Mod: CPTII,S$GLB,, | Performed by: INTERNAL MEDICINE

## 2024-08-05 PROCEDURE — 3074F SYST BP LT 130 MM HG: CPT | Mod: CPTII,S$GLB,, | Performed by: INTERNAL MEDICINE

## 2024-08-05 PROCEDURE — 1159F MED LIST DOCD IN RCRD: CPT | Mod: CPTII,S$GLB,, | Performed by: INTERNAL MEDICINE

## 2024-08-05 PROCEDURE — 99999 PR PBB SHADOW E&M-EST. PATIENT-LVL V: CPT | Mod: PBBFAC,,, | Performed by: INTERNAL MEDICINE

## 2024-08-08 ENCOUNTER — LAB VISIT (OUTPATIENT)
Dept: LAB | Facility: HOSPITAL | Age: 49
End: 2024-08-08
Attending: INTERNAL MEDICINE
Payer: COMMERCIAL

## 2024-08-08 DIAGNOSIS — M79.642 BILATERAL HAND PAIN: ICD-10-CM

## 2024-08-08 DIAGNOSIS — M79.641 BILATERAL HAND PAIN: ICD-10-CM

## 2024-08-08 LAB
CCP AB SER IA-ACNC: 1.4 U/ML
CRP SERPL-MCNC: 6.9 MG/L (ref 0–8.2)
ERYTHROCYTE [SEDIMENTATION RATE] IN BLOOD BY PHOTOMETRIC METHOD: 22 MM/HR (ref 0–36)
RHEUMATOID FACT SERPL-ACNC: <13 IU/ML (ref 0–15)

## 2024-08-08 PROCEDURE — 86431 RHEUMATOID FACTOR QUANT: CPT | Performed by: INTERNAL MEDICINE

## 2024-08-08 PROCEDURE — 85652 RBC SED RATE AUTOMATED: CPT | Performed by: INTERNAL MEDICINE

## 2024-08-08 PROCEDURE — 36415 COLL VENOUS BLD VENIPUNCTURE: CPT | Performed by: INTERNAL MEDICINE

## 2024-08-08 PROCEDURE — 86140 C-REACTIVE PROTEIN: CPT | Performed by: INTERNAL MEDICINE

## 2024-08-08 PROCEDURE — 86038 ANTINUCLEAR ANTIBODIES: CPT | Performed by: INTERNAL MEDICINE

## 2024-08-08 PROCEDURE — 86200 CCP ANTIBODY: CPT | Performed by: INTERNAL MEDICINE

## 2024-08-09 LAB — ANA SER QL IF: NORMAL

## 2024-08-14 ENCOUNTER — OFFICE VISIT (OUTPATIENT)
Dept: INTERNAL MEDICINE | Facility: CLINIC | Age: 49
End: 2024-08-14
Payer: COMMERCIAL

## 2024-08-14 VITALS
HEIGHT: 62 IN | SYSTOLIC BLOOD PRESSURE: 134 MMHG | OXYGEN SATURATION: 98 % | BODY MASS INDEX: 30.67 KG/M2 | WEIGHT: 166.69 LBS | HEART RATE: 82 BPM | DIASTOLIC BLOOD PRESSURE: 78 MMHG

## 2024-08-14 DIAGNOSIS — F90.9 ATTENTION DEFICIT HYPERACTIVITY DISORDER (ADHD), UNSPECIFIED ADHD TYPE: Primary | ICD-10-CM

## 2024-08-14 DIAGNOSIS — F33.1 DEPRESSION, MAJOR, RECURRENT, MODERATE: ICD-10-CM

## 2024-08-14 DIAGNOSIS — L30.0 NUMMULAR ECZEMA: ICD-10-CM

## 2024-08-14 DIAGNOSIS — F41.9 ANXIETY: ICD-10-CM

## 2024-08-14 PROCEDURE — 1160F RVW MEDS BY RX/DR IN RCRD: CPT | Mod: CPTII,S$GLB,, | Performed by: PHYSICIAN ASSISTANT

## 2024-08-14 PROCEDURE — 3044F HG A1C LEVEL LT 7.0%: CPT | Mod: CPTII,S$GLB,, | Performed by: PHYSICIAN ASSISTANT

## 2024-08-14 PROCEDURE — 1159F MED LIST DOCD IN RCRD: CPT | Mod: CPTII,S$GLB,, | Performed by: PHYSICIAN ASSISTANT

## 2024-08-14 PROCEDURE — 99999 PR PBB SHADOW E&M-EST. PATIENT-LVL V: CPT | Mod: PBBFAC,,, | Performed by: PHYSICIAN ASSISTANT

## 2024-08-14 PROCEDURE — 3075F SYST BP GE 130 - 139MM HG: CPT | Mod: CPTII,S$GLB,, | Performed by: PHYSICIAN ASSISTANT

## 2024-08-14 PROCEDURE — 99214 OFFICE O/P EST MOD 30 MIN: CPT | Mod: S$GLB,,, | Performed by: PHYSICIAN ASSISTANT

## 2024-08-14 PROCEDURE — 3078F DIAST BP <80 MM HG: CPT | Mod: CPTII,S$GLB,, | Performed by: PHYSICIAN ASSISTANT

## 2024-08-14 PROCEDURE — 3008F BODY MASS INDEX DOCD: CPT | Mod: CPTII,S$GLB,, | Performed by: PHYSICIAN ASSISTANT

## 2024-08-14 NOTE — PROGRESS NOTES
Subjective:       Patient ID: Rema Horton is a 49 y.o. female.        Chief Complaint: Rash (Hand, chest, and back)    Rema Horton is an established patient of Britta Clark MD here today for urgent care visit.     Anxious, jumps from one issue to the next and hard to follow at times.     Fibromyalgia, chronic pain -   Meloxicam, baclofen, gabapentin, cymbalta     Anxiety, depression, ADHD -   Adderall XR 10 mg daily  Cymbalta 60 mg daily    Rash on left hand - thumb area  2 visits with Dr. Clark, 2 visits with derm  Tx with clobetasol and tacroliumus ointment  She also uses cerave ointment  Here today to ask why the rash is not resolved as ongoing for several months          Rash  This is a recurrent problem. The current episode started in the past 7 days. The problem has been waxing and waning since onset. The affected locations include the groin, left shoulder, left arm, left hand and left upper leg. The rash is characterized by blistering, dryness, redness and scaling. It is unknown if there was an exposure to a precipitant. Associated symptoms include coughing (2 days ago, now resolved), facial edema, fatigue, joint pain, rhinorrhea (2 days ago now resolved) and a sore throat (2 days ago now resolved). Pertinent negatives include no anorexia, congestion, diarrhea, eye pain, fever, nail changes, shortness of breath or vomiting. Past treatments include analgesics, moisturizer and topical steroids. The treatment provided no relief. Her past medical history is significant for allergies and eczema.   Low-back Pain  Associated symptoms include coughing (2 days ago, now resolved), fatigue, a rash and a sore throat (2 days ago now resolved). Pertinent negatives include no abdominal pain, anorexia, arthralgias, chest pain, chills, congestion, diaphoresis, fever, headaches, nausea, vomiting or weakness.      Review of Systems   Constitutional:  Positive for fatigue. Negative for chills, diaphoresis and  fever.   HENT:  Positive for rhinorrhea (2 days ago now resolved) and sore throat (2 days ago now resolved). Negative for congestion.    Eyes:  Negative for pain and visual disturbance.   Respiratory:  Positive for cough (2 days ago, now resolved). Negative for chest tightness and shortness of breath.    Cardiovascular:  Negative for chest pain, palpitations and leg swelling.   Gastrointestinal:  Negative for abdominal pain, anorexia, blood in stool, constipation, diarrhea, nausea and vomiting.   Genitourinary:  Negative for dysuria, frequency, hematuria and urgency.   Musculoskeletal:  Positive for joint pain. Negative for arthralgias and back pain.   Skin:  Positive for rash. Negative for nail changes.   Neurological:  Negative for dizziness, syncope, weakness and headaches.   Psychiatric/Behavioral:  Negative for dysphoric mood and sleep disturbance. The patient is not nervous/anxious.        Objective:      Physical Exam  Vitals and nursing note reviewed.   Constitutional:       Appearance: Normal appearance. She is well-developed.   HENT:      Head: Normocephalic.      Right Ear: External ear normal.      Left Ear: External ear normal.   Eyes:      Pupils: Pupils are equal, round, and reactive to light.   Cardiovascular:      Rate and Rhythm: Normal rate and regular rhythm.      Heart sounds: Normal heart sounds. No murmur heard.     No friction rub. No gallop.   Pulmonary:      Effort: Pulmonary effort is normal. No respiratory distress.      Breath sounds: Normal breath sounds.   Abdominal:      Palpations: Abdomen is soft.      Tenderness: There is no abdominal tenderness.   Skin:     General: Skin is warm and dry.             Comments: Eczematous patches   Neurological:      Mental Status: She is alert.         Assessment:       1. Attention deficit hyperactivity disorder (ADHD), unspecified ADHD type    2. Anxiety    3. Depression, major, recurrent, moderate    4. Nummular eczema        Plan:      "  Rema was seen today for rash.    Diagnoses and all orders for this visit:    Attention deficit hyperactivity disorder (ADHD), unspecified ADHD type - stable and controlled, continue current regimen, followed by psychiatry    Anxiety - stable and controlled, continue current regimen, followed by psychiatry    Depression, major, recurrent, moderate - stable and controlled, continue current regimen, followed by psychiatry    Nummular eczema - discussed that she should f/u with derm for this so she will schedule an appointment    >30 minutes spent on patient encounter    Pt has been given instructions populated from patient instructions database and has verbalized understanding of the after visit summary and information contained wherein.    Follow up with a primary care provider. May go to ER for acute shortness of breath, lightheadedness, fever, or any other emergent complaints or changes in condition.    "This note will be shared with the patient"    Future Appointments   Date Time Provider Department Center   8/20/2024 10:00 AM Ugo Rodriguez MD [facility] [place]   9/26/2024 10:45 AM [facility]   9/27/2024 10:00 AM [facility]   10/24/2024 10:30 AM Umberto Dan MD [facility]   11/6/2024 10:30 AM Odessa Li MD [facility]   11/14/2024 10:00 AM Britta Clark MD [facility]                 "

## 2024-08-15 PROBLEM — F90.9 ADHD: Status: ACTIVE | Noted: 2024-08-15

## 2024-08-20 ENCOUNTER — OFFICE VISIT (OUTPATIENT)
Dept: PHYSICAL MEDICINE AND REHAB | Facility: CLINIC | Age: 49
End: 2024-08-20
Payer: COMMERCIAL

## 2024-08-20 VITALS — HEIGHT: 62 IN | WEIGHT: 166.88 LBS | OXYGEN SATURATION: 98 % | BODY MASS INDEX: 30.71 KG/M2

## 2024-08-20 DIAGNOSIS — M94.0 COSTOCHONDRITIS: ICD-10-CM

## 2024-08-20 DIAGNOSIS — R20.0 NUMBNESS IN FEET: ICD-10-CM

## 2024-08-20 DIAGNOSIS — G89.4 CHRONIC PAIN DISORDER: ICD-10-CM

## 2024-08-20 DIAGNOSIS — M79.7 FIBROMYALGIA SYNDROME: Primary | ICD-10-CM

## 2024-08-20 DIAGNOSIS — M25.562 ACUTE PAIN OF LEFT KNEE: ICD-10-CM

## 2024-08-20 PROCEDURE — 3008F BODY MASS INDEX DOCD: CPT | Mod: CPTII,S$GLB,, | Performed by: PHYSICAL MEDICINE & REHABILITATION

## 2024-08-20 PROCEDURE — 1159F MED LIST DOCD IN RCRD: CPT | Mod: CPTII,S$GLB,, | Performed by: PHYSICAL MEDICINE & REHABILITATION

## 2024-08-20 PROCEDURE — 3044F HG A1C LEVEL LT 7.0%: CPT | Mod: CPTII,S$GLB,, | Performed by: PHYSICAL MEDICINE & REHABILITATION

## 2024-08-20 PROCEDURE — 99215 OFFICE O/P EST HI 40 MIN: CPT | Mod: S$GLB,,, | Performed by: PHYSICAL MEDICINE & REHABILITATION

## 2024-08-20 PROCEDURE — 99999 PR PBB SHADOW E&M-EST. PATIENT-LVL IV: CPT | Mod: PBBFAC,,, | Performed by: PHYSICAL MEDICINE & REHABILITATION

## 2024-08-20 RX ORDER — GABAPENTIN 300 MG/1
300 CAPSULE ORAL NIGHTLY
Qty: 60 CAPSULE | Refills: 3 | Status: SHIPPED | OUTPATIENT
Start: 2024-08-20

## 2024-08-20 RX ORDER — CYCLOBENZAPRINE HCL 10 MG
10 TABLET ORAL 3 TIMES DAILY PRN
Qty: 90 TABLET | Refills: 0 | Status: SHIPPED | OUTPATIENT
Start: 2024-08-20 | End: 2024-09-19

## 2024-08-20 NOTE — PROGRESS NOTES
Subjective:       Patient ID: Rema Horton is a 49 y.o. female.    Chief Complaint: No chief complaint on file.      HPI      Mrs. Horton is a 49-year-old female with past medical history of anemia, left breast nodule, interstitial cystitis, positive FELIPA,  anxiety/depression, obstructive sleep apnea.  [facility] for fibromyalgia syndrome and bilateral costochondritis.  She was also complaining of painful bilateral foot numbness.  Her last visit was on 4/16/2024.  She was also complaining of left knee pain of 1 month's duration.  She was switched from ibuprofen to meloxicam.  She was maintained on gabapentin (dose was increased) and duloxetine.    The patient is coming to the clinic for follow-up. Her fibromyalgia symptoms including generalized muscle and joint aching, stiffness, fatigue/poor energy, brain fog and hypersensitivity to pain been stable with occasional flare ups, usually related to excess activity and stress, and to sleep impairment.  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    She continues to complain pain in bilateral costochondral junctions. It has been under good control.    X-rays of the left knee done last time were unremarkable.  Her left knee pain has been under good control.  She has been complaining of  intermittent  right knee pain.  It is worse with prolonged walking or going up and down steps    She continues to complain of painful bilateral foot numbness. It has been worse and more proximal.    She continues to follow-up with Psychiatry for anxiety/depression.  She indicates she was also diagnosed with ADHD.  She has sleep apnea and started since her last visit using a CPAP.  She still has occasional nights when she can not sleep well even the CPAP.    She is currently on:  Meloxicam 7 5 mg tablets, 1 tablet daily  Gabapentin 300 mg capsules, 1 capsule daily.  However she has been out for few  weeks not call for a new prescription  Duloxetine 60 mg capsule, 1 capsule daily (prescribed by Psychiatry).    Baclofen 10 mg p.r.n., usually once per day.  However, she has been having some bladder leakage which could be attributed to baclofen.    She tries to stay active.  He has a stationary bicycle that she uses often.      Past Medical History:   Diagnosis Date    Abnormal Pap smear of cervix     Anemia     Breast disorder     Fever blister     Fibromyalgia     IC (interstitial cystitis)         Review of patient's allergies indicates:   Allergen Reactions    Flexeril  [cyclobenzaprine]      Other reaction(s): Rash    Lodine  [etodolac] Rash        Review of Systems   Constitutional:  Positive for fatigue.   Eyes:  Positive for visual disturbance.   Respiratory:  Positive for shortness of breath.    Cardiovascular:  Positive for chest pain.   Gastrointestinal:  Positive for nausea and vomiting. Negative for blood in stool.   Genitourinary:  Negative for difficulty urinating.   Musculoskeletal:  Positive for arthralgias, back pain, myalgias and neck pain. Negative for gait problem.   Neurological:  Positive for dizziness, headaches and memory loss. Negative for weakness.   Psychiatric/Behavioral:  Positive for sleep disturbance. Negative for behavioral problems.              Objective:      Physical Exam  Vitals reviewed.   Constitutional:       Appearance: She is well-developed.   HENT:      Head: Normocephalic and atraumatic.   Eyes:      Extraocular Movements: Extraocular movements intact.   Musculoskeletal:      Cervical back: Normal range of motion. No tenderness.      Comments: BUE:  ROM:   RUE: full.   LUE: full.  Strength:    RUE: 5-/5 at shoulder abduction, 5 elbow flexion, 5 elbow extension, 5 hand .   LUE: 5-/5 at shoulder abduction, 5 elbow flexion, 5 elbow extension, 5 hand .  Sensation to pinprick:   RUE: intact.   LUE: intact.        BLE:  ROM:   RLE: full.   LLE: full.  Knee crepitus:    RLE: -ve.   LLE: -ve.   Strength:    RLE: 5/5 at hip flexion, 5 knee extension, 5 ankle DF, 5 PF.   LLE: 5/5 at hip flexion, 5 knee extension, 5 ankle DF, 5 PF.  Sensation to pinprick:     RLE: intact.      LLE: intact.   SLR (sitting):      RLE: -ve.      LLE: -ve.    +ve diffuse tender points over the upper & lower back, anterior chest wall, hips, elbows & knees    Gait: WNL       Skin:     General: Skin is warm.   Neurological:      General: No focal deficit present.      Mental Status: She is alert.   Psychiatric:         Mood and Affect: Mood normal.         Behavior: Behavior normal.         Assessment:       1. Fibromyalgia syndrome    2. Costochondritis    3. Numbness in feet    4. Chronic pain disorder    5. Acute pain of left knee        Plan:         - Continue meloxicam (MOBIC) 7.5 MG tablet; Take 1 tablet (7.5 mg total) by mouth once daily. In 1-2 weeks, if no relief, may increase dose to two tablets once daily.  - Restart gabapentin (NEURONTIN) 300 MG capsule; Take 1 capsule (300 mg total) by mouth every evening. In 1 week, if no relief, may increase dose to twice per day.  - Discontinue baclofen.    - Start cyclobenzaprine (FLEXERIL) 10 MG tablet; Take 1 tablet (10 mg total) by mouth 3 (three) times daily as needed for Muscle spasms.  - Continue DULoxetine (CYMBALTA) 60 MG capsule; Take 1 capsule (60 mg total) by mouth once daily (prescribed by Psychiatry but our clinic can take over this prescription if necessary).  - She was asked to check the co-pay for physical therapy and call us back if she is able to commit to a course of physical therapy  - Regular home exercise program was encouraged.  - She was encouraged to explore Marcel-Chi exercises for fibromyalgia.  - Follow up in about 4 months (around 12/20/2024).      This was a 45 minute visit, 50% of which was spent educating the patient about the diagnosis and the treatment plan.    This note was partly generated with [employer] voice recognition  software. I apologize for any possible typographical errors.

## 2024-08-30 ENCOUNTER — PATIENT MESSAGE (OUTPATIENT)
Dept: SLEEP MEDICINE | Facility: CLINIC | Age: 49
End: 2024-08-30
Payer: COMMERCIAL

## 2024-09-12 DIAGNOSIS — L21.9 ACUTE SEBORRHEIC DERMATITIS: ICD-10-CM

## 2024-09-13 RX ORDER — KETOCONAZOLE 20 MG/ML
SHAMPOO, SUSPENSION TOPICAL
Qty: 120 ML | Refills: 5 | Status: SHIPPED | OUTPATIENT
Start: 2024-09-13

## 2024-09-20 ENCOUNTER — PATIENT MESSAGE (OUTPATIENT)
Dept: DERMATOLOGY | Facility: CLINIC | Age: 49
End: 2024-09-20
Payer: COMMERCIAL

## 2024-09-23 ENCOUNTER — PATIENT MESSAGE (OUTPATIENT)
Dept: INTERNAL MEDICINE | Facility: CLINIC | Age: 49
End: 2024-09-23
Payer: COMMERCIAL

## 2024-09-26 ENCOUNTER — HOSPITAL ENCOUNTER (OUTPATIENT)
Dept: RADIOLOGY | Facility: HOSPITAL | Age: 49
Discharge: HOME | End: 2024-09-26
Attending: INTERNAL MEDICINE
Payer: COMMERCIAL

## 2024-09-26 DIAGNOSIS — M79.89 SWELLING OF RIGHT FOOT: ICD-10-CM

## 2024-09-26 DIAGNOSIS — M79.671 RIGHT FOOT PAIN: ICD-10-CM

## 2024-09-26 PROCEDURE — 25500020 PHARM REV CODE 255: Performed by: INTERNAL MEDICINE

## 2024-09-26 PROCEDURE — 73720 MRI LWR EXTREMITY W/O&W/DYE: CPT | Mod: 26,RT,, | Performed by: RADIOLOGY

## 2024-09-26 PROCEDURE — 73720 MRI LWR EXTREMITY W/O&W/DYE: CPT | Mod: TC,RT

## 2024-09-26 PROCEDURE — A9585 GADOBUTROL INJECTION: HCPCS | Performed by: INTERNAL MEDICINE

## 2024-09-26 RX ORDER — GADOBUTROL 604.72 MG/ML
8 INJECTION INTRAVENOUS
Status: COMPLETED | OUTPATIENT
Start: 2024-09-26 | End: 2024-09-26

## 2024-09-26 RX ADMIN — GADOBUTROL 8 ML: 604.72 INJECTION INTRAVENOUS at 12:09

## 2024-09-30 ENCOUNTER — HOSPITAL ENCOUNTER (OUTPATIENT)
Dept: RADIOLOGY | Facility: HOSPITAL | Age: 49
Discharge: HOME | End: 2024-09-30
Attending: INTERNAL MEDICINE
Payer: COMMERCIAL

## 2024-09-30 DIAGNOSIS — M79.642 BILATERAL HAND PAIN: ICD-10-CM

## 2024-09-30 DIAGNOSIS — M79.641 BILATERAL HAND PAIN: ICD-10-CM

## 2024-09-30 DIAGNOSIS — M79.89 BILATERAL HAND SWELLING: ICD-10-CM

## 2024-09-30 PROCEDURE — A9585 GADOBUTROL INJECTION: HCPCS | Performed by: INTERNAL MEDICINE

## 2024-09-30 PROCEDURE — 25500020 PHARM REV CODE 255: Performed by: INTERNAL MEDICINE

## 2024-09-30 PROCEDURE — 73223 MRI JOINT UPR EXTR W/O&W/DYE: CPT | Mod: 26,,, | Performed by: RADIOLOGY

## 2024-09-30 PROCEDURE — 73223 MRI JOINT UPR EXTR W/O&W/DYE: CPT | Mod: TC,50

## 2024-09-30 RX ORDER — GADOBUTROL 604.72 MG/ML
8 INJECTION INTRAVENOUS
Status: COMPLETED | OUTPATIENT
Start: 2024-09-30 | End: 2024-09-30

## 2024-09-30 RX ADMIN — GADOBUTROL 8 ML: 604.72 INJECTION INTRAVENOUS at 11:09

## 2024-10-03 ENCOUNTER — PATIENT MESSAGE (OUTPATIENT)
Dept: PHYSICAL MEDICINE AND REHAB | Facility: CLINIC | Age: 49
End: 2024-10-03
Payer: COMMERCIAL

## 2024-10-03 DIAGNOSIS — M79.641 RIGHT HAND PAIN: ICD-10-CM

## 2024-10-03 DIAGNOSIS — M25.562 ACUTE PAIN OF LEFT KNEE: ICD-10-CM

## 2024-10-03 DIAGNOSIS — M79.7 FIBROMYALGIA SYNDROME: Primary | ICD-10-CM

## 2024-10-04 RX ORDER — METHYLPREDNISOLONE 4 MG/1
TABLET ORAL
Qty: 21 EACH | Refills: 0 | Status: SHIPPED | OUTPATIENT
Start: 2024-10-04 | End: 2024-10-25

## 2024-10-09 ENCOUNTER — TELEPHONE (OUTPATIENT)
Dept: INTERNAL MEDICINE | Facility: CLINIC | Age: 49
End: 2024-10-09
Payer: COMMERCIAL

## 2024-10-09 DIAGNOSIS — M54.2 NECK PAIN ON RIGHT SIDE: ICD-10-CM

## 2024-10-09 RX ORDER — BACLOFEN 10 MG/1
TABLET ORAL
Qty: 90 TABLET | Refills: 0 | OUTPATIENT
Start: 2024-10-09

## 2024-10-09 NOTE — TELEPHONE ENCOUNTER
Refill Routing Note   Medication(s) are not appropriate for processing by [facility] for the following reason(s):        Non-participating provider  Responsible provider unclear    [facility] action(s):  Route             Appointments  past 12m or future 3m with PCP    Date Provider   Last Visit   8/14/2024 Sendy Butcher PA-C   Next Visit   Visit date not found Sendy Butcher PA-C   ED visits in past 90 days: 0        Note composed:12:11 PM 10/09/2024

## 2024-10-09 NOTE — TELEPHONE ENCOUNTER
Baclofen was d/c by Dr. Rodriguez  Changed to cyclobenzaprine  She should discuss with Dr. Rodriguez

## 2024-10-10 ENCOUNTER — OFFICE VISIT (OUTPATIENT)
Dept: INTERNAL MEDICINE | Facility: CLINIC | Age: 49
End: 2024-10-10
Payer: COMMERCIAL

## 2024-10-10 VITALS
WEIGHT: 167.25 LBS | BODY MASS INDEX: 30.78 KG/M2 | SYSTOLIC BLOOD PRESSURE: 130 MMHG | OXYGEN SATURATION: 100 % | HEIGHT: 62 IN | DIASTOLIC BLOOD PRESSURE: 80 MMHG | HEART RATE: 70 BPM

## 2024-10-10 DIAGNOSIS — L30.0 NUMMULAR ECZEMA: Primary | ICD-10-CM

## 2024-10-10 DIAGNOSIS — K59.01 SLOW TRANSIT CONSTIPATION: ICD-10-CM

## 2024-10-10 DIAGNOSIS — E66.9 OBESITY (BMI 30-39.9): ICD-10-CM

## 2024-10-10 DIAGNOSIS — F41.9 ANXIETY: ICD-10-CM

## 2024-10-10 DIAGNOSIS — M79.7 FIBROMYALGIA: ICD-10-CM

## 2024-10-10 PROCEDURE — 3008F BODY MASS INDEX DOCD: CPT | Mod: CPTII,S$GLB,, | Performed by: INTERNAL MEDICINE

## 2024-10-10 PROCEDURE — G2211 COMPLEX E/M VISIT ADD ON: HCPCS | Mod: S$GLB,,, | Performed by: INTERNAL MEDICINE

## 2024-10-10 PROCEDURE — 1160F RVW MEDS BY RX/DR IN RCRD: CPT | Mod: CPTII,S$GLB,, | Performed by: INTERNAL MEDICINE

## 2024-10-10 PROCEDURE — 1159F MED LIST DOCD IN RCRD: CPT | Mod: CPTII,S$GLB,, | Performed by: INTERNAL MEDICINE

## 2024-10-10 PROCEDURE — 99214 OFFICE O/P EST MOD 30 MIN: CPT | Mod: S$GLB,,, | Performed by: INTERNAL MEDICINE

## 2024-10-10 PROCEDURE — 99999 PR PBB SHADOW E&M-EST. PATIENT-LVL V: CPT | Mod: PBBFAC,,, | Performed by: INTERNAL MEDICINE

## 2024-10-10 PROCEDURE — 3044F HG A1C LEVEL LT 7.0%: CPT | Mod: CPTII,S$GLB,, | Performed by: INTERNAL MEDICINE

## 2024-10-10 PROCEDURE — 3075F SYST BP GE 130 - 139MM HG: CPT | Mod: CPTII,S$GLB,, | Performed by: INTERNAL MEDICINE

## 2024-10-10 PROCEDURE — 3079F DIAST BP 80-89 MM HG: CPT | Mod: CPTII,S$GLB,, | Performed by: INTERNAL MEDICINE

## 2024-10-10 NOTE — PROGRESS NOTES
Subjective:       Patient ID: Rema Horton is a 49 y.o. female.    Chief Complaint: Follow-up (RASH ON HAND )    HPI  49 y.o. female here for follow up of    RASH on left hand.  Nummular dermatitis, some irritant contact component w dish washing   Clobetasol ointment and protopic ointment    Abdominal/flank pain    Rectal pain  Saw Dr. Bobo in 2017 - had posterior fissure at that time. She has lots of constipation. She has a CRS appt later this month.     She is taking metamucil but only few times a week. Reports when she took every day she sees blood in stool. She is not using sitz baths at current.   ---------  Rosacea  Followed by derm     Seb derm  Ketoconazole shampoo     Iron deficiency; last hgb 13; previously w anemia  Previously w heavy periods. Iron when having period but none in months.      Fibromyalgia, anxiety, depression  Duloxetine 30mg daily -> 60mg  meloxicam  Gabapentin 300mg bid (only takes qhs; am makes her too drowsy)  Often trouble going down stairs in the am.   Very tired. Pain lower back/upper buttocks.  She often gets confused, forgetful.  Followed by Dr. Rodriguez.     paresthesias  Numbness in feet, sensitive to touch.   Gabapentin just qhs, too strong for her to take a daytime dosage.  Neurology 10/30/23 -   Magnesium bid  Heavy metal screen negative     Perimenopausal w hot flashes.  Previously took black cohosh; plans to go back. Lots of sweating at nights. Has to have a fan on her.  Venlfaxine started 8/2022 but stopped and now on duloxetine instead for fibromyalgia     perfecto  She has started cpap and fatigue seems to be not as bad as before.   Has an appt w Dr. Dan in January     Headache -   Saw Dr. Olivas in the past, tried elavil and maxalt and not much help in past. Saw Dr. Gillette on 10/30/23.  MRI brain 7/28/23: normal  Magnesium      PERFECTO  On CPAP     Daughter, Ernestina, is 10yo w special needs.  Son is 12 yo.  Review of Systems   Constitutional:  Positive for fatigue.  "Negative for fever.   Respiratory:  Negative for shortness of breath.    Cardiovascular:  Negative for chest pain.   Musculoskeletal:  Positive for arthralgias and myalgias.   Skin:  Positive for rash.       Objective:   /80   Pulse 70   Ht 5' 2" (1.575 m)   Wt 75.8 kg (167 lb 3.5 oz)   SpO2 100%   BMI 30.58 kg/m²      Physical Exam  Constitutional:       General: She is not in acute distress.     Appearance: She is well-developed. She is not diaphoretic.   HENT:      Head: Normocephalic and atraumatic.   Cardiovascular:      Rate and Rhythm: Normal rate and regular rhythm.   Pulmonary:      Effort: Pulmonary effort is normal. No respiratory distress.      Breath sounds: No wheezing or rales.   Skin:     General: Skin is warm and dry.   Neurological:      Mental Status: She is alert and oriented to person, place, and time.   Psychiatric:         Behavior: Behavior normal.         Assessment:       1. Nummular eczema    2. Slow transit constipation    3. Anxiety    4. Fibromyalgia    5. Obesity (BMI 30-39.9)        Plan:       Nummular eczema  Continue current regimen    Slow transit constipation  Encouraged her to increase metamucil to daily    Anxiety  Fibromyalgia  - stable and controlled  - continue current regimen    Obesity (BMI 30-39.9)  -     Ambulatory referral/consult to Bariatric/Obesity Medicine; Future; Expected date: 10/17/2024          You are up to date for your primary preventive health care, and there are no reminders at this time.     Increase metamucil and start sitz baths  "

## 2024-10-14 RX ORDER — VALACYCLOVIR HYDROCHLORIDE 500 MG/1
500 TABLET, FILM COATED ORAL
Qty: 30 TABLET | Refills: 11 | Status: SHIPPED | OUTPATIENT
Start: 2024-10-14

## 2024-10-14 NOTE — TELEPHONE ENCOUNTER
Refill Decision Note   Rema Horton  is requesting a refill authorization.  Brief Assessment and Rationale for Refill:  Approve     Medication Therapy Plan:         Comments:     Note composed:7:48 AM 10/14/2024

## 2024-10-14 NOTE — TELEPHONE ENCOUNTER
No care due was identified.  [facility] Embedded Care Due Messages. Reference number: [id number].   10/14/2024 2:12:54 AM CDT

## 2024-10-15 DIAGNOSIS — L30.9 DERMATITIS: ICD-10-CM

## 2024-10-15 RX ORDER — CLOBETASOL PROPIONATE 0.5 MG/G
OINTMENT TOPICAL 2 TIMES DAILY
Qty: 45 G | Refills: 2 | Status: SHIPPED | OUTPATIENT
Start: 2024-10-15

## 2024-10-17 ENCOUNTER — CLINICAL SUPPORT (OUTPATIENT)
Dept: REHABILITATION | Facility: HOSPITAL | Age: 49
End: 2024-10-17
Attending: PHYSICAL MEDICINE & REHABILITATION
Payer: COMMERCIAL

## 2024-10-17 DIAGNOSIS — Z74.09 DECREASED MOBILITY AND ENDURANCE: ICD-10-CM

## 2024-10-17 DIAGNOSIS — R29.898 WEAKNESS OF BOTH LOWER EXTREMITIES: ICD-10-CM

## 2024-10-17 DIAGNOSIS — M25.562 ACUTE PAIN OF LEFT KNEE: ICD-10-CM

## 2024-10-17 DIAGNOSIS — M79.7 FIBROMYALGIA SYNDROME: ICD-10-CM

## 2024-10-17 DIAGNOSIS — M79.641 RIGHT HAND PAIN: ICD-10-CM

## 2024-10-17 DIAGNOSIS — R29.898 UPPER EXTREMITY WEAKNESS: Primary | ICD-10-CM

## 2024-10-17 PROCEDURE — 97161 PT EVAL LOW COMPLEX 20 MIN: CPT

## 2024-10-17 PROCEDURE — 97110 THERAPEUTIC EXERCISES: CPT

## 2024-10-17 NOTE — PROGRESS NOTES
[facility]   Physical Therapy Initial Evaluation     Date: 10/17/2024   Name: Rema Horton  Clinic Number: [id number]    Therapy Diagnosis:   Encounter Diagnoses   Name Primary?    Fibromyalgia syndrome     Acute pain of left knee     Right hand pain     Upper extremity weakness Yes    Weakness of both lower extremities     Decreased mobility and endurance      Physician: Ugo Rodriguez MD    Physician Orders: PT Eval and Treat   Medical Diagnosis from Referral:   M79.7 (ICD-10-CM) - Fibromyalgia syndrome   M25.562 (ICD-10-CM) - Acute pain of left knee   M79.641 (ICD-10-CM) - Right hand pain   Evaluation Date: 10/17/2024  Authorization Period Expiration: TBD  Plan of Care Expiration: 12/17/2024  Progress Note Due: 11/17/2024  Visit # / Visits authorized: 1/1   FOTO: 1/5    FOTO Initial Evaluation: See Foto   FOTO 2nd F/U: NA  FOTO Discharge: NA    Precautions: Standard     Time In: 1045  Time Out: 1130  Total Appointment Time (timed & untimed codes): 45 minutes      SUBJECTIVE     Date of onset: 2015    History of current condition - Rema reports: she has been dealing with chronic fatigue that waxes and wanes for several years. Patient notes pain goes from her legs to her hands, headaches for no reasons, and it takes her a while in the morning to warm up and move. Patient feels like her brain wakes up before her body. Patient reports she also deals with anxiety. Patient reports her knees give her issues as well. Patient reports her symptoms can be fine one day and the other day it can be bad again. Patient has had several tests done at rheumatology but her test came back normal. Patient also has issues with her memory as well and it has no pattern. Patient reports the fatigue is constant. She feels that she is carrying an elephant with her in her body. Patient reports the primary reason for her coming here is to address her R wrist. Patient reports she has trouble closing her  fists in the morning but needs to warm up.     Falls: None    Imaging, MRI studies:     FINDINGS:  Bone marrow signal is maintained.  No fracture or infiltrative process.  No osteitis.     Cartilage spaces are maintained.  No effusion.  No significant synovial thickening or hyperenhancement.  Few tiny subcortical cysts noted bilaterally.     There is severe thickening and hyperenhancement of the right extensor carpi ulnaris tendon sheath with thickening of the tendon.  Remaining flexor and extensor tendons are unremarkable.     Carpal tunnel and Guyon's canal are unremarkable bilaterally.     Impression:     1. 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  2. No evidence for inflammatory arthropathy.       Prior Therapy: Yes   Social History:  lives with their family  Occupation: Not Working   Prior Level of Function: Independent with ADLs   Current Level of Function: Independent with ADLs, does not clean or cook as much    Pain:  Current 0/10, worst 8/10, best 0/10   Location: Bilateral knees, ankles, back, arms, wrists. R Wrist is worse.    Description: Aching, Dull, Tight, and Weak   Aggravating Factors: Morning time, and overuse   Easing Factors:  Rest and warming up the body with movement after being sedentary for too long     Patients goals: to address fatigue and R wrist pain/weak     Medical History:   Past Medical History:   Diagnosis Date    Abnormal Pap smear of cervix     Anemia     Breast disorder     Fever blister     Fibromyalgia     IC (interstitial cystitis)        Surgical History:   Rema Horton  has a past surgical history that includes Dilation and curettage of uterus ();  section (); and Breast biopsy (Left, 2014).    Medications:   Rema has a current medication list which includes the following prescription(s): acetaminophen, black cohosh, cholecalciferol (vitamin d3), clobetasol 0.05%, dextroamphetamine-amphetamine, dextroamphetamine-amphetamine,  dextroamphetamine-amphetamine, duloxetine, primrose oil, xavier primrose/linoleic/g-lenic, fluocinolone, fluticasone propionate, gabapentin, ketoconazole, magnesium oxide, meloxicam, methylprednisolone, tacrolimus, triamcinolone acetonide 0.1%, triamcinolone acetonide 0.1%, turmeric, valacyclovir, and vitamin e acetate.    Allergies:   Review of patient's allergies indicates:   Allergen Reactions    Flexeril  [cyclobenzaprine]      Other reaction(s): Rash    Lodine  [etodolac] Rash          OBJECTIVE     Hip Right Right Left Left Pain/Dysfunction with Movement    AROM MMT AROM MMT    Flexion WNL 4/5 WNL 4/5    Extension WNL 4/5 WNL 4/5    Abduction WNL 4-/5 WNL 4-/5    Adduction WNL 4/5 WNL 4/5    Internal rotation WNL 4/5 WNL 4/5    External rotation WNL 4/5 WNL 4/5       Knee Right Right Left Left Pain/Dysfunction with Movement    AROM MMT AROM MMT    Flexion WNL 4/5 WNL 4/5    Extension WNL 4+/5 WNL 4+/5      Ankle Right Right Left Left Pain/Dysfunction with Movement    AROM MMT AROM MMT    Plantarflexion WNL 4/5 WNL 4/5    Dorsiflexion WNL 4/5 WNL 4/5    Inversion WNL 4/5 WNL 4/5    Eversion WNL 4/5 WNL 4/5      Shoulder Right Right Left Left Pain/Dysfunction with Movement    AROM MMT AROM MMT    Flexion WNL 4+/5 WNL 4+/5    Extension WNL 4/5 WNL 4/5    Abduction WNL 4/5 WNL 4/5    Internal rotation WNL 4/5 WNL 4/5    External Rotation WNL 4/5 WNL 4/5      Elbow Right Right Left Left Pain/Dysfunction with Movement    AROM MMT AROM MMT    Flexion WNL 4/5 WNL 4/5    Extension WNL 4/5 WNL 4/5        Wrist Right Right Left Left Pain/Dysfunction with Movement    AROM MMT AROM MMT    Flexion WNL 4-/5 WNL 4/5    Extension WNL 4-/5 WNL 4/5    Radial deviation WNL 4-/5 WNL 4/5    Ulnar deviation WNL 4-/5 WNL 4/5          FOTO Wrist Survey    Therapist reviewed FOTO scores for Rema Horton on 10/17/2024.   FOTO documents entered into [employer] - see Media section.    Intake Score: See foto          TREATMENT     Total Treatment  time (time-based codes) separate from Evaluation: 15 minutes      Rema received the treatments listed below:      therapeutic exercises to develop strength, endurance, ROM, and flexibility for 15 minutes including:    Access Code: [email or web address]  URL: [email or web address]  Date: 10/17/2024  Prepared by: Deshawn Rodarte    Exercises  - Scapular Retraction with Resistance  - 1 x daily - 7 x weekly - 2 sets - 10 reps  - Seated Elbow Flexion with Resistance  - 1 x daily - 7 x weekly - 2 sets - 10 reps  - Standing Tricep Extensions with Resistance  - 1 x daily - 7 x weekly - 2 sets - 10 reps  - Standing Hip Abduction with Counter Support  - 1 x daily - 7 x weekly - 2 sets - 10 reps  - Standing Hip Extension with Counter Support  - 1 x daily - 7 x weekly - 2 sets - 10 reps  - Seated Wrist Flexion Stretch  - 1 x daily - 7 x weekly - 3 sets - 20 seconds hold  - Seated Wrist Extension Stretch  - 1 x daily - 7 x weekly - 3 sets - 20 seconds  hold  - Wrist Flexion AROM  - 1 x daily - 7 x weekly - 2 sets - 10 reps  - Wrist Flexion Extension AROM with Fingers Curled and Palm Down  - 1 x daily - 7 x weekly - 2 sets - 10 reps            PATIENT EDUCATION AND HOME EXERCISES     Education provided:   - Purpose of PT  - HEP    Written Home Exercises Provided: yes. Exercises were reviewed and Rema was able to demonstrate them prior to the end of the session.  Rema demonstrated good  understanding of the education provided. See EMR under Patient Instructions for exercises provided during therapy sessions.    ASSESSMENT     Rema is a 49 y.o. female referred to outpatient Physical Therapy with a medical diagnosis of   M79.7 (ICD-10-CM) - Fibromyalgia syndrome   M25.562 (ICD-10-CM) - Acute pain of left knee   M79.641 (ICD-10-CM) - Right hand pain       Patient presents with chronic body fatigue, body pains that span across the body, decreased strength, and decreased functional mobility. Patient was progressed with light  TE to address problem list. Patient felt the exercises were targeted to her issues and thinks they will help. Patient was educated there her symptoms may be multifactorial stemming from sleep, diet, and activity levels. PT will aim to address her activity levels and improve her day to day function.     Patient prognosis is Fair.   Patient will benefit from skilled outpatient Physical Therapy to address the deficits stated above and in the chart below, provide patient /family education, and to maximize patientt's level of independence.     Plan of care discussed with patient: Yes  Patient's spiritual, cultural and educational needs considered and patient is agreeable to the plan of care and goals as stated below:     Anticipated Barriers for therapy: Chronic symptoms     Medical Necessity is demonstrated by the following  History  Co-morbidities and personal factors that may impact the plan of care Co-morbidities:   See medical history     Personal Factors:   no deficits     high   Examination  Body Structures and Functions, activity limitations and participation restrictions that may impact the plan of care Body Regions:   back  lower extremities  upper extremities    Body Systems:    ROM  strength    Participation Restrictions:   None    Activity limitations:   Learning and applying knowledge  no deficits    General Tasks and Commands  no deficits    Communication  no deficits    Mobility  lifting and carrying objects    Self care  no deficits    Domestic Life  doing house work (cleaning house, washing dishes, laundry)  assisting others    Interactions/Relationships  no deficits    Life Areas  employment    Community and Social Life  recreation and leisure         high   Clinical Presentation stable and uncomplicated low   Decision Making/ Complexity Score: low     Goals:  Short Term Goals (4 Weeks):   1. Pt will be compliant with HEP to supplement PT in restoring pain free function.  2. Pt will improve impaired  LE/UE MMTs by 1/2 grade  to improve strength for functional tasks  3. Pt will report 4/10 pain at its worst with daily activities   Long Term Goals (8 Weeks):  1. Pt will improve FOTO score to >/= 65 to decrease perceived limitation with mobility  2. Pt will improve impaired LE/UE MMTs by 1 grade to improve strength for functional tasks.  3. Pt will report 1/10 pain at its worst with daily activities   4. Pt will subjectively report being more functional throughout the day       PLAN   Plan of care Certification: 10/17/2024 to 12/17/2024.    Outpatient Physical Therapy 2 times weekly for 6 weeks to include the following interventions: Manual Therapy, Moist Heat/ Ice, Neuromuscular Re-ed, Patient Education, Self Care, Therapeutic Activities, and Therapeutic Exercise.     Deshawn Rodarte, PT      I CERTIFY THE NEED FOR THESE SERVICES FURNISHED UNDER THIS PLAN OF TREATMENT AND WHILE UNDER MY CARE   Physician's comments:     Physician's Signature: ___________________________________________________

## 2024-10-17 NOTE — PLAN OF CARE
[facility]   Physical Therapy Initial Evaluation     Date: 10/17/2024   Name: Rema Horton  Clinic Number: [id number]    Therapy Diagnosis:   Encounter Diagnoses   Name Primary?    Fibromyalgia syndrome     Acute pain of left knee     Right hand pain     Upper extremity weakness Yes    Weakness of both lower extremities     Decreased mobility and endurance      Physician: Ugo Rodriguez MD    Physician Orders: PT Eval and Treat   Medical Diagnosis from Referral:   M79.7 (ICD-10-CM) - Fibromyalgia syndrome   M25.562 (ICD-10-CM) - Acute pain of left knee   M79.641 (ICD-10-CM) - Right hand pain   Evaluation Date: 10/17/2024  Authorization Period Expiration: TBD  Plan of Care Expiration: 12/17/2024  Progress Note Due: 11/17/2024  Visit # / Visits authorized: 1/1   FOTO: 1/5    FOTO Initial Evaluation: See Foto   FOTO 2nd F/U: NA  FOTO Discharge: NA    Precautions: Standard     Time In: 1045  Time Out: 1130  Total Appointment Time (timed & untimed codes): 45 minutes      SUBJECTIVE     Date of onset: 2015    History of current condition - Rema reports: she has been dealing with chronic fatigue that waxes and wanes for several years. Patient notes pain goes from her legs to her hands, headaches for no reasons, and it takes her a while in the morning to warm up and move. Patient feels like her brain wakes up before her body. Patient reports she also deals with anxiety. Patient reports her knees give her issues as well. Patient reports her symptoms can be fine one day and the other day it can be bad again. Patient has had several tests done at rheumatology but her test came back normal. Patient also has issues with her memory as well and it has no pattern. Patient reports the fatigue is constant. She feels that she is carrying an elephant with her in her body. Patient reports the primary reason for her coming here is to address her R wrist. Patient reports she has trouble closing her  fists in the morning but needs to warm up.     Falls: None    Imaging, MRI studies:     FINDINGS:  Bone marrow signal is maintained.  No fracture or infiltrative process.  No osteitis.     Cartilage spaces are maintained.  No effusion.  No significant synovial thickening or hyperenhancement.  Few tiny subcortical cysts noted bilaterally.     There is severe thickening and hyperenhancement of the right extensor carpi ulnaris tendon sheath with thickening of the tendon.  Remaining flexor and extensor tendons are unremarkable.     Carpal tunnel and Guyon's canal are unremarkable bilaterally.     Impression:     1. 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  2. No evidence for inflammatory arthropathy.       Prior Therapy: Yes   Social History:  lives with their family  Occupation: Not Working   Prior Level of Function: Independent with ADLs   Current Level of Function: Independent with ADLs, does not clean or cook as much    Pain:  Current 0/10, worst 8/10, best 0/10   Location: Bilateral knees, ankles, back, arms, wrists. R Wrist is worse.    Description: Aching, Dull, Tight, and Weak   Aggravating Factors: Morning time, and overuse   Easing Factors: Rest and warming up the body with movement after being sedentary for too long     Patients goals: to address fatigue and R wrist pain/weak     Medical History:   Past Medical History:   Diagnosis Date    Abnormal Pap smear of cervix     Anemia     Breast disorder     Fever blister     Fibromyalgia     IC (interstitial cystitis)        Surgical History:   Rema Horton  has a past surgical history that includes Dilation and curettage of uterus ();  section (); and Breast biopsy (Left, 2014).    Medications:   Rema has a current medication list which includes the following prescription(s): acetaminophen, black cohosh, cholecalciferol (vitamin d3), clobetasol 0.05%, dextroamphetamine-amphetamine, dextroamphetamine-amphetamine,  dextroamphetamine-amphetamine, duloxetine, primrose oil, xavier primrose/linoleic/g-lenic, fluocinolone, fluticasone propionate, gabapentin, ketoconazole, magnesium oxide, meloxicam, methylprednisolone, tacrolimus, triamcinolone acetonide 0.1%, triamcinolone acetonide 0.1%, turmeric, valacyclovir, and vitamin e acetate.    Allergies:   Review of patient's allergies indicates:   Allergen Reactions    Flexeril  [cyclobenzaprine]      Other reaction(s): Rash    Lodine  [etodolac] Rash          OBJECTIVE     Hip Right Right Left Left Pain/Dysfunction with Movement    AROM MMT AROM MMT    Flexion WNL 4/5 WNL 4/5    Extension WNL 4/5 WNL 4/5    Abduction WNL 4-/5 WNL 4-/5    Adduction WNL 4/5 WNL 4/5    Internal rotation WNL 4/5 WNL 4/5    External rotation WNL 4/5 WNL 4/5       Knee Right Right Left Left Pain/Dysfunction with Movement    AROM MMT AROM MMT    Flexion WNL 4/5 WNL 4/5    Extension WNL 4+/5 WNL 4+/5      Ankle Right Right Left Left Pain/Dysfunction with Movement    AROM MMT AROM MMT    Plantarflexion WNL 4/5 WNL 4/5    Dorsiflexion WNL 4/5 WNL 4/5    Inversion WNL 4/5 WNL 4/5    Eversion WNL 4/5 WNL 4/5      Shoulder Right Right Left Left Pain/Dysfunction with Movement    AROM MMT AROM MMT    Flexion WNL 4+/5 WNL 4+/5    Extension WNL 4/5 WNL 4/5    Abduction WNL 4/5 WNL 4/5    Internal rotation WNL 4/5 WNL 4/5    External Rotation WNL 4/5 WNL 4/5      Elbow Right Right Left Left Pain/Dysfunction with Movement    AROM MMT AROM MMT    Flexion WNL 4/5 WNL 4/5    Extension WNL 4/5 WNL 4/5        Wrist Right Right Left Left Pain/Dysfunction with Movement    AROM MMT AROM MMT    Flexion WNL 4-/5 WNL 4/5    Extension WNL 4-/5 WNL 4/5    Radial deviation WNL 4-/5 WNL 4/5    Ulnar deviation WNL 4-/5 WNL 4/5          FOTO Wrist Survey    Therapist reviewed FOTO scores for Rema Horton on 10/17/2024.   FOTO documents entered into [employer] - see Media section.    Intake Score: See foto          TREATMENT     Total Treatment  time (time-based codes) separate from Evaluation: 15 minutes      Rema received the treatments listed below:      therapeutic exercises to develop strength, endurance, ROM, and flexibility for 15 minutes including:    Access Code: [email or web address]  URL: [email or web address]  Date: 10/17/2024  Prepared by: Deshawn Rodarte    Exercises  - Scapular Retraction with Resistance  - 1 x daily - 7 x weekly - 2 sets - 10 reps  - Seated Elbow Flexion with Resistance  - 1 x daily - 7 x weekly - 2 sets - 10 reps  - Standing Tricep Extensions with Resistance  - 1 x daily - 7 x weekly - 2 sets - 10 reps  - Standing Hip Abduction with Counter Support  - 1 x daily - 7 x weekly - 2 sets - 10 reps  - Standing Hip Extension with Counter Support  - 1 x daily - 7 x weekly - 2 sets - 10 reps  - Seated Wrist Flexion Stretch  - 1 x daily - 7 x weekly - 3 sets - 20 seconds hold  - Seated Wrist Extension Stretch  - 1 x daily - 7 x weekly - 3 sets - 20 seconds  hold  - Wrist Flexion AROM  - 1 x daily - 7 x weekly - 2 sets - 10 reps  - Wrist Flexion Extension AROM with Fingers Curled and Palm Down  - 1 x daily - 7 x weekly - 2 sets - 10 reps            PATIENT EDUCATION AND HOME EXERCISES     Education provided:   - Purpose of PT  - HEP    Written Home Exercises Provided: yes. Exercises were reviewed and Rema was able to demonstrate them prior to the end of the session.  Rema demonstrated good  understanding of the education provided. See EMR under Patient Instructions for exercises provided during therapy sessions.    ASSESSMENT     Rema is a 49 y.o. female referred to outpatient Physical Therapy with a medical diagnosis of   M79.7 (ICD-10-CM) - Fibromyalgia syndrome   M25.562 (ICD-10-CM) - Acute pain of left knee   M79.641 (ICD-10-CM) - Right hand pain       Patient presents with chronic body fatigue, body pains that span across the body, decreased strength, and decreased functional mobility. Patient was progressed with light  TE to address problem list. Patient felt the exercises were targeted to her issues and thinks they will help. Patient was educated there her symptoms may be multifactorial stemming from sleep, diet, and activity levels. PT will aim to address her activity levels and improve her day to day function.     Patient prognosis is Fair.   Patient will benefit from skilled outpatient Physical Therapy to address the deficits stated above and in the chart below, provide patient /family education, and to maximize patientt's level of independence.     Plan of care discussed with patient: Yes  Patient's spiritual, cultural and educational needs considered and patient is agreeable to the plan of care and goals as stated below:     Anticipated Barriers for therapy: Chronic symptoms     Medical Necessity is demonstrated by the following  History  Co-morbidities and personal factors that may impact the plan of care Co-morbidities:   See medical history     Personal Factors:   no deficits     high   Examination  Body Structures and Functions, activity limitations and participation restrictions that may impact the plan of care Body Regions:   back  lower extremities  upper extremities    Body Systems:    ROM  strength    Participation Restrictions:   None    Activity limitations:   Learning and applying knowledge  no deficits    General Tasks and Commands  no deficits    Communication  no deficits    Mobility  lifting and carrying objects    Self care  no deficits    Domestic Life  doing house work (cleaning house, washing dishes, laundry)  assisting others    Interactions/Relationships  no deficits    Life Areas  employment    Community and Social Life  recreation and leisure         high   Clinical Presentation stable and uncomplicated low   Decision Making/ Complexity Score: low     Goals:  Short Term Goals (4 Weeks):   1. Pt will be compliant with HEP to supplement PT in restoring pain free function.  2. Pt will improve impaired  LE/UE MMTs by 1/2 grade  to improve strength for functional tasks  3. Pt will report 4/10 pain at its worst with daily activities   Long Term Goals (8 Weeks):  1. Pt will improve FOTO score to >/= 65 to decrease perceived limitation with mobility  2. Pt will improve impaired LE/UE MMTs by 1 grade to improve strength for functional tasks.  3. Pt will report 1/10 pain at its worst with daily activities   4. Pt will subjectively report being more functional throughout the day       PLAN   Plan of care Certification: 10/17/2024 to 12/17/2024.    Outpatient Physical Therapy 2 times weekly for 6 weeks to include the following interventions: Manual Therapy, Moist Heat/ Ice, Neuromuscular Re-ed, Patient Education, Self Care, Therapeutic Activities, and Therapeutic Exercise.     Deshawn Rodarte, PT      I CERTIFY THE NEED FOR THESE SERVICES FURNISHED UNDER THIS PLAN OF TREATMENT AND WHILE UNDER MY CARE   Physician's comments:     Physician's Signature: ___________________________________________________

## 2024-10-23 ENCOUNTER — PATIENT MESSAGE (OUTPATIENT)
Dept: SLEEP MEDICINE | Facility: CLINIC | Age: 49
End: 2024-10-23
Payer: COMMERCIAL

## 2024-10-29 ENCOUNTER — OFFICE VISIT (OUTPATIENT)
Dept: SURGERY | Facility: CLINIC | Age: 49
End: 2024-10-29
Payer: COMMERCIAL

## 2024-10-29 VITALS
RESPIRATION RATE: 19 BRPM | WEIGHT: 166 LBS | HEART RATE: 87 BPM | DIASTOLIC BLOOD PRESSURE: 80 MMHG | HEIGHT: 62 IN | BODY MASS INDEX: 30.55 KG/M2 | OXYGEN SATURATION: 98 % | SYSTOLIC BLOOD PRESSURE: 133 MMHG

## 2024-10-29 DIAGNOSIS — K60.2 ANAL FISSURE: ICD-10-CM

## 2024-10-29 DIAGNOSIS — R10.30 LOWER ABDOMINAL PAIN: Primary | ICD-10-CM

## 2024-10-29 PROCEDURE — 1159F MED LIST DOCD IN RCRD: CPT | Mod: CPTII,S$GLB,, | Performed by: NURSE PRACTITIONER

## 2024-10-29 PROCEDURE — 3075F SYST BP GE 130 - 139MM HG: CPT | Mod: CPTII,S$GLB,, | Performed by: NURSE PRACTITIONER

## 2024-10-29 PROCEDURE — 99204 OFFICE O/P NEW MOD 45 MIN: CPT | Mod: S$GLB,,, | Performed by: NURSE PRACTITIONER

## 2024-10-29 PROCEDURE — 1160F RVW MEDS BY RX/DR IN RCRD: CPT | Mod: CPTII,S$GLB,, | Performed by: NURSE PRACTITIONER

## 2024-10-29 PROCEDURE — 3008F BODY MASS INDEX DOCD: CPT | Mod: CPTII,S$GLB,, | Performed by: NURSE PRACTITIONER

## 2024-10-29 PROCEDURE — 3044F HG A1C LEVEL LT 7.0%: CPT | Mod: CPTII,S$GLB,, | Performed by: NURSE PRACTITIONER

## 2024-10-29 PROCEDURE — 3079F DIAST BP 80-89 MM HG: CPT | Mod: CPTII,S$GLB,, | Performed by: NURSE PRACTITIONER

## 2024-10-29 PROCEDURE — 99999 PR PBB SHADOW E&M-EST. PATIENT-LVL V: CPT | Mod: PBBFAC,,, | Performed by: NURSE PRACTITIONER

## 2024-10-29 RX ORDER — NITROGLYCERIN 4 MG/G
1 OINTMENT RECTAL 2 TIMES DAILY
Qty: 30 G | Refills: 2 | Status: SHIPPED | OUTPATIENT
Start: 2024-10-29 | End: 2025-01-01

## 2024-10-30 ENCOUNTER — OFFICE VISIT (OUTPATIENT)
Dept: PSYCHIATRY | Facility: CLINIC | Age: 49
End: 2024-10-30
Payer: COMMERCIAL

## 2024-10-30 ENCOUNTER — CLINICAL SUPPORT (OUTPATIENT)
Dept: REHABILITATION | Facility: HOSPITAL | Age: 49
End: 2024-10-30
Payer: COMMERCIAL

## 2024-10-30 VITALS
BODY MASS INDEX: 29.95 KG/M2 | SYSTOLIC BLOOD PRESSURE: 129 MMHG | WEIGHT: 163.81 LBS | DIASTOLIC BLOOD PRESSURE: 79 MMHG | HEART RATE: 79 BPM

## 2024-10-30 DIAGNOSIS — M79.7 FIBROMYALGIA: ICD-10-CM

## 2024-10-30 DIAGNOSIS — F41.9 ANXIETY: ICD-10-CM

## 2024-10-30 DIAGNOSIS — F90.2 ATTENTION DEFICIT HYPERACTIVITY DISORDER (ADHD), COMBINED TYPE: ICD-10-CM

## 2024-10-30 DIAGNOSIS — Z74.09 DECREASED MOBILITY AND ENDURANCE: ICD-10-CM

## 2024-10-30 DIAGNOSIS — F33.1 MDD (MAJOR DEPRESSIVE DISORDER), RECURRENT EPISODE, MODERATE: Primary | ICD-10-CM

## 2024-10-30 DIAGNOSIS — R29.898 WEAKNESS OF BOTH LOWER EXTREMITIES: ICD-10-CM

## 2024-10-30 DIAGNOSIS — R29.898 UPPER EXTREMITY WEAKNESS: Primary | ICD-10-CM

## 2024-10-30 DIAGNOSIS — F41.0 PANIC ATTACKS: ICD-10-CM

## 2024-10-30 DIAGNOSIS — F41.1 GAD (GENERALIZED ANXIETY DISORDER): ICD-10-CM

## 2024-10-30 DIAGNOSIS — L24.5 IRRITANT CONTACT DERMATITIS DUE TO OTHER CHEMICAL PRODUCTS: ICD-10-CM

## 2024-10-30 PROCEDURE — 97530 THERAPEUTIC ACTIVITIES: CPT | Mod: CQ

## 2024-10-30 PROCEDURE — 99999 PR PBB SHADOW E&M-EST. PATIENT-LVL II: CPT | Mod: PBBFAC,,,

## 2024-10-30 PROCEDURE — 97112 NEUROMUSCULAR REEDUCATION: CPT | Mod: CQ

## 2024-10-30 PROCEDURE — 97110 THERAPEUTIC EXERCISES: CPT | Mod: CQ

## 2024-10-30 RX ORDER — DEXTROAMPHETAMINE SACCHARATE, AMPHETAMINE ASPARTATE MONOHYDRATE, DEXTROAMPHETAMINE SULFATE AND AMPHETAMINE SULFATE 2.5; 2.5; 2.5; 2.5 MG/1; MG/1; MG/1; MG/1
10 CAPSULE, EXTENDED RELEASE ORAL EVERY MORNING
Qty: 30 CAPSULE | Refills: 0 | Status: SHIPPED | OUTPATIENT
Start: 2024-10-30

## 2024-10-30 RX ORDER — TRIAMCINOLONE ACETONIDE 1 MG/G
OINTMENT TOPICAL
Qty: 75 G | Refills: 1 | Status: SHIPPED | OUTPATIENT
Start: 2024-10-30

## 2024-10-30 RX ORDER — DEXTROAMPHETAMINE SACCHARATE, AMPHETAMINE ASPARTATE MONOHYDRATE, DEXTROAMPHETAMINE SULFATE AND AMPHETAMINE SULFATE 2.5; 2.5; 2.5; 2.5 MG/1; MG/1; MG/1; MG/1
10 CAPSULE, EXTENDED RELEASE ORAL EVERY MORNING
Qty: 30 CAPSULE | Refills: 0 | Status: SHIPPED | OUTPATIENT
Start: 2024-12-29

## 2024-10-30 RX ORDER — DEXTROAMPHETAMINE SACCHARATE, AMPHETAMINE ASPARTATE MONOHYDRATE, DEXTROAMPHETAMINE SULFATE AND AMPHETAMINE SULFATE 2.5; 2.5; 2.5; 2.5 MG/1; MG/1; MG/1; MG/1
10 CAPSULE, EXTENDED RELEASE ORAL EVERY MORNING
Qty: 30 CAPSULE | Refills: 0 | Status: SHIPPED | OUTPATIENT
Start: 2024-11-29

## 2024-10-30 RX ORDER — DULOXETIN HYDROCHLORIDE 60 MG/1
60 CAPSULE, DELAYED RELEASE ORAL DAILY
Qty: 90 CAPSULE | Refills: 1 | Status: SHIPPED | OUTPATIENT
Start: 2024-10-30 | End: 2025-04-28

## 2024-11-06 ENCOUNTER — OFFICE VISIT (OUTPATIENT)
Dept: DERMATOLOGY | Facility: CLINIC | Age: 49
End: 2024-11-06
Payer: COMMERCIAL

## 2024-11-06 DIAGNOSIS — B35.2 TINEA MANUUM: Primary | ICD-10-CM

## 2024-11-06 PROCEDURE — 1159F MED LIST DOCD IN RCRD: CPT | Mod: CPTII,S$GLB,, | Performed by: DERMATOLOGY

## 2024-11-06 PROCEDURE — 99999 PR PBB SHADOW E&M-EST. PATIENT-LVL III: CPT | Mod: PBBFAC,,, | Performed by: DERMATOLOGY

## 2024-11-06 PROCEDURE — 99214 OFFICE O/P EST MOD 30 MIN: CPT | Mod: S$GLB,,, | Performed by: DERMATOLOGY

## 2024-11-06 PROCEDURE — 87220 TISSUE EXAM FOR FUNGI: CPT | Mod: S$GLB,,, | Performed by: DERMATOLOGY

## 2024-11-06 PROCEDURE — 1160F RVW MEDS BY RX/DR IN RCRD: CPT | Mod: CPTII,S$GLB,, | Performed by: DERMATOLOGY

## 2024-11-06 PROCEDURE — 3044F HG A1C LEVEL LT 7.0%: CPT | Mod: CPTII,S$GLB,, | Performed by: DERMATOLOGY

## 2024-11-06 RX ORDER — TERBINAFINE HYDROCHLORIDE 250 MG/1
250 TABLET ORAL DAILY
Qty: 30 TABLET | Refills: 0 | Status: SHIPPED | OUTPATIENT
Start: 2024-11-06 | End: 2024-12-06

## 2024-11-06 NOTE — PROGRESS NOTES
Subjective:      Patient ID:  Rema Horton is a 49 y.o. female who presents for   Chief Complaint   Patient presents with    Rash     Rash      Pt c/o rash on left hand x 1 year. Tx with clobetasol cream, protopic ointment and TAC cream. Tx did not help.  Has pet rabbits and birds.    See last derm note for rheum hx.    Review of Systems   Constitutional:  Negative for fever, chills, weight loss, weight gain, fatigue, night sweats and malaise.   Skin:  Positive for rash and dry skin. Negative for itching.       Objective:   Physical Exam   Constitutional: She appears well-developed and well-nourished.   Neurological: She is alert and oriented to person, place, and time.   Psychiatric: She has a normal mood and affect.   Skin:   Areas Examined (abnormalities noted in diagram):   LUE Inspection Performed  Nails and Digits Inspection Performed          Photos from 2 months ago:                     Diagram Legend     Erythematous scaling macule/papule c/w actinic keratosis       Vascular papule c/w angioma      Pigmented verrucoid papule/plaque c/w seborrheic keratosis      Yellow umbilicated papule c/w sebaceous hyperplasia      Irregularly shaped tan macule c/w lentigo     1-2 mm smooth white papules consistent with Milia      Movable subcutaneous cyst with punctum c/w epidermal inclusion cyst      Subcutaneous movable cyst c/w pilar cyst      Firm pink to brown papule c/w dermatofibroma      Pedunculated fleshy papule(s) c/w skin tag(s)      Evenly pigmented macule c/w junctional nevus     Mildly variegated pigmented, slightly irregular-bordered macule c/w mildly atypical nevus      Flesh colored to evenly pigmented papule c/w intradermal nevus       Pink pearly papule/plaque c/w basal cell carcinoma      Erythematous hyperkeratotic cursted plaque c/w SCC      Surgical scar with no sign of skin cancer recurrence      Open and closed comedones      Inflammatory papules and pustules      Verrucoid papule consistent  consistent with wart     Erythematous eczematous patches and plaques     Dystrophic onycholytic nail with subungual debris c/w onychomycosis     Umbilicated papule    Erythematous-base heme-crusted tan verrucoid plaque consistent with inflamed seborrheic keratosis     Erythematous Silvery Scaling Plaque c/w Psoriasis     See annotation      Assessment / Plan:        Tinea manuum  KOH today + for hyphae  -     terbinafine HCL (LAMISIL) 250 mg tablet; Take 1 tablet (250 mg total) by mouth once daily.  Dispense: 30 tablet; Refill: 0    Last LFTs WNL    RTC 4 weeks

## 2024-11-07 ENCOUNTER — PATIENT MESSAGE (OUTPATIENT)
Dept: DERMATOLOGY | Facility: CLINIC | Age: 49
End: 2024-11-07
Payer: COMMERCIAL

## 2024-11-07 DIAGNOSIS — B35.2 TINEA MANUUM: ICD-10-CM

## 2024-11-07 NOTE — PATIENT INSTRUCTIONS

## 2024-11-08 ENCOUNTER — PATIENT MESSAGE (OUTPATIENT)
Dept: DERMATOLOGY | Facility: CLINIC | Age: 49
End: 2024-11-08
Payer: COMMERCIAL

## 2024-11-11 RX ORDER — TERBINAFINE HYDROCHLORIDE 250 MG/1
250 TABLET ORAL DAILY
Qty: 30 TABLET | Refills: 0 | Status: SHIPPED | OUTPATIENT
Start: 2024-11-11 | End: 2024-12-13

## 2024-11-13 ENCOUNTER — CLINICAL SUPPORT (OUTPATIENT)
Dept: REHABILITATION | Facility: HOSPITAL | Age: 49
End: 2024-11-13
Payer: COMMERCIAL

## 2024-11-13 DIAGNOSIS — Z74.09 DECREASED MOBILITY AND ENDURANCE: ICD-10-CM

## 2024-11-13 DIAGNOSIS — R29.898 UPPER EXTREMITY WEAKNESS: Primary | ICD-10-CM

## 2024-11-13 DIAGNOSIS — R29.898 WEAKNESS OF BOTH LOWER EXTREMITIES: ICD-10-CM

## 2024-11-13 PROCEDURE — 97112 NEUROMUSCULAR REEDUCATION: CPT | Mod: CQ

## 2024-11-13 PROCEDURE — 97110 THERAPEUTIC EXERCISES: CPT | Mod: CQ

## 2024-11-13 PROCEDURE — 97530 THERAPEUTIC ACTIVITIES: CPT | Mod: CQ

## 2024-11-13 NOTE — PROGRESS NOTES
"[facility]   Physical Therapy Treatment Note      Name: Rema Horton  Clinic Number: [id number]    Therapy Diagnosis:   Encounter Diagnoses   Name Primary?    Upper extremity weakness Yes    Weakness of both lower extremities     Decreased mobility and endurance        Physician: Ugo Rodriguez MD    Visit Date: 11/13/2024    Physician Orders: PT Eval and Treat   Medical Diagnosis from Referral:   M79.7 (ICD-10-CM) - Fibromyalgia syndrome   M25.562 (ICD-10-CM) - Acute pain of left knee   M79.641 (ICD-10-CM) - Right hand pain   Evaluation Date: 10/17/2024  Authorization Period Expiration: 12/17/24  Plan of Care Expiration: 12/17/2024  Progress Note Due: 11/17/2024  Visit # / Visits authorized: 1/1, 2/12   FOTO: 1/5     FOTO Initial Evaluation: See Foto   FOTO 2nd F/U: NA  FOTO Discharge: NA     Precautions: Standard      PTA Visit #: 2/5     Time In: 4:38 pm   Time Out: 5:25 pm   Total Billable Time: 47 minutes    Subjective     Pt reports: that she felt like her legs were weak after her last session, however by the next day they were just sore. Pt states that she doesn't want to do the ones with the band with her arms.   She was compliant with home exercise program.  Response to previous treatment: muscle soreness/fatigue  Functional change: none at this time     Pain: see assessment   Location:     Objective      Objective Measures updated at progress report unless specified.     Treatment     Rema received the treatments listed below:      therapeutic exercises to develop strength, endurance, ROM, and flexibility for 18 minutes including:  Ball rolls 10x 3" fwd/lat   Standing tricep ext c/ YTB 2x10   Supine clams RTB 2x10   Seated marches c/ RTB 2x10     Not performed:   Seated bicep curls   Seated wrist flexion stretch  Seated wrist extension stretch  AROM wrist flexion  Wrist ext (fingers curled/palm down)       neuromuscular re-education activities to improve: Coordination, " "Proprioception, Posture, and Motor Control for 10 minutes. The following activities were included:  Scap retractions c/ YTB 2x10   Hip adduction iso 3" 2x10   Supine horizontal shoulder abd RTB 2x10 (not performed today)     therapeutic activities to improve functional performance for 19  minutes, including:  Nu-step (for improved functional mobility and standing tolerance) 5 min supervised   Standing hip abd 2x10 BLE   Standing hip ext 2x10 BLE       Patient Education and Home Exercises       Education provided:   - HEP    Written Home Exercises Provided: Patient instructed to cont prior HEP. Exercises were reviewed and Rema was able to demonstrate them prior to the end of the session.  Rema demonstrated good  understanding of the education provided. See EMR under Patient Instructions for exercises provided during therapy sessions    Assessment     Continued with established activities, modifying rows and tricep extensions to yellow band. Pt tolerated decreased resistance better compared to previous session. Plan to continue to progress pt as tolerated during future sessions.      Rema Is progressing well towards her goals.   Pt prognosis is Fair.     Pt will continue to benefit from skilled outpatient physical therapy to address the deficits listed in the problem list box on initial evaluation, provide pt/family education and to maximize pt's level of independence in the home and community environment.     Pt's spiritual, cultural and educational needs considered and pt agreeable to plan of care and goals.     Anticipated barriers to physical therapy: Chronic symptoms    Goals:   Short Term Goals (4 Weeks):   1. Pt will be compliant with HEP to supplement PT in restoring pain free function.  2. Pt will improve impaired LE/UE MMTs by 1/2 grade  to improve strength for functional tasks  3. Pt will report 4/10 pain at its worst with daily activities   Long Term Goals (8 Weeks):  1. Pt will improve FOTO " score to >/= 65 to decrease perceived limitation with mobility  2. Pt will improve impaired LE/UE MMTs by 1 grade to improve strength for functional tasks.  3. Pt will report 1/10 pain at its worst with daily activities   4. Pt will subjectively report being more functional throughout the day     Plan     Plan of care Certification: 10/17/2024 to 12/17/2024.    PT/PTA met face to face to discuss pt's treatment plan and progress towards established goals. Pt will be seen by a physical therapist minimally every 6th visit or every 30 days.      Manjula Bolanos PTA

## 2024-11-18 ENCOUNTER — TELEPHONE (OUTPATIENT)
Dept: BARIATRICS | Facility: CLINIC | Age: 49
End: 2024-11-18
Payer: COMMERCIAL

## 2024-11-18 RX ORDER — CYCLOBENZAPRINE HCL 10 MG
TABLET ORAL
Qty: 90 TABLET | Refills: 0 | Status: SHIPPED | OUTPATIENT
Start: 2024-11-18

## 2024-11-19 DIAGNOSIS — L30.9 DERMATITIS: ICD-10-CM

## 2024-11-20 ENCOUNTER — CLINICAL SUPPORT (OUTPATIENT)
Dept: REHABILITATION | Facility: HOSPITAL | Age: 49
End: 2024-11-20
Payer: COMMERCIAL

## 2024-11-20 DIAGNOSIS — Z74.09 DECREASED MOBILITY AND ENDURANCE: ICD-10-CM

## 2024-11-20 DIAGNOSIS — R29.898 UPPER EXTREMITY WEAKNESS: Primary | ICD-10-CM

## 2024-11-20 DIAGNOSIS — R29.898 WEAKNESS OF BOTH LOWER EXTREMITIES: ICD-10-CM

## 2024-11-20 PROCEDURE — 97112 NEUROMUSCULAR REEDUCATION: CPT

## 2024-11-20 PROCEDURE — 97110 THERAPEUTIC EXERCISES: CPT

## 2024-11-20 PROCEDURE — 97530 THERAPEUTIC ACTIVITIES: CPT

## 2024-11-20 RX ORDER — TACROLIMUS 1 MG/G
OINTMENT TOPICAL 2 TIMES DAILY
Qty: 100 G | Refills: 4 | Status: SHIPPED | OUTPATIENT
Start: 2024-11-20

## 2024-11-20 NOTE — PROGRESS NOTES
"[facility]   Physical Therapy Treatment Note      Name: Rema Horton  Clinic Number: [id number]    Therapy Diagnosis:   Encounter Diagnoses   Name Primary?    Upper extremity weakness Yes    Weakness of both lower extremities     Decreased mobility and endurance          Physician: Ugo Rodriguez MD    Visit Date: 11/20/2024    Physician Orders: PT Eval and Treat   Medical Diagnosis from Referral:   M79.7 (ICD-10-CM) - Fibromyalgia syndrome   M25.562 (ICD-10-CM) - Acute pain of left knee   M79.641 (ICD-10-CM) - Right hand pain   Evaluation Date: 10/17/2024  Authorization Period Expiration: 12/17/24  Plan of Care Expiration: 12/17/2024  Progress Note Due: 11/17/2024  Visit # / Visits authorized: 1/1, 3/12   FOTO: 4/5     FOTO Initial Evaluation: See Foto   FOTO 2nd F/U: NA  FOTO Discharge: NA     Precautions: Standard      PTA Visit #: 0/5     Time In: 1000  Time Out: 1045   Total Billable Time: 45 minutes    Subjective     Pt reports: she is feeling weak in the legs still, but slowly getting better.       She was compliant with home exercise program.  Response to previous treatment: muscle soreness/fatigue  Functional change: none at this time     Pain: see assessment   Location:     Objective      Objective Measures updated at progress report unless specified.     Treatment     Rema received the treatments listed below:      therapeutic exercises to develop strength, endurance, ROM, and flexibility for 18 minutes including:    Ball rolls 10x 3" fwd/lat   Standing tricep ext c/ YTB 2x10   Rows 10lbs 3x10   Supine clams RTB 2x10   Seated marches c/ RTB 2x10     Not performed:   Seated bicep curls   Seated wrist flexion stretch  Seated wrist extension stretch  AROM wrist flexion  Wrist ext (fingers curled/palm down)       neuromuscular re-education activities to improve: Coordination, Proprioception, Posture, and Motor Control for 8 minutes. The following activities were " "included:    Scap retractions c/ YTB 2x10   Hip adduction iso 3" 2x10   Supine horizontal shoulder abd RTB 2x10 (not performed today)     therapeutic activities to improve functional performance for 19  minutes, including:    Nu-step (for improved functional mobility and standing tolerance) 7 min supervised   Shuttle dl press 1.5c   - 3x10  Shuttle sl press 1c   - 3x10   Standing hip abd 2x10 BLE   Standing hip ext 2x10 BLE       Patient Education and Home Exercises       Education provided:   - HEP    Written Home Exercises Provided: Patient instructed to cont prior HEP. Exercises were reviewed and Rema was able to demonstrate them prior to the end of the session.  Rema demonstrated good  understanding of the education provided. See EMR under Patient Instructions for exercises provided during therapy sessions    Assessment     Continued with established activities, continuing rows and tricep extensions to yellow band. Pt tolerated decreased resistance better compared to previous session. Plan to continue to progress pt as tolerated during future sessions.      Rema Is progressing well towards her goals.   Pt prognosis is Fair.     Pt will continue to benefit from skilled outpatient physical therapy to address the deficits listed in the problem list box on initial evaluation, provide pt/family education and to maximize pt's level of independence in the home and community environment.     Pt's spiritual, cultural and educational needs considered and pt agreeable to plan of care and goals.     Anticipated barriers to physical therapy: Chronic symptoms    Goals:   Short Term Goals (4 Weeks):   1. Pt will be compliant with HEP to supplement PT in restoring pain free function.  2. Pt will improve impaired LE/UE MMTs by 1/2 grade  to improve strength for functional tasks  3. Pt will report 4/10 pain at its worst with daily activities   Long Term Goals (8 Weeks):  1. Pt will improve FOTO score to >/= 65 to " decrease perceived limitation with mobility  2. Pt will improve impaired LE/UE MMTs by 1 grade to improve strength for functional tasks.  3. Pt will report 1/10 pain at its worst with daily activities   4. Pt will subjectively report being more functional throughout the day     Plan     Plan of care Certification: 10/17/2024 to 12/17/2024.    PT/PTA met face to face to discuss pt's treatment plan and progress towards established goals. Pt will be seen by a physical therapist minimally every 6th visit or every 30 days.      Deshawn Rodarte, PT

## 2024-11-21 ENCOUNTER — OFFICE VISIT (OUTPATIENT)
Dept: OBSTETRICS AND GYNECOLOGY | Facility: CLINIC | Age: 49
End: 2024-11-21
Payer: COMMERCIAL

## 2024-11-21 VITALS — SYSTOLIC BLOOD PRESSURE: 122 MMHG | WEIGHT: 166 LBS | BODY MASS INDEX: 30.36 KG/M2 | DIASTOLIC BLOOD PRESSURE: 79 MMHG

## 2024-11-21 DIAGNOSIS — N89.8 VAGINAL DISCHARGE: Primary | ICD-10-CM

## 2024-11-21 PROCEDURE — 3044F HG A1C LEVEL LT 7.0%: CPT | Mod: CPTII,S$GLB,, | Performed by: OBSTETRICS & GYNECOLOGY

## 2024-11-21 PROCEDURE — 1159F MED LIST DOCD IN RCRD: CPT | Mod: CPTII,S$GLB,, | Performed by: OBSTETRICS & GYNECOLOGY

## 2024-11-21 PROCEDURE — 3074F SYST BP LT 130 MM HG: CPT | Mod: CPTII,S$GLB,, | Performed by: OBSTETRICS & GYNECOLOGY

## 2024-11-21 PROCEDURE — 0352U VAGINOSIS SCREEN BY DNA PROBE: CPT | Performed by: OBSTETRICS & GYNECOLOGY

## 2024-11-21 PROCEDURE — 87491 CHLMYD TRACH DNA AMP PROBE: CPT | Performed by: OBSTETRICS & GYNECOLOGY

## 2024-11-21 PROCEDURE — 3008F BODY MASS INDEX DOCD: CPT | Mod: CPTII,S$GLB,, | Performed by: OBSTETRICS & GYNECOLOGY

## 2024-11-21 PROCEDURE — 99999 PR PBB SHADOW E&M-EST. PATIENT-LVL IV: CPT | Mod: PBBFAC,,, | Performed by: OBSTETRICS & GYNECOLOGY

## 2024-11-21 PROCEDURE — 99214 OFFICE O/P EST MOD 30 MIN: CPT | Mod: S$GLB,,, | Performed by: OBSTETRICS & GYNECOLOGY

## 2024-11-21 PROCEDURE — 3078F DIAST BP <80 MM HG: CPT | Mod: CPTII,S$GLB,, | Performed by: OBSTETRICS & GYNECOLOGY

## 2024-11-21 RX ORDER — FLUCONAZOLE 150 MG/1
150 TABLET ORAL ONCE AS NEEDED
Qty: 2 TABLET | Refills: 2 | Status: SHIPPED | OUTPATIENT
Start: 2024-11-21 | End: 2024-11-21

## 2024-11-21 NOTE — PROGRESS NOTES
CC: vaginal discharge  Urinary frequency    Ream Horton is a 49 y.o. female  presents for urinary frequency and vaginal discharge.      Past Medical History:   Diagnosis Date    Abnormal Pap smear of cervix     Anemia     Breast disorder     Fever blister     Fibromyalgia     IC (interstitial cystitis)        Past Surgical History:   Procedure Laterality Date    BREAST BIOPSY Left 2014    fibroadenoma     SECTION  2012    X 2---JST FOR KJB ([facility])    DILATION AND CURETTAGE OF UTERUS      KJB       OB History    Para Term  AB Living   4 3 3   1 3   SAB IAB Ectopic Multiple Live Births   1       3      # Outcome Date GA Lbr Jem/2nd Weight Sex Type Anes PTL Lv   4 Term 12    F CS-Unspec   BARRY   3 Term 03/24/10    M Vag-Spont   BARRY   2 SAB      SAB   FD   1 Term 10/23/92    M Vag-Spont   BARRY       Family History   Problem Relation Name Age of Onset    Cancer Mother          bladder (not sure if was actually gynecologic)    No Known Problems Father      Stroke Maternal Grandfather      Breast cancer Paternal Grandmother      Amblyopia Daughter Ernestina     Chromosomal disorder Daughter Ernestina     No Known Problems Son      Melanoma Neg Hx      Ovarian cancer Neg Hx      Colon cancer Neg Hx      Colon polyps Neg Hx      Esophageal cancer Neg Hx      Stomach cancer Neg Hx      Rectal cancer Neg Hx      Blindness Neg Hx      Glaucoma Neg Hx      Macular degeneration Neg Hx      Retinal detachment Neg Hx         Social History     Tobacco Use    Smoking status: Never    Smokeless tobacco: Never   Substance Use Topics    Alcohol use: Not Currently     Alcohol/week: 0.0 standard drinks of alcohol     Comment: Occasionally.    Drug use: No       /79 (Patient Position: Sitting)   Wt 75.3 kg (166 lb 0.1 oz)   BMI 30.36 kg/m²     ROS:  GENERAL: Denies weight gain or weight loss. Feeling well overall.   SKIN: Denies rash or lesions.   HEAD: Denies head injury or headache.    NODES: Denies enlarged lymph nodes.   CHEST: Denies chest pain or shortness of breath.   CARDIOVASCULAR: Denies palpitations or left sided chest pain.   ABDOMEN: No abdominal pain, constipation, diarrhea, nausea, vomiting or rectal bleeding.   URINARY: No frequency, dysuria, hematuria, or burning on urination.  REPRODUCTIVE: See HPI.   BREASTS: The patient performs breast self-examination and denies pain, lumps, or nipple discharge.   HEMATOLOGIC: No easy bruisability or excessive bleeding.  MUSCULOSKELETAL: Denies joint pain or swelling.   NEUROLOGIC: Denies syncope or weakness.   PSYCHIATRIC: Denies depression, anxiety or mood swings.    Physical Exam:    APPEARANCE: Well nourished, well developed, in no acute distress.  AFFECT: WNL, alert and oriented x 3  SKIN: No acne or hirsutism  NECK: Neck symmetric without masses or thyromegaly  NODES: No inguinal, cervical, axillary, or femoral lymph node enlargement  CHEST: Good respiratory effect  PELVIC: Normal external genitalia without lesions.  Normal hair distribution.  Adequate perineal body, normal urethral meatus.  Vagina moist and well rugated without lesions or discharge.  Cervix pink, without lesions, discharge or tenderness.  No significant cystocele or rectocele.  Bimanual exam shows uterus to be normal size, regular, mobile and nontender.  Adnexa without masses or tenderness.    EXTREMITIES: No edema.    ASSESSMENT AND PLAN  1. Vaginal discharge  Vaginosis Screen by DNA Probe    C. trachomatis/N. gonorrhoeae by AMP DNA    fluconazole (DIFLUCAN) 150 MG Tab        Vaginitis prevention including :   a. avoiding feminine products such as deoderant soaps, body wash, bubble bath, douches, scented toilet paper, deoderant tampons or pads, baby or feminine wipes, chronic pad use, etc. and   b. avoiding other vulvovaginal irritants such as long hot baths, humidity, tight, synthetic clothing, chlorine and sitting around in wet bathing suits and   c. wearing cotton  underwear, avoiding thong underwear and no underwear to bed and   d. taking showers instead of baths and use a hair dryer on cool setting afterwards to dry and   e.wearing cotton to exercise and shower immediately after exercise and change clothes and   f. using polyurethane condoms without spermicide if sexually active and symptoms are triggered by intercourse;   Diflucan and Mycolog cream use and potential side effects;   -pelvic rest until symptoms resolve.       Drinking chamomile, fennel or tres tea is an easy, natural way to relieve menstrual cramps.      Anti-inflammatory foods can help promote blood flow and relax your uterus. Try eating berries, tomatoes, pineapples and spices like turmeric, tres or garlic. Leafy green vegetables, almonds, walnuts and fatty fish, like salmon, can also help reduce inflammation.      Vitamin D can help your body absorb calcium and reduce inflammation. Other supplements, including omega-3, vitamin E and magnesium, can help reduce inflammation      RUKHSANA Inositol 2 grams BID, Folic acid 400 mcg  Fish Oil  Vitamin D Supplements   Magnesium  Probiotics  Zinc     B Vitamin Supplements     Decrease inflammation    Patient was counseled today on A.C.S. Pap guidelines and recommendations for yearly pelvic exams, mammograms and monthly self breast exams; to see her PCP for other health maintenance.   F/U with functional medicine

## 2024-12-02 ENCOUNTER — TELEPHONE (OUTPATIENT)
Dept: SURGERY | Facility: CLINIC | Age: 49
End: 2024-12-02
Payer: COMMERCIAL

## 2024-12-02 ENCOUNTER — CLINICAL SUPPORT (OUTPATIENT)
Dept: REHABILITATION | Facility: HOSPITAL | Age: 49
End: 2024-12-02
Payer: COMMERCIAL

## 2024-12-02 DIAGNOSIS — Z74.09 DECREASED MOBILITY AND ENDURANCE: ICD-10-CM

## 2024-12-02 DIAGNOSIS — R29.898 UPPER EXTREMITY WEAKNESS: Primary | ICD-10-CM

## 2024-12-02 DIAGNOSIS — R29.898 WEAKNESS OF BOTH LOWER EXTREMITIES: ICD-10-CM

## 2024-12-02 PROCEDURE — 97110 THERAPEUTIC EXERCISES: CPT | Mod: CQ

## 2024-12-02 PROCEDURE — 97530 THERAPEUTIC ACTIVITIES: CPT | Mod: CQ

## 2024-12-02 NOTE — PROGRESS NOTES
"[facility]   Physical Therapy Treatment Note      Name: Rema Horton  Clinic Number: [id number]    Therapy Diagnosis:   Encounter Diagnoses   Name Primary?    Upper extremity weakness Yes    Weakness of both lower extremities     Decreased mobility and endurance            Physician: Ugo Rodriguez MD    Visit Date: 12/2/2024    Physician Orders: PT Eval and Treat   Medical Diagnosis from Referral:   M79.7 (ICD-10-CM) - Fibromyalgia syndrome   M25.562 (ICD-10-CM) - Acute pain of left knee   M79.641 (ICD-10-CM) - Right hand pain   Evaluation Date: 10/17/2024  Authorization Period Expiration: 12/17/24  Plan of Care Expiration: 12/17/2024  Progress Note Due: 11/17/2024  Visit # / Visits authorized: 1/1, 4/12   FOTO: 5/5     FOTO Initial Evaluation: See Foto   FOTO 2nd F/U: NA  FOTO Discharge: NA     Precautions: Standard      PTA Visit #: 0/5     Time In: 10:45  Time Out: 11:32   Total Billable Time: 47 minutes    Subjective     Pt reports: that her fibromyalgia is flared up today. Pt states that her right arm and wrist is bothering her and had seen the rheumatologist in the past.       She was compliant with home exercise program.  Response to previous treatment: muscle soreness/fatigue  Functional change: none at this time     Pain: see assessment   Location:     Objective      Objective Measures updated at progress report unless specified.     Treatment     Rema received the treatments listed below:      therapeutic exercises to develop strength, endurance, ROM, and flexibility for 15 minutes including:    Ball rolls 10x 3" fwd/lat   Standing tricep ext c/ YTB 2x10   Rows 10lbs 3x10   Supine clams RTB 2x10   Seated marches c/ RTB 2x10     Not performed:   Seated bicep curls   Seated wrist flexion stretch  Seated wrist extension stretch  AROM wrist flexion  Wrist ext (fingers curled/palm down)       neuromuscular re-education activities to improve: Coordination, Proprioception, " "Posture, and Motor Control for 2 minutes. The following activities were included:    Scap retractions c/ YTB 2x10   Hip adduction iso 3" 2x10 (not performed)   Supine horizontal shoulder abd RTB 2x10 (not performed today)     therapeutic activities to improve functional performance for 30 minutes, including:    Nu-step (for improved functional mobility and standing tolerance) 7 min supervised   Shuttle dl press 1.5c   - 2x10  Shuttle sl press 1c   - 3x10   Standing hip abd 2x10 BLE   Standing hip ext 2x10 BLE       Patient Education and Home Exercises       Education provided:   - HEP    Written Home Exercises Provided: Patient instructed to cont prior HEP. Exercises were reviewed and Rema was able to demonstrate them prior to the end of the session.  Rema demonstrated good  understanding of the education provided. See EMR under Patient Instructions for exercises provided during therapy sessions    Assessment     Pt encouraged to use only LE on Nu-step this session as patient with increased right wrist pain.  Also discussed pt reaching out to rheumatologist regarding referral for orthopedist per instructions after MRI. Pt noted weakness during double leg press, therefore held at 2 sets of 10 instead of 3. Will continue to progress pt towards goals as tolerated.     Rema Is progressing well towards her goals.   Pt prognosis is Fair.     Pt will continue to benefit from skilled outpatient physical therapy to address the deficits listed in the problem list box on initial evaluation, provide pt/family education and to maximize pt's level of independence in the home and community environment.     Pt's spiritual, cultural and educational needs considered and pt agreeable to plan of care and goals.     Anticipated barriers to physical therapy: Chronic symptoms    Goals:   Short Term Goals (4 Weeks):   1. Pt will be compliant with HEP to supplement PT in restoring pain free function.  2. Pt will improve impaired " LE/UE MMTs by 1/2 grade  to improve strength for functional tasks  3. Pt will report 4/10 pain at its worst with daily activities   Long Term Goals (8 Weeks):  1. Pt will improve FOTO score to >/= 65 to decrease perceived limitation with mobility  2. Pt will improve impaired LE/UE MMTs by 1 grade to improve strength for functional tasks.  3. Pt will report 1/10 pain at its worst with daily activities   4. Pt will subjectively report being more functional throughout the day     Plan     Plan of care Certification: 10/17/2024 to 12/17/2024.    PT/PTA met face to face to discuss pt's treatment plan and progress towards established goals. Pt will be seen by a physical therapist minimally every 6th visit or every 30 days.      Manjula Bolanos PTA

## 2024-12-04 ENCOUNTER — OFFICE VISIT (OUTPATIENT)
Dept: INTERNAL MEDICINE | Facility: CLINIC | Age: 49
End: 2024-12-04
Payer: COMMERCIAL

## 2024-12-04 VITALS
SYSTOLIC BLOOD PRESSURE: 110 MMHG | HEIGHT: 62 IN | OXYGEN SATURATION: 100 % | BODY MASS INDEX: 30.97 KG/M2 | WEIGHT: 168.31 LBS | HEART RATE: 80 BPM | DIASTOLIC BLOOD PRESSURE: 70 MMHG

## 2024-12-04 DIAGNOSIS — F41.9 ANXIETY: Primary | ICD-10-CM

## 2024-12-04 DIAGNOSIS — E66.9 OBESITY (BMI 30-39.9): ICD-10-CM

## 2024-12-04 DIAGNOSIS — F33.1 DEPRESSION, MAJOR, RECURRENT, MODERATE: ICD-10-CM

## 2024-12-04 DIAGNOSIS — M79.7 FIBROMYALGIA: ICD-10-CM

## 2024-12-04 DIAGNOSIS — Z23 NEED FOR VACCINATION: ICD-10-CM

## 2024-12-04 DIAGNOSIS — G47.33 OSA (OBSTRUCTIVE SLEEP APNEA): ICD-10-CM

## 2024-12-04 PROCEDURE — 99999 PR PBB SHADOW E&M-EST. PATIENT-LVL V: CPT | Mod: PBBFAC,,, | Performed by: INTERNAL MEDICINE

## 2024-12-04 NOTE — PROGRESS NOTES
"Subjective:       Patient ID: Rema Horton is a 49 y.o. female.    Chief Complaint: Follow-up    HPI  49 y.o. female here for follow up of    Rash on hands found to be tinea manuum  Treated with terbinafine PO daily    Chronic anal fissure since 2017.   Rx rectiv on 10/29 by CRS NP.   -"Referral to GI for chronic abd pain, she def needs colonoscopy but if she needs EGD this can be performed w GI as well"  She is taking metamucil to help w constipation.  She is having a hard time sleeping. Drinks a small coffee daily.   Puffy under eyes.   Tries to remember her cpap and be consistent.   Arms stiff in morning. Working with PT.   Will be meeting with bariatric medicine physician soon.  Also has follow up with dermatologist and colorectal NP soon.  Stopped flexeril due to hx of allergy with rash and noticed redness of her neck.   Review of Systems   Constitutional:  Negative for fever.   Respiratory:  Negative for shortness of breath.    Cardiovascular:  Negative for chest pain.   Musculoskeletal: Negative.    Skin: Negative.        Objective:   /70 (BP Location: Left arm, Patient Position: Sitting)   Pulse 80   Ht 5' 2.01" (1.575 m)   Wt 76.4 kg (168 lb 5.1 oz)   SpO2 100%   BMI 30.78 kg/m²      Physical Exam  Constitutional:       General: She is not in acute distress.     Appearance: She is well-developed. She is not diaphoretic.   HENT:      Head: Normocephalic and atraumatic.   Cardiovascular:      Rate and Rhythm: Normal rate and regular rhythm.   Pulmonary:      Effort: Pulmonary effort is normal. No respiratory distress.      Breath sounds: No wheezing or rales.   Skin:     General: Skin is warm and dry.   Neurological:      Mental Status: She is alert and oriented to person, place, and time.   Psychiatric:         Behavior: Behavior normal.      Comments: Pressured speech, somewhat tangential         Assessment:       1. Anxiety    2. Fibromyalgia    3. Obesity (BMI 30-39.9)    4. Depression, " major, recurrent, moderate    5. PERFECTO (obstructive sleep apnea)    6. Need for vaccination        Plan:       Anxiety  Fibromyalgia  Obesity (BMI 30-39.9)  Depression, major, recurrent, moderate  PERFECTO (obstructive sleep apnea)  - stable and controlled  - continue current regimen    Need for vaccination  -     Influenza - Trivalent - PF (ADULT)          You are up to date for your primary preventive health care, and there are no reminders at this time.     40 minutes of total time spent on the encounter - this includes face to face time and non-face to face time preparing to see the patient (eg, review of tests), obtaining and/or reviewing separately obtained history, documenting clinical information in the electronic or other health record, independently interpreting results (not separately reported) and communicating results to the patient/family/caregiver, and/or care coordination (not separately reported.)

## 2024-12-05 ENCOUNTER — TELEPHONE (OUTPATIENT)
Dept: SURGERY | Facility: CLINIC | Age: 49
End: 2024-12-05
Payer: COMMERCIAL

## 2024-12-05 ENCOUNTER — IMMUNIZATION (OUTPATIENT)
Dept: INTERNAL MEDICINE | Facility: CLINIC | Age: 49
End: 2024-12-05
Payer: COMMERCIAL

## 2024-12-05 DIAGNOSIS — Z23 NEED FOR VACCINATION: Primary | ICD-10-CM

## 2024-12-05 PROCEDURE — 91320 SARSCV2 VAC 30MCG TRS-SUC IM: CPT | Mod: S$GLB,,, | Performed by: INTERNAL MEDICINE

## 2024-12-05 PROCEDURE — 90480 ADMN SARSCOV2 VAC 1/ONLY CMP: CPT | Mod: S$GLB,,, | Performed by: INTERNAL MEDICINE

## 2024-12-06 ENCOUNTER — OFFICE VISIT (OUTPATIENT)
Dept: BARIATRICS | Facility: CLINIC | Age: 49
End: 2024-12-06
Payer: COMMERCIAL

## 2024-12-06 VITALS
DIASTOLIC BLOOD PRESSURE: 78 MMHG | OXYGEN SATURATION: 99 % | SYSTOLIC BLOOD PRESSURE: 140 MMHG | HEART RATE: 86 BPM | HEIGHT: 61 IN | WEIGHT: 168.31 LBS | BODY MASS INDEX: 31.78 KG/M2

## 2024-12-06 DIAGNOSIS — E66.09 CLASS 1 OBESITY DUE TO EXCESS CALORIES WITHOUT SERIOUS COMORBIDITY WITH BODY MASS INDEX (BMI) OF 31.0 TO 31.9 IN ADULT: Primary | ICD-10-CM

## 2024-12-06 DIAGNOSIS — Z71.3 ENCOUNTER FOR WEIGHT LOSS COUNSELING: ICD-10-CM

## 2024-12-06 DIAGNOSIS — E66.811 CLASS 1 OBESITY DUE TO EXCESS CALORIES WITHOUT SERIOUS COMORBIDITY WITH BODY MASS INDEX (BMI) OF 31.0 TO 31.9 IN ADULT: Primary | ICD-10-CM

## 2024-12-06 DIAGNOSIS — J06.9 ACUTE URI: ICD-10-CM

## 2024-12-06 PROCEDURE — 99999 PR PBB SHADOW E&M-EST. PATIENT-LVL V: CPT | Mod: PBBFAC,,, | Performed by: STUDENT IN AN ORGANIZED HEALTH CARE EDUCATION/TRAINING PROGRAM

## 2024-12-06 RX ORDER — FLUTICASONE PROPIONATE 50 MCG
1 SPRAY, SUSPENSION (ML) NASAL 2 TIMES DAILY
Qty: 48 ML | Refills: 3 | Status: SHIPPED | OUTPATIENT
Start: 2024-12-06

## 2024-12-06 RX ORDER — TOPIRAMATE 25 MG/1
25 TABLET ORAL 2 TIMES DAILY
Qty: 180 TABLET | Refills: 0 | Status: SHIPPED | OUTPATIENT
Start: 2024-12-06 | End: 2025-03-06

## 2024-12-06 NOTE — TELEPHONE ENCOUNTER
Refill Decision Note   Rema Horton  is requesting a refill authorization.  Brief Assessment and Rationale for Refill:        Medication Therapy Plan:         Comments:     Note composed:5:45 AM 12/06/2024

## 2024-12-06 NOTE — PATIENT INSTRUCTIONS
Topiramate is approved for migraine and seizure prevention. Weight loss is a common side effect that is well documented. Other potential side effects include a rash, vision changes, paresthesias, forgetfulness, fatigue, kidney stones, and upset stomach.               Copyright © 2011, [facility]. For more information about The Healthy Eating Plate, please see The Nutrition Source, Department of Nutrition, [employer], www.thenutritionsource.org, and [employer], www.health.[place].edu.      Meal Planning & Grocery Shopping    Meal planning builds the foundation for healthy eating. When you have structured ideas for healthy meals and foods available at home to prepare those meals, weight control becomes easier.  If only healthy foods are available at home, then you will be much more likely to eat healthy foods. And you will be less likely to go to a restaurant or  a fast food meal, which tend to be unhealthy and higher in calories than meals prepared at home.      Take 5-10 minutes each week to plan meals for the next 7 days.  Make a grocery list based on the meal plan.    Grocery Shopping Tips:  Shop on a full stomach.  Schedule your shopping for times when you are most motivated and able to be disciplined about your purchases. For example, after a stressful day at work it may be difficult to make the healthiest choices. Shopping at other times, such as early in the morning or after dinner, may be easier.  Focus your shopping on the outside aisles of the store, which tend to contain more fresh foods and lower calorie foods. The inside aisles tend to have more processed foods.  Stick to your list. Avoid buying unhealthy items just because they are on sale.   Compare nutrition labels to check the number of calories and percentage of fat.      What to buy:    Vegetables  Fresh vegetables  Frozen vegetables with no sauce or added salt  Canned vegetables with  no sauce or added salt    Protein  Lean meats, such as chicken and turkey  Limit red meats, such as beef to no more than 1x/week  Limit processed meats, such as cold cuts, barkley, sausage, and hot dogs. Look for brands that have no nitrites and are minimally processed. Consider turkey sausage or turkey barkley.  Fish and Shellfish  Eggs  Dried beans  Canned beans (reduced sodium)    Fat  Use healthy oils, such as olive oil or canola oil, for cooking, salad dressings, etc.  Unflavored nuts and seeds  Nut butters (no added sugar)    Dairy  Yogurt (no sugar added)  Cheese  Low-fat milk  Unsweetened nondairy milks (almond milk, soy milk, etc)    Fruit  Fresh Fruit  Frozen fruit with no added sugar  Canned fruit with no added sugar  Dried fruit with no added sugar  100% fruit juice    Whole Grains  Single ingredient grains, such as oats, quinoa, brown rice  Whole-wheat pasta  Sprouted whole-grain bread    What to avoid:  - Avoid fried foods  - Avoid foods with added sugar  - Avoid sugar-sweetened beverages  - Avoid ultra-processed foods      Lifestyle Activity    Lifestyle activity consists of all the activities that burn calories during the course of a normal day. Using the stairs, washing the dishes, or even getting up to turn off the television are all examples of lifestyle activity. All activities, no matter how small, burn calories. Increasing lifestyle activity can help with weight control, so building physical activity into your everyday routine is important.     A pedometer is a tool that can help you track how much lifestyle activity you are getting. It can help you stay active. A pedometer counts each step you take and displays your total steps on the screen. Many smartphones, including [employer] and Androids, have applications that automatically count your steps. If you decide to use this method, make sure you are holding or wearing your smartphone so it can count all of your steps.     During the next 2 weeks, aim  for 5,000 or more steps per day. Each week aim to increase your daily step goal by 250 so that you will reach an average of 10,000 steps per day (equal to walking 4 to 5 miles).     Start making more active choices in your routine in order to increase the amount of walking you do each day.     Strategies to Increase Lifestyle Activity:    Take several 5-10-minute walks during the day.   Set a reminder to take a 5 minute break from your desk every hour.   Choose the farthest entrance to a building that you are entering.   Host walking meetings at work.   Walk down the sheehan to contact co-workers face-to-face, instead of emailing or calling.  Walk to a restroom, water cooler, or copy machine on a different floor at work.   Walk during your lunch break.   Park farther away in parking lots.   Get off the bus or train earlier and walk farther to your destination.   Take the stairs rather than the elevator or the escalator.   Start a walking club with co-workers or neighbors.   Walk - don't drive - for trips less than one mile.   Take an after-dinner walk with family.   Go for a walk while talking on your wireless phone.   Walk the dog more often.   If you prefer to stay indoors because of the weather, try walking in a shopping mall or doing laps around a large store.   Purchase a treadmill to use at home.   Schedule time for walking every week and stick to it like any other appointment.   Or think of your own strategy: _______________________________       Physical Activity    Physical activity improves your health and helps with weight control, especially weight loss maintenance.      When starting to incorporate an exercise routine into your day, it is helpful to set specific goals.  For example, I will walk at moderate intensity from 12:30-12:45pm on Monday, Wednesday, and Friday.  Schedule your exercise time into your calendar.  Think of any potential barriers that may come up and prevent you from exercising.   Develop solutions or back-up plans for when these barriers may arise.    Walking is an ideal activity to start with if you do not currently have an exercise routine. You can make the activity more fun by doing it with a friend or listening to music or a book on tape while you do it. If you don't enjoy walking, think of other activities you like, such as bike riding or swimming.  Other alternatives include group fitness classes or exercise at home using DVDs, Apps, etc.    If you are new to exercising, then start with 10 minutes 3-4 days per week.  After two weeks, increase to 15 minutes 3-4 days per week.  If you already exercise more than this, continue at your higher level, or increase by 10-15 minutes if able.         Aerobic Activity  Aerobic Activity gets you breathing harder and your heart beating faster.  Eventually, you should work up to 150 - 300 minutes of moderate-intensity aerobic activity every week or 75 - 150 minutes of vigorous-intensity aerobic activity every week.  An easy way to gauge the intensity of your activity is with the Talk Test.  During moderate activity, you should be able to talk, but not sing without having to pause for a breath.  During vigorous activity, you shouldn't be able to say more than a few words without pausing for a breath.  You should not push yourself until you are completely out of breath, tired, or sore.    Examples of Aerobic Activity:  Walking  Running  Swimming  Biking  Playing sports like tennis or basketball  Dancing  Jumping Rope      Strength Training  It is also recommended that you perform muscle-strengthening activities at least 2 days per week.  Be sure to include activities that work all major muscle groups (legs, arms, abdomen, back, buttocks, chest, and shoulders)    Examples of Strength Training  Lifting weights  Working with resistance band  Using your body weight for resistance (squats, push-ups,  sit-ups)  Pilates  Yoga      https://health.gov/moveyourway/activity-planner/      Remember to keep a record of your activity to track your progress.

## 2024-12-06 NOTE — TELEPHONE ENCOUNTER
No care due was identified.  [facility] Embedded Care Due Messages. Reference number: [id number].   12/06/2024 12:33:03 AM CST

## 2024-12-06 NOTE — PROGRESS NOTES
Subjective     Patient ID: Rema Horton is a 49 y.o. female.    Chief Complaint: Consult and Obesity    Patient presents for treatment of obesity.     Co-morbidities   ADHD  Fibromyalgia    Denies any personal or family history of thyroid cancer      History of Weight Loss Efforts  No prior use of weight loss medications    Current Physical Activity  No regular exercise routine    Current Eating Habits  Breakfast - coffee, banana  Lunch - often skips  Dinner - mac & cheese, chicken nuggets, salads, beans and rice, pizza, sandwich  Snacks - fruit  Beverages - smoothies, whole milk, water    Medical Weight Loss  12/6/2024: 168.3 lbs, BMI 31.8, BFP 44.9%, BFM 75.7 lbs, SMM 50.7 lbs, BMR 1278 kcal      Review of Systems   Constitutional:  Negative for chills and fever.   Respiratory:  Negative for shortness of breath.    Cardiovascular:  Negative for chest pain and palpitations.   Gastrointestinal:  Negative for abdominal pain, nausea and vomiting.   Neurological:  Negative for dizziness and light-headedness.   Psychiatric/Behavioral:  The patient is not nervous/anxious.           Objective    Latest Reference Range & Units 05/10/24 11:31   WBC 3.90 - 12.70 K/uL 4.73   RBC 4.00 - 5.40 M/uL 4.22   Hemoglobin 12.0 - 16.0 g/dL 13.2   Hematocrit 37.0 - 48.5 % 40.3   MCV 82 - 98 fL 96   MCH 27.0 - 31.0 pg 31.3 (H)   MCHC 32.0 - 36.0 g/dL 32.8   RDW 11.5 - 14.5 % 12.4   Platelet Count 150 - 450 K/uL 254   MPV 9.2 - 12.9 fL 10.1   Gran % 38.0 - 73.0 % 49.4   Lymph % 18.0 - 48.0 % 41.4   Mono % 4.0 - 15.0 % 8.2   Eos % 0.0 - 8.0 % 0.6   Basophil % 0.0 - 1.9 % 0.2   Immature Granulocytes 0.0 - 0.5 % 0.2   Gran # (ANC) 1.8 - 7.7 K/uL 2.3   Lymph # 1.0 - 4.8 K/uL 2.0   Mono # 0.3 - 1.0 K/uL 0.4   Eos # 0.0 - 0.5 K/uL 0.0   Baso # 0.00 - 0.20 K/uL 0.01   Immature Grans (Abs) 0.00 - 0.04 K/uL 0.01   nRBC 0 /100 WBC 0   Differential Method  Automated   Vitamin B12 210 - 950 pg/mL 1651 (H)   Sodium 136 - 145 mmol/L 141   Potassium  3.5 - 5.1 mmol/L 4.0   Chloride 95 - 110 mmol/L 107   CO2 23 - 29 mmol/L 23   Anion Gap 8 - 16 mmol/L 11   BUN 6 - 20 mg/dL 14   Creatinine 0.5 - 1.4 mg/dL 0.7   eGFR >60 mL/min/1.73 m^2 >60.0   Glucose 70 - 110 mg/dL 84   Calcium 8.7 - 10.5 mg/dL 10.1   ALP 55 - 135 U/L 73   PROTEIN TOTAL 6.0 - 8.4 g/dL 7.6   Albumin 3.5 - 5.2 g/dL 4.2   BILIRUBIN TOTAL 0.1 - 1.0 mg/dL 0.3   AST 10 - 40 U/L 23   ALT 10 - 44 U/L 23   Cholesterol Total 120 - 199 mg/dL 214 (H)   HDL 40 - 75 mg/dL 56   HDL/Cholesterol Ratio 20.0 - 50.0 % 26.2   Non-HDL Cholesterol mg/dL 158   Total Cholesterol/HDL Ratio 2.0 - 5.0  3.8   Triglycerides 30 - 150 mg/dL 95   LDL Cholesterol 63.0 - 159.0 mg/dL 139.0   Vitamin D 30 - 96 ng/mL 31   Hemoglobin A1C External 4.0 - 5.6 % 5.3   Estimated Avg Glucose 68 - 131 mg/dL 105   TSH 0.400 - 4.000 uIU/mL 2.641   (H): Data is abnormally high    Vitals:    12/06/24 1001   BP: (!) 140/78   Pulse: 86       Physical Exam  Vitals reviewed.   Constitutional:       General: She is not in acute distress.     Appearance: Normal appearance. She is obese. She is not ill-appearing, toxic-appearing or diaphoretic.   HENT:      Head: Normocephalic and atraumatic.   Cardiovascular:      Rate and Rhythm: Normal rate.   Pulmonary:      Effort: Pulmonary effort is normal. No respiratory distress.   Skin:     General: Skin is warm and dry.   Neurological:      Mental Status: She is alert and oriented to person, place, and time.            Assessment and Plan     1. Class 1 obesity due to excess calories without serious comorbidity with body mass index (BMI) of 31.0 to 31.9 in adult  -     Ambulatory referral/consult to Bariatric/Obesity Medicine    2. Encounter for weight loss counseling      - Topiramate 25 mg twice daily    - Log all food and beverage intake with a daily calorie goal of 1000 calories per day    - Low intensity aerobic exercise for 15-30 minutes 3-5x/week

## 2024-12-11 ENCOUNTER — OFFICE VISIT (OUTPATIENT)
Dept: DERMATOLOGY | Facility: CLINIC | Age: 49
End: 2024-12-11
Payer: COMMERCIAL

## 2024-12-11 DIAGNOSIS — L81.0 POST-INFLAMMATORY HYPERPIGMENTATION: ICD-10-CM

## 2024-12-11 DIAGNOSIS — L29.9 PRURITUS: ICD-10-CM

## 2024-12-11 DIAGNOSIS — B35.2 TINEA MANUUM: Primary | ICD-10-CM

## 2024-12-11 DIAGNOSIS — D22.39 FIBROUS PAPULE OF NOSE: ICD-10-CM

## 2024-12-11 DIAGNOSIS — L57.3 POIKILODERMA OF CIVATTE: ICD-10-CM

## 2024-12-11 PROCEDURE — 1159F MED LIST DOCD IN RCRD: CPT | Mod: CPTII,S$GLB,, | Performed by: DERMATOLOGY

## 2024-12-11 PROCEDURE — 99213 OFFICE O/P EST LOW 20 MIN: CPT | Mod: S$GLB,,, | Performed by: DERMATOLOGY

## 2024-12-11 PROCEDURE — 3044F HG A1C LEVEL LT 7.0%: CPT | Mod: CPTII,S$GLB,, | Performed by: DERMATOLOGY

## 2024-12-11 PROCEDURE — 1160F RVW MEDS BY RX/DR IN RCRD: CPT | Mod: CPTII,S$GLB,, | Performed by: DERMATOLOGY

## 2024-12-11 PROCEDURE — 99999 PR PBB SHADOW E&M-EST. PATIENT-LVL II: CPT | Mod: PBBFAC,,, | Performed by: DERMATOLOGY

## 2024-12-14 RX ORDER — KETOCONAZOLE 20 MG/G
CREAM TOPICAL
Qty: 30 G | Refills: 1 | Status: SHIPPED | OUTPATIENT
Start: 2024-12-14

## 2024-12-14 NOTE — PATIENT INSTRUCTIONS

## 2024-12-14 NOTE — PROGRESS NOTES
Subjective:      Patient ID:  Rema Horton is a 49 y.o. female who presents for   Chief Complaint   Patient presents with    Rash     Rash      Pt here today to f/u on tinea manuum diagnosed last visit on 11/6.  Tomorrow is her last day of lamisil 250mg x 4 wks.  Has had nausea, but had this on and off before taking lamisil too.  Rash has improved. Only one small itchy area remains at base of L palm.    Also reports discoloration of sides of neck. Looking for a good sunscreen.    States she gets itchy all over when she sweats.  Feels better with cold water.  Uses Dove sensitive soap    Also has a bump on nose x yrs. Asymptomatic and no prior treatment.    Review of Systems   Constitutional:  Negative for fever and chills.   Skin:  Positive for itching and rash.       Objective:   Physical Exam   Constitutional: She appears well-developed and well-nourished. No distress.   Neurological: She is alert and oriented to person, place, and time. She is not disoriented.   Psychiatric: She has a normal mood and affect.   Skin:   Areas Examined (abnormalities noted in diagram):   Head / Face Inspection Performed  Neck Inspection Performed  Chest / Axilla Inspection Performed  RUE Inspected  LUE Inspection Performed  Nails and Digits Inspection Performed                Diagram Legend     Erythematous scaling macule/papule c/w actinic keratosis       Vascular papule c/w angioma      Pigmented verrucoid papule/plaque c/w seborrheic keratosis      Yellow umbilicated papule c/w sebaceous hyperplasia      Irregularly shaped tan macule c/w lentigo     1-2 mm smooth white papules consistent with Milia      Movable subcutaneous cyst with punctum c/w epidermal inclusion cyst      Subcutaneous movable cyst c/w pilar cyst      Firm pink to brown papule c/w dermatofibroma      Pedunculated fleshy papule(s) c/w skin tag(s)      Evenly pigmented macule c/w junctional nevus     Mildly variegated pigmented, slightly irregular-bordered  macule c/w mildly atypical nevus      Flesh colored to evenly pigmented papule c/w intradermal nevus       Pink pearly papule/plaque c/w basal cell carcinoma      Erythematous hyperkeratotic cursted plaque c/w SCC      Surgical scar with no sign of skin cancer recurrence      Open and closed comedones      Inflammatory papules and pustules      Verrucoid papule consistent consistent with wart     Erythematous eczematous patches and plaques     Dystrophic onycholytic nail with subungual debris c/w onychomycosis     Umbilicated papule    Erythematous-base heme-crusted tan verrucoid plaque consistent with inflamed seborrheic keratosis     Erythematous Silvery Scaling Plaque c/w Psoriasis     See annotation      Assessment / Plan:        Tinea manuum  -     ketoconazole (NIZORAL) 2 % cream; AAA on palm bid  Dispense: 30 g; Refill: 1    Fibrous papule of nose  This is a benign growth and no further treatment of this site is necessary.    Poikiloderma of [place]  Due to cumulative sun exposure.  Discussed sunscreens with physical blockers and samples provided.    Pruritus  Good skin care regimen discussed including limiting to one bath or shower/day, using lukewarm water with mild soap and moisturizing cream to skin 1 - 2x/day. Brochure was provided and reviewed with patient.    Recommended Vanicream products.    Post-inflammatory hyperpigmentation  This is normal discoloration of the skin after an inflammatory process has resolved. It may take months to years to fade.    Follow up in about 6 months (around 6/11/2025) for skin check or sooner for any concerns.

## 2024-12-17 ENCOUNTER — TELEPHONE (OUTPATIENT)
Dept: NEUROLOGY | Facility: CLINIC | Age: 49
End: 2024-12-17
Payer: COMMERCIAL

## 2024-12-17 ENCOUNTER — OFFICE VISIT (OUTPATIENT)
Dept: PHYSICAL MEDICINE AND REHAB | Facility: CLINIC | Age: 49
End: 2024-12-17
Payer: COMMERCIAL

## 2024-12-17 DIAGNOSIS — R20.0 NUMBNESS IN FEET: ICD-10-CM

## 2024-12-17 DIAGNOSIS — G89.4 CHRONIC PAIN DISORDER: ICD-10-CM

## 2024-12-17 DIAGNOSIS — M79.7 FIBROMYALGIA SYNDROME: Primary | ICD-10-CM

## 2024-12-17 DIAGNOSIS — M79.671 RIGHT FOOT PAIN: ICD-10-CM

## 2024-12-17 DIAGNOSIS — M94.0 COSTOCHONDRITIS: ICD-10-CM

## 2024-12-17 PROCEDURE — 99215 OFFICE O/P EST HI 40 MIN: CPT | Mod: S$GLB,,, | Performed by: PHYSICAL MEDICINE & REHABILITATION

## 2024-12-17 PROCEDURE — 99999 PR PBB SHADOW E&M-EST. PATIENT-LVL II: CPT | Mod: PBBFAC,,, | Performed by: PHYSICAL MEDICINE & REHABILITATION

## 2024-12-17 PROCEDURE — 3044F HG A1C LEVEL LT 7.0%: CPT | Mod: CPTII,S$GLB,, | Performed by: PHYSICAL MEDICINE & REHABILITATION

## 2024-12-17 RX ORDER — CELECOXIB 200 MG/1
200 CAPSULE ORAL DAILY
Qty: 30 CAPSULE | Refills: 3 | Status: SHIPPED | OUTPATIENT
Start: 2024-12-17

## 2024-12-17 RX ORDER — TIZANIDINE 2 MG/1
4 TABLET ORAL 2 TIMES DAILY PRN
Qty: 14 TABLET | Refills: 0 | Status: SHIPPED | OUTPATIENT
Start: 2024-12-17 | End: 2024-12-24

## 2024-12-17 RX ORDER — GABAPENTIN 300 MG/1
600 CAPSULE ORAL NIGHTLY
Start: 2024-12-17

## 2024-12-17 NOTE — TELEPHONE ENCOUNTER
Called Pt to request if she can come in around 9:30 am for 10 am appt. I was unable to speak with her but left a vm.

## 2024-12-17 NOTE — PROGRESS NOTES
Subjective:       Patient ID: Rema Horton is a 49 y.o. female.    Chief Complaint: No chief complaint on file.      HPI    Mrs. Horton is a 49-year-old female with past medical history of anemia, left breast nodule, interstitial cystitis, positive FELIPA,  anxiety/depression, ADHD, obstructive sleep apnea on CPAP.  She [facility] for fibromyalgia syndrome, bilateral costochondritis, painful bilateral foot numbness and bilateral knee pain.  Her last visit was on 8/20/2024.  She was started on gabapentin and p.r.n. cyclobenzaprine.  She was maintained on meloxicam and duloxetine.  She was not able to commit to physical therapy.    The patient is coming to the clinic for follow-up. Her fibromyalgia symptoms including generalized muscle and joint aching, stiffness, fatigue/poor energy, brain fog and hypersensitivity to pain been stable with occasional flare ups, usually related to excess activity and stress, and to sleep impairment.  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    She continues to complain pain in bilateral costochondral junctions. It has been stable with occasional flare ups.    X-rays of the left knee on 4/16/24 were unremarkable.  Her bilateral knee pain has been stable with occasional flare ups.    She continues to complain of painful bilateral foot numbness.  She started complaining about a month ago of pain in her right foot.  It is an intermittent stabbing sensation in the dorsum of the midfoot.  It is worse with weight-bearing and with extension at the ankle.  Her maximum pain is 9-10/10 and minimum 3-4/10.  Today it is 6-7/10.  She denies any warmth or swelling of her foot.      She is currently on:  Meloxicam 7 5 mg tablets, 1 tablet daily  Gabapentin 300 mg capsules, 1 capsule in the evening.    Duloxetine 60 mg capsule, 1 capsule daily (prescribed by Psychiatry).    He was started on cyclobenzaprine last  visit but stopped due to rash.  She was previously on baclofen but it was stopped due to bladder leakage.  She tries to stay active.   She went to physical therapy weekly for about 4 weeks.  She reports that it makes her sore.    Past Medical History:   Diagnosis Date    Abnormal Pap smear of cervix     Anemia     Breast disorder     Fever blister     Fibromyalgia     IC (interstitial cystitis)         Review of patient's allergies indicates:   Allergen Reactions    Flexeril  [cyclobenzaprine]      Other reaction(s): Rash    Lodine  [etodolac] Rash        Review of Systems   Constitutional:  Positive for fatigue.   Eyes:  Positive for visual disturbance.   Respiratory:  Negative for shortness of breath.    Cardiovascular:  Positive for chest pain.   Gastrointestinal:  Positive for nausea and vomiting. Negative for blood in stool.   Genitourinary:  Negative for difficulty urinating.   Musculoskeletal:  Positive for arthralgias, back pain, myalgias and neck pain. Negative for gait problem.   Neurological:  Positive for dizziness, headaches and memory loss. Negative for weakness.   Psychiatric/Behavioral:  Positive for sleep disturbance. Negative for behavioral problems.              Objective:      Physical Exam  Vitals reviewed.   Constitutional:       Appearance: She is well-developed.   HENT:      Head: Normocephalic and atraumatic.   Eyes:      Extraocular Movements: Extraocular movements intact.   Musculoskeletal:      Cervical back: Normal range of motion. No tenderness.      Comments: BUE:  ROM:   RUE: full.   LUE: full.  Strength:    RUE: 5-/5 at shoulder abduction, 5 elbow flexion, 5 elbow extension, 5 hand .   LUE: 5-/5 at shoulder abduction, 5 elbow flexion, 5 elbow extension, 5 hand .  Sensation to pinprick:   RUE: intact.   LUE: intact.        BLE:  ROM:   RLE: full.   LLE: full.  Knee crepitus:   RLE: -ve.   LLE: -ve.   Strength:    RLE: 5/5 at hip flexion, 5 knee extension, 5 ankle DF, 5 PF.    LLE: 5/5 at hip flexion, 5 knee extension, 5 ankle DF, 5 PF.  Sensation to pinprick:     RLE: intact.      LLE: intact.   SLR (sitting):      RLE: -ve.      LLE: -ve.    +ve diffuse tender points over the upper & lower back, anterior chest wall, hips, elbows & knees    Gait: WNL       Skin:     General: Skin is warm.   Neurological:      General: No focal deficit present.      Mental Status: She is alert.   Psychiatric:         Mood and Affect: Mood normal.         Behavior: Behavior normal.       Assessment:       1. Fibromyalgia syndrome    2. Costochondritis    3. Numbness in feet    4. Right foot pain    5. Chronic pain disorder        Plan:         - Discontinue meloxicam.  - Start celecoxib (CELEBREX) 200 MG capsule; Take 1 capsule (200 mg total) by mouth once daily. In 1-2 weeks, if no relief, may increase dose to twice per day.  - Continue DULoxetine (CYMBALTA) 60 MG capsule; Take 1 capsule (60 mg total) by mouth once daily (prescribed by Psychiatry but our clinic can take over this prescription if necessary).  - Trial of increasing gabapentin (NEURONTIN) 300 MG capsule; Take 2 capsules (600 mg total) by mouth every evening.   - Start tiZANidine (ZANAFLEX) 2 MG tablet; Take 2 tablets (4 mg total) by mouth 2 (two) times daily as needed.  - Regular home exercise program was encouraged.  - Referral to Podiatry for evaluation and treatment right pain.  - Follow up in about 4 months (around 4/17/2025).      This was a 40 minute visit, 50% of which was spent educating the patient about the diagnosis and the treatment plan.    This note was partly generated with enymotion voice recognition software. I apologize for any possible typographical errors.

## 2024-12-20 ENCOUNTER — PATIENT MESSAGE (OUTPATIENT)
Dept: BARIATRICS | Facility: CLINIC | Age: 49
End: 2024-12-20
Payer: COMMERCIAL

## 2024-12-23 RX ORDER — TIRZEPATIDE 2.5 MG/.5ML
2.5 INJECTION, SOLUTION SUBCUTANEOUS
Qty: 2 ML | Refills: 0 | Status: SHIPPED | OUTPATIENT
Start: 2024-12-23 | End: 2025-01-21

## 2024-12-23 RX ORDER — TIRZEPATIDE 7.5 MG/.5ML
7.5 INJECTION, SOLUTION SUBCUTANEOUS
Qty: 2 ML | Refills: 0 | Status: SHIPPED | OUTPATIENT
Start: 2025-02-17 | End: 2025-03-11

## 2024-12-23 RX ORDER — TIRZEPATIDE 5 MG/.5ML
5 INJECTION, SOLUTION SUBCUTANEOUS
Qty: 2 ML | Refills: 0 | Status: SHIPPED | OUTPATIENT
Start: 2025-01-20 | End: 2025-02-11

## 2024-12-27 ENCOUNTER — PATIENT MESSAGE (OUTPATIENT)
Dept: INTERNAL MEDICINE | Facility: CLINIC | Age: 49
End: 2024-12-27
Payer: COMMERCIAL

## 2024-12-31 ENCOUNTER — OFFICE VISIT (OUTPATIENT)
Dept: INTERNAL MEDICINE | Facility: CLINIC | Age: 49
End: 2024-12-31
Payer: COMMERCIAL

## 2024-12-31 VITALS
OXYGEN SATURATION: 98 % | HEIGHT: 61 IN | BODY MASS INDEX: 32.02 KG/M2 | SYSTOLIC BLOOD PRESSURE: 110 MMHG | HEART RATE: 78 BPM | DIASTOLIC BLOOD PRESSURE: 66 MMHG | WEIGHT: 169.63 LBS

## 2024-12-31 DIAGNOSIS — M79.7 FIBROMYALGIA: ICD-10-CM

## 2024-12-31 DIAGNOSIS — F90.9 ATTENTION DEFICIT HYPERACTIVITY DISORDER (ADHD), UNSPECIFIED ADHD TYPE: ICD-10-CM

## 2024-12-31 DIAGNOSIS — F33.1 DEPRESSION, MAJOR, RECURRENT, MODERATE: ICD-10-CM

## 2024-12-31 DIAGNOSIS — M62.830 BACK SPASM: Primary | ICD-10-CM

## 2024-12-31 DIAGNOSIS — F41.9 ANXIETY: ICD-10-CM

## 2024-12-31 PROCEDURE — 3008F BODY MASS INDEX DOCD: CPT | Mod: CPTII,S$GLB,, | Performed by: PHYSICIAN ASSISTANT

## 2024-12-31 PROCEDURE — 3078F DIAST BP <80 MM HG: CPT | Mod: CPTII,S$GLB,, | Performed by: PHYSICIAN ASSISTANT

## 2024-12-31 PROCEDURE — 99214 OFFICE O/P EST MOD 30 MIN: CPT | Mod: S$GLB,,, | Performed by: PHYSICIAN ASSISTANT

## 2024-12-31 PROCEDURE — 3044F HG A1C LEVEL LT 7.0%: CPT | Mod: CPTII,S$GLB,, | Performed by: PHYSICIAN ASSISTANT

## 2024-12-31 PROCEDURE — 99999 PR PBB SHADOW E&M-EST. PATIENT-LVL V: CPT | Mod: PBBFAC,,, | Performed by: PHYSICIAN ASSISTANT

## 2024-12-31 PROCEDURE — 3074F SYST BP LT 130 MM HG: CPT | Mod: CPTII,S$GLB,, | Performed by: PHYSICIAN ASSISTANT

## 2024-12-31 PROCEDURE — 1160F RVW MEDS BY RX/DR IN RCRD: CPT | Mod: CPTII,S$GLB,, | Performed by: PHYSICIAN ASSISTANT

## 2024-12-31 PROCEDURE — 1159F MED LIST DOCD IN RCRD: CPT | Mod: CPTII,S$GLB,, | Performed by: PHYSICIAN ASSISTANT

## 2024-12-31 RX ORDER — TIZANIDINE HYDROCHLORIDE 2 MG/1
4 CAPSULE, GELATIN COATED ORAL 2 TIMES DAILY PRN
COMMUNITY

## 2024-12-31 NOTE — PROGRESS NOTES
Subjective:       Patient ID: Rema Horton is a 49 y.o. female.        Chief Complaint: Follow-up    Rema Horton is an established patient of Britta Clark MD here today for urgent care visit.     Now seeing Dr. Rodriguez  Changed from baclofen to tizanidine  On 12/23 she felt low back pain, spasm  On 12/25 she got up and went to take a bath  She had a terrible spasm across her back and had to go lay down  She took a tizanidine and this helped  Now all completely resolved  Lasted about 5 hours on 12/25    Anxious, jumps from one issue to the next and hard to follow at times.     Fibromyalgia, chronic pain -   Celebrex 200 mg daily  Gabapentin 300 mg 1-2 tablets nightly  Cymbalta 60 mg daily  Tizanidine 2 mg 2 BID prn     Anxiety, depression, ADHD -   Adderall XR 10 mg daily, uses approximately 3 times/week  Cymbalta 60 mg daily     Rash, eczema     BMI 32 -   Seeing bariatric medicine  Has not started zepbound yet  Topamax made her feel confused         Back Pain  This is a recurrent problem. The current episode started in the past 7 days. The problem occurs constantly. The problem has been waxing and waning since onset. The pain is present in the sacro-iliac and costovertebral angle. The quality of the pain is described as burning and cramping. The pain radiates to the right foot. The pain is at a severity of 8/10. The pain is severe. The pain is Worse during the night. The symptoms are aggravated by bending, position, lying down, sitting and twisting. Stiffness is present All day. Associated symptoms include bladder incontinence, leg pain, paresis, paresthesias, tingling and weakness (generalized during muscle spasm). Pertinent negatives include no abdominal pain, bowel incontinence, chest pain, dysuria, fever, headaches, numbness, pelvic pain, perianal numbness or weight loss. Risk factors include history of steroid use, menopause, obesity and poor posture. The treatment provided moderate relief.    Follow-up  Associated symptoms include weakness (generalized during muscle spasm). Pertinent negatives include no abdominal pain, arthralgias, chest pain, chills, congestion, coughing, diaphoresis, fatigue, fever, headaches, nausea, numbness, rash, sore throat or vomiting.      Review of Systems   Constitutional:  Negative for chills, diaphoresis, fatigue, fever and weight loss.   HENT:  Negative for congestion and sore throat.    Eyes:  Negative for visual disturbance.   Respiratory:  Negative for cough, chest tightness and shortness of breath.    Cardiovascular:  Negative for chest pain, palpitations and leg swelling.   Gastrointestinal:  Negative for abdominal pain, blood in stool, bowel incontinence, constipation, diarrhea, nausea and vomiting.   Genitourinary:  Positive for bladder incontinence. Negative for dysuria, frequency, hematuria, pelvic pain and urgency.   Musculoskeletal:  Positive for back pain. Negative for arthralgias.   Skin:  Negative for rash.   Neurological:  Positive for tingling, weakness (generalized during muscle spasm) and paresthesias. Negative for dizziness, syncope, numbness and headaches.   Psychiatric/Behavioral:  Negative for dysphoric mood and sleep disturbance. The patient is not nervous/anxious.        Objective:      Physical Exam  Vitals and nursing note reviewed.   Constitutional:       Appearance: Normal appearance. She is well-developed.   HENT:      Head: Normocephalic.      Right Ear: External ear normal.      Left Ear: External ear normal.   Eyes:      Pupils: Pupils are equal, round, and reactive to light.   Cardiovascular:      Rate and Rhythm: Normal rate and regular rhythm.      Heart sounds: Normal heart sounds. No murmur heard.     No friction rub. No gallop.   Pulmonary:      Effort: Pulmonary effort is normal. No respiratory distress.      Breath sounds: Normal breath sounds.   Abdominal:      Palpations: Abdomen is soft.      Tenderness: There is no abdominal  "tenderness.   Musculoskeletal:      Lumbar back: Normal. No spasms or tenderness. Normal range of motion. Negative right straight leg raise test and negative left straight leg raise test.   Skin:     General: Skin is warm and dry.   Neurological:      Mental Status: She is alert.      Sensory: Sensation is intact.      Motor: Motor function is intact.         Assessment:       1. Back spasm    2. Depression, major, recurrent, moderate    3. Anxiety    4. Attention deficit hyperactivity disorder (ADHD), unspecified ADHD type    5. Fibromyalgia        Plan:       Rema was seen today for follow-up.    Diagnoses and all orders for this visit:    Back spasm - she treated with home tizanidine which really helped, discussed using moist heat at time of spasm as well as trying to get up and move around as much as she can, now all resolved, monitor    Depression, major, recurrent, moderate  Anxiety  Attention deficit hyperactivity disorder (ADHD), unspecified ADHD type       -     Follows with psychiatry, chronic and stable    Fibromyalgia - doing well on current regimen, she continues to have some pain but feels overall much better    Pt has been given instructions populated from patient instructions database and has verbalized understanding of the after visit summary and information contained wherein.    Follow up with a primary care provider. May go to ER for acute shortness of breath, lightheadedness, fever, or any other emergent complaints or changes in condition.    "This note will be shared with the patient"    Future Appointments   Date Time Provider Department Center   1/7/2025 10:40 AM aJda Platt FNP-BRITTANIE [facility] GASTRO [address]   1/8/2025  2:00 PM Umberto Dan MD [facility] SLEEP [facility]   1/16/2025 10:00 AM Imelda Atkinson NP [facility] COLON [address]   1/23/2025 10:30 AM Ivan Cobos DPM [facility] POD [address]   2/5/2025 11:30 AM Ricky Becerra MD [facility] PSYCH [address]   3/27/2025 10:15 AM " Teresa Boykin MD [facility] CATH[facility] [address]   4/10/2025 10:30 AM Britta Clark MD [facility] [address]

## 2025-01-01 DIAGNOSIS — L24.5 IRRITANT CONTACT DERMATITIS DUE TO OTHER CHEMICAL PRODUCTS: ICD-10-CM

## 2025-01-01 NOTE — TELEPHONE ENCOUNTER
No care due was identified.  [facility] Embedded Care Due Messages. Reference number: [id number].   1/01/2025 12:30:50 AM CST

## 2025-01-02 RX ORDER — TRIAMCINOLONE ACETONIDE 1 MG/G
OINTMENT TOPICAL
Qty: 80 G | Refills: 1 | Status: SHIPPED | OUTPATIENT
Start: 2025-01-02

## 2025-01-02 NOTE — TELEPHONE ENCOUNTER
Refill Decision Note   Rema Hortno  is requesting a refill authorization.  Brief Assessment and Rationale for Refill:  Approve     Medication Therapy Plan:         Comments:     Note composed:9:21 AM 01/02/2025

## 2025-01-07 ENCOUNTER — OFFICE VISIT (OUTPATIENT)
Dept: GASTROENTEROLOGY | Facility: CLINIC | Age: 50
End: 2025-01-07
Payer: COMMERCIAL

## 2025-01-07 ENCOUNTER — TELEPHONE (OUTPATIENT)
Dept: ENDOSCOPY | Facility: HOSPITAL | Age: 50
End: 2025-01-07
Payer: COMMERCIAL

## 2025-01-07 VITALS
WEIGHT: 169.06 LBS | SYSTOLIC BLOOD PRESSURE: 145 MMHG | DIASTOLIC BLOOD PRESSURE: 97 MMHG | HEART RATE: 73 BPM | BODY MASS INDEX: 31.11 KG/M2 | HEIGHT: 62 IN

## 2025-01-07 VITALS — WEIGHT: 169 LBS | BODY MASS INDEX: 31.1 KG/M2 | HEIGHT: 62 IN

## 2025-01-07 DIAGNOSIS — Z12.11 SCREEN FOR COLON CANCER: Primary | ICD-10-CM

## 2025-01-07 DIAGNOSIS — R11.2 NAUSEA AND VOMITING, UNSPECIFIED VOMITING TYPE: ICD-10-CM

## 2025-01-07 DIAGNOSIS — R10.9 ABDOMINAL PAIN, UNSPECIFIED ABDOMINAL LOCATION: ICD-10-CM

## 2025-01-07 DIAGNOSIS — K60.2 ANAL FISSURE: ICD-10-CM

## 2025-01-07 DIAGNOSIS — K59.01 SLOW TRANSIT CONSTIPATION: Primary | ICD-10-CM

## 2025-01-07 DIAGNOSIS — K62.5 RECTAL BLEEDING: ICD-10-CM

## 2025-01-07 DIAGNOSIS — K21.9 GASTROESOPHAGEAL REFLUX DISEASE, UNSPECIFIED WHETHER ESOPHAGITIS PRESENT: ICD-10-CM

## 2025-01-07 DIAGNOSIS — R14.0 ABDOMINAL BLOATING: ICD-10-CM

## 2025-01-07 DIAGNOSIS — K62.89 ANAL OR RECTAL PAIN: ICD-10-CM

## 2025-01-07 DIAGNOSIS — N93.9 COMPLAINT OF VAGINAL BLEEDING: ICD-10-CM

## 2025-01-07 DIAGNOSIS — D50.9 IRON DEFICIENCY ANEMIA, UNSPECIFIED IRON DEFICIENCY ANEMIA TYPE: ICD-10-CM

## 2025-01-07 DIAGNOSIS — K62.5 RECTAL BLEEDING: Primary | ICD-10-CM

## 2025-01-07 DIAGNOSIS — R10.30 LOWER ABDOMINAL PAIN: ICD-10-CM

## 2025-01-07 PROCEDURE — 99204 OFFICE O/P NEW MOD 45 MIN: CPT | Mod: S$GLB,,,

## 2025-01-07 PROCEDURE — 99999 PR PBB SHADOW E&M-EST. PATIENT-LVL V: CPT | Mod: PBBFAC,,,

## 2025-01-07 PROCEDURE — 3077F SYST BP >= 140 MM HG: CPT | Mod: CPTII,S$GLB,,

## 2025-01-07 PROCEDURE — 1159F MED LIST DOCD IN RCRD: CPT | Mod: CPTII,S$GLB,,

## 2025-01-07 PROCEDURE — 1160F RVW MEDS BY RX/DR IN RCRD: CPT | Mod: CPTII,S$GLB,,

## 2025-01-07 PROCEDURE — 3008F BODY MASS INDEX DOCD: CPT | Mod: CPTII,S$GLB,,

## 2025-01-07 PROCEDURE — 3080F DIAST BP >= 90 MM HG: CPT | Mod: CPTII,S$GLB,,

## 2025-01-07 RX ORDER — TIZANIDINE 2 MG/1
2 TABLET ORAL 2 TIMES DAILY PRN
Qty: 60 TABLET | Refills: 0 | Status: SHIPPED | OUTPATIENT
Start: 2025-01-07

## 2025-01-07 RX ORDER — PANTOPRAZOLE SODIUM 40 MG/1
40 TABLET, DELAYED RELEASE ORAL DAILY
Qty: 30 TABLET | Refills: 2 | Status: SHIPPED | OUTPATIENT
Start: 2025-01-07 | End: 2025-04-07

## 2025-01-07 RX ORDER — HYDROCORTISONE ACETATE 25 MG/1
25 SUPPOSITORY RECTAL 2 TIMES DAILY PRN
Qty: 20 SUPPOSITORY | Refills: 0 | Status: SHIPPED | OUTPATIENT
Start: 2025-01-07 | End: 2025-01-17

## 2025-01-07 NOTE — PROGRESS NOTES
Gastroenterology Clinic Consultation Note    Reason for Visit:  The primary encounter diagnosis was Slow transit constipation. Diagnoses of Rectal bleeding, Abdominal bloating, Lower abdominal pain, Anal or rectal pain, Gastroesophageal reflux disease, unspecified whether esophagitis present, Complaint of vaginal bleeding, and Anal fissure were also pertinent to this visit.    PCP:   Britta Clark   [address] [facility] / [place] [address]      Initial HPI   This is a 49 y.o. female presenting for constipation, rectal bleeding/pain, abdominal pain/bloating, GERD  Patient in clinic with multiple GI complaints as above. She reports she has had constipation issues since 2010. She occasionally takes metamucil but reports it causes worsening abdominal pain and bloating. She has not tried any other OTC products. She reports having BM every 2 days sometimes hard in consistency. She reports anal pain and bleeding intermittently in which she has seen CRS in the past, last time October 2024. She was given nitroglycerin ointment for anal fissure. She has not had a colonoscopy or EGD in the past. She recently started Zepbound which caused increased abdominal pain and vaginal bleeding. She reports she had not had a menstrual cycle in 2 years so this was abnormal. She had a vaginal US in May 2024 that showed adenomyosis. Suggested following back up with gyn for further workup. She additionally reports reflux in which she has dealt with for years but is not currently taking any medications. Denies unintentional weight loss, fever, chills, nausea, vomiting, constipation, diarrhea, regurgitation, hematemesis, difficulties swallowing, changes in bowel habits, changes in stool caliber, and melena.        ROS:  Review of Systems   Constitutional:  Negative for chills, fever, malaise/fatigue and weight loss.   Respiratory:  Negative for shortness of breath.    Cardiovascular:  Negative for chest pain.   Gastrointestinal:   Positive for abdominal pain, blood in stool, constipation and heartburn. Negative for diarrhea, melena, nausea and vomiting.   Genitourinary:  Negative for hematuria.   Neurological:  Negative for dizziness and weakness.        Medical History:  has a past medical history of Abnormal Pap smear of cervix, Anemia, Breast disorder, Fever blister, Fibromyalgia, and IC (interstitial cystitis).    Surgical History:  has a past surgical history that includes Dilation and curettage of uterus ();  section (); and Breast biopsy (Left, 2014).    Family History: family history includes Amblyopia in her daughter; Breast cancer in her paternal grandmother; Cancer in her mother; Chromosomal disorder in her daughter; No Known Problems in her father and son; Stroke in her maternal grandfather..       Review of patient's allergies indicates:   Allergen Reactions    Flexeril  [cyclobenzaprine]      Other reaction(s): Rash    Lodine  [etodolac] Rash       Current Outpatient Medications on File Prior to Visit   Medication Sig Dispense Refill    acetaminophen (TYLENOL) 500 MG tablet Take 1-2 tablets (500-1,000 mg total) by mouth 3 (three) times daily as needed for Pain.      celecoxib (CELEBREX) 200 MG capsule Take 1 capsule (200 mg total) by mouth once daily. In 1-2 weeks, if no relief, may increase dose to twice per day. 30 capsule 3    cholecalciferol, vitamin D3, (VITAMIN D3) 25 mcg (1,000 unit) capsule TAKE 2 CAPSULES (2,000 UNITS TOTAL) BY MOUTH ONCE DAILY. 180 capsule 3    clobetasol 0.05% (TEMOVATE) 0.05 % Oint APPLY TOPICALLY 2 (TWO) TIMES DAILY. USE TO AFFECTED AREAS FOR UP TO 2 WEEKS THEN TAKE A 1 WEEK BREAK OR DECREASE TO 3 TIMES WEEKLY. DO NOT APPLY TO GROIN OR FACE. USE TO SPOT ON HAND 45 g 2    dextroamphetamine-amphetamine (ADDERALL XR) 10 MG 24 hr capsule Take 1 capsule (10 mg total) by mouth every morning. 30 capsule 0    dextroamphetamine-amphetamine (ADDERALL XR) 10 MG 24 hr capsule Take 1 capsule (10  mg total) by mouth every morning. 30 capsule 0    DULoxetine (CYMBALTA) 60 MG capsule Take 1 capsule (60 mg total) by mouth once daily. 90 capsule 1    fluocinolone (SYNALAR) 0.01 % external solution AAA scalp qday - bid prn itching or scaling 60 mL 3    fluticasone propionate (FLONASE) 50 mcg/actuation nasal spray SPRAY 1 SPRAY INTO EACH NOSTRIL TWICE A DAY 48 mL 3    gabapentin (NEURONTIN) 300 MG capsule Take 2 capsules (600 mg total) by mouth every evening. In 1 week, if no relief, may increase dose to twice per day.      ketoconazole (NIZORAL) 2 % cream AAA on palm bid 30 g 1    ketoconazole (NIZORAL) 2 % shampoo WASH HAIR WITH MEDICATED SHAMPOO AT LEAST 2X/WEEK - LET SIT ON SCALP AT LEAST 5 MINUTES PRIOR TO RINSING 120 mL 5    magnesium oxide (MAG-OX) 400 mg (241.3 mg magnesium) tablet TAKE 1 TABLET BY MOUTH TWICE A DAY 60 tablet 5    tacrolimus (PROTOPIC) 0.1 % ointment APPLY TOPICALLY 2 (TWO) TIMES DAILY. NON-STEROID. USE TO AREAS OF ECZEMA WHEN TAKING A BREAK FROM CLOBETASOL 100 g 4    tirzepatide, weight loss, (ZEPBOUND) 2.5 mg/0.5 mL PnIj Inject 2.5 mg into the skin every 7 days. for 4 doses (Patient not taking: Reported on 1/7/2025) 2 mL 0    [START ON 1/20/2025] tirzepatide, weight loss, (ZEPBOUND) 5 mg/0.5 mL PnIj Inject 5 mg into the skin every 7 days. for 4 doses (Patient not taking: Reported on 1/7/2025) 2 mL 0    [START ON 2/17/2025] tirzepatide, weight loss, (ZEPBOUND) 7.5 mg/0.5 mL PnIj Inject 7.5 mg into the skin every 7 days. for 4 doses (Patient not taking: Reported on 1/7/2025) 2 mL 0    tiZANidine 2 mg Cap Take 4 mg by mouth 2 (two) times daily as needed (spasm).      topiramate (TOPAMAX) 25 MG tablet Take 1 tablet (25 mg total) by mouth 2 (two) times daily. 180 tablet 0    triamcinolone acetonide 0.1% (KENALOG) 0.1 % ointment APPLY TOPICALLY 2 (TWO) TIMES DAILY TO LEFT THUMB 80 g 1    TURMERIC ORAL Take by mouth.      valACYclovir (VALTREX) 500 MG tablet TAKE 1 TABLET BY MOUTH EVERY DAY 30  "tablet 11    vitamin E acetate (VITAMIN E ORAL) Take by mouth once daily.      nitroglycerin (RECTIV) 0.4 % (w/w) Oint Place 1 Application rectally 2 (two) times a day. (Patient not taking: Reported on 12/31/2024) 30 g 2     No current facility-administered medications on file prior to visit.         Objective Findings:    Vital Signs:  BP (!) 145/97   Pulse 73   Ht 5' 2" (1.575 m)   Wt 76.7 kg (169 lb 1.5 oz)   BMI 30.93 kg/m²   Body mass index is 30.93 kg/m².    Physical Exam:  Physical Exam  Vitals and nursing note reviewed.   Constitutional:       General: She is not in acute distress.     Appearance: Normal appearance. She is not ill-appearing.   HENT:      Head: Normocephalic and atraumatic.      Right Ear: External ear normal.      Left Ear: External ear normal.      Nose: Nose normal.   Eyes:      General: No scleral icterus.     Extraocular Movements: Extraocular movements intact.   Cardiovascular:      Rate and Rhythm: Normal rate.   Pulmonary:      Effort: Pulmonary effort is normal. No respiratory distress.   Abdominal:      General: Bowel sounds are normal. There is no distension.      Palpations: Abdomen is soft.      Tenderness: There is no guarding.   Musculoskeletal:         General: Normal range of motion.      Cervical back: Normal range of motion.   Skin:     General: Skin is warm.   Neurological:      Mental Status: She is alert and oriented to person, place, and time.   Psychiatric:         Mood and Affect: Mood normal.         Behavior: Behavior is cooperative.         Thought Content: Thought content normal.             Labs:  Lab Results   Component Value Date    WBC 4.73 05/10/2024    HGB 13.2 05/10/2024    HCT 40.3 05/10/2024     05/10/2024    CRP 6.9 08/08/2024    CHOL 214 (H) 05/10/2024    TRIG 95 05/10/2024    HDL 56 05/10/2024    ALKPHOS 73 05/10/2024    LIPASE 26 08/26/2020    ALT 23 05/10/2024    AST 23 05/10/2024     05/10/2024    K 4.0 05/10/2024     " 05/10/2024    CREATININE 0.7 05/10/2024    BUN 14 05/10/2024    CO2 23 05/10/2024    TSH 2.641 05/10/2024    HGBA1C 5.3 05/10/2024       Imaging reviewed:   US PELVIS COMP WITH TRANSVAG NON-OB (XPD)     CLINICAL HISTORY:  Dorsalgia, unspecified     TECHNIQUE:  Transabdominal sonography of the pelvis was performed, followed by transvaginal sonography to better evaluate the uterus and ovaries.     COMPARISON:  Ultrasound pelvis 07/15/2021     FINDINGS:  Uterus:     Size: 6.3 x 4.4 x 4.4 cm     Masses: Postoperative changes from Caesarean section.  Diffuse heterogeneity of the myometrium with indistinctness of the junctional zone, asymmetric bulkiness of the posterior wall, and streaky posterior shadowing.  No discrete mass.     Endometrium: Normal in this john menopausal patient, measuring 5 mm.     Nabothian cysts noted.     Right ovary:     Size: 2.7 x 1.0 x 2.3  cm     Appearance: Normal.     Vascular flow: Normal.     Left ovary:     Size: 2.5 x 1.6 x 2.7 cm     Appearance: Normal.     Vascular Flow: Normal.  Prominent left adnexal vasculature.     Free Fluid:     None.     Impression:     Findings suggestive of adenomyosis.  No discrete uterine mass.     Prominent left adnexal vessels, which could represent pelvic congestion syndrome in the appropriate clinical setting.     Electronically signed by resident: Bertha Pro  Date:                                            06/13/2024      Endoscopy reviewed: NA       Assessment:  1. Slow transit constipation    2. Rectal bleeding    3. Abdominal bloating    4. Lower abdominal pain    5. Anal or rectal pain    6. Gastroesophageal reflux disease, unspecified whether esophagitis present    7. Complaint of vaginal bleeding    8. Anal fissure      Orders Placed This Encounter    pantoprazole (PROTONIX) 40 MG tablet    hydrocortisone (ANUSOL-HC) 25 mg suppository       Plan:  EGD and colonoscopy for current GI complaints  Anusol for anal fissure   Start protonix  daily as below  RTC based on scopes     Plan Constipation:   Start daily fiber. Take 1 tsp of fiber powder (Benefiber).  Mix in 8 oz of water.  Take x 3-5 days.  Then, increase fiber by 1 tsp every 3-5 days until stool is easy to pass.  Stop and continue at that dose.   Do not exceed 6 tsps/day. GOAL:  More well-formed stool (one continuous well-formed piece vs. Pellets) and minimize straining with initiation. Can cause increased gas.   Start miralax daily (17g per dose). Start at once per day and can titrate up to twice daily. Decrease and/or stop Miralax if stools become softer/liquid in consistency.   Increase water intake to 8-10 glasses of water per day  Stay active. 30-45 minutes of moderate activity 3-4 times per week. (Mobility=motility)     For GERD/Reflux:  Take your PPI 30-45 minutes before your first protein containing meal (breakfast) every day. Take daily for 8 weeks. If symptoms improve ok discontinue an use PPI as needed for symptoms.   Take Pepcid 20 mg every evening before bedtime to help with nocturnal symptoms, as needed.  Remain upright for at least 3 hours after eating.   Elevate the head of the bed for nighttime.   Avoid foods that you have noticed make your symptoms worse (possible triggers include: peppermint, alcohol, chocolate, caffeine, spicy foods, greasy/fried foods, acidic foods-citrus).   Lose weight if you are overweight -- of all of the lifestyle changes you can make, this one is the most effective.  Follow up in 8 weeks with provider to assess symptoms and ability to taper off PPI (can be a virtual visit).        Thank you for allowing me to participate in this patient's care.    Sincerely,     CARLOS MUHAMMAD  Gastroenterology Department  [facility] Office [phone]

## 2025-01-07 NOTE — TELEPHONE ENCOUNTER
"----- Message from CARLOS Leos sent at 2025 11:11 AM CST -----  Regarding: EGD/colonoscopy  Procedure: EGD/Colonoscopy    Diagnosis: Rectal bleeding, Abdominal pain, GERD, Iron deficiency anemia, and Nausea/Vomiting    Procedure Timin-12 weeks    #If within 4 weeks selected, please lalit as high priority#    #If greater than 12 weeks, please select "5-12 weeks" and delay sending until 3 months prior to requested date#     Location: Any Site    Additional Scheduling Information: No scheduling concerns    Prep Specifications:Extended/Constipation prep    Is the patient taking a GLP-1 Agonist:yes  Zepbound     Have you attached a patient to this message: yes  "

## 2025-01-07 NOTE — TELEPHONE ENCOUNTER
Referral for procedure from [facility]      Spoke to Rema to schedule procedure(s) Colonoscopy/EGD       Physician to perform procedure(s) Dr. VANESSA Abraham  Date of Procedure (s) 1/29/25  Arrival Time 9:00 AM  Time of Procedure(s) 10:00 AM   Location of Procedure(s) [place] [address] [facility]  Type of Rx Prep sent to patient: PEG  Instructions provided to patient via Copy in hand    Patient was informed on the following information and verbalized understanding. Screening questionnaire reviewed with patient and complete. If procedure requires anesthesia, a responsible adult needs to be present to accompany the patient home, patient cannot drive after receiving anesthesia. Appointment details are tentative, especially check-in time. Patient will receive a prep-op call 7 days prior to confirm check-in time for procedure. If applicable the patient should contact their pharmacy to verify Rx for procedure prep is ready for pick-up. Patient was advised to call the scheduling department at [phone] if pharmacy states no Rx is available. Patient was advised to call the endoscopy scheduling department if any questions or concerns arise.       Endoscopy Scheduling Department

## 2025-01-08 ENCOUNTER — OFFICE VISIT (OUTPATIENT)
Dept: SLEEP MEDICINE | Facility: CLINIC | Age: 50
End: 2025-01-08
Payer: COMMERCIAL

## 2025-01-08 VITALS
HEIGHT: 62 IN | DIASTOLIC BLOOD PRESSURE: 82 MMHG | SYSTOLIC BLOOD PRESSURE: 135 MMHG | HEART RATE: 101 BPM | WEIGHT: 168.88 LBS | BODY MASS INDEX: 31.08 KG/M2

## 2025-01-08 DIAGNOSIS — R40.0 SOMNOLENCE: ICD-10-CM

## 2025-01-08 DIAGNOSIS — G47.33 OSA (OBSTRUCTIVE SLEEP APNEA): Primary | ICD-10-CM

## 2025-01-08 PROCEDURE — 99214 OFFICE O/P EST MOD 30 MIN: CPT | Mod: S$GLB,,, | Performed by: INTERNAL MEDICINE

## 2025-01-08 PROCEDURE — 1159F MED LIST DOCD IN RCRD: CPT | Mod: CPTII,S$GLB,, | Performed by: INTERNAL MEDICINE

## 2025-01-08 PROCEDURE — 3008F BODY MASS INDEX DOCD: CPT | Mod: CPTII,S$GLB,, | Performed by: INTERNAL MEDICINE

## 2025-01-08 PROCEDURE — 3079F DIAST BP 80-89 MM HG: CPT | Mod: CPTII,S$GLB,, | Performed by: INTERNAL MEDICINE

## 2025-01-08 PROCEDURE — 3075F SYST BP GE 130 - 139MM HG: CPT | Mod: CPTII,S$GLB,, | Performed by: INTERNAL MEDICINE

## 2025-01-08 PROCEDURE — 99999 PR PBB SHADOW E&M-EST. PATIENT-LVL II: CPT | Mod: PBBFAC,,, | Performed by: INTERNAL MEDICINE

## 2025-01-08 NOTE — PROGRESS NOTES
ESTABLISHED PATIENT VISIT    Rema Horton  is a pleasant 49 y.o. female  established with the [facility] sleep medicine clinic      Here today for:  follow-up     Since last visit:   See assessment below      Past Medical History:   Diagnosis Date    Abnormal Pap smear of cervix     Anemia     Breast disorder     Fever blister     Fibromyalgia     IC (interstitial cystitis)      Patient Active Problem List   Diagnosis    Chronic pelvic pain in female    Frequency-urgency syndrome    Slow transit constipation    Dysuria    Incomplete bladder emptying    Normocytic anemia    Voiding dysfunction    Fibromyalgia    Interstitial cystitis    Periodic headache syndrome, not intractable    Other insomnia    Snoring    Sleep apnea    Pulmonary nodules    Pain    Decreased range of motion    Anxiety    Depression, major, recurrent, moderate    Panic attack    ADHD    Upper extremity weakness    Weakness of both lower extremities    Decreased mobility and endurance       Current Outpatient Medications:     acetaminophen (TYLENOL) 500 MG tablet, Take 1-2 tablets (500-1,000 mg total) by mouth 3 (three) times daily as needed for Pain., Disp: , Rfl:     celecoxib (CELEBREX) 200 MG capsule, Take 1 capsule (200 mg total) by mouth once daily. In 1-2 weeks, if no relief, may increase dose to twice per day., Disp: 30 capsule, Rfl: 3    cholecalciferol, vitamin D3, (VITAMIN D3) 25 mcg (1,000 unit) capsule, TAKE 2 CAPSULES (2,000 UNITS TOTAL) BY MOUTH ONCE DAILY., Disp: 180 capsule, Rfl: 3    clobetasol 0.05% (TEMOVATE) 0.05 % Oint, APPLY TOPICALLY 2 (TWO) TIMES DAILY. USE TO AFFECTED AREAS FOR UP TO 2 WEEKS THEN TAKE A 1 WEEK BREAK OR DECREASE TO 3 TIMES WEEKLY. DO NOT APPLY TO GROIN OR FACE. USE TO SPOT ON HAND, Disp: 45 g, Rfl: 2    dextroamphetamine-amphetamine (ADDERALL XR) 10 MG 24 hr capsule, Take 1 capsule (10 mg total) by mouth every morning., Disp: 30 capsule, Rfl: 0    dextroamphetamine-amphetamine (ADDERALL XR) 10 MG 24 hr  capsule, Take 1 capsule (10 mg total) by mouth every morning., Disp: 30 capsule, Rfl: 0    DULoxetine (CYMBALTA) 60 MG capsule, Take 1 capsule (60 mg total) by mouth once daily., Disp: 90 capsule, Rfl: 1    fluocinolone (SYNALAR) 0.01 % external solution, AAA scalp qday - bid prn itching or scaling, Disp: 60 mL, Rfl: 3    fluticasone propionate (FLONASE) 50 mcg/actuation nasal spray, SPRAY 1 SPRAY INTO EACH NOSTRIL TWICE A DAY, Disp: 48 mL, Rfl: 3    gabapentin (NEURONTIN) 300 MG capsule, Take 2 capsules (600 mg total) by mouth every evening. In 1 week, if no relief, may increase dose to twice per day., Disp: , Rfl:     hydrocortisone (ANUSOL-HC) 25 mg suppository, Place 1 suppository (25 mg total) rectally 2 (two) times daily as needed for Hemorrhoids (anal fissure)., Disp: 20 suppository, Rfl: 0    ketoconazole (NIZORAL) 2 % cream, AAA on palm bid, Disp: 30 g, Rfl: 1    ketoconazole (NIZORAL) 2 % shampoo, WASH HAIR WITH MEDICATED SHAMPOO AT LEAST 2X/WEEK - LET SIT ON SCALP AT LEAST 5 MINUTES PRIOR TO RINSING, Disp: 120 mL, Rfl: 5    magnesium oxide (MAG-OX) 400 mg (241.3 mg magnesium) tablet, TAKE 1 TABLET BY MOUTH TWICE A DAY, Disp: 60 tablet, Rfl: 5    nitroglycerin (RECTIV) 0.4 % (w/w) Oint, Place 1 Application rectally 2 (two) times a day. (Patient not taking: Reported on 12/31/2024), Disp: 30 g, Rfl: 2    pantoprazole (PROTONIX) 40 MG tablet, Take 1 tablet (40 mg total) by mouth once daily., Disp: 30 tablet, Rfl: 2    polyethylene glycol (COLYTE) 240-22.72-6.72 -5.84 gram SolR, Take as directed by provider office, Disp: 8000 mL, Rfl: 0    tacrolimus (PROTOPIC) 0.1 % ointment, APPLY TOPICALLY 2 (TWO) TIMES DAILY. NON-STEROID. USE TO AREAS OF ECZEMA WHEN TAKING A BREAK FROM CLOBETASOL, Disp: 100 g, Rfl: 4    tirzepatide, weight loss, (ZEPBOUND) 2.5 mg/0.5 mL PnIj, Inject 2.5 mg into the skin every 7 days. for 4 doses (Patient not taking: Reported on 1/7/2025), Disp: 2 mL, Rfl: 0    [START ON 1/20/2025]  "tirzepatide, weight loss, (ZEPBOUND) 5 mg/0.5 mL PnIj, Inject 5 mg into the skin every 7 days. for 4 doses (Patient not taking: Reported on 1/7/2025), Disp: 2 mL, Rfl: 0    [START ON 2/17/2025] tirzepatide, weight loss, (ZEPBOUND) 7.5 mg/0.5 mL PnIj, Inject 7.5 mg into the skin every 7 days. for 4 doses (Patient not taking: Reported on 1/7/2025), Disp: 2 mL, Rfl: 0    tiZANidine (ZANAFLEX) 2 MG tablet, Take 1 tablet (2 mg total) by mouth 2 (two) times daily as needed (muscle spams)., Disp: 60 tablet, Rfl: 0    tiZANidine 2 mg Cap, Take 4 mg by mouth 2 (two) times daily as needed (spasm)., Disp: , Rfl:     topiramate (TOPAMAX) 25 MG tablet, Take 1 tablet (25 mg total) by mouth 2 (two) times daily., Disp: 180 tablet, Rfl: 0    triamcinolone acetonide 0.1% (KENALOG) 0.1 % ointment, APPLY TOPICALLY 2 (TWO) TIMES DAILY TO LEFT THUMB, Disp: 80 g, Rfl: 1    TURMERIC ORAL, Take by mouth., Disp: , Rfl:     valACYclovir (VALTREX) 500 MG tablet, TAKE 1 TABLET BY MOUTH EVERY DAY, Disp: 30 tablet, Rfl: 11    vitamin E acetate (VITAMIN E ORAL), Take by mouth once daily., Disp: , Rfl:        There were no vitals filed for this visit.    Physical Exam:    GEN:   Well-appearing  Psych:  Appropriate affect, demonstrates insight  SKIN:  No rash on the face or bridge of the nose      LABS:   No results found for: "HGB", "CO2"      RECORDS REVIEWED PREVIOUSLY:         HST 5/11/23: AHI 2, (3% 4) RDI 19  PSG 6.15.23: AHI 20.6, MARK 29 vs 11, no REM sleep      1.24.24: 24/30 x 6h 14min (6-12) 8.9/10.8/11.6, Leak 0/5/21, AHI 2.4    5.28.24: 25/30 x [id number], 6-12 (8.4/10.3/11), leak 2/12/23    AHI 2.1    ASSESSMENT    Sig PMH:  PMH significant for LOV 1.17.20 Dr. Pan (no HST in [facility]) who presents for   PROBLEM DESCRIPTION/ Sx on Presentation Interval Hx STATUS PLAN   PERFECTO   Presentation:  evaluation (had trouble getting a sleep study prior in 2020). She snores loudly which has developed over the past 15 years.    + snoring, + snoring " arousals, + witnessed apneas  Both parents are loud snorers    HST 2/28/2020: AHI 6, RDI 18        PAP history   Dx Study    Bed partner    Position    Mask nasal   Pressure    DME HME   My Air Using!   Machine age Mid 2023   Extras    PAP altn    Benefits Sleeps better  Sudden arousals   PROBS Hard to wear CPAP when severe pain    Dry mouth    Doesn't like high ramp                  LOV: alvaro 5.9.24    1.5.25: 21/30 x [id number], 9-14 (9.5/10.5/11), leak 2/10/26, AHI 0.9    In December she had all over body pain which is slowly relenting--hard to wear CPAP     Trouble breathing with CPAP   partially controlled     Conssider titration    -sent msg to E to clarify billing      PAP PLAN   EPAP min 9 cwp   IPAP max 14 cwp   PS    RAMP 5 x 15 minutes   EPR 2   Mask    Other    Altn. -try xylimelts       -we discussed BiPAP titration study due to   SLEEP DISRUPTION on auto-CPAP despite adequate usage  AND CPAP PRESSURE INTOLERANCE despite comfortable interface      The patient is using and benefitting from PAP therapy      CHECKOUT   Recall will arrange RTC depending on results of sleep testing      DME    Other         Hypersomnia   + sleepiness when inactive   ESS 21/24 on intake     Not as severe when doing well with CPAP    persists   -PERFECTO management as above       Insomnia       SLEEP SCHEDULE   Duration    Wind- down    Envmnt    CBTi -discussed limiting TIB, regular WT   Meds prior    Meds now    Bed Time 8-9 P   Lights out    Latency 3 hrs   Arousals frequent   Back to sleep 1 hour   Stim. ctrl    Wake time 6-8AM   Caffeine    Naps    Nocturia    Work     Back to sleep quicker with CPAP in general    Can take a long time to fall asleep    Waking frequently          Sometimes hard to fall asleep, worse with increased pain of late     persists       -PERFECTO management as above     Other issues:

## 2025-01-14 ENCOUNTER — TELEPHONE (OUTPATIENT)
Dept: SURGERY | Facility: CLINIC | Age: 50
End: 2025-01-14
Payer: MEDICAID

## 2025-01-21 DIAGNOSIS — L30.9 DERMATITIS: ICD-10-CM

## 2025-01-22 ENCOUNTER — PATIENT MESSAGE (OUTPATIENT)
Dept: OBSTETRICS AND GYNECOLOGY | Facility: CLINIC | Age: 50
End: 2025-01-22
Payer: MEDICAID

## 2025-01-22 ENCOUNTER — PATIENT MESSAGE (OUTPATIENT)
Dept: GASTROENTEROLOGY | Facility: CLINIC | Age: 50
End: 2025-01-22
Payer: MEDICAID

## 2025-01-22 RX ORDER — CLOBETASOL PROPIONATE 0.5 MG/G
OINTMENT TOPICAL 2 TIMES DAILY
Qty: 45 G | Refills: 2 | Status: SHIPPED | OUTPATIENT
Start: 2025-01-22

## 2025-01-29 ENCOUNTER — ANESTHESIA EVENT (OUTPATIENT)
Dept: ENDOSCOPY | Facility: HOSPITAL | Age: 50
End: 2025-01-29
Payer: MEDICAID

## 2025-01-29 ENCOUNTER — ANESTHESIA (OUTPATIENT)
Dept: ENDOSCOPY | Facility: HOSPITAL | Age: 50
End: 2025-01-29
Payer: MEDICAID

## 2025-01-29 ENCOUNTER — HOSPITAL ENCOUNTER (OUTPATIENT)
Facility: HOSPITAL | Age: 50
Discharge: HOME | End: 2025-01-29
Attending: INTERNAL MEDICINE | Admitting: INTERNAL MEDICINE
Payer: MEDICAID

## 2025-01-29 VITALS
HEIGHT: 62 IN | WEIGHT: 162 LBS | HEART RATE: 72 BPM | SYSTOLIC BLOOD PRESSURE: 119 MMHG | DIASTOLIC BLOOD PRESSURE: 75 MMHG | OXYGEN SATURATION: 100 % | BODY MASS INDEX: 29.81 KG/M2 | RESPIRATION RATE: 12 BRPM | TEMPERATURE: 98 F

## 2025-01-29 DIAGNOSIS — R11.2 NAUSEA AND VOMITING: ICD-10-CM

## 2025-01-29 LAB
B-HCG UR QL: NEGATIVE
CTP QC/QA: YES

## 2025-01-29 PROCEDURE — 43239 EGD BIOPSY SINGLE/MULTIPLE: CPT | Mod: 51,,, | Performed by: INTERNAL MEDICINE

## 2025-01-29 PROCEDURE — 45378 DIAGNOSTIC COLONOSCOPY: CPT | Mod: ,,, | Performed by: INTERNAL MEDICINE

## 2025-01-29 PROCEDURE — 88305 TISSUE EXAM BY PATHOLOGIST: CPT | Mod: 26,,, | Performed by: PATHOLOGY

## 2025-01-29 PROCEDURE — 63600175 PHARM REV CODE 636 W HCPCS: Performed by: NURSE ANESTHETIST, CERTIFIED REGISTERED

## 2025-01-29 PROCEDURE — 88305 TISSUE EXAM BY PATHOLOGIST: CPT | Performed by: PATHOLOGY

## 2025-01-29 PROCEDURE — 27201012 HC FORCEPS, HOT/COLD, DISP: Performed by: INTERNAL MEDICINE

## 2025-01-29 PROCEDURE — 37000008 HC ANESTHESIA 1ST 15 MINUTES: Performed by: INTERNAL MEDICINE

## 2025-01-29 PROCEDURE — 81025 URINE PREGNANCY TEST: CPT | Performed by: INTERNAL MEDICINE

## 2025-01-29 PROCEDURE — G0121 COLON CA SCRN NOT HI RSK IND: HCPCS | Performed by: INTERNAL MEDICINE

## 2025-01-29 PROCEDURE — 43239 EGD BIOPSY SINGLE/MULTIPLE: CPT | Performed by: INTERNAL MEDICINE

## 2025-01-29 PROCEDURE — E9220 PRA ENDO ANESTHESIA: HCPCS | Mod: ,,, | Performed by: NURSE ANESTHETIST, CERTIFIED REGISTERED

## 2025-01-29 PROCEDURE — 37000009 HC ANESTHESIA EA ADD 15 MINS: Performed by: INTERNAL MEDICINE

## 2025-01-29 RX ORDER — SODIUM CHLORIDE 9 MG/ML
INJECTION, SOLUTION INTRAVENOUS CONTINUOUS
Status: DISCONTINUED | OUTPATIENT
Start: 2025-01-29 | End: 2025-01-29 | Stop reason: HOSPADM

## 2025-01-29 RX ORDER — PROPOFOL 10 MG/ML
VIAL (ML) INTRAVENOUS CONTINUOUS PRN
Status: DISCONTINUED | OUTPATIENT
Start: 2025-01-29 | End: 2025-01-29

## 2025-01-29 RX ORDER — LIDOCAINE HYDROCHLORIDE 20 MG/ML
INJECTION INTRAVENOUS
Status: DISCONTINUED | OUTPATIENT
Start: 2025-01-29 | End: 2025-01-29

## 2025-01-29 RX ADMIN — PROPOFOL 175 MCG/KG/MIN: 10 INJECTION, EMULSION INTRAVENOUS at 09:01

## 2025-01-29 RX ADMIN — LIDOCAINE HYDROCHLORIDE 50 MG: 20 INJECTION INTRAVENOUS at 09:01

## 2025-01-29 RX ADMIN — GLYCOPYRROLATE 0.2 MG: 0.2 INJECTION, SOLUTION INTRAMUSCULAR; INTRAVENOUS at 08:01

## 2025-01-29 NOTE — ANESTHESIA PREPROCEDURE EVALUATION
Past Medical History:   Diagnosis Date    Abnormal Pap smear of cervix     Anemia     Breast disorder     Fever blister     Fibromyalgia     IC (interstitial cystitis)      Past Surgical History:   Procedure Laterality Date    BREAST BIOPSY Left 2014    fibroadenoma     SECTION  2012    X 2---JST FOR KJB ([facility])    DILATION AND CURETTAGE OF UTERUS      KJB                                                                                                                  2025  Rema Horton is a 49 y.o., female.      Pre-op Assessment    I have reviewed the Patient Summary Reports.     I have reviewed the Nursing Notes. I have reviewed the NPO Status.   I have reviewed the Medications.     Review of Systems      Physical Exam  General: Well nourished, Alert, Oriented and Cooperative    Airway:  Mallampati: II   Mouth Opening: Normal  TM Distance: Normal    Dental:  Intact        Anesthesia Plan  Type of Anesthesia, risks & benefits discussed:    Anesthesia Type: Gen Natural Airway  Intra-op Monitoring Plan: Standard ASA Monitors  Post Op Pain Control Plan: multimodal analgesia  Induction:  IV  Informed Consent: Informed consent signed with the Patient and all parties understand the risks and agree with anesthesia plan.  All questions answered. Patient consented to blood products? No  ASA Score: 2  Day of Surgery Review of History & Physical: H&P Update referred to the surgeon/provider.I have interviewed and examined the patient. I have reviewed the patient's H&P dated: There are no significant changes.     Ready For Surgery From Anesthesia Perspective.     .

## 2025-01-29 NOTE — PROVATION PATIENT INSTRUCTIONS
Discharge Summary/Instructions after an Endoscopic Procedure  Patient Name: Rema Horton  Patient MRN: [id number]  Patient YOB: 1975 Wednesday, January 29, 2025  Cristofer Abraham MD  Dear patient,  As a result of recent federal legislation (The Federal Cures Act), you may   receive lab or pathology results from your procedure in your [facility]   account before your physician is able to contact you. Your physician or   their representative will relay the results to you with their   recommendations at their soonest availability.  Thank you,  RESTRICTIONS:  During your procedure today, you received medications for sedation.  These   medications may affect your judgment, balance and coordination.  Therefore,   for 24 hours, you have the following restrictions:   - DO NOT drive a car, operate machinery, make legal/financial decisions,   sign important papers or drink alcohol.    ACTIVITY:  Today: no heavy lifting, straining or running due to procedural   sedation/anesthesia.  The following day: return to full activity including work.  DIET:  Eat and drink normally unless instructed otherwise.     TREATMENT FOR COMMON SIDE EFFECTS:  - Mild abdominal pain, nausea, belching, bloating or excessive gas:  rest,   eat lightly and use a heating pad.  - Sore Throat: treat with throat lozenges and/or gargle with warm salt   water.  - Because air was used during the procedure, expelling large amounts of air   from your rectum or belching is normal.  - If a bowel prep was taken, you may not have a bowel movement for 1-3 days.    This is normal.  SYMPTOMS TO WATCH FOR AND REPORT TO YOUR PHYSICIAN:  1. Abdominal pain or bloating, other than gas cramps.  2. Chest pain.  3. Back pain.  4. Signs of infection such as: chills or fever occurring within 24 hours   after the procedure.  5. Rectal bleeding, which would show as bright red, maroon, or black stools.   (A tablespoon of blood from the rectum is not serious, especially  if   hemorrhoids are present.)  6. Vomiting.  7. Weakness or dizziness.  GO DIRECTLY TO THE NEAREST EMERGENCY ROOM IF YOU HAVE ANY OF THE FOLLOWING:      Difficulty breathing              Chills and/or fever over 101 F   Persistent vomiting and/or vomiting blood   Severe abdominal pain   Severe chest pain   Black, tarry stools   Bleeding- more than one tablespoon   Any other symptom or condition that you feel may need urgent attention  Your doctor recommends these additional instructions:  If any biopsies were taken, your doctors clinic will contact you in 1 to 2   weeks with any results.  - Discharge patient to home.   - Follow an antireflux regimen.   - Await pathology results.   - Telephone endoscopist for pathology results in 3 weeks.   - Telephone nurse practitioner for study results in 2 weeks.   - Return to nurse practitioner.   - The findings and recommendations were discussed with the patient.  For questions, problems or results please call your physician - Cristofer Abraham MD at Work:  [phone].  [facility], EMERGENCY ROOM PHONE NUMBER: [phone]  IF A COMPLICATION OR EMERGENCY SITUATION ARISES AND YOU ARE UNABLE TO REACH   YOUR PHYSICIAN - GO DIRECTLY TO THE EMERGENCY ROOM.  Cristofer Abraham MD  1/29/2025 9:19:14 AM  This report has been verified and signed electronically.  Dear patient,  As a result of recent federal legislation (The Federal Cures Act), you may   receive lab or pathology results from your procedure in your [facility]   account before your physician is able to contact you. Your physician or   their representative will relay the results to you with their   recommendations at their soonest availability.  Thank you,  PROVATION

## 2025-01-29 NOTE — PROVATION PATIENT INSTRUCTIONS
Discharge Summary/Instructions after an Endoscopic Procedure  Patient Name: Rema Horton  Patient MRN: [id number]  Patient YOB: 1975 Wednesday, January 29, 2025  Cristofer Abraham MD  Dear patient,  As a result of recent federal legislation (The Federal Cures Act), you may   receive lab or pathology results from your procedure in your [facility]   account before your physician is able to contact you. Your physician or   their representative will relay the results to you with their   recommendations at their soonest availability.  Thank you,  RESTRICTIONS:  During your procedure today, you received medications for sedation.  These   medications may affect your judgment, balance and coordination.  Therefore,   for 24 hours, you have the following restrictions:   - DO NOT drive a car, operate machinery, make legal/financial decisions,   sign important papers or drink alcohol.    ACTIVITY:  Today: no heavy lifting, straining or running due to procedural   sedation/anesthesia.  The following day: return to full activity including work.  DIET:  Eat and drink normally unless instructed otherwise.     TREATMENT FOR COMMON SIDE EFFECTS:  - Mild abdominal pain, nausea, belching, bloating or excessive gas:  rest,   eat lightly and use a heating pad.  - Sore Throat: treat with throat lozenges and/or gargle with warm salt   water.  - Because air was used during the procedure, expelling large amounts of air   from your rectum or belching is normal.  - If a bowel prep was taken, you may not have a bowel movement for 1-3 days.    This is normal.  SYMPTOMS TO WATCH FOR AND REPORT TO YOUR PHYSICIAN:  1. Abdominal pain or bloating, other than gas cramps.  2. Chest pain.  3. Back pain.  4. Signs of infection such as: chills or fever occurring within 24 hours   after the procedure.  5. Rectal bleeding, which would show as bright red, maroon, or black stools.   (A tablespoon of blood from the rectum is not serious, especially  if   hemorrhoids are present.)  6. Vomiting.  7. Weakness or dizziness.  GO DIRECTLY TO THE NEAREST EMERGENCY ROOM IF YOU HAVE ANY OF THE FOLLOWING:      Difficulty breathing              Chills and/or fever over 101 F   Persistent vomiting and/or vomiting blood   Severe abdominal pain   Severe chest pain   Black, tarry stools   Bleeding- more than one tablespoon   Any other symptom or condition that you feel may need urgent attention  Your doctor recommends these additional instructions:  If any biopsies were taken, your doctors clinic will contact you in 1 to 2   weeks with any results.  - Discharge patient to home.   - Repeat colonoscopy in 10 years for screening purposes.   - THIS RECOMMENDATION of 10-Years FOR NEXT SCREENING COLONOSCOPY ASSUMES   THAT YOU WILL NOT HAVE HAD OR NOT HAD A FIRST OR SECOND DEGREE RELATIVE/S   WITH COLORECTAL CANCER OR AN ADVANCE COLON ADENOMAS BEFORE THE AGE OF 60   YEARS OF AGE OF THOSE INDIVIDUALS BY THE TIME OF YOUR NEXT SCREENING   COLONOSCOPY.               - Return to nurse practitioner.   - Return to primary care physician.   - The findings and recommendations were discussed with the patient.  For questions, problems or results please call your physician - Cristofer Abraham MD at Work:  [phone].  [facility], EMERGENCY ROOM PHONE NUMBER: [phone]  IF A COMPLICATION OR EMERGENCY SITUATION ARISES AND YOU ARE UNABLE TO REACH   YOUR PHYSICIAN - GO DIRECTLY TO THE EMERGENCY ROOM.  Cristofer Abraham MD  1/29/2025 9:41:10 AM  This report has been verified and signed electronically.  Dear patient,  As a result of recent federal legislation (The Federal Cures Act), you may   receive lab or pathology results from your procedure in your [facility]   account before your physician is able to contact you. Your physician or   their representative will relay the results to you with their   recommendations at their soonest availability.  Thank you,  PROVATION

## 2025-01-29 NOTE — H&P
[facility] 4th Floor  History & Physical    Subjective:      Chief Complaint/Reason for Admission:     Direct access EGD and colonoscopy for Diagnoses  Rectal bleeding [K62.5]  Abdominal pain, unspecified abdominal location [R10.9]  Gastroesophageal reflux disease, unspecified whether esophagitis present [K21.9]  Nausea and vomiting, unspecified vomiting type [R11.2]     Rema Horton is a 49 y.o. female.    Past Medical History:   Diagnosis Date    Abnormal Pap smear of cervix     Anemia     Breast disorder     Fever blister     Fibromyalgia     IC (interstitial cystitis)      Past Surgical History:   Procedure Laterality Date    BREAST BIOPSY Left 2014    fibroadenoma     SECTION  2012    X 2---JST FOR KJB (BREECH)    DILATION AND CURETTAGE OF UTERUS      KJB     Social History     Tobacco Use    Smoking status: Never    Smokeless tobacco: Never   Substance Use Topics    Alcohol use: Not Currently     Alcohol/week: 0.0 standard drinks of alcohol     Comment: Occasionally.    Drug use: No       PTA Medications   Medication Sig    celecoxib (CELEBREX) 200 MG capsule Take 1 capsule (200 mg total) by mouth once daily. In 1-2 weeks, if no relief, may increase dose to twice per day.    cholecalciferol, vitamin D3, (VITAMIN D3) 25 mcg (1,000 unit) capsule TAKE 2 CAPSULES (2,000 UNITS TOTAL) BY MOUTH ONCE DAILY.    dextroamphetamine-amphetamine (ADDERALL XR) 10 MG 24 hr capsule Take 1 capsule (10 mg total) by mouth every morning.    dextroamphetamine-amphetamine (ADDERALL XR) 10 MG 24 hr capsule Take 1 capsule (10 mg total) by mouth every morning.    DULoxetine (CYMBALTA) 60 MG capsule Take 1 capsule (60 mg total) by mouth once daily.    gabapentin (NEURONTIN) 300 MG capsule Take 2 capsules (600 mg total) by mouth every evening. In 1 week, if no relief, may increase dose to twice per day.    magnesium oxide (MAG-OX) 400 mg (241.3 mg magnesium) tablet TAKE 1 TABLET BY MOUTH TWICE A DAY     pantoprazole (PROTONIX) 40 MG tablet Take 1 tablet (40 mg total) by mouth once daily.    tirzepatide, weight loss, (ZEPBOUND) 2.5 mg/0.5 mL PnIj Inject 2.5 mg into the skin every 7 days. for 4 doses    tirzepatide, weight loss, (ZEPBOUND) 5 mg/0.5 mL PnIj Inject 5 mg into the skin every 7 days. for 4 doses    [START ON 2/17/2025] tirzepatide, weight loss, (ZEPBOUND) 7.5 mg/0.5 mL PnIj Inject 7.5 mg into the skin every 7 days. for 4 doses    tiZANidine (ZANAFLEX) 2 MG tablet Take 1 tablet (2 mg total) by mouth 2 (two) times daily as needed (muscle spams).    tiZANidine 2 mg Cap Take 4 mg by mouth 2 (two) times daily as needed (spasm).    topiramate (TOPAMAX) 25 MG tablet Take 1 tablet (25 mg total) by mouth 2 (two) times daily.    triamcinolone acetonide 0.1% (KENALOG) 0.1 % ointment APPLY TOPICALLY 2 (TWO) TIMES DAILY TO LEFT THUMB    TURMERIC ORAL Take by mouth.    valACYclovir (VALTREX) 500 MG tablet TAKE 1 TABLET BY MOUTH EVERY DAY    vitamin E acetate (VITAMIN E ORAL) Take by mouth once daily.    acetaminophen (TYLENOL) 500 MG tablet Take 1-2 tablets (500-1,000 mg total) by mouth 3 (three) times daily as needed for Pain.    clobetasol 0.05% (TEMOVATE) 0.05 % Oint APPLY TOPICALLY 2 (TWO) TIMES DAILY. USE TO AFFECTED AREAS FOR UP TO 2 WEEKS THEN TAKE A 1 WEEK BREAK OR DECREASE TO 3 TIMES WEEKLY. DO NOT APPLY TO GROIN OR FACE. USE TO SPOT ON HAND    fluocinolone (SYNALAR) 0.01 % external solution AAA scalp qday - bid prn itching or scaling    fluticasone propionate (FLONASE) 50 mcg/actuation nasal spray SPRAY 1 SPRAY INTO EACH NOSTRIL TWICE A DAY    ketoconazole (NIZORAL) 2 % cream AAA on palm bid    ketoconazole (NIZORAL) 2 % shampoo WASH HAIR WITH MEDICATED SHAMPOO AT LEAST 2X/WEEK - LET SIT ON SCALP AT LEAST 5 MINUTES PRIOR TO RINSING    nitroglycerin (RECTIV) 0.4 % (w/w) Oint Place 1 Application rectally 2 (two) times a day. (Patient not taking: Reported on 12/31/2024)    polyethylene glycol (COLYTE)  240-22.72-6.72 -5.84 gram SolR Take as directed by provider office    tacrolimus (PROTOPIC) 0.1 % ointment APPLY TOPICALLY 2 (TWO) TIMES DAILY. NON-STEROID. USE TO AREAS OF ECZEMA WHEN TAKING A BREAK FROM CLOBETASOL     Review of patient's allergies indicates:   Allergen Reactions    Flexeril  [cyclobenzaprine]      Other reaction(s): Rash    Lodine  [etodolac] Rash        Review of Systems   Constitutional:  Negative for fever.       Objective:      Vital Signs (Most Recent)  Temp: 97.7 °F (36.5 °C) (01/29/25 0830)  Pulse: 75 (01/29/25 0830)  Resp: 16 (01/29/25 0830)  BP: 123/84 (01/29/25 0830)  SpO2: 97 % (01/29/25 0830)    Vital Signs Range (Last 24H):  Temp:  [97.7 °F (36.5 °C)]   Pulse:  [75]   Resp:  [16]   BP: (123)/(84)   SpO2:  [97 %]     Physical Exam  Cardiovascular:      Rate and Rhythm: Normal rate.   Pulmonary:      Effort: Pulmonary effort is normal.   Neurological:      Mental Status: She is alert.             Assessment:      Direct access EGD and colonoscopy for Diagnoses  Rectal bleeding [K62.5]  Abdominal pain, unspecified abdominal location [R10.9]  Gastroesophageal reflux disease, unspecified whether esophagitis present [K21.9]  Nausea and vomiting, unspecified vomiting type [R11.2]       Plan:      Direct access EGD and colonoscopy for Diagnoses  Rectal bleeding [K62.5]  Abdominal pain, unspecified abdominal location [R10.9]  Gastroesophageal reflux disease, unspecified whether esophagitis present [K21.9]  Nausea and vomiting, unspecified vomiting type [R11.2]

## 2025-01-29 NOTE — TRANSFER OF CARE
"Anesthesia Transfer of Care Note    Patient: Rema Horton    Procedure(s) Performed: Procedure(s) (LRB):  EGD (ESOPHAGOGASTRODUODENOSCOPY) (N/A)  COLONOSCOPY (N/A)    Patient location: PACU    Anesthesia Type: general    Transport from OR: Transported from OR on 2-3 L/min O2 by NC with adequate spontaneous ventilation    Post pain: adequate analgesia    Post assessment: no apparent anesthetic complications    Post vital signs: stable    Level of consciousness: awake    Nausea/Vomiting: no nausea/vomiting    Complications: none    Transfer of care protocol was followed    Last vitals: Visit Vitals  /84   Pulse 75   Temp 36.5 °C (97.7 °F)   Resp 16   Ht 5' 2" (1.575 m)   Wt 73.5 kg (162 lb)   SpO2 97%   BMI 29.63 kg/m²     "

## 2025-01-31 LAB
FINAL PATHOLOGIC DIAGNOSIS: NORMAL
GROSS: NORMAL
Lab: NORMAL

## 2025-01-31 NOTE — ANESTHESIA POSTPROCEDURE EVALUATION
Anesthesia Post Evaluation    Patient: Rema Horton    Procedure(s) Performed: Procedure(s) (LRB):  EGD (ESOPHAGOGASTRODUODENOSCOPY) (N/A)  COLONOSCOPY (N/A)    Final Anesthesia Type: general      Patient location during evaluation: GI PACU  Patient participation: Yes- Able to Participate  Level of consciousness: awake  Post-procedure vital signs: reviewed and stable  Pain management: adequate  Airway patency: patent    PONV status at discharge: No PONV  Anesthetic complications: no      Cardiovascular status: blood pressure returned to baseline  Respiratory status: unassisted  Hydration status: euvolemic  Follow-up not needed.              Vitals Value Taken Time   /75 01/29/25 1011   Temp 36.5 °C (97.7 °F) 01/29/25 0943   Pulse 72 01/29/25 1017   Resp 12 01/29/25 0943   SpO2 100 % 01/29/25 1011         Event Time   Out of Recovery 10:19:04         Pain/Deanna Score: No data recorded

## 2025-02-01 NOTE — PROGRESS NOTES
Rema your EGD pathology results were benign no celiac sprue no H pylori.  Final Pathologic Diagnosis 1. Duodenum (biopsy):  Duodenal mucosa with no significant histopathologic changes.  No support for celiac disease.  No pathogens identified    2. Stomach (biopsy):  Antral and oxyntic mucosa with minimal chronic inflammation, nonspecific  No significant active inflammation.  No Helicobacter organisms.  Comment: Interp By Keri Hart M.D., Signed on 01/31/2025

## 2025-02-03 DIAGNOSIS — B35.2 TINEA MANUUM: ICD-10-CM

## 2025-02-03 RX ORDER — KETOCONAZOLE 20 MG/G
CREAM TOPICAL
Qty: 30 G | Refills: 1 | Status: SHIPPED | OUTPATIENT
Start: 2025-02-03

## 2025-02-03 RX ORDER — TIZANIDINE 2 MG/1
2 TABLET ORAL 2 TIMES DAILY PRN
Qty: 180 TABLET | Refills: 1 | Status: SHIPPED | OUTPATIENT
Start: 2025-02-03

## 2025-02-11 DIAGNOSIS — G47.10 HYPERSOMNOLENCE: ICD-10-CM

## 2025-02-11 DIAGNOSIS — G47.33 OSA (OBSTRUCTIVE SLEEP APNEA): Primary | ICD-10-CM

## 2025-02-11 DIAGNOSIS — Z78.9 INTOLERANCE OF CONTINUOUS POSITIVE AIRWAY PRESSURE (CPAP) VENTILATION: ICD-10-CM

## 2025-02-19 ENCOUNTER — OFFICE VISIT (OUTPATIENT)
Dept: PODIATRY | Facility: CLINIC | Age: 50
End: 2025-02-19
Payer: MEDICAID

## 2025-02-19 VITALS — HEIGHT: 62 IN | BODY MASS INDEX: 29.82 KG/M2 | WEIGHT: 162.06 LBS

## 2025-02-19 DIAGNOSIS — M79.671 RIGHT FOOT PAIN: ICD-10-CM

## 2025-02-19 DIAGNOSIS — M79.2 NEURITIS: Primary | ICD-10-CM

## 2025-02-19 PROCEDURE — 99999 PR PBB SHADOW E&M-EST. PATIENT-LVL V: CPT | Mod: PBBFAC,,, | Performed by: PODIATRIST

## 2025-02-19 PROCEDURE — 99215 OFFICE O/P EST HI 40 MIN: CPT | Mod: PBBFAC | Performed by: PODIATRIST

## 2025-02-19 NOTE — PROGRESS NOTES
Subjective:      Patient ID: Rema Horton is a 50 y.o. female.    Chief Complaint: Numbness (Top of ankle across top of foot, worse in morning, pain when lying down)    Pt presents today with c/o numbness and hypersensitivity of the skin from her knee down. Pt states she can no longer shave her legs due to the sensations and burning. Pt states the numbness is on the top of her ankles and she is having shooting pains at night.     Review of Systems   Constitutional: Negative for chills, fever and malaise/fatigue.   HENT:  Negative for hearing loss.    Cardiovascular:  Negative for claudication.   Respiratory:  Negative for shortness of breath.    Skin:  Negative for flushing and rash.   Musculoskeletal:  Positive for muscle cramps and muscle weakness. Negative for joint pain and myalgias.   Neurological:  Positive for numbness and paresthesias. Negative for loss of balance and sensory change.   Psychiatric/Behavioral:  Negative for altered mental status.            Objective:      Physical Exam  Vitals reviewed.   Cardiovascular:      Pulses:           Dorsalis pedis pulses are 2+ on the right side and 2+ on the left side.        Posterior tibial pulses are 2+ on the right side and 2+ on the left side.      Comments: No edema noted to b/L LEs  Musculoskeletal:         General: Normal range of motion.      Comments: Adequate joint ROM noted to all lower extremity muscle groups with no pain or crepitation noted. Muscle strength is 5/5 in all groups bilaterally.     Feet:      Right foot:      Protective Sensation: 5 sites tested.  5 sites sensed.      Left foot:      Protective Sensation: 5 sites tested.  5 sites sensed.   Skin:     General: Skin is warm.      Capillary Refill: Capillary refill takes 2 to 3 seconds.      Comments: Normal skin tugor noted.   No open lesion noted b/L  Skin temp is warm to warm from proximal to distal b/L.  Webspaces clean, dry, and intact  Nails x10 short   Neurological:      Mental  Status: She is alert and oriented to person, place, and time.      Sensory: Sensory deficit present.      Comments: Intact gross sensation noted to b/L LEs               Assessment:       Encounter Diagnoses   Name Primary?    Right foot pain     Neuritis Yes         Plan:       Rema was seen today for numbness.    Diagnoses and all orders for this visit:    Neuritis  -     Ambulatory referral/consult to Neurology; Future  -     EMG W/ ULTRASOUND AND NERVE CONDUCTION TEST 2 Extremities; Future    Right foot pain  -     Ambulatory consult to Podiatry  -     Ambulatory referral/consult to Neurology; Future  -     EMG W/ ULTRASOUND AND NERVE CONDUCTION TEST 2 Extremities; Future      I counseled the patient on her conditions, their implications and medical management.    Medical records reviewed, pt is under the care of a neurologist for headaches but not neuritis    Long discussion had with pt, etiology of nerve symptoms unknown but could be neuritis    EMG/NCV of both extremities ordered     Will notify pt of results    .

## 2025-03-16 DIAGNOSIS — F90.2 ATTENTION DEFICIT HYPERACTIVITY DISORDER (ADHD), COMBINED TYPE: ICD-10-CM

## 2025-03-16 DIAGNOSIS — M79.7 FIBROMYALGIA SYNDROME: ICD-10-CM

## 2025-03-16 DIAGNOSIS — R20.0 NUMBNESS IN FEET: ICD-10-CM

## 2025-03-16 DIAGNOSIS — L24.5 IRRITANT CONTACT DERMATITIS DUE TO OTHER CHEMICAL PRODUCTS: ICD-10-CM

## 2025-03-16 RX ORDER — TRIAMCINOLONE ACETONIDE 1 MG/G
OINTMENT TOPICAL
Qty: 80 G | Refills: 0 | Status: SHIPPED | OUTPATIENT
Start: 2025-03-16

## 2025-03-16 NOTE — TELEPHONE ENCOUNTER
Care Due:                  Date            Visit Type   Department     Provider  --------------------------------------------------------------------------------                                MYCHART                              FOLLOWUP/OF  [facility] INTERNAL  Last Visit: 12-      FICE VISIT   MEDICINE       MARIE ORDAZ                              EP -                              PRIMARY      [facility] INTERNAL  Next Visit: 04-      CARE ([facility])   MEDICINE       MARIE ORDAZ                                                            Last  Test          Frequency    Reason                     Performed    Due Date  --------------------------------------------------------------------------------    CBC.........  12 months..  valACYclovir.............  05-   05-    Cr..........  12 months..  valACYclovir.............  05-   05-    [facility] Embedded Care Due Messages. Reference number: [id number].   3/16/2025 7:09:40 AM CDT

## 2025-03-17 NOTE — TELEPHONE ENCOUNTER
Provider Staff:  Action required for this patient     Please see care gap opportunities below in Care Due Message.    Thanks!  [facility]     Appointments      Date Provider   Last Visit   12/4/2024 Britta Clark MD   Next Visit   4/10/2025 Britta Clark MD      Refill Decision Note   Rema Horton  is requesting a refill authorization.  Brief Assessment and Rationale for Refill:  Approve     Medication Therapy Plan:         Comments:     Note composed:8:08 PM 03/16/2025

## 2025-03-18 RX ORDER — GABAPENTIN 300 MG/1
300 CAPSULE ORAL 2 TIMES DAILY
Qty: 60 CAPSULE | Refills: 2 | Status: SHIPPED | OUTPATIENT
Start: 2025-03-18 | End: 2025-06-16

## 2025-03-19 RX ORDER — DEXTROAMPHETAMINE SACCHARATE, AMPHETAMINE ASPARTATE MONOHYDRATE, DEXTROAMPHETAMINE SULFATE AND AMPHETAMINE SULFATE 2.5; 2.5; 2.5; 2.5 MG/1; MG/1; MG/1; MG/1
10 CAPSULE, EXTENDED RELEASE ORAL EVERY MORNING
Qty: 30 CAPSULE | Refills: 0 | Status: SHIPPED | OUTPATIENT
Start: 2025-03-19

## 2025-03-25 ENCOUNTER — TELEPHONE (OUTPATIENT)
Dept: BARIATRICS | Facility: CLINIC | Age: 50
End: 2025-03-25

## 2025-03-25 NOTE — TELEPHONE ENCOUNTER
----- Message from Lennie sent at 3/25/2025  1:35 PM CDT -----  Name of Caller: Nature of Call:Requesting an apptBest Call Back Number:[phone] Additional Information:Rema Horton calling regarding the appt on 3/27/25. The pt is requesting the appt to be rescheduled. Please call back to assist

## 2025-04-03 ENCOUNTER — OFFICE VISIT (OUTPATIENT)
Dept: OBSTETRICS AND GYNECOLOGY | Facility: CLINIC | Age: 50
End: 2025-04-03
Payer: MEDICAID

## 2025-04-03 ENCOUNTER — PATIENT MESSAGE (OUTPATIENT)
Dept: OBSTETRICS AND GYNECOLOGY | Facility: CLINIC | Age: 50
End: 2025-04-03

## 2025-04-03 VITALS
BODY MASS INDEX: 30.02 KG/M2 | SYSTOLIC BLOOD PRESSURE: 130 MMHG | DIASTOLIC BLOOD PRESSURE: 90 MMHG | HEIGHT: 62 IN | WEIGHT: 163.13 LBS

## 2025-04-03 DIAGNOSIS — Z32.02 NEGATIVE PREGNANCY TEST: ICD-10-CM

## 2025-04-03 DIAGNOSIS — N95.0 POSTMENOPAUSAL BLEEDING: ICD-10-CM

## 2025-04-03 DIAGNOSIS — N94.89 PELVIC CONGESTION SYNDROME: ICD-10-CM

## 2025-04-03 DIAGNOSIS — R10.2 PELVIC PAIN IN FEMALE: Primary | ICD-10-CM

## 2025-04-03 DIAGNOSIS — M54.9 BACK PAIN, UNSPECIFIED BACK LOCATION, UNSPECIFIED BACK PAIN LATERALITY, UNSPECIFIED CHRONICITY: ICD-10-CM

## 2025-04-03 LAB
B-HCG UR QL: NEGATIVE
CTP QC/QA: YES

## 2025-04-03 PROCEDURE — 99999PBSHW POCT URINE PREGNANCY: Mod: PBBFAC,,,

## 2025-04-03 PROCEDURE — 99215 OFFICE O/P EST HI 40 MIN: CPT | Mod: PBBFAC,PN | Performed by: OBSTETRICS & GYNECOLOGY

## 2025-04-03 PROCEDURE — 81025 URINE PREGNANCY TEST: CPT | Mod: PBBFAC,PN | Performed by: OBSTETRICS & GYNECOLOGY

## 2025-04-03 PROCEDURE — 99999 PR PBB SHADOW E&M-EST. PATIENT-LVL V: CPT | Mod: PBBFAC,,, | Performed by: OBSTETRICS & GYNECOLOGY

## 2025-04-03 PROCEDURE — 1159F MED LIST DOCD IN RCRD: CPT | Mod: CPTII,,, | Performed by: OBSTETRICS & GYNECOLOGY

## 2025-04-03 PROCEDURE — 3080F DIAST BP >= 90 MM HG: CPT | Mod: CPTII,,, | Performed by: OBSTETRICS & GYNECOLOGY

## 2025-04-03 PROCEDURE — 3075F SYST BP GE 130 - 139MM HG: CPT | Mod: CPTII,,, | Performed by: OBSTETRICS & GYNECOLOGY

## 2025-04-03 PROCEDURE — 3008F BODY MASS INDEX DOCD: CPT | Mod: CPTII,,, | Performed by: OBSTETRICS & GYNECOLOGY

## 2025-04-03 PROCEDURE — 1160F RVW MEDS BY RX/DR IN RCRD: CPT | Mod: CPTII,,, | Performed by: OBSTETRICS & GYNECOLOGY

## 2025-04-03 PROCEDURE — 99214 OFFICE O/P EST MOD 30 MIN: CPT | Mod: 57,S$PBB,, | Performed by: OBSTETRICS & GYNECOLOGY

## 2025-04-03 NOTE — PROGRESS NOTES
CC: irregular bleeding/ PMB      Rema Horton is a 50 y.o. female  presents for irregular bleeding/ PMB  NO cycles for two years.  She is having spotting and bleeding off and on.   She has back pain and pelvic cramping and pain   She was worked up by GI and discusses with her PCP.  She is ready for intervention     US in     FINDINGS:  Uterus:     Size: 6.3 x 4.4 x 4.4 cm     Masses: Postoperative changes from Caesarean section.  Diffuse heterogeneity of the myometrium with indistinctness of the junctional zone, asymmetric bulkiness of the posterior wall, and streaky posterior shadowing.  No discrete mass.     Endometrium: Normal in this john menopausal patient, measuring 5 mm.     Nabothian cysts noted.     Right ovary:     Size: 2.7 x 1.0 x 2.3  cm     Appearance: Normal.     Vascular flow: Normal.     Left ovary:     Size: 2.5 x 1.6 x 2.7 cm     Appearance: Normal.     Vascular Flow: Normal.  Prominent left adnexal vasculature.     Free Fluid:     None.     Impression:     Findings suggestive of adenomyosis.  No discrete uterine mass.     Prominent left adnexal vessels, which could represent pelvic congestion syndrome in the appropriate clinical setting.  Findings suggestive of adenomyosis.  No discrete uterine mass.       Prominent left adnexal vessels, which could represent pelvic congestion syndrome in the appropriate clinical setting.  Past Medical History:   Diagnosis Date    Abnormal Pap smear of cervix     Anemia     Breast disorder     Fever blister     Fibromyalgia     IC (interstitial cystitis)        Past Surgical History:   Procedure Laterality Date    BREAST BIOPSY Left     fibroadenoma     SECTION  2012    X 2---JST FOR KJB ([facility]REECH)    COLONOSCOPY N/A 2025    Procedure: COLONOSCOPY;  Surgeon: Cristofer Abraham MD;  Location: [facility] ([address] [place] [address]);  Service: Endoscopy;  Laterality: N/A;    DILATION AND CURETTAGE OF UTERUS      KJB    ESOPHAGOGASTRODUODENOSCOPY N/A  "2025    Procedure: EGD (ESOPHAGOGASTRODUODENOSCOPY);  Surgeon: Cristofer Abraham MD;  Location: [facility] ([address] [place] [address]);  Service: Endoscopy;  Laterality: N/A;  Jm/referral l norris/prep inst given in office / constipation prep/zepbound/peg prep   - precall complete, pt confirmed holding on zepbound - mf       OB History    Para Term  AB Living   4 3 3  1 3   SAB IAB Ectopic Multiple Live Births   1    3      # Outcome Date GA Lbr Jem/2nd Weight Sex Type Anes PTL Lv   4 Term 12    F CS-Unspec   BARRY   3 Term 03/24/10    M Vag-Spont   BARRY   2 1999   FD   1 Term 10/23/92    M Vag-Spont   BARRY       Family History   Problem Relation Name Age of Onset    Cancer Mother          bladder (not sure if was actually gynecologic)    No Known Problems Father      Stroke Maternal Grandfather      Breast cancer Paternal Grandmother      Amblyopia Daughter Ernestina     Chromosomal disorder Daughter Ernestina     No Known Problems Son      Melanoma Neg Hx      Ovarian cancer Neg Hx      Colon cancer Neg Hx      Colon polyps Neg Hx      Esophageal cancer Neg Hx      Stomach cancer Neg Hx      Rectal cancer Neg Hx      Blindness Neg Hx      Glaucoma Neg Hx      Macular degeneration Neg Hx      Retinal detachment Neg Hx         Social History[1]    BP (!) 130/90   Ht 5' 2" (1.575 m)   Wt 74 kg (163 lb 2.3 oz)   BMI 29.84 kg/m²     ROS:  GENERAL: Denies weight gain or weight loss. Feeling well overall.   SKIN: Denies rash or lesions.   HEAD: Denies head injury or headache.   NODES: Denies enlarged lymph nodes.   CHEST: Denies chest pain or shortness of breath.   CARDIOVASCULAR: Denies palpitations or left sided chest pain.   ABDOMEN: No abdominal pain, constipation, diarrhea, nausea, vomiting or rectal bleeding.   URINARY: No frequency, dysuria, hematuria, or burning on urination.  REPRODUCTIVE: See HPI.   BREASTS: The patient performs breast self-examination and denies pain, lumps, or nipple " discharge.   HEMATOLOGIC: No easy bruisability or excessive bleeding.  MUSCULOSKELETAL: Denies joint pain or swelling.   NEUROLOGIC: Denies syncope or weakness.   PSYCHIATRIC: Denies depression, anxiety or mood swings.    Physical Exam:    APPEARANCE: Well nourished, well developed, in no acute distress.  AFFECT: WNL, alert and oriented x 3  SKIN: No acne or hirsutism  NECK: Neck symmetric without masses or thyromegaly  NODES: No inguinal, cervical, axillary, or femoral lymph node enlargement  CHEST: Good respiratory effect  ABDOMEN: Soft.  No tenderness or masses.  No hepatosplenomegaly.  No hernias.  PELVIC: Normal external genitalia without lesions.  Normal hair distribution.  Adequate perineal body, normal urethral meatus.  Vagina moist and well rugated without lesions or discharge.  Cervix pink, without lesions, discharge or tenderness.  No significant cystocele or rectocele.  Bimanual exam shows uterus to be normal size, regular, mobile and nontender.  Adnexa without masses or tenderness.    EXTREMITIES: No edema.      ASSESSMENT AND PLAN  1. Postmenopausal bleeding  Vaginosis Screen by DNA Probe    C. trachomatis/N. gonorrhoeae by AMP DNA      2. Negative pregnancy test  POCT Urine Pregnancy      3. Pelvic pain in female  Vaginosis Screen by DNA Probe    C. trachomatis/N. gonorrhoeae by AMP DNA      4. Back pain, unspecified back location, unspecified back pain laterality, unspecified chronicity  Vaginosis Screen by DNA Probe    C. trachomatis/N. gonorrhoeae by AMP DNA      5. Pelvic congestion syndrome  Vaginosis Screen by DNA Probe    C. trachomatis/N. gonorrhoeae by AMP DNA            Patient was counseled today on A.C.S. Pap guidelines and recommendations for yearly pelvic exams, mammograms and monthly self breast exams; to see her PCP for other health maintenance.     EMBX to be scheduled  Plan for ALENA OZUNA BS           [1]   Social History  Tobacco Use    Smoking status: Never    Smokeless tobacco: Never    Substance Use Topics    Alcohol use: Not Currently     Alcohol/week: 0.0 standard drinks of alcohol     Comment: Occasionally.    Drug use: No

## 2025-04-04 ENCOUNTER — OFFICE VISIT (OUTPATIENT)
Facility: CLINIC | Age: 50
End: 2025-04-04
Payer: MEDICAID

## 2025-04-04 ENCOUNTER — LAB VISIT (OUTPATIENT)
Dept: LAB | Facility: HOSPITAL | Age: 50
End: 2025-04-04
Payer: MEDICAID

## 2025-04-04 VITALS
SYSTOLIC BLOOD PRESSURE: 122 MMHG | WEIGHT: 164.69 LBS | HEART RATE: 83 BPM | BODY MASS INDEX: 30.31 KG/M2 | HEIGHT: 62 IN | DIASTOLIC BLOOD PRESSURE: 85 MMHG

## 2025-04-04 DIAGNOSIS — M79.671 RIGHT FOOT PAIN: ICD-10-CM

## 2025-04-04 DIAGNOSIS — M79.2 NEURITIS: ICD-10-CM

## 2025-04-04 DIAGNOSIS — M79.7 FIBROMYALGIA: ICD-10-CM

## 2025-04-04 DIAGNOSIS — R20.0 NUMBNESS IN RIGHT LEG: ICD-10-CM

## 2025-04-04 DIAGNOSIS — R20.0 NUMBNESS IN RIGHT LEG: Primary | ICD-10-CM

## 2025-04-04 LAB
EAG (OHS): 103 MG/DL (ref 68–131)
FOLATE SERPL-MCNC: 13.5 NG/ML (ref 4–24)
HBA1C MFR BLD: 5.2 % (ref 4–5.6)
TSH SERPL-ACNC: 2.23 UIU/ML (ref 0.4–4)

## 2025-04-04 PROCEDURE — 84425 ASSAY OF VITAMIN B-1: CPT

## 2025-04-04 PROCEDURE — 36415 COLL VENOUS BLD VENIPUNCTURE: CPT

## 2025-04-04 PROCEDURE — 84630 ASSAY OF ZINC: CPT

## 2025-04-04 PROCEDURE — 84207 ASSAY OF VITAMIN B-6: CPT

## 2025-04-04 PROCEDURE — 99999 PR PBB SHADOW E&M-EST. PATIENT-LVL IV: CPT | Mod: PBBFAC,,,

## 2025-04-04 PROCEDURE — 82525 ASSAY OF COPPER: CPT

## 2025-04-04 PROCEDURE — 83921 ORGANIC ACID SINGLE QUANT: CPT

## 2025-04-04 PROCEDURE — 82746 ASSAY OF FOLIC ACID SERUM: CPT

## 2025-04-04 PROCEDURE — 99214 OFFICE O/P EST MOD 30 MIN: CPT | Mod: PBBFAC

## 2025-04-04 PROCEDURE — 83036 HEMOGLOBIN GLYCOSYLATED A1C: CPT

## 2025-04-04 PROCEDURE — 84443 ASSAY THYROID STIM HORMONE: CPT

## 2025-04-04 PROCEDURE — 82607 VITAMIN B-12: CPT

## 2025-04-04 NOTE — PROGRESS NOTES
"      [facility] - NEUROLOGY 7TH [place] [facility], [place]    Date: 4/4/25  Patient Name: Rema Horton   MRN: [id number]   PCP: Britta Clark  Referring Provider: Ivan Cobos DPM    Reason for visit: "Right foot pain; Neuritis"    Details provided by:    Patient    HISTORY OF PRESENT ILLNESS   Ms. Rema Horton is a 50 y.o. female with PMHx of fibromyalgia, anxiety, depression, and  presenting for evaluation of numbness.     Patient reports having N/T in the front of her R lower leg that has been present for about 3-4 years. It's unchanged and not worsening. She notes that the N/T also extends into the lateral side of her R calf as well as the top of her R foot. Additionally, she notes having "shock-like" sensations whenever significant tactile stimulation is applied to her R lower leg. Specifically, shaving her R legs causes her to have a shock-like pain that is present throughout the R leg and R foot. She states that the N/T is currently isolated to her R leg, denying having any N/T in her arms or her left leg. However, she does intermittently get a "crawling" sensation in both of her arms that she has difficulty describing in further detail.    She currently takes gabapentin 300mg BID which she states helps with the N/T and other abnormal sensations. She sometimes only takes 1 tablet in the afternoon to avoid daytime drowsiness. She states that she does not want to increase the dosage of this medication. She is also taking cymablta which helps as well.    Patient reports a long-standing hx of anxiety that is very debilitating. She states that she sometimes has days where she is unable to call people on the phone because she is so anxious thinking that the person she's calling will be judging her. She notes that her abnormal sensations get significantly worse on days that her anxiety is high. In addition to the abnormal sensations, she reports getting weakness in both legs when she's very " "stressed. She notes that sometimes this makes it difficult for her to walk. She also notes having significant issues with memory for the past several years, however, she received neuropsych testing in 2023 for this issue and testing was negative for any neurodegenerative process, but rather found to be most consistent with her long-standing hx of anxiety.    She reports taking multiple supplements including a zinc supplement.    Chart Review:  MRI Brain (7/13/23)  No evidence of acute intracranial pathology.  Normal MR appearance of the brain.    MRI Right Foot and Toes (9/26/24)  Mild degenerative changes of the hallux MTP joint.    Pertinent work up based on chart review for current condition:  B12 >2000 1 year ago  Rheumatoid factor <13.0  CCP 1.4  CRP 6.9  ESR 22  FELIPA neg    Lab Results   Component Value Date    WBC 4.73 05/10/2024    HGB 13.2 05/10/2024    HCT 40.3 05/10/2024     05/10/2024    CHOL 214 (H) 05/10/2024    TRIG 95 05/10/2024    HDL 56 05/10/2024    ALT 23 05/10/2024    AST 23 05/10/2024     05/10/2024    K 4.0 05/10/2024     05/10/2024    CREATININE 0.7 05/10/2024    BUN 14 05/10/2024    CO2 23 05/10/2024    TSH 2.641 05/10/2024    HGBA1C 5.3 05/10/2024    RUSCTFMO05 1651 (H) 05/10/2024    VITAMINB6 12 06/27/2024    FOLATE 16.4 09/12/2018       Review of Systems:  12 system review of systems is negative except for the symptoms mentioned in HPI.     PHYSICAL EXAMINATION     Vitals:    04/04/25 1042   BP: 122/85   Patient Position: Sitting   Pulse: 83   Weight: 74.7 kg (164 lb 10.9 oz)   Height: 5' 2" (1.575 m)     Wt Readings from Last 3 Encounters:   04/04/25 1042 74.7 kg (164 lb 10.9 oz)   04/03/25 1022 74 kg (163 lb 2.3 oz)   02/19/25 1115 73.5 kg (162 lb 0.6 oz)     Body mass index is 30.12 kg/m².     GENERAL/CONSTITUTIONAL/SYSTEMIC:    - Well appearing; well nourished  - Significantly anxious throughout the encounter    HIGHER INTEGRATIVE FUNCTIONS:   -Attention & " concentration: Normal   -Orientation: Oriented to person, place & time  -Memory: Normal  -Language: Normal   -Fund of Knowledge: Normal     CRANIAL NERVES:   -CN 2: Visual fields full  -CN 2,3: PERRL  -CN 3,4,6: EOMI  -CN 5: Facial sensation intact bilaterally  -CN 7: Facial strength/movement intact bilaterally  -CN 8: Hearing normal bilaterally  -CN 9,10: Palate elevates symmetrically  -CN 11: Normal shoulder shrug and head turn  -CN 12: Tongue protrudes midline     MOTOR:   -Tone: normal in upper and lower extremities  -UE/LE motor: 5/5 throughout, no pronator drift     SENSATION:   - Light touch impaired in R lateral calf and dorsum of R foot. Otherwise intact.  - Vibratory sense very mildly impaired in R toes. Otherwise intact.  - Intact bilaterally to pin prick    REFLEXES:     RIGHT Reflex   LEFT   2+ Biceps 2+   2+ Brachiorad. 2+   2+ Triceps 2+    Pectoralis     Jaw Jerk     Cunningham's         2+ Patellar 2+   2+ Ankle 2+    Suprapatellar              Down PLANTAR Down       COORDINATION:   -FNF normal bilaterally    GAIT:   -Normal casual and tandem gait. Able to stand on heels and toes without difficulty.    Scheduled Follow-up :  Future Appointments   Date Time Provider Department Center   4[facility]10/2025 10:30 AM [facility]   4/15/2025  3:15 PM Marichuy Ramirez MD [facility] OBGYN [place]   5/28/2025  9:45 AM Marichuy Ramirez MD [id number]   5/28/2025 11:00 AM PRE-ADMIT, [facility] PREAD[facility]       After Visit Medication List :     Medication List            Accurate as of April 4, 2025  1:47 PM. If you have any questions, ask your nurse or doctor.                CONTINUE taking these medications      acetaminophen 500 MG tablet  Commonly known as: TYLENOL  Take 1-2 tablets (500-1,000 mg total) by mouth 3 (three) times daily as needed for Pain.     celecoxib 200 MG capsule  Commonly known as: CeleBREX  Take 1 capsule (200 mg total) by  mouth once daily. In 1-2 weeks, if no relief, may increase dose to twice per day.     cholecalciferol (vitamin D3) 25 mcg (1,000 unit) capsule  Commonly known as: VITAMIN D3  TAKE 2 CAPSULES (2,000 UNITS TOTAL) BY MOUTH ONCE DAILY.     clobetasol 0.05% 0.05 % Oint  Commonly known as: TEMOVATE  APPLY TOPICALLY 2 (TWO) TIMES DAILY. USE TO AFFECTED AREAS FOR UP TO 2 WEEKS THEN TAKE A 1 WEEK BREAK OR DECREASE TO 3 TIMES WEEKLY. DO NOT APPLY TO GROIN OR FACE. USE TO SPOT ON HAND     * dextroamphetamine-amphetamine 10 MG 24 hr capsule  Commonly known as: ADDERALL XR  Take 1 capsule (10 mg total) by mouth every morning.     * dextroamphetamine-amphetamine 10 MG 24 hr capsule  Commonly known as: ADDERALL XR  Take 1 capsule (10 mg total) by mouth every morning.     DULoxetine 60 MG capsule  Commonly known as: CYMBALTA  Take 1 capsule (60 mg total) by mouth once daily.     fluocinolone 0.01 % external solution  Commonly known as: SYNALAR  AAA scalp qday - bid prn itching or scaling     fluticasone propionate 50 mcg/actuation nasal spray  Commonly known as: FLONASE  SPRAY 1 SPRAY INTO EACH NOSTRIL TWICE A DAY     gabapentin 300 MG capsule  Commonly known as: NEURONTIN  Take 1 capsule (300 mg total) by mouth 2 (two) times daily. Alternatively, may take 2 capsules (600 mg total) at bedtime     * ketoconazole 2 % shampoo  Commonly known as: NIZORAL  WASH HAIR WITH MEDICATED SHAMPOO AT LEAST 2X/WEEK - LET SIT ON SCALP AT LEAST 5 MINUTES PRIOR TO RINSING     * ketoconazole 2 % cream  Commonly known as: NIZORAL  APPLY TO AFFECTED AREA ON PALM TWICE A DAY     magnesium oxide 400 mg (241.3 mg magnesium) tablet  Commonly known as: MAG-OX  TAKE 1 TABLET BY MOUTH TWICE A DAY     nitroglycerin 0.4 % (w/w) Oint  Commonly known as: RECTIV  Place 1 Application rectally 2 (two) times a day.     pantoprazole 40 MG tablet  Commonly known as: PROTONIX  Take 1 tablet (40 mg total) by mouth once daily.     polyethylene glycol 240-22.72-6.72 -5.84  ayla Solr  Commonly known as: COLYTE  Take as directed by provider office     tacrolimus 0.1 % ointment  Commonly known as: PROTOPIC  APPLY TOPICALLY 2 (TWO) TIMES DAILY. NON-STEROID. USE TO AREAS OF ECZEMA WHEN TAKING A BREAK FROM CLOBETASOL     * tiZANidine 2 mg Cap     * tiZANidine 2 MG tablet  Commonly known as: ZANAFLEX  TAKE 1 TABLET BY MOUTH TWICE A DAY AS NEEDED FOR MUSCLE SPASMS     topiramate 25 MG tablet  Commonly known as: TOPAMAX  Take 1 tablet (25 mg total) by mouth 2 (two) times daily.     triamcinolone acetonide 0.1% 0.1 % ointment  Commonly known as: KENALOG  APPLY TOPICALLY 2 (TWO) TIMES DAILY TO LEFT THUMB     TURMERIC ORAL     valACYclovir 500 MG tablet  Commonly known as: VALTREX  TAKE 1 TABLET BY MOUTH EVERY DAY     VITAMIN E ORAL     ZEPBOUND 7.5 mg/0.5 mL Pnij  Generic drug: tirzepatide (weight loss)  Inject 7.5 mg (one pen) into the skin every 7 days           * This list has 6 medication(s) that are the same as other medications prescribed for you. Read the directions carefully, and ask your doctor or other care provider to review them with you.                    Assessment/Plan:   Ms. Rema Horton is a 50 y.o. female with PMHx of fibromyalgia, anxiety, depression, and  presenting for evaluation of numbness.     - Patient continues to have abnormal sensations including RLE numbness as well as shock-like sensations with tactile stimulation. Previously seen by Dr. Gillette in neuromuscular clinic for these same symptoms. Neuromuscular workup has been essentially unremarkable to date.  - Suspect that these symptoms are most likely related to her extremely high anxiety as well as her fibromyalgia given significant worsening of symptoms at times of high stress  - EMG/NCS not warranted at this time given the suspicion that her symptoms are closely related to her anxiety. However, consider scheduling EMG/NCS in the future if symptoms significantly worsen or if clinical picture becomes more  complex. Patient was very amendable to this plan.  - Encouraged patient to continue managing her anxiety to the best of her ability to help control her symptoms. She reports that she often meditates for stress relief. I advised her that this is a great practice that she should continue.  - Advised that she also continue following with PMR for fibromyalgia.  - Although I still believe the main cause of her abnormal sensations is her anxiety, ordering routine neuropathy labs to r/o any contributing factors  - Continue gabapentin and cymablta given reported improvement for her abnormal sensations    Follow-up in 3 months.    I discussed with the patient in depth the risk/benefits of the following plan and the patient was amenable. We discussed the following:    Problem List Items Addressed This Visit          Orthopedic    Fibromyalgia     Other Visit Diagnoses         Numbness in right leg    -  Primary    Relevant Orders    Folate    Hemoglobin A1C    TSH    Vitamin B1    Vitamin B12 Deficiency Panel    Vitamin B6    Copper, Serum    Zinc      Right foot pain          Neuritis                     This evaluation was completed in >35  Minutes over 50% of the time spent on education & counseling. This includes face to face time and non-face to face time preparing to see the patient (eg, review of tests), obtaining and/or reviewing separately obtained history, documenting clinical information in the electronic or other health record, independently interpreting results and communicating results to the patient/family/caregiver, or care coordinator.          Marshall J Kellerman, PA-C  Supervising physician Leonie Gillette MD was available for all questions during this exam.  [facility]  [address],  [facility] [place]. [address] [place] [address].

## 2025-04-07 LAB — W ZINC: 57 UG/DL

## 2025-04-08 ENCOUNTER — RESULTS FOLLOW-UP (OUTPATIENT)
Facility: CLINIC | Age: 50
End: 2025-04-08

## 2025-04-08 DIAGNOSIS — R79.89 LOW VITAMIN B12 LEVEL: Primary | ICD-10-CM

## 2025-04-08 LAB
VIT B12 SERPL-MCNC: 389 NG/L (ref 180–914)
W COPPER: 1260 UG/L
W VITAMIN B6: 7 UG/L

## 2025-04-08 RX ORDER — MECOBALAMIN 1000 MCG
1000 TABLET,CHEWABLE ORAL DAILY
Qty: 90 TABLET | Refills: 3 | Status: SHIPPED | OUTPATIENT
Start: 2025-04-08

## 2025-04-09 LAB — W VITAMIN B1: 62 UG/L

## 2025-04-10 DIAGNOSIS — E53.1 INADEQUATE VITAMIN B6 INTAKE: Primary | ICD-10-CM

## 2025-04-10 LAB — METHYLMALONATE SERPL-SCNC: 0.16 NMOL/ML

## 2025-04-10 RX ORDER — LANOLIN ALCOHOL/MO/W.PET/CERES
50 CREAM (GRAM) TOPICAL DAILY
Qty: 14 TABLET | Refills: 0 | Status: SHIPPED | OUTPATIENT
Start: 2025-04-10

## 2025-04-15 ENCOUNTER — PROCEDURE VISIT (OUTPATIENT)
Dept: OBSTETRICS AND GYNECOLOGY | Facility: CLINIC | Age: 50
End: 2025-04-15
Payer: MEDICAID

## 2025-04-15 VITALS
DIASTOLIC BLOOD PRESSURE: 82 MMHG | WEIGHT: 165.81 LBS | BODY MASS INDEX: 30.51 KG/M2 | HEIGHT: 62 IN | SYSTOLIC BLOOD PRESSURE: 104 MMHG

## 2025-04-15 DIAGNOSIS — N92.4 EXCESSIVE BLEEDING IN PREMENOPAUSAL PERIOD: ICD-10-CM

## 2025-04-15 DIAGNOSIS — Z32.02 NEGATIVE PREGNANCY TEST: Primary | ICD-10-CM

## 2025-04-15 DIAGNOSIS — N94.6 DYSMENORRHEA: ICD-10-CM

## 2025-04-15 LAB
B-HCG UR QL: NEGATIVE
CTP QC/QA: YES

## 2025-04-15 PROCEDURE — 88305 TISSUE EXAM BY PATHOLOGIST: CPT | Mod: TC | Performed by: OBSTETRICS & GYNECOLOGY

## 2025-04-15 PROCEDURE — 58100 BIOPSY OF UTERUS LINING: CPT | Mod: PBBFAC,PN | Performed by: OBSTETRICS & GYNECOLOGY

## 2025-04-15 PROCEDURE — 81025 URINE PREGNANCY TEST: CPT | Mod: PBBFAC,PN | Performed by: OBSTETRICS & GYNECOLOGY

## 2025-04-15 PROCEDURE — 58100 BIOPSY OF UTERUS LINING: CPT | Mod: S$PBB,,, | Performed by: OBSTETRICS & GYNECOLOGY

## 2025-04-15 PROCEDURE — 99999PBSHW POCT URINE PREGNANCY: Mod: PBBFAC,,,

## 2025-04-15 PROCEDURE — 88305 TISSUE EXAM BY PATHOLOGIST: CPT | Mod: 26,,, | Performed by: PATHOLOGY

## 2025-04-16 DIAGNOSIS — L24.5 IRRITANT CONTACT DERMATITIS DUE TO OTHER CHEMICAL PRODUCTS: ICD-10-CM

## 2025-04-16 RX ORDER — TRIAMCINOLONE ACETONIDE 1 MG/G
OINTMENT TOPICAL
Qty: 80 G | Refills: 1 | Status: SHIPPED | OUTPATIENT
Start: 2025-04-16

## 2025-04-16 NOTE — TELEPHONE ENCOUNTER
No care due was identified.  [facility] Embedded Care Due Messages. Reference number: [id number].   4/16/2025 12:17:21 AM CDT

## 2025-04-17 NOTE — TELEPHONE ENCOUNTER
Refill Decision Note   Rema Horton  is requesting a refill authorization.  Brief Assessment and Rationale for Refill:  Approve     Medication Therapy Plan:         Comments:     Note composed:8:53 PM 04/16/2025

## 2025-04-20 LAB
ESTROGEN SERPL-MCNC: NORMAL PG/ML
INSULIN SERPL-ACNC: NORMAL U[IU]/ML
LAB AP GROSS DESCRIPTION: NORMAL
LAB AP PERFORMING LOCATION(S): NORMAL
LAB AP REPORT FOOTNOTES: NORMAL

## 2025-04-22 ENCOUNTER — RESULTS FOLLOW-UP (OUTPATIENT)
Dept: OBSTETRICS AND GYNECOLOGY | Facility: CLINIC | Age: 50
End: 2025-04-22

## 2025-04-22 NOTE — PROGRESS NOTES
Here for endometrial biopsy  Risks, benefits and alternatives to procedure discussed.        Patient presents for endometrial biopsy.      The risks, benefits and alternatives to procedure was discussed, and will also determine the plan of care, treatments available, the minimal risk of bleeding and infection with endometrial biopsy,and she agrees to proceed.      UPT is negative    ENDOMETRIAL BIOPSY     The cervix was swabbed with betadine times 3.  The anterior lip of the cervix was grasped with a single toothed tenaculum.  A uterine pipelle was inserted into the uterus to a level of 8 cm.  The patient tolerated with above procedure WELL.     All collected specimens sent to pathology for histologic analysis.    Post-biopsy counseling:  The patient was instructed to manage post endometrial biopsy cramping with NSAIDs or Tylenol, or with a prescription per the medication card.  Avoid intercourse, douching, or tampons in the vagina for at least 2-3 days.  .  Report heavy bleeding, worsening pain or pain that does not respond to above medications, or foul-smelling vaginal discharge.   Importance of follow-up stressed.      Follow up based on endometrial biopsy results.

## 2025-05-04 DIAGNOSIS — L30.9 DERMATITIS: ICD-10-CM

## 2025-05-05 RX ORDER — CLOBETASOL PROPIONATE 0.5 MG/G
OINTMENT TOPICAL 2 TIMES DAILY
Qty: 45 G | Refills: 2 | Status: SHIPPED | OUTPATIENT
Start: 2025-05-05

## 2025-05-14 ENCOUNTER — PATIENT MESSAGE (OUTPATIENT)
Dept: PSYCHIATRY | Facility: CLINIC | Age: 50
End: 2025-05-14
Payer: MEDICAID

## 2025-05-14 ENCOUNTER — OFFICE VISIT (OUTPATIENT)
Dept: PSYCHIATRY | Facility: CLINIC | Age: 50
End: 2025-05-14
Payer: MEDICAID

## 2025-05-14 VITALS
BODY MASS INDEX: 30.4 KG/M2 | WEIGHT: 166.25 LBS | DIASTOLIC BLOOD PRESSURE: 86 MMHG | SYSTOLIC BLOOD PRESSURE: 128 MMHG | HEART RATE: 88 BPM

## 2025-05-14 DIAGNOSIS — M79.7 FIBROMYALGIA: ICD-10-CM

## 2025-05-14 DIAGNOSIS — F33.1 MDD (MAJOR DEPRESSIVE DISORDER), RECURRENT EPISODE, MODERATE: ICD-10-CM

## 2025-05-14 DIAGNOSIS — F41.0 PANIC ATTACKS: ICD-10-CM

## 2025-05-14 DIAGNOSIS — F90.2 ATTENTION DEFICIT HYPERACTIVITY DISORDER (ADHD), COMBINED TYPE: Primary | ICD-10-CM

## 2025-05-14 PROCEDURE — 99211 OFF/OP EST MAY X REQ PHY/QHP: CPT | Mod: PBBFAC

## 2025-05-14 PROCEDURE — 99999 PR PBB SHADOW E&M-EST. PATIENT-LVL I: CPT | Mod: PBBFAC,HB,,

## 2025-05-14 NOTE — PROGRESS NOTES
Outpatient Psychiatry Clinic Follow-up  [facility]  2025  Patient Name: Rema Horton   : 1975       Source of information: Patient      History of Present Illness (HPI):     Rema Horton is a 50 y.o. female with history of MARIMAR, panic attacks, MDD, fibromyalgia presenting to clinic for follow-up appointment.  who presents for follow up appointment.      Plan at last appointment on 10/30/2024:  Continue   Cymbalta 60 mg  Adderall XR 10 mg       Interval History      Today, patient reports doing okay during interim. Patient reports continued psychosocial stressors of taking care of daughter with medical conditions and issues with spouse. Patient reports continued symptoms of tearing spells, mood lability, and intermittent irritability. States that she has always been a very sensitive person and that these symptoms are not new to her. Denies further questions or concerns at this time.           Psychiatric Review of Systems (ROS):     Depression: depressed mood and hopelessness     Kika/hypomania: denies      Anxiety (MARIMAR and PA): excessive anxiety/worry that is difficult to control     Psychosis: denies current symptoms      PTSD: denies current symptoms     Eating Disorders: denies current symptoms      OCD: OCD Symptoms: denies current symptoms      ADHD: denies current symptoms    Sleep: denies current symptoms/complaints       Examination:       VITALS  Vitals:    25 1344   BP: 128/86   Pulse: 88   Weight: 75.4 kg (166 lb 3.6 oz)       RELEVANT LABS/STUDIES:    Lab Results   Component Value Date    WBC 4.73 05/10/2024    HGB 13.2 05/10/2024    HCT 40.3 05/10/2024    MCV 96 05/10/2024     05/10/2024     BMP  Lab Results   Component Value Date     05/10/2024    K 4.0 05/10/2024     05/10/2024    CO2 23 05/10/2024    BUN 14 05/10/2024    CREATININE 0.7 05/10/2024    CALCIUM 10.1 05/10/2024    ANIONGAP 11 05/10/2024    ESTGFRAFRICA >60.0 2022    EGFRNONAA  ">60.0 03/03/2022     Lab Results   Component Value Date    ALT 23 05/10/2024    AST 23 05/10/2024    ALKPHOS 73 05/10/2024    BILITOT 0.3 05/10/2024     Lab Results   Component Value Date    TSH 2.235 04/04/2025     Lab Results   Component Value Date    HGBA1C 5.2 04/04/2025         PHYSICAL EXAM  General: well developed, well nourished  Neurologic:   Gait: Normal   Psychomotor signs:  No involuntary movements or tremor  AIMS: none    Risk Parameters:  Patient reports no suicidal ideation  Patient reports no homicidal ideation  Patient reports no self-injurious behavior  Patient reports no violent behavior      MENTAL STATUS EXAMINATION  General Appearance: appears stated age, well developed and nourished, adequately groomed and appropriately dressed, in no acute distress  Behavior: normal; cooperative; reasonably friendly, pleasant, and polite; appropriate eye-contact; under good behavioral control  Involuntary Movements and Motor Activity: no abnormal involuntary movements noted; no tics, no tremors, no akathisia, no dystonia, no evidence of tardive dyskinesia; no psychomotor agitation or retardation  Muscle Strength and Tone: intact  Gait and Station: intact, normal gait and station, ambulates without assistance  Speech: intact; normal rate, rhythm, volume, tone and pitch; conversational, spontaneous, and coherent  Language: intact; speaks and understands English fluently and proficiently; repeats words and phrases, no word finding difficulties are noted  Mood: "just fine"  Affect: normal, euthymic, reactive, full-range, mood-congruent, appropriate to situation and context  Thought Process: intact, linear, goal-directed, organized, logical  Associations: intact, no loosening of associations  Thought Content and Perceptions: no suicidal or homicidal ideation, no auditory or visual hallucinations, no paranoid ideation, no ideas of reference, no evidence of delusions or psychosis  Sensorium/Arousal: alert with clear " no sensorium  Orientation: intact; oriented fully to person, place, time and situation  Recent and Remote Memory: grossly intact, able to recall relevant and salient information from the recent and remote past  Attention and Concentration: grossly intact, attentive to the conversation and not readily distractible  Fund of Knowledge: grossly intact, used appropriate vocabulary and demonstrated an awareness of current events  Insight: intact, demonstrates awareness of illness and situation  Judgment: intact, behavior is adequate/appropriate to the circumstances, compliant with health provider's recommendations and instructions      Medical Decision Making - Assessment:     DIAGNOSIS:    ICD-10-CM ICD-9-CM   1. Attention deficit hyperactivity disorder (ADHD), combined type  F90.2 314.01   2. Fibromyalgia  M79.7 729.1   3. Panic attacks  F41.0 300.01   4. MDD (major depressive disorder), recurrent episode, moderate  F33.1 296.32         General Impression:  49F w/ history of MARIMAR, panic attacks, MDD, fibromyalgia presenting to clinic for follow-up appointment. Patient had been previously initiated on Cymbalta by previous resident which had been titrated to 60 mg by the time of our first appointment. At that time patient was continuing to endorse persistent but improved symptoms of depression and anxiety. Discussed options of increasing dosage of Cymbalta for which patient denied interest at this time.    ----------------------------------------------------------------------------------Update 5/14/25-------------------------------------------------------------------------------------------  Patient reports discontinuing Cymbalta 60 mg during interim due to side effects. Denies interest in re-starting scheduled antidepressant at this time.     Strengths and liabilities:  Strength: Patient accepts guidance/feedback, Strength: Patient is expressive/articulate., Strength: Patient is intelligent., Strength: Patient is motivated  for change., Strength: Patient is physically healthy., Strength: Patient has positive support network., Strength: Patient has reasonable judgment., Strength: Patient is stable.  Protective factors: female, age 26-59 y/o, , and non-  Risk factors: n/a        Medical Decision Making - Plan:     Start  Vistaril 25 mg TID  Continue   Adderall XR 10 mg  [place] PDMP reviewed:  no concern for misuse; filling Adderall as prescribed; no other controlled substances being filled  Discussed lifestyle modifications to reduce symptoms non-pharmacologically (ex: sleep, exercise, diet, etc).  Encouraged psychotherapy, patient expresses understanding  Counseled to limit substance use as drugs/alcohol can exacerbate psychiatric symptoms.  Discussed importance of sleep hygiene    Return to Clinic: 3 months or sooner if needed    Discussed with patient verbal informed consent including: risks and benefits of proposed treatment above vs. alternative treatments vs. no treatment, serious and common side effects of these respective treatments, as well as the inherent unpredictability of individual responses to any treatments, contingency plans if adverse reactions occur, precautions in which to me through Epic MyChart portal or call the clinic, and precautions in which to call 911 and/or go to the emergency room. The patient expresses understanding of the above and displays the capacity to agree with this current plan. All questions were answered.    Total of 30 minutes spent today on encounter, including chart review,  review, seeing patient, documenting encounter, ordering medications.      Billing Documentation:     Method of Encounter IN PERSON visit at the clinic   Type of Encounter Follow up visit with me   Counseling;  Psychotherapy Not applicable: Not done or UNDER 16 minutes   Counseling;  Tobacco and/or Nicotine Not applicable: Not done or UNDER 3 minutes   Time Remaining Chart/Pt 66375: FOLLOW UP VISIT, Rx mgmt    Total Mins  (5/14/2025) TIME: N/A - Not billing for time         Ricky Becerra MD  [facility], PGY-III  [facility]

## 2025-05-17 DIAGNOSIS — B35.2 TINEA MANUUM: ICD-10-CM

## 2025-05-19 RX ORDER — KETOCONAZOLE 20 MG/G
CREAM TOPICAL
Qty: 30 G | Refills: 1 | Status: SHIPPED | OUTPATIENT
Start: 2025-05-19

## 2025-05-28 ENCOUNTER — TELEPHONE (OUTPATIENT)
Dept: OBSTETRICS AND GYNECOLOGY | Facility: CLINIC | Age: 50
End: 2025-05-28
Payer: MEDICAID

## 2025-05-28 RX ORDER — HYDROXYZINE HYDROCHLORIDE 10 MG/1
10 TABLET, FILM COATED ORAL 3 TIMES DAILY PRN
Qty: 90 TABLET | Refills: 3 | Status: SHIPPED | OUTPATIENT
Start: 2025-05-28

## 2025-05-28 NOTE — TELEPHONE ENCOUNTER
I called pt in regards to her canceling her pre-op and pre-admit appts today for surgery. Pt is required to complete both of these visits prior to surgery. I left pt a detailed message asking her to call the office back asap to either reschedule this or cancel her surgery.

## 2025-05-30 ENCOUNTER — PATIENT MESSAGE (OUTPATIENT)
Dept: PREADMISSION TESTING | Facility: OTHER | Age: 50
End: 2025-05-30
Payer: MEDICAID

## 2025-06-03 ENCOUNTER — PATIENT MESSAGE (OUTPATIENT)
Dept: PREADMISSION TESTING | Facility: OTHER | Age: 50
End: 2025-06-03
Payer: MEDICAID

## 2025-06-13 ENCOUNTER — HOSPITAL ENCOUNTER (OUTPATIENT)
Dept: PREADMISSION TESTING | Facility: OTHER | Age: 50
Discharge: HOME | End: 2025-06-13
Attending: OBSTETRICS & GYNECOLOGY
Payer: MEDICAID

## 2025-06-13 VITALS
HEART RATE: 78 BPM | WEIGHT: 169.19 LBS | DIASTOLIC BLOOD PRESSURE: 88 MMHG | TEMPERATURE: 98 F | SYSTOLIC BLOOD PRESSURE: 139 MMHG | OXYGEN SATURATION: 98 % | HEIGHT: 62 IN | BODY MASS INDEX: 31.13 KG/M2

## 2025-06-13 DIAGNOSIS — Z01.818 PREOP TESTING: Primary | ICD-10-CM

## 2025-06-13 LAB
ABSOLUTE EOSINOPHIL (OHS): 0.03 K/UL
ABSOLUTE MONOCYTE (OHS): 0.52 K/UL (ref 0.3–1)
ABSOLUTE NEUTROPHIL COUNT (OHS): 2.74 K/UL (ref 1.8–7.7)
ANION GAP (OHS): 10 MMOL/L (ref 8–16)
BASOPHILS # BLD AUTO: 0.02 K/UL
BASOPHILS NFR BLD AUTO: 0.4 %
BUN SERPL-MCNC: 14 MG/DL (ref 6–20)
CALCIUM SERPL-MCNC: 9.2 MG/DL (ref 8.7–10.5)
CHLORIDE SERPL-SCNC: 104 MMOL/L (ref 95–110)
CO2 SERPL-SCNC: 25 MMOL/L (ref 23–29)
CREAT SERPL-MCNC: 0.7 MG/DL (ref 0.5–1.4)
ERYTHROCYTE [DISTWIDTH] IN BLOOD BY AUTOMATED COUNT: 12.1 % (ref 11.5–14.5)
GFR SERPLBLD CREATININE-BSD FMLA CKD-EPI: >60 ML/MIN/1.73/M2
GLUCOSE SERPL-MCNC: 85 MG/DL (ref 70–110)
HCT VFR BLD AUTO: 39.6 % (ref 37–48.5)
HGB BLD-MCNC: 13.4 GM/DL (ref 12–16)
IMM GRANULOCYTES # BLD AUTO: 0.01 K/UL (ref 0–0.04)
IMM GRANULOCYTES NFR BLD AUTO: 0.2 % (ref 0–0.5)
INDIRECT COOMBS: NORMAL
LYMPHOCYTES # BLD AUTO: 2.28 K/UL (ref 1–4.8)
MCH RBC QN AUTO: 31.1 PG (ref 27–31)
MCHC RBC AUTO-ENTMCNC: 33.8 G/DL (ref 32–36)
MCV RBC AUTO: 92 FL (ref 82–98)
NUCLEATED RBC (/100WBC) (OHS): 0 /100 WBC
PLATELET # BLD AUTO: 239 K/UL (ref 150–450)
PMV BLD AUTO: 9.2 FL (ref 9.2–12.9)
POTASSIUM SERPL-SCNC: 4.1 MMOL/L (ref 3.5–5.1)
RBC # BLD AUTO: 4.31 M/UL (ref 4–5.4)
RELATIVE EOSINOPHIL (OHS): 0.5 %
RELATIVE LYMPHOCYTE (OHS): 40.7 % (ref 18–48)
RELATIVE MONOCYTE (OHS): 9.3 % (ref 4–15)
RELATIVE NEUTROPHIL (OHS): 48.9 % (ref 38–73)
RH BLD: NORMAL
SODIUM SERPL-SCNC: 139 MMOL/L (ref 136–145)
SPECIMEN OUTDATE: NORMAL
WBC # BLD AUTO: 5.6 K/UL (ref 3.9–12.7)

## 2025-06-13 PROCEDURE — 85025 COMPLETE CBC W/AUTO DIFF WBC: CPT

## 2025-06-13 PROCEDURE — 86850 RBC ANTIBODY SCREEN: CPT

## 2025-06-13 PROCEDURE — 82310 ASSAY OF CALCIUM: CPT

## 2025-06-13 RX ORDER — ACETAMINOPHEN 500 MG
1000 TABLET ORAL
OUTPATIENT
Start: 2025-06-13 | End: 2025-06-13

## 2025-06-13 RX ORDER — SODIUM CHLORIDE, SODIUM LACTATE, POTASSIUM CHLORIDE, CALCIUM CHLORIDE 600; 310; 30; 20 MG/100ML; MG/100ML; MG/100ML; MG/100ML
INJECTION, SOLUTION INTRAVENOUS CONTINUOUS
OUTPATIENT
Start: 2025-06-13

## 2025-06-13 RX ORDER — LIDOCAINE HYDROCHLORIDE 10 MG/ML
0.5 INJECTION, SOLUTION EPIDURAL; INFILTRATION; INTRACAUDAL; PERINEURAL ONCE
OUTPATIENT
Start: 2025-06-13 | End: 2025-06-13

## 2025-06-13 NOTE — DISCHARGE INSTRUCTIONS
Information to Prepare you for your Surgery    PRE-ADMIT TESTING -  [phone]    [address] [facility]          Your surgery has been scheduled at [facility]. We are pleased to have the opportunity to serve you. For Further Information please call [phone].    On the day of surgery please report to the Information Desk on the 1st floor.    CONTACT YOUR PHYSICIAN'S OFFICE THE DAY PRIOR TO YOUR SURGERY TO OBTAIN YOUR ARRIVAL TIME.     The evening before surgery do not eat anything after 9 p.m. ( this includes hard candy, chewing gum and mints).  You may only have GATORADE, POWERADE AND WATER  from 9 p.m. until you leave your home.   DO NOT DRINK ANY LIQUIDS ON THE WAY TO THE HOSPITAL.      Why does your anesthesiologist allow you to drink Gatorade/Powerade before surgery?  Gatorade/Powerade helps to increase your comfort before surgery and to decrease your nausea after surgery. The carbohydrates in Gatorade/Powerade help reduce your body's stress response to surgery.  If you are a diabetic-drink only water prior to surgery.    Outpatient Surgery- May allow 2 adult (18 and older) Support Persons (1 being the designated ) for all surgical/procedural patients. A breastfeeding mother will be allowed her infant and 2 adult Support Persons. No one under the age of 18 will be allowed in the building.       SPECIAL MEDICATION INSTRUCTIONS: TAKE medications checked off by the Anesthesiologist on your Medication List.      Angiogram Patients: Take medications as instructed by your physician, including aspirin.       Surgery Patients:    If you take ASPIRIN - Your PHYSICIAN/SURGEON will need to inform you IF/OR when you need to stop taking aspirin prior to your surgery.           The week prior to surgery do not ot take any medications containing IBUPROFEN or NSAIDS ( Advil, Motrin, Goodys, BC, Aleve, Naproxen etc)         If you are not sure if you should  take a medicine please call your surgeon's office.        Ok to take Tylenol    Do Not Wear any make-up (especially eye make-up) to surgery. Please remove any false eyelashes or eyelash extensions. If you arrive the day of surgery with makeup/eyelashes on you will be required to remove prior to surgery. (There is a risk of corneal abrasions if eye makeup/eyelash extensions are not removed)      Leave all valuables at home. Do Not wear any jewelry or watches, including any metal in body piercings. Jewelry must be removed prior to coming to the hospital.  There is a possibility that rings that are unable to be removed may be cut off if they are on the surgical extremity.    Please remove all hair extensions, wigs, clips and any other metal accessories/ ornaments from your hair.  These items may pose a flammable/fire risk in Surgery and must be removed.    Do not shave your surgical area at least 5 days prior to your surgery. The surgical prep will be performed at the hospital according to Infection Control regulations.    Contact Lens must be removed before surgery. Either do not wear the contact lens or bring a case and solution for storage.        Please bring a container for eyeglasses or dentures as required.        Bring any paperwork your physician has provided, such as consent forms,        history and physicals, doctor's orders, etc.         Bring comfortable clothes that are loose fitting to wear upon discharge. Take into consideration the type of surgery being performed.        Maintain your diet as advised per your physician the day prior to surgery.    Park in the Parking lot behind the hospital or in the [facility] across the street from the parking lot. Parking is complimentary.        If you will be discharged the same day as your procedure, please arrange for a responsible adult to drive you home or to accompany you if traveling by taxi.         YOU WILL NOT BE PERMITTED TO DRIVE OR TO  LEAVE THE HOSPITAL ALONE AFTER SURGERY.         If you are being discharged the same day, it is strongly recommended that you arrange for someone to remain with you for the first 24 hrs following your surgery.    The Surgeon will speak to your family/visitor after your surgery regarding the outcome of your surgery and post op care.  The Surgeon may speak to you after your surgery, but there is a possibility you may not remember the details.  Please check with your family members regarding the conversation with the Surgeon.    We strongly recommend whoever is bringing you home be present for discharge instructions.  This will ensure a thorough understanding for your post op home care.    If the patient has fever, cough, or signs/symptoms of Flu or Covid please do not come in for your surgery. Contact your surgeon and your primary care physician for further instructions.             Bathing Instructions with Hibiclens    Shower the evening before and morning of your procedure with Chlorhexidine (Hibiclens)  do not use Chlorhexidine on your face or genitals. Do not get in your eyes.  Wash your face with water and your regular face wash/soap  Use your regular shampoo  Apply Chlorhexidine (Hibiclens) directly on your skin or on a wet washcloth and wash gently. When showering: Move away from the shower stream when applying Chlorhexidine (Hibiclens) to avoid rinsing off too soon.  Rinse thoroughly with warm water  Do not dilute Chlorhexidine (Hibiclens)   Dry off as usual, do not use any deodorant, powder, body lotions, perfume, after shave or cologne.

## 2025-06-14 ENCOUNTER — PATIENT MESSAGE (OUTPATIENT)
Dept: HEMATOLOGY/ONCOLOGY | Facility: CLINIC | Age: 50
End: 2025-06-14
Payer: MEDICAID

## 2025-06-16 ENCOUNTER — PATIENT MESSAGE (OUTPATIENT)
Dept: ADMINISTRATIVE | Facility: OTHER | Age: 50
End: 2025-06-16
Payer: MEDICAID

## 2025-06-17 ENCOUNTER — PATIENT MESSAGE (OUTPATIENT)
Dept: OBSTETRICS AND GYNECOLOGY | Facility: CLINIC | Age: 50
End: 2025-06-17
Payer: MEDICAID

## 2025-06-18 ENCOUNTER — OFFICE VISIT (OUTPATIENT)
Dept: INTERNAL MEDICINE | Facility: CLINIC | Age: 50
End: 2025-06-18
Payer: MEDICAID

## 2025-06-18 VITALS — HEART RATE: 80 BPM | RESPIRATION RATE: 12 BRPM

## 2025-06-18 DIAGNOSIS — J02.9 ACUTE VIRAL PHARYNGITIS: Primary | ICD-10-CM

## 2025-06-18 PROCEDURE — 99213 OFFICE O/P EST LOW 20 MIN: CPT | Mod: GE,S$PBB,, | Performed by: INTERNAL MEDICINE

## 2025-06-18 PROCEDURE — 99999 PR PBB SHADOW E&M-EST. PATIENT-LVL II: CPT | Mod: PBBFAC,,,

## 2025-06-18 PROCEDURE — 99212 OFFICE O/P EST SF 10 MIN: CPT | Mod: PBBFAC

## 2025-06-18 PROCEDURE — 3044F HG A1C LEVEL LT 7.0%: CPT | Mod: CPTII,,, | Performed by: INTERNAL MEDICINE

## 2025-06-18 RX ORDER — PHENOL 1.4 %
AEROSOL, SPRAY (ML) MUCOUS MEMBRANE
Qty: 20 ML | Refills: 0 | Status: CANCELLED | OUTPATIENT
Start: 2025-06-18

## 2025-06-18 NOTE — PROGRESS NOTES
INTERNAL MEDICINE RESIDENT CLINIC  CLINIC NOTE    Patient Name: Rema Horton  YOB: 1975    PRESENTING HISTORY       History of Present Illness:  Ms. Rema Horton is a 50 y.o. female who presents as an urgent care visit for sore throat and fever. Her symptoms started on . She was scheduled to have a hysterectomy today but had to cancel. She notes that the pain started in her left ear and moved to her throat. Since onset of symptoms, she has had worsening pain with swallowing and cannot tolerate water or saliva due to the pain. She had shaking chills on 6/15 and measured a temp of 99F, otherwise has not had chills since. She has not been coughing.       PAST HISTORY:     Past Medical History:   Diagnosis Date    Abnormal Pap smear of cervix     ADHD     Allergy     Flexeril and Lodine    Anemia     Anxiety     Arthritis     Live in pain    Bleeding disorder     Heavy period    Breast disorder     Clotting disorder     Dyspareunia     Fever blister     Fibromyalgia     Headache 6 months    IC (interstitial cystitis)     Immune disorder     [facility]    [facility]    Memory loss 2 years    Confusion memory loss forgetting things places    Muscle spasm     radiates from back    Sleep apnea     cpap used    Systemic lupus erythematosus     Tests  positive 1:640    Urinary incontinence        Past Surgical History:   Procedure Laterality Date    BREAST BIOPSY Left 2014    fibroadenoma     SECTION  2012    X 2---JST FOR KJB (BREECH)    COLONOSCOPY N/A 2025    Procedure: COLONOSCOPY;  Surgeon: Cristofer Abraham MD;  Location: [address]);  Service: Endoscopy;  Laterality: N/A;    DILATION AND CURETTAGE OF UTERUS      KJB    ESOPHAGOGASTRODUODENOSCOPY N/A 2025    Procedure: EGD (ESOPHAGOGASTRODUODENOSCOPY);  Surgeon: Cristofer Abraham MD;  Location: [facility] ([address] [place] [address]);  Service: Endoscopy;  Laterality: N/A;  Jm/referral l norris/prep inst given in office  / constipation prep/zepbound/peg prep  1/24 - precall complete, pt confirmed holding on zepbound - mf    PELVIC LAPAROSCOPY      [facility]    SKIN BIOPSY      [facility]    VAGINAL DELIVERY         MEDICATIONS & ALLERGIES:     Current Outpatient Medications on File Prior to Visit   Medication Sig    acetaminophen (TYLENOL) 500 MG tablet Take 1-2 tablets (500-1,000 mg total) by mouth 3 (three) times daily as needed for Pain.    cholecalciferol, vitamin D3, (VITAMIN D3) 25 mcg (1,000 unit) capsule TAKE 2 CAPSULES (2,000 UNITS TOTAL) BY MOUTH ONCE DAILY.    clobetasol 0.05% (TEMOVATE) 0.05 % Oint Apply topically 2 (two) times daily. Use1 gram to affected areas for up to 2 weeks then take a 1 week break or decrease to 3 times weekly. Do not apply to groin or face. Use to spot on hand    dextroamphetamine-amphetamine (ADDERALL XR) 10 MG 24 hr capsule Take 1 capsule (10 mg total) by mouth every morning.    dextroamphetamine-amphetamine (ADDERALL XR) 10 MG 24 hr capsule Take 1 capsule (10 mg total) by mouth every morning.    DULoxetine (CYMBALTA) 60 MG capsule Take 1 capsule (60 mg total) by mouth once daily.    fluocinolone (SYNALAR) 0.01 % external solution AAA scalp qday - bid prn itching or scaling    fluticasone propionate (FLONASE) 50 mcg/actuation nasal spray SPRAY 1 SPRAY INTO EACH NOSTRIL TWICE A DAY    gabapentin (NEURONTIN) 300 MG capsule Take 1 capsule (300 mg total) by mouth 2 (two) times daily. Alternatively, may take 2 capsules (600 mg total) at bedtime    hydrOXYzine HCL (ATARAX) 10 MG Tab Take 1 tablet (10 mg total) by mouth 3 (three) times daily as needed.    ketoconazole (NIZORAL) 2 % cream APPLY TO AFFECTED AREA ON PALM TWICE A DAY    ketoconazole (NIZORAL) 2 % shampoo WASH HAIR WITH MEDICATED SHAMPOO AT LEAST 2X/WEEK - LET SIT ON SCALP AT LEAST 5 MINUTES PRIOR TO RINSING    magnesium oxide (MAG-OX) 400 mg (241.3 mg magnesium) tablet TAKE 1 TABLET BY MOUTH TWICE A DAY    mecobalamin, vitamin B12,  (B12 ACTIVE) 1,000 mcg Chew Take 1,000 mcg by mouth once daily. (Patient not taking: Reported on 4/15/2025)    nitroglycerin (RECTIV) 0.4 % (w/w) Oint Place 1 Application rectally 2 (two) times a day. (Patient not taking: Reported on 12/31/2024)    pantoprazole (PROTONIX) 40 MG tablet Take 1 tablet (40 mg total) by mouth once daily.    polyethylene glycol (COLYTE) 240-22.72-6.72 -5.84 gram SolR Take as directed by provider office (Patient not taking: Reported on 4/15/2025)    pyridoxine, vitamin B6, (B-6) 50 MG Tab Take 1 tablet (50 mg total) by mouth once daily.    tacrolimus (PROTOPIC) 0.1 % ointment APPLY TOPICALLY 2 (TWO) TIMES DAILY. NON-STEROID. USE TO AREAS OF ECZEMA WHEN TAKING A BREAK FROM CLOBETASOL (Patient not taking: Reported on 4/15/2025)    tiZANidine (ZANAFLEX) 2 MG tablet TAKE 1 TABLET BY MOUTH TWICE A DAY AS NEEDED FOR MUSCLE SPASMS    topiramate (TOPAMAX) 25 MG tablet Take 1 tablet (25 mg total) by mouth 2 (two) times daily.    triamcinolone acetonide 0.1% (KENALOG) 0.1 % ointment APPLY TOPICALLY 2 (TWO) TIMES DAILY TO LEFT THUMB    TURMERIC ORAL Take by mouth.    valACYclovir (VALTREX) 500 MG tablet TAKE 1 TABLET BY MOUTH EVERY DAY    vitamin E acetate (VITAMIN E ORAL) Take by mouth once daily.     No current facility-administered medications on file prior to visit.       Review of patient's allergies indicates:   Allergen Reactions    Flexeril  [cyclobenzaprine]      Other reaction(s): Rash    Lodine  [etodolac] Rash       OBJECTIVE:   Vital Signs:  Vitals:    06/18/25 1514   Pulse: 80   Resp: 12       No results found for this or any previous visit (from the past 24 hours).      Physical Exam  HENT:      Head: Normocephalic and atraumatic.      Right Ear: Tympanic membrane and ear canal normal.      Left Ear: Tympanic membrane and ear canal normal.      Mouth/Throat:      Mouth: Mucous membranes are moist.      Pharynx: Oropharynx is clear. Posterior oropharyngeal erythema present. No  "oropharyngeal exudate.   Eyes:      General: No scleral icterus.     Conjunctiva/sclera: Conjunctivae normal.   Cardiovascular:      Rate and Rhythm: Normal rate and regular rhythm.      Heart sounds: Normal heart sounds.   Pulmonary:      Effort: Pulmonary effort is normal.      Breath sounds: Normal breath sounds.   Musculoskeletal:         General: Normal range of motion.      Cervical back: Normal range of motion.   Lymphadenopathy:      Cervical: Cervical adenopathy (mild) present.   Skin:     General: Skin is warm and dry.   Neurological:      General: No focal deficit present.      Mental Status: She is alert and oriented to person, place, and time.   Psychiatric:         Mood and Affect: Mood normal.         Behavior: Behavior normal.         Laboratory  Lab Results   Component Value Date    WBC 5.60 06/13/2025    HGB 13.4 06/13/2025    HCT 39.6 06/13/2025    MCV 92 06/13/2025     06/13/2025     Sodium   Date Value Ref Range Status   06/13/2025 139 136 - 145 mmol/L Final     Potassium   Date Value Ref Range Status   06/13/2025 4.1 3.5 - 5.1 mmol/L Final     Chloride   Date Value Ref Range Status   06/13/2025 104 95 - 110 mmol/L Final     CO2   Date Value Ref Range Status   06/13/2025 25 23 - 29 mmol/L Final     BUN   Date Value Ref Range Status   06/13/2025 14 6 - 20 mg/dL Final     Anion Gap   Date Value Ref Range Status   06/13/2025 10 8 - 16 mmol/L Final     Lab Results   Component Value Date    LDLCALC 139.0 05/10/2024    HDL 56 05/10/2024    TRIG 95 05/10/2024    CHOL 214 (H) 05/10/2024     No results found for: "INR", "PROTIME"  Lab Results   Component Value Date    HGBA1C 5.2 04/04/2025     No results for input(s): "POCTGLUCOSE" in the last 72 hours.      ASSESSMENT & PLAN:     Ms. Rema Horton is a 50 y.o. female w/ an active medical problem list including     1. Acute viral pharyngitis  - (Magic mouthwash) 1:1:1 diphenhydrAMINE(Benadryl) 12.5mg/5ml liq, aluminum & magnesium " hydroxide-simethicone (Maalox), LIDOcaine viscous 2%; Swish and spit 5 mLs every 4 (four) hours as needed (sore throat). for mouth sores  Dispense: 100 mL; Refill: 0  - Based on her exam I do not suspect a bacterial infection. Her only sick contact was her  about a week ago, however his symptoms quickly resolved. I advised her to use the magic mouthwash to numb the oral cavity in order to tolerate fluids and medication. I advised her to RTC if her symptoms do not begin improving in the next 2-3 days.      Follow Up:   No follow-ups on file.      Discussed with Dr. Viveros  - staff attestation to follow        Marino Fulton M.D.  Internal Medicine PGY-3  [facility]

## 2025-06-21 DIAGNOSIS — L21.9 ACUTE SEBORRHEIC DERMATITIS: ICD-10-CM

## 2025-06-21 DIAGNOSIS — L24.5 IRRITANT CONTACT DERMATITIS DUE TO OTHER CHEMICAL PRODUCTS: ICD-10-CM

## 2025-06-21 RX ORDER — TRIAMCINOLONE ACETONIDE 1 MG/G
OINTMENT TOPICAL
Qty: 80 G | Refills: 1 | Status: SHIPPED | OUTPATIENT
Start: 2025-06-21

## 2025-06-21 NOTE — TELEPHONE ENCOUNTER
No care due was identified.  [facility] Embedded Care Due Messages. Reference number: [id number].   6/21/2025 7:08:43 AM CDT

## 2025-06-22 NOTE — TELEPHONE ENCOUNTER
Refill Decision Note   Rema Horton  is requesting a refill authorization.  Brief Assessment and Rationale for Refill:  Approve     Medication Therapy Plan:         Comments:     Note composed:10:39 PM 06/21/2025

## 2025-06-23 RX ORDER — KETOCONAZOLE 20 MG/ML
SHAMPOO, SUSPENSION TOPICAL
Qty: 120 ML | Refills: 5 | Status: SHIPPED | OUTPATIENT
Start: 2025-06-23

## 2025-07-21 ENCOUNTER — PATIENT MESSAGE (OUTPATIENT)
Dept: RHEUMATOLOGY | Facility: CLINIC | Age: 50
End: 2025-07-21
Payer: MEDICAID

## 2025-07-21 ENCOUNTER — TELEPHONE (OUTPATIENT)
Dept: RHEUMATOLOGY | Facility: CLINIC | Age: 50
End: 2025-07-21
Payer: MEDICAID

## 2025-07-21 NOTE — TELEPHONE ENCOUNTER
Left voicemail and sent patient a message with the new appointment time due to Dr. Nunez being out of clinic July 22 @ 2:00.     New appointment time, July 22 @ 11:30.

## 2025-07-22 ENCOUNTER — OFFICE VISIT (OUTPATIENT)
Dept: RHEUMATOLOGY | Facility: CLINIC | Age: 50
End: 2025-07-22
Payer: MEDICAID

## 2025-07-22 ENCOUNTER — HOSPITAL ENCOUNTER (OUTPATIENT)
Dept: RADIOLOGY | Facility: HOSPITAL | Age: 50
Discharge: HOME | End: 2025-07-22
Attending: INTERNAL MEDICINE
Payer: MEDICAID

## 2025-07-22 VITALS
HEIGHT: 62 IN | WEIGHT: 168.19 LBS | DIASTOLIC BLOOD PRESSURE: 98 MMHG | HEART RATE: 69 BPM | BODY MASS INDEX: 30.95 KG/M2 | SYSTOLIC BLOOD PRESSURE: 157 MMHG

## 2025-07-22 DIAGNOSIS — M79.641 BILATERAL HAND PAIN: Primary | ICD-10-CM

## 2025-07-22 DIAGNOSIS — M25.50 POLYARTHRALGIA: ICD-10-CM

## 2025-07-22 DIAGNOSIS — M79.7 FIBROMYALGIA: ICD-10-CM

## 2025-07-22 DIAGNOSIS — M79.642 BILATERAL HAND PAIN: Primary | ICD-10-CM

## 2025-07-22 PROCEDURE — 99214 OFFICE O/P EST MOD 30 MIN: CPT | Mod: PBBFAC,25 | Performed by: INTERNAL MEDICINE

## 2025-07-22 PROCEDURE — 3077F SYST BP >= 140 MM HG: CPT | Mod: CPTII,,, | Performed by: INTERNAL MEDICINE

## 2025-07-22 PROCEDURE — 77077 JOINT SURVEY SINGLE VIEW: CPT | Mod: 26,,, | Performed by: RADIOLOGY

## 2025-07-22 PROCEDURE — 1159F MED LIST DOCD IN RCRD: CPT | Mod: CPTII,,, | Performed by: INTERNAL MEDICINE

## 2025-07-22 PROCEDURE — 3080F DIAST BP >= 90 MM HG: CPT | Mod: CPTII,,, | Performed by: INTERNAL MEDICINE

## 2025-07-22 PROCEDURE — 3044F HG A1C LEVEL LT 7.0%: CPT | Mod: CPTII,,, | Performed by: INTERNAL MEDICINE

## 2025-07-22 PROCEDURE — 99215 OFFICE O/P EST HI 40 MIN: CPT | Mod: S$PBB,,, | Performed by: INTERNAL MEDICINE

## 2025-07-22 PROCEDURE — 77077 JOINT SURVEY SINGLE VIEW: CPT | Mod: TC

## 2025-07-22 PROCEDURE — 99999 PR PBB SHADOW E&M-EST. PATIENT-LVL IV: CPT | Mod: PBBFAC,,, | Performed by: INTERNAL MEDICINE

## 2025-07-22 PROCEDURE — 3008F BODY MASS INDEX DOCD: CPT | Mod: CPTII,,, | Performed by: INTERNAL MEDICINE

## 2025-07-22 RX ORDER — GABAPENTIN 100 MG/1
CAPSULE ORAL
Qty: 60 CAPSULE | Refills: 11 | Status: SHIPPED | OUTPATIENT
Start: 2025-07-22

## 2025-07-22 NOTE — PROGRESS NOTES
Patient ID: Rema Horton is a 39 y.o. Female her for joint pain and +FELIPA     Chief Complaint: Joint Pain     HPI   40 yo F with new left breast nodule awaiting biopsy here for joint pain  -reports fatigue for 2010  - lower back and buttock pain when lying down since 2010  -+fatigue  -no recent change  -burning sensation IN UPPER BACK  -HAD mri pelvis in summer 2013  overall unrevealing  -had hair loss  For last 2 years  -no joint swelling or stiffness  -oral ulcers this week (painful with eating)  +photosensitivity since childhood      Interval history: she is seen Dr. Rodriguez. She has diffuse itching for about 6 months.  She continues to have severe fatigue. She takes gabapentin 300mg in afternoon. She is very stiff in morning.  She has pain in knees.                     Past Medical History   Diagnosis Date    Menarche         Age of onset 13                   Review of Systems   Constitutional: Positive for fatigue. Negative for chills, diaphoresis, activity change and appetite change.   HENT: Negative for congestion, ear discharge, ear pain, facial swelling, sinus pressure, sneezing, sore throat, tinnitus and trouble swallowing.    Eyes: Negative for photophobia, pain, discharge, redness, itching and visual disturbance.   Respiratory: Negative for apnea, chest tightness, shortness of breath, wheezing and stridor.    Cardiovascular: Negative for chest pain, palpitations and leg swelling.   Gastrointestinal: Negative for nausea, abdominal pain, diarrhea, constipation, blood in stool, abdominal distention and anal bleeding.   Endocrine: Negative for cold intolerance and heat intolerance.   Genitourinary: Negative for dysuria and difficulty urinating.   Musculoskeletal: Positive for back pain and arthralgias. Negative for myalgias, joint swelling, gait problem, neck pain and neck stiffness.   Skin: Negative for color change, pallor and wound.   Neurological: Negative for dizziness, seizures, light-headedness  and numbness.   Hematological: Negative for adenopathy. Does not bruise/bleed easily.   Psychiatric/Behavioral: Negative for sleep disturbance. The patient is not nervous/anxious.           Objective:         Physical Exam   Constitutional: She is oriented to person, place, and time and well-developed, well-nourished, and in no distress.   HENT:   Head: Normocephalic and atraumatic.   Right Ear: External ear normal.   Left Ear: External ear normal.   Nose: Nose normal.   Mouth/Throat: Oropharynx is clear and moist. No oropharyngeal exudate.   Eyes: Conjunctivae and EOM are normal. Pupils are equal, round, and reactive to light. Right eye exhibits no discharge. Left eye exhibits no discharge. No scleral icterus.   Neck: Neck supple. No JVD present. No thyromegaly present.   Cardiovascular: Normal rate, regular rhythm, normal heart sounds and intact distal pulses.  Exam reveals no gallop and no friction rub.    No murmur heard.  Pulmonary/Chest: Effort normal and breath sounds normal. No respiratory distress. She has no wheezes. She has no rales. She exhibits no tenderness.   Abdominal: Soft. Bowel sounds are normal. She exhibits no distension and no mass. There is no tenderness. There is no rebound and no guarding.   Lymphadenopathy:     She has no cervical adenopathy.   Neurological: She is alert and oriented to person, place, and time. No cranial nerve deficit. Gait normal. Coordination normal.   Skin: Skin is dry. No rash noted.. No pallor.     Bilateral cheek and chin erythema   Psychiatric: Affect and judgment normal.   Musculoskeletal: Normal range of motion. She exhibits tenderness. She exhibits no edema.   Tender points throughout entire back          labs and imaging reviewed  [place]bey-1:160  Neg marshall, ro, la, dsdna, rnp,APS panel  Rf,ccp-negative  -normal TSH, T4  Esr-33<34        Lumbar spine xray (2014)- I personally reviewed films and it was negative.  10/2014-Arthritis survey- no erosions ( I personally  reviewed films)     Assessment:     48 yo F with no significant PMH here for follow up of fibromyalgia. Reports diffuse body aches and is very anxious about her health.       # Joint pain:  MRIS from August 2024 not consistent with RA.  Consider repeating MRIs if work up negative.  Labs   Xrays      #.Fibromyalgia-followed in PMR  Start gabapentin 100mg in AM and continue 300 mg po qhs    #Obesity: risks of obesity discussed including increased inflammation.  -discussed diet including anti-inflammatory diet and handout was given  -encouraged daily exercise 30 minutes a day       #Obesity: risks of obesity discussed including increased inflammation.  -discussed diet including anti-inflammatory diet and handout was given  -encouraged daily exercise 30 minutes a day       45  minutes of total time spent on the encounter, which includes face to face time and non-face to face time preparing to see the patient (eg, review of tests), Obtaining and/or reviewing separately obtained history, Documenting clinical information in the electronic or other health record, Independently interpreting results (not separately reported) and communicating results to the patient/family/caregiver, or Care coordination (not separately reported).

## 2025-07-22 NOTE — PROGRESS NOTES
7/22/2025     9:34 AM   Rapid3 Question Responses and Scores   MDHAQ Score 1.8   Psychologic Score 4.4   Pain Score 8   When you awakened in the morning OVER THE LAST WEEK, did you feel stiff? Yes   If Yes, please indicate the number of hours until you are as limber as you will be for the day 3   Fatigue Score 9.5   Global Health Score 7.5   RAPID3 Score 7.17     Answers submitted by the patient for this visit:  Rheumatology Questionnaire (Submitted on 7/22/2025)  fever: Yes  eye redness: Yes  mouth sores: Yes  headaches: Yes  shortness of breath: Yes  chest pain: Yes  trouble swallowing: Yes  diarrhea: No  constipation: Yes  unexpected weight change: Yes  genital sore: Yes  dysuria: Yes  During the last 3 days, have you had a skin rash?: Yes  Bruises or bleeds easily: Yes  cough: Yes

## 2025-07-24 ENCOUNTER — TELEPHONE (OUTPATIENT)
Dept: HEMATOLOGY/ONCOLOGY | Facility: CLINIC | Age: 50
End: 2025-07-24
Payer: MEDICAID

## 2025-07-24 NOTE — TELEPHONE ENCOUNTER
Called patient and confirmed the in office appointment on 7/31/25 and to complete the questionnaire

## 2025-07-31 ENCOUNTER — OFFICE VISIT (OUTPATIENT)
Dept: HEMATOLOGY/ONCOLOGY | Facility: CLINIC | Age: 50
End: 2025-07-31
Payer: MEDICAID

## 2025-07-31 ENCOUNTER — LAB VISIT (OUTPATIENT)
Dept: LAB | Facility: HOSPITAL | Age: 50
End: 2025-07-31
Payer: MEDICAID

## 2025-07-31 ENCOUNTER — PATIENT MESSAGE (OUTPATIENT)
Dept: HEMATOLOGY/ONCOLOGY | Facility: CLINIC | Age: 50
End: 2025-07-31

## 2025-07-31 ENCOUNTER — TELEPHONE (OUTPATIENT)
Dept: OBSTETRICS AND GYNECOLOGY | Facility: CLINIC | Age: 50
End: 2025-07-31
Payer: MEDICAID

## 2025-07-31 DIAGNOSIS — Z80.3 FAMILY HISTORY OF BREAST CANCER: ICD-10-CM

## 2025-07-31 DIAGNOSIS — R20.0 NUMBNESS IN FEET: ICD-10-CM

## 2025-07-31 DIAGNOSIS — Z80.9 FAMILY HISTORY OF CANCER: ICD-10-CM

## 2025-07-31 DIAGNOSIS — Z71.83 ENCOUNTER FOR NONPROCREATIVE GENETIC COUNSELING AND TESTING: Primary | ICD-10-CM

## 2025-07-31 DIAGNOSIS — M79.7 FIBROMYALGIA SYNDROME: ICD-10-CM

## 2025-07-31 DIAGNOSIS — Z13.71 ENCOUNTER FOR NONPROCREATIVE GENETIC COUNSELING AND TESTING: Primary | ICD-10-CM

## 2025-07-31 PROCEDURE — 99999 PR PBB SHADOW E&M-EST. PATIENT-LVL II: CPT | Mod: PBBFAC,,,

## 2025-07-31 PROCEDURE — 99212 OFFICE O/P EST SF 10 MIN: CPT | Mod: PBBFAC

## 2025-07-31 PROCEDURE — 36415 COLL VENOUS BLD VENIPUNCTURE: CPT

## 2025-07-31 RX ORDER — GABAPENTIN 300 MG/1
300 CAPSULE ORAL 2 TIMES DAILY
Qty: 60 CAPSULE | Refills: 2 | Status: SHIPPED | OUTPATIENT
Start: 2025-07-31 | End: 2025-10-29

## 2025-07-31 NOTE — PROGRESS NOTES
Cancer Genetics  Hereditary and High-Risk Clinic  Department of Hematology and Oncology  [facility]    Date of Service:  25  Visit Provider:  Frederic Salas, [facility] [place] [facility] [place]  Collaborating Physician:  Andrew Boyer MD    Patient ID  Name: Rema Horton    : 1975    MRN: [id number]      Referring Provider:   Monica Amaya, Provider  [address] [place] [address]    Visit Information  The patient location is:  [facility].    The chief complaint leading to consultation is:  As below.    Visit type: in-person.    Face-to-face time with patient: 71 minutes.    92 minutes in total were spent on this encounter, which includes face-to-face time and non-face-to-face time preparing to see the patient (e.g., review of tests), obtaining and/or reviewing separately obtained history, documenting clinical information in the electronic or other health record, independently interpreting results (not separately reported) and communicating results to the patient/family/caregiver, or coordinating care (not separately reported).      KAMILLA Horton is a 50 y.o. yo female who was referred to genetic counseling due to her family history of cancer (Rema had originally reported a known familial cancer predisposition, but this turned out to not be the case). Rema's family history does not meet NCCN guidelines for testing. However, much of her family history is unknown. Specifically, Rema is unsure if her mother had ovarian cancer or some other kind of gynecological cancer. She is highly motivated to do testing, citing her concerns for her children. She wished to proceed with testing with understanding that insurance may not cover the cost. Rema was provided with information about financial aid through [employer]. A sample will be submitted to [employer] on 25 for the xG panel, along with insurance information. Results will be available within 2-3  "weeks of sample submission.    SUBJECTIVE      Chief Complaint: Genetic evaluation (family history of cancer)    History of Present Illness (HPI):  Rema Horton ("Rema"), 50 y.o., assigned female sex at birth, is new to the [facility] Department of H[facility]ology and On[facility] and to me.  She was referred by Dr. Nereyda Torres (genetic wellness assessment) for hereditary cancer risk assessment given her family history of cancer.    Age:  50 y.o.   Race and ethnicity:  White,  or /a  Weight:  168 lb  Height:  5'2"  Previous germline cancer genetic testing: No    Cancer History  No personal history of cancer   Masses/tumors/lesions  History of breast fibroadenoma    Breast History  Most recent mammogram: 4/25/24  Mammographic breast density:  scattered fibroglandular densities    FINAL PATHOLOGIC DIAGNOSIS  CORE BIOPSIES FROM LEFT BREAST:  FIBROADENOMA    Breast MRI:  No  Breast biopsy history and findings:  Left breast 2014  Thoracic radiation therapy history:  No    Gynecologic History  Age at menarche:  10y  Age at first live childbirth:  18y   Menopausal status:  premenopausal  Reproductive organs:  Intact    Hormone Use  Estrogen/Progesterone: No  OCPs: N/A  Testosterone: No    Dermatologic History  No issues reported  Abnormal moles/lesions: reports number of moles  Skin cancer screening: No    Focused Social History  Tobacco Use: Low Risk  (6/13/2025)    Patient History     Smoking Tobacco Use: Never     Smokeless Tobacco Use: Never     Passive Exposure: Not on file     Review of Systems   Patient's Distress Score today was 6/10 (with 10 being the worst).  Patient attributes this to her experience with fibromyalgia and caring for her daughter who had genetic testing which was positive for a chromosome 17 duplication and WAC-related neurodevelopmental  disorder. Her daughter follows with Dr. Oneal at [facility]. Patient denies experiencing suicidal or homicidal ideations (SI/HI).  Offered patient " a referral to psychology and patient declines.     FAMILY HISTORY      ONCOLOGY PEDIGREE  Ashkenazi Yazidism:  No  Consanguinity:  No  Hereditary cancer genetic testing in blood relatives: Reports her mother may have had testing in  which was positive, however she has no access to results and seemed unsure if testing was done. Reports it may have been completed in [place].    Family Cancer Pedigree:  Pedigree Image    Rema reported her  family history of cancer as follows:   Mother ( at 56y) diagnosed with cancer, unsure what type but thought to be gynecological, possibly ovarian  Two maternal first cousins () diagnosed with leukemia at 8y  Paternal grandmother (88y) diagnosed with breast cancer at 83y  Paternal grandfather diagnosed with cancer, unknown type, around his head or ear  Paternal maternal great grandmother () diagnosed with throat cancer     A family history of birth defects, intellectual disability, SIDS, sudden early death, multiple miscarriages and consanguinity were denied. Please refer to above pedigree for further details. A larger copy is available for review in the Media tab.     COUNSELING      Pre-test cancer genetic counseling was conducted.        Causes of Cancer:  Cancer occurs when cells grow out of control. Some genes help protect against cancer by controlling the growth of cells. However, mutations (problems) in these genes can prevent them from working properly, increasing the risk of developing cancer.  Sporadic Cancer: Most people who get cancer have sporadic cancer. Sporadic cancer is caused by mutations that occur during the lifetime. These mutations may be caused by aging, environmental exposures (ex. UV radiation, carcinogens), lifestyle (ex. smoking, drinking alcohol, sunbathing, poor diet), other medical conditions (ex. hepatitis, HPV, ulcerative colitis), or other factors.   Hereditary Cancer: A small percentage (5-10%) of people who develop cancer  were born with a mutation already in one of the cancer protection genes.  Familial Cancer: Cancer can also cluster in families that do not have an identifiable mutation. This may be due to shared environmental or lifestyle factors or genetic risk factors that have not been identified or cannot be detected using current technology or panels.     The likelihood of having a hereditary cancer risk depends on many factors including who in the family had cancer, what type of cancer they had, their age at diagnosis, cancer specifics (such as MMR status of an endometrial or colon cancer or type of breast cancer), and previous genetic testing in the family. Typically, the chance is higher for families that have multiple people with the same or related cancers, an individual with multiple cancers, younger ages of cancer, and certain types of cancer (such as pancreatic or triple negative breast cancer).      Possible Results:    Positive (pathogenic or likely pathogenic variant): A genetic variant was found that is suspected or known to impact the function of the gene. The impact of a positive result on an individual's risk of cancer varies based on the gene, specific variant, individual's sex assigned at birth, personal cancer history, other health history (such as surgical history), and family history. A positive result can impact screening and risk management recommendations. However, there are not always available guidelines for management based on a specific gene variant. Family history and personal risk factors should always be considered. Sometimes, a positive result can also have treatment or reproductive implications.   Negative: No clinically significant variants were reported in the tested genes. A negative result does not indicate that an individual cannot develop cancer or even that the individual is at average risk. An individual may still be at an increased risk for cancer based on personal risk factors or  family history. Additionally, there could be a hereditary cancer predisposition that was not included in a chosen panel or is not detected with current technology.   Variant of Uncertain Significance (VUS): A variant was found. However, the lab does not have enough information to determine if the variant is benign (harmless) or pathogenic (impacts the function of the gene). The laboratory may update (reclassify) the variant over time as more information becomes available. When reclassified, most variants of uncertain significance are reclassified to benign/likely benign. Typically, it is not recommended to  based on the presence of a VUS. The chance of finding a VUS varies based on the test performed. Generally, the chance of finding a VUS increases with the number of genes tested and decreases with the amount of testing of that gene (genes that are tested more frequently or for a longer period of time have a lower VUS rate).     Genetic Mutation Inheritance:  When an individual has a gene mutation, their first-degree relatives (parents, children, and siblings) each have a 50% chance of carrying the same mutation. Other, more distant blood relatives can also be at risk of carrying the same mutation. At-risk relatives of an individual with a mutation should consider genetic testing to help determine their risk for cancer.     Genetic Discrimination: The Genetic Information Nondiscrimination Act of 2008 (ELBA) is a [place] federal law that provides some protections against the use of an individual's genetic information by their health insurer and by their employer. Title I of ELBA prohibits most health insurers (except for insurance obtained through a job with the  or the Federal Employees Health Benefits Plan) from utilizing an individual's genetic information to make decisions regarding insurance eligibility or premium charges. Title II of ELBA prohibits covered entities from requesting or  requiring the genetic information of employees and applicants and from using said information to make employment decisions. This does not apply to employers with fewer than 15 employees or to the .  ELBA also does not protect individuals from genetic discrimination by any other type of policy or entity, including but not limited to life insurance, disability insurance, long-term care insurance,  benefits, and Citizen of [place] [employer] benefits.    Genetic Testing Options:   Various genetic testing panel options were discussed along with associated benefits, limitations, and risks.   There are several issues to consider regarding multi-gene testing:  Insurance coverage can vary depending on the genetic test panel(s) ordered.  There are limited data and a lack of clear guidelines regarding degree of cancer risk associated with some of the genes assessed and how to communicate and manage risk for carriers of these genes; this issue is compounded by the low incidence rates of hereditary disease; multi-gene tests include moderate-penetrance genes, and they often also include low-penetrance genes for which there are little available data regarding degree of cancer risk and guidelines for risk management.  Increased likelihood of detecting a genetic variant of unknown significance (VUS).  Increased chance of finding genotypically distinct cell lines (i.e., genetic mosaicism) with next-generation sequencing; clones of non-cancerous cell containing certain genetic mutations have been found in healthy adults undergoing multi-gene testing; this phenomenon can often be attributed to clonal hematopoiesis, a condition in which a hematopoietic stem cell begins making blood cells with the same acquired mutation.    The option for DNA only vs DNA+RNA was discussed. Adding RNA has a small chance of helping to clarify a VUS or detecting genetic variants that DNA only cannot (deep intronic variants). However, it must be  conducted on a blood sample (not saliva).     Genetic Testing Guidelines: Rema's reported family history does not meet the genetic testing criteria established by the National Comprehensive Cancer Network   However, Rema decided to proceed with testing anyway after a discussion regarding various genetic testing panels that could be performed as well as associated risks. Rema has provided informed consent.  The patient understands that the chance of finding a clinically significant variant is low and that insurance may not cover genetic testing.    ASSESSMENT / PLAN      Genetic Testing Logistics:  An outside laboratory performs this genetic testing. Genetic testing typically takes 2-3 weeks to after the sample has been received.  There is a potential for the patient to have out-of-pocket costs related to genetic testing.  Post-test genetic counseling can be conducted once the genetic testing results are available.    Genetic Test Information:  Testing lab: [facility]   Test panel: xG   ICD-10 code(s): Z80.9, Z80.3   Verbal informed consent: Obtained   Specimen type: Blood  (Patient denies blood disorders that would necessitate a skin fibroblast specimen)   Specimen collection by: [facility]   Specimen collection date: 7/31/25   Results expected by: Approximately 2-3 weeks after the genetic testing lab receives the specimen   Results disclosure plan: Post-test visit if positive or complex result; otherwise, results will be communicated by phone call      Follow-up: Post-test genetic counseling will be conducted once the genetic testing results are available.    Rema appeared to have a good understanding of the information. Questions were encouraged and answered to the patient's satisfaction, and she verbalized understanding of the information and agreement with the plan.   Rema is welcome to contact me if she has any questions, concerns, or updates to her family history.      This assessment is based on the history and reports provided, as well as the current scientific knowledge regarding cancer genetics.     Frederic Salas MGC, [facility] [place]  Certified Genetic Counselor  Department of Hematology and Oncology  [facility]    CC:  Dr. Nereyda Anderson *

## 2025-08-01 RX ORDER — TIZANIDINE 2 MG/1
TABLET ORAL
Qty: 60 TABLET | Refills: 5 | Status: SHIPPED | OUTPATIENT
Start: 2025-08-01

## 2025-08-02 DIAGNOSIS — L30.9 DERMATITIS: ICD-10-CM

## 2025-08-11 RX ORDER — CLOBETASOL PROPIONATE 0.5 MG/G
OINTMENT TOPICAL 2 TIMES DAILY
Qty: 45 G | Refills: 0 | Status: SHIPPED | OUTPATIENT
Start: 2025-08-11

## 2025-08-18 ENCOUNTER — PATIENT MESSAGE (OUTPATIENT)
Dept: DERMATOLOGY | Facility: CLINIC | Age: 50
End: 2025-08-18
Payer: MEDICAID

## 2025-08-22 ENCOUNTER — TELEPHONE (OUTPATIENT)
Dept: HEMATOLOGY/ONCOLOGY | Facility: CLINIC | Age: 50
End: 2025-08-22
Payer: MEDICAID

## 2025-08-24 DIAGNOSIS — L24.5 IRRITANT CONTACT DERMATITIS DUE TO OTHER CHEMICAL PRODUCTS: ICD-10-CM

## 2025-08-25 RX ORDER — TRIAMCINOLONE ACETONIDE 1 MG/G
OINTMENT TOPICAL
Qty: 80 G | Refills: 1 | OUTPATIENT
Start: 2025-08-25

## 2025-08-25 RX ORDER — TRIAMCINOLONE ACETONIDE 1 MG/G
OINTMENT TOPICAL 2 TIMES DAILY
Qty: 80 G | Refills: 0 | Status: SHIPPED | OUTPATIENT
Start: 2025-08-25